# Patient Record
Sex: FEMALE | Race: WHITE | NOT HISPANIC OR LATINO | Employment: FULL TIME | URBAN - METROPOLITAN AREA
[De-identification: names, ages, dates, MRNs, and addresses within clinical notes are randomized per-mention and may not be internally consistent; named-entity substitution may affect disease eponyms.]

---

## 2017-09-11 ENCOUNTER — OFFICE VISIT (OUTPATIENT)
Dept: URGENT CARE | Facility: CLINIC | Age: 27
End: 2017-09-11
Payer: COMMERCIAL

## 2017-09-11 VITALS
HEIGHT: 66 IN | RESPIRATION RATE: 16 BRPM | DIASTOLIC BLOOD PRESSURE: 78 MMHG | HEART RATE: 83 BPM | TEMPERATURE: 97 F | BODY MASS INDEX: 45.8 KG/M2 | SYSTOLIC BLOOD PRESSURE: 112 MMHG | OXYGEN SATURATION: 98 % | WEIGHT: 285 LBS

## 2017-09-11 DIAGNOSIS — R11.2 NON-INTRACTABLE VOMITING WITH NAUSEA, UNSPECIFIED VOMITING TYPE: Primary | ICD-10-CM

## 2017-09-11 PROCEDURE — S0119 ONDANSETRON 4 MG: HCPCS | Mod: S$GLB,,, | Performed by: EMERGENCY MEDICINE

## 2017-09-11 PROCEDURE — 99203 OFFICE O/P NEW LOW 30 MIN: CPT | Mod: 25,S$GLB,, | Performed by: EMERGENCY MEDICINE

## 2017-09-11 PROCEDURE — 3008F BODY MASS INDEX DOCD: CPT | Mod: S$GLB,,, | Performed by: EMERGENCY MEDICINE

## 2017-09-11 PROCEDURE — 96372 THER/PROPH/DIAG INJ SC/IM: CPT | Mod: S$GLB,,, | Performed by: EMERGENCY MEDICINE

## 2017-09-11 RX ORDER — ONDANSETRON 2 MG/ML
4 INJECTION INTRAMUSCULAR; INTRAVENOUS
Status: COMPLETED | OUTPATIENT
Start: 2017-09-11 | End: 2017-09-11

## 2017-09-11 RX ORDER — ONDANSETRON HYDROCHLORIDE 8 MG/1
8 TABLET, FILM COATED ORAL EVERY 8 HOURS PRN
Qty: 12 TABLET | Refills: 0 | Status: SHIPPED | OUTPATIENT
Start: 2017-09-11 | End: 2018-08-22

## 2017-09-11 RX ORDER — ONDANSETRON 4 MG/1
4 TABLET, ORALLY DISINTEGRATING ORAL
Status: COMPLETED | OUTPATIENT
Start: 2017-09-11 | End: 2017-09-11

## 2017-09-11 RX ADMIN — ONDANSETRON 4 MG: 4 TABLET, ORALLY DISINTEGRATING ORAL at 03:09

## 2017-09-11 RX ADMIN — ONDANSETRON 4 MG: 2 INJECTION INTRAMUSCULAR; INTRAVENOUS at 03:09

## 2017-09-11 NOTE — PATIENT INSTRUCTIONS
"  Vomiting (Adult)  Vomiting is a common symptom that may be due to different causes. These include gastroenteritis ("stomach flu"), food poisoning and gastritis. There are other more serious causes of vomiting which may be hard to diagnose early in the illness. Therefore, it is important to watch for the warning signs listed below.  The main danger from repeated vomiting is dehydration. This is due to excess loss of water and minerals from the body. When this occurs, body fluids must be replaced.  Home care  · If symptoms are severe, rest at home for the next 24 hours.  · Because your symptoms may be from an infection, wash your hands frequently and well, and use alcohol-based  to avoid spreading the infection to others.  · Wash your hands for at least 20 seconds. Hum the happy birthday song twice for the correct length of time.  · Wash your hands after using the toilet, before and after preparing food, before eating food, after changing a diaper, cleaning a wound, caring for a sick person, and blowing your nose, coughing, or sneezing. You should also wash your hands after caring for someone who is sick, touching pet food, or treats, and touching an animal, or animal waste.  · You may use acetaminophen or NSAID medicines like ibuprofen or naproxen to control fever, unless another medicine was prescribed. If you have chronic liver or kidney disease or ever had a stomach ulcer or GI bleeding, talk with your doctor before using these medicines. Aspirin should never be used in anyone under 18 years of age who is ill with a fever. It may cause severe liver damage. Don't use NSAID medicines if you are already taking one for another condition (like arthritis) or are on aspirin (such as for heart disease, or after a stroke)  · Avoid tobacco and alcohol use, which may worsen your symptoms.  · If medicines for vomiting were prescribed, take as directed.  · Once vomiting stops, then follow these guidelines:  During " the first 12 to 24 hours follow the diet below:  · Fruit juices. Apple, grape juice, clear fruit drinks, and electrolyte replacement drinks.  · Beverages. Soft drinks without caffeine; mineral water (plain or flavored), decaffeinated tea and coffee.  · Soups. Clear broth, consommé and bouillon  · Desserts. Plain gelatin, popsicles and fruit juice bars. As you feel better, you may add 6-8 ounces of yogurt per day.  During the next 24 hours you may add the following to the above:  · Hot cereal, plain toast, bread, rolls, crackers  · Plain noodles, rice, mashed potatoes, chicken noodle or rice soup  · Unsweetened canned fruit (avoid pineapple), bananas  · Limit caffeine and chocolate. No spices or seasonings except salt.  During the next 24 hours:  Gradually resume a normal diet, as you feel better and your symptoms lessen.  Follow-up care  Follow up with your healthcare provider, or as advised.  When to seek medical advice  Call your healthcare provider right away if any of these occur:  · Constant right-sided lower abdominal pain or increasing general abdominal pain  · Continued vomiting (unable to keep liquids down) for 24 hours  · Frequent diarrhea (more than 5 times a day); blood (red or black color) or mucus in diarrhea  · Reduced urine output or extreme thirst  · Weakness, dizziness or fainting  · Unusually drowsy or confused  · Fever of 100.4°F (38°C) oral or higher, or as directed  · Yellow color of the eyes or skin  Date Last Reviewed: 11/16/2015 © 2000-2017 [employer]. [address] [place] [address], [place] [address]. All rights reserved. This information is not intended as a substitute for professional medical care. Always follow your healthcare professional's instructions.      REST AND HYDRATE WITH PLENTY OF FLUIDS  ZOFRAN RX FOR NAUSEA/VOMITING  SLOWLY ADVANCE DIET AS TOLERATED  SEE VOMITING ADVICE ABOVE  TYLENOL OR MOTRIN 600 MG EVERY 6-8 HOURS

## 2017-09-11 NOTE — PROGRESS NOTES
"Subjective:       Patient ID: Divya De Paz is a 27 y.o. female.    Vitals:  height is 5' 6" (1.676 m) and weight is 129.3 kg (285 lb). Her temperature is 97.4 °F (36.3 °C). Her blood pressure is 112/78 and her pulse is 83. Her respiration is 16 and oxygen saturation is 98%.     Chief Complaint: Nausea (pt said she started vomiting when she woke up today)    Pt vomited several times this morning and is currently vomiting. Pt said she drank and  apple cider vinegar drink with breakfast right before she became sick.REPORTS ACUTE ONSET OF CLAMMINESS, NAUSEA, VOMITING, NO DIARRHEA, NO ABDOMINAL PAIN, NO NEAR SYNCOPE.         Nausea   This is a new problem. The current episode started today. The problem occurs constantly. The problem has been unchanged. Associated symptoms include abdominal pain (CRAMPING), nausea and vomiting. Pertinent negatives include no chest pain, chills, coughing or fever. The symptoms are aggravated by drinking and eating. She has tried nothing for the symptoms. The treatment provided no relief.     Review of Systems   Constitution: Negative for chills and fever.   Eyes: Negative for blurred vision and pain.   Cardiovascular: Negative for chest pain.   Respiratory: Negative for cough and shortness of breath.    Musculoskeletal: Negative for back pain.   Gastrointestinal: Positive for abdominal pain (CRAMPING), nausea and vomiting. Negative for constipation, diarrhea, hematochezia and melena.   Genitourinary: Negative for dysuria and hematuria.       Objective:      Physical Exam   Constitutional: She is oriented to person, place, and time. She appears well-developed and well-nourished.   INITIALLY CLAMMY, UNCOMFORTABLE, AFTER ZOFRAN INJECTION, GENERAL APPEARANCE IS MUCH IMPROVED. NO DISTRESS   HENT:   Head: Normocephalic and atraumatic.   Right Ear: External ear normal.   Left Ear: External ear normal.   Nose: Nose normal.   Mouth/Throat: Mucous membranes are normal.   Eyes: " Conjunctivae and lids are normal.   Neck: Trachea normal and full passive range of motion without pain. Neck supple.   Cardiovascular: Normal rate, regular rhythm and normal heart sounds.    Pulmonary/Chest: Effort normal and breath sounds normal. No respiratory distress.   Abdominal: Soft. Normal appearance. She exhibits no distension, no abdominal bruit and no pulsatile midline mass. There is no tenderness.   MILDYLY HYPERACTIVE Bs, ALLOWS FULL PALPATION AND NO TTP IN ANY QUADRANT SPECIFICALLY THE RLQ AND RUQ   Musculoskeletal: Normal range of motion. She exhibits no edema.   Neurological: She is alert and oriented to person, place, and time. She has normal strength.   Skin: Skin is warm, dry and intact. She is not diaphoretic. No pallor.   Psychiatric: She has a normal mood and affect. Her speech is normal and behavior is normal. Cognition and memory are normal.   Nursing note and vitals reviewed.      REASSESSED MULTIPLE TIMES AND REPEAT ABDOMINAL EXAM SHOWS NO TTP OF THE RLQ/RUQ OR ANY OTHER AREA. ATER ZOFRAN ODT AND Im, PATIENT MUCH IMPROVED SUBJECTIVELY AND OBJECTIVELY. C/W AGE VS FOOD POISONING  Assessment:       1. Non-intractable vomiting with nausea, unspecified vomiting type        Plan:         Non-intractable vomiting with nausea, unspecified vomiting type  -     ondansetron disintegrating tablet 4 mg; Take 1 tablet (4 mg total) by mouth one time.  -     ondansetron injection 4 mg; Inject 4 mg into the vein one time.    Other orders  -     ondansetron (ZOFRAN) 8 MG tablet; Take 1 tablet (8 mg total) by mouth every 8 (eight) hours as needed for Nausea.  Dispense: 12 tablet; Refill: 0          REST AND HYDRATE WITH PLENTY OF FLUIDS  ZOFRAN RX FOR NAUSEA/VOMITING  SLOWLY ADVANCE DIET AS TOLERATED  SEE VOMITING ADVICE ABOVE  TYLENOL OR MOTRIN 600 MG EVERY 6-8 HOURS

## 2017-11-20 DIAGNOSIS — N92.6 IRREGULAR MENSTRUAL CYCLE: ICD-10-CM

## 2017-11-20 DIAGNOSIS — E88.819 INSULIN RESISTANCE: ICD-10-CM

## 2017-11-20 RX ORDER — NORGESTIMATE AND ETHINYL ESTRADIOL 7DAYSX3 28
1 KIT ORAL DAILY
Qty: 84 TABLET | Refills: 2 | OUTPATIENT
Start: 2017-11-20 | End: 2017-12-20

## 2018-07-23 ENCOUNTER — TELEPHONE (OUTPATIENT)
Dept: INTERNAL MEDICINE | Facility: CLINIC | Age: 28
End: 2018-07-23

## 2018-08-22 ENCOUNTER — OFFICE VISIT (OUTPATIENT)
Dept: INTERNAL MEDICINE | Facility: CLINIC | Age: 28
End: 2018-08-22
Attending: INTERNAL MEDICINE
Payer: COMMERCIAL

## 2018-08-22 VITALS
WEIGHT: 293 LBS | DIASTOLIC BLOOD PRESSURE: 74 MMHG | BODY MASS INDEX: 47.09 KG/M2 | OXYGEN SATURATION: 98 % | SYSTOLIC BLOOD PRESSURE: 133 MMHG | HEART RATE: 93 BPM | HEIGHT: 66 IN

## 2018-08-22 DIAGNOSIS — F41.9 ANXIETY AND DEPRESSION: ICD-10-CM

## 2018-08-22 DIAGNOSIS — E28.2 PCOS (POLYCYSTIC OVARIAN SYNDROME): ICD-10-CM

## 2018-08-22 DIAGNOSIS — Z00.00 ANNUAL PHYSICAL EXAM: Primary | ICD-10-CM

## 2018-08-22 DIAGNOSIS — F32.A ANXIETY AND DEPRESSION: ICD-10-CM

## 2018-08-22 DIAGNOSIS — E66.01 MORBID OBESITY WITH BMI OF 45.0-49.9, ADULT: ICD-10-CM

## 2018-08-22 PROCEDURE — 99385 PREV VISIT NEW AGE 18-39: CPT | Mod: S$GLB,,, | Performed by: INTERNAL MEDICINE

## 2018-08-22 PROCEDURE — 99999 PR PBB SHADOW E&M-EST. PATIENT-LVL III: CPT | Mod: PBBFAC,,, | Performed by: INTERNAL MEDICINE

## 2018-08-22 RX ORDER — FLUOXETINE HYDROCHLORIDE 20 MG/1
20 CAPSULE ORAL DAILY
Qty: 90 CAPSULE | Refills: 0 | Status: SHIPPED | OUTPATIENT
Start: 2018-08-22 | End: 2018-11-13 | Stop reason: SDUPTHER

## 2018-08-22 NOTE — PROGRESS NOTES
"Subjective:   Patient ID: Divya De Paz is a 28 y.o. female  Chief complaint:   Chief Complaint   Patient presents with    Annual Exam       HPI     Here for annual exam and to [facility] care     Hx of anxiety and depression  - dwain prozac 20mg and well controlled   - no si/hi/hewitt   - was seeing psychiatrist Dr. Nito Gates but copay increasing and doing well on med - would like to see if can further refills from me     PCOS: on ocps - seen by Endocrine in past   Gyn: [facility] - has appt scheduled in Sept    Walking 2 mi per day after new dog   Exercising in gyn - elliptical - to promote wt loss     Review of Systems    Objective:  Vitals:    08/22/18 1141   BP: 133/74   Pulse: 93   SpO2: 98%   Weight: 134.7 kg (296 lb 15.4 oz)   Height: 5' 6" (1.676 m)     Body mass index is 47.93 kg/m².    Physical Exam   Constitutional: She is oriented to person, place, and time. She appears well-developed and well-nourished.   HENT:   Head: Normocephalic and atraumatic.   Right Ear: External ear normal.   Left Ear: External ear normal.   Nose: Nose normal.   Mouth/Throat: Oropharynx is clear and moist. No oropharyngeal exudate.   No carotid bruits   Eyes: Conjunctivae and EOM are normal.   Neck: Neck supple. No thyromegaly present.   Cardiovascular: Normal rate, regular rhythm, normal heart sounds and intact distal pulses.   Pulmonary/Chest: Effort normal and breath sounds normal.   Abdominal: Soft. Bowel sounds are normal.   Musculoskeletal: She exhibits no edema or tenderness.   Lymphadenopathy:     She has no cervical adenopathy.   Neurological: She is alert and oriented to person, place, and time.   Skin: Skin is warm and dry.   Psychiatric: Her behavior is normal. Thought content normal.   Vitals reviewed.      Assessment:  1. Annual physical exam    2. PCOS (polycystic ovarian syndrome)    3. Morbid obesity with BMI of 45.0-49.9, adult    4. Anxiety and depression        Plan:  Divya was seen today for annual " exam.    Diagnoses and all orders for this visit:    Annual physical exam  -     CBC auto differential; Future  -     Comprehensive metabolic panel; Future  -     TSH; Future  -     Lipid panel; Future  -     Hemoglobin A1c; Future    PCOS (polycystic ovarian syndrome)    Morbid obesity with BMI of 45.0-49.9, adult  Recommend daily sunscreen, cardiovascular exercise min 30 min 5 days per week. Seatbelts routinely.    Other orders  -     Cancel: Tdap Vaccine  -     FLUoxetine (PROZAC) 20 MG capsule; Take 1 capsule (20 mg total) by mouth once daily.    Recommend daily sunscreen, cardiovascular exercise min 30 min 5 days per week. Seatbelts routinely.  Cont current meds   Will refill prozac as doing well and sx stable       Health Maintenance   Topic Date Due    TETANUS VACCINE  08/05/2014    Influenza Vaccine  08/01/2018    Pap Smear  09/15/2018 (Originally 10/22/2017)    Lipid Panel  Completed

## 2018-09-14 ENCOUNTER — OFFICE VISIT (OUTPATIENT)
Dept: OBSTETRICS AND GYNECOLOGY | Facility: CLINIC | Age: 28
End: 2018-09-14
Payer: COMMERCIAL

## 2018-09-14 VITALS
WEIGHT: 291 LBS | SYSTOLIC BLOOD PRESSURE: 130 MMHG | DIASTOLIC BLOOD PRESSURE: 78 MMHG | BODY MASS INDEX: 46.77 KG/M2 | HEIGHT: 66 IN

## 2018-09-14 DIAGNOSIS — F41.9 ANXIETY: ICD-10-CM

## 2018-09-14 DIAGNOSIS — Z30.9 ENCOUNTER FOR CONTRACEPTIVE MANAGEMENT, UNSPECIFIED TYPE: Primary | ICD-10-CM

## 2018-09-14 DIAGNOSIS — E88.819 INSULIN RESISTANCE: ICD-10-CM

## 2018-09-14 DIAGNOSIS — Z01.419 ENCOUNTER FOR ANNUAL ROUTINE GYNECOLOGICAL EXAMINATION: ICD-10-CM

## 2018-09-14 DIAGNOSIS — N92.6 IRREGULAR MENSTRUAL CYCLE: ICD-10-CM

## 2018-09-14 LAB
B-HCG UR QL: NEGATIVE
CTP QC/QA: YES

## 2018-09-14 PROCEDURE — 99395 PREV VISIT EST AGE 18-39: CPT | Mod: S$GLB,,, | Performed by: OBSTETRICS & GYNECOLOGY

## 2018-09-14 PROCEDURE — 88175 CYTOPATH C/V AUTO FLUID REDO: CPT

## 2018-09-14 PROCEDURE — 81025 URINE PREGNANCY TEST: CPT | Mod: S$GLB,,, | Performed by: OBSTETRICS & GYNECOLOGY

## 2018-09-14 PROCEDURE — 99999 PR PBB SHADOW E&M-EST. PATIENT-LVL II: CPT | Mod: PBBFAC,,, | Performed by: OBSTETRICS & GYNECOLOGY

## 2018-09-14 RX ORDER — NORGESTIMATE AND ETHINYL ESTRADIOL 7DAYSX3 28
1 KIT ORAL DAILY
Qty: 28 TABLET | Refills: 3 | Status: CANCELLED | OUTPATIENT
Start: 2018-09-14 | End: 2018-10-14

## 2018-09-14 RX ORDER — AMOXICILLIN 500 MG/1
CAPSULE ORAL
COMMUNITY
Start: 2018-08-30 | End: 2018-11-28

## 2018-09-14 RX ORDER — HYDROCODONE BITARTRATE AND ACETAMINOPHEN 5; 325 MG/1; MG/1
TABLET ORAL
COMMUNITY
Start: 2018-08-30 | End: 2018-11-28

## 2018-09-17 NOTE — PROGRESS NOTES
HISTORY OF PRESENT ILLNESS:    Divya De Paz is a 28 y.o. female, , Patient's last menstrual period was 2018.,  presents for a routine exam and has no complaints.  Cycles occur every 1-2 months.     Last visit:   Menarche at age 14 with cycles every 1-2 months, at age 18 every 3-4 months, now every 4-12 months.  Weight at age  lbs,  lbs. No family history of irregular cycles.   Hirsutism increased over the last 2-3 years, hair removal cream every other day.   Patient seen in 10/2014, insurance lapse & returns for follow up today.   Patient had cycle with Provera challenge.    Past Medical History:   Diagnosis Date    Anxiety     Depression     Hidradenitis suppurativa     PCOS (polycystic ovarian syndrome)        History reviewed. No pertinent surgical history.    MEDICATIONS AND ALLERGIES:      Current Outpatient Medications:     amoxicillin (AMOXIL) 500 MG capsule, , Disp: , Rfl:     FLUoxetine (PROZAC) 20 MG capsule, Take 1 capsule (20 mg total) by mouth once daily., Disp: 90 capsule, Rfl: 0    HYDROcodone-acetaminophen (NORCO) 5-325 mg per tablet, , Disp: , Rfl:     norgestimate-ethinyl estradiol (ORTHO TRI-CYCLEN, 28,) 0.18/0.215/0.25 mg-35 mcg (28) tablet, Take 1 tablet by mouth once daily., Disp: 28 tablet, Rfl: 3    Review of patient's allergies indicates:   Allergen Reactions    Cefzil [cefprozil] Hives       Family History   Problem Relation Age of Onset    Diabetes Mother     Depression Mother     Hypertension Father     Depression Father     Arthritis Father     Melanoma Maternal Aunt     Cancer Maternal Grandmother         lung cancer, nonsmoker    Cancer Paternal Grandmother         breast cancer    Acne Brother     Cancer Paternal Aunt 42        breast cancer       Social History     Socioeconomic History    Marital status: Single     Spouse name: Not on file    Number of children: Not on file    Years of education: Not on file    Highest education  level: Not on file   Social Needs    Financial resource strain: Not on file    Food insecurity - worry: Not on file    Food insecurity - inability: Not on file    Transportation needs - medical: Not on file    Transportation needs - non-medical: Not on file   Occupational History    Occupation: claims  and proc     Employer: [employer]   Tobacco Use    Smoking status: Passive Smoke Exposure - Never Smoker    Smokeless tobacco: Never Used    Tobacco comment: social tobacco 3 cigs every 2 weeks    Substance and Sexual Activity    Alcohol use: Yes     Alcohol/week: 0.0 oz     Comment: occasional    Drug use: No    Sexual activity: Not Currently     Partners: Male     Birth control/protection: OCP   Other Topics Concern    Are you pregnant or think you may be? No    Breast-feeding No   Social History Narrative    From [place]    Moved to [facility] 2008    Exercising        COMPREHENSIVE GYN HISTORY:  PAP History: Denies abnormal Paps.  Infection History: Denies STDs. Denies PID.  Benign History: Denies uterine fibroids. Denies ovarian cysts. Denies endometriosis. Denies other conditions.  Cancer History: Denies cervical cancer. Denies uterine cancer or hyperplasia. Denies ovarian cancer. Denies vulvar cancer or pre-cancer. Denies vaginal cancer or pre-cancer. Denies breast cancer. Denies colon cancer.  Sexual Activity History: Reports currently being sexually active  Menstrual History: Monthly. Mod then light flow.   Dysmenorrhea History: Reports mild dysmenorrhea.       ROS:  GENERAL: No weight changes. No swelling. No fatigue. No fever.  CARDIOVASCULAR: No chest pain. No shortness of breath. No leg cramps.   NEUROLOGICAL: No headaches. No vision changes.  BREASTS: No pain. No lumps. No discharge.  ABDOMEN: No pain. No nausea. No vomiting. No diarrhea. No constipation.  REPRODUCTIVE: No abnormal bleeding.   VULVA: No pain. No lesions. No itching.  VAGINA: No relaxation. No itching. No odor. No  "discharge. No lesions.  URINARY: No incontinence. No nocturia. No frequency. No dysuria.    /78   Ht 5' 6" (1.676 m)   Wt 132 kg (291 lb 0.1 oz)   LMP 08/29/2018   BMI 46.97 kg/m²     PE:  APPEARANCE: Well nourished, well developed, in no acute distress.  AFFECT: WNL, alert and oriented x 3.  SKIN: No acne or hirsutism.  NECK: Neck symmetric, without masses or thyromegaly.  NODES: No inguinal, cervical, axillary or femoral lymph node enlargement.  CHEST: Good respiratory effort.   ABDOMEN: Soft. No tenderness or masses. No hepatosplenomegaly. No hernias.  BREASTS: Symmetrical, no skin changes, visible lesions, palpable masses or nipple discharge bilaterally.  PELVIC: External female genitalia without lesions.  Female hair distribution. Adequate perineal body, Normal urethral meatus. Vagina moist and well rugated without lesions or discharge.  No significant cystocele or rectocele present. Cervix pink without lesions, discharge or tenderness. Uterus is 4-6 week size, regular, mobile and nontender. Adnexa without masses or tenderness.  EXTREMITIES: No edema    DIAGNOSIS:  1. Encounter for contraceptive management, unspecified type    2. Irregular menstrual cycle    3. Insulin resistance    4. Encounter for annual routine gynecological examination    5. Anxiety        PLAN:    Orders Placed This Encounter    POCT urine pregnancy    Liquid-based pap smear, screening       COUNSELING:  The patient was counseled today on:  -A.C.S. Pap and pelvic exam guidelines (pap every 3 years), recomendations for yearly mammogram;  -to follow up with her PCP for other health maintenance.    FOLLOW-UP with me annually.   "

## 2018-09-27 DIAGNOSIS — E88.819 INSULIN RESISTANCE: ICD-10-CM

## 2018-09-27 DIAGNOSIS — N92.6 IRREGULAR MENSTRUAL CYCLE: ICD-10-CM

## 2018-09-28 RX ORDER — NORGESTIMATE AND ETHINYL ESTRADIOL 7DAYSX3 28
1 KIT ORAL DAILY
Qty: 84 TABLET | Refills: 2 | Status: SHIPPED | OUTPATIENT
Start: 2018-09-28 | End: 2019-12-30 | Stop reason: SDUPTHER

## 2018-09-29 ENCOUNTER — OFFICE VISIT (OUTPATIENT)
Dept: URGENT CARE | Facility: CLINIC | Age: 28
End: 2018-09-29
Payer: COMMERCIAL

## 2018-09-29 VITALS
RESPIRATION RATE: 18 BRPM | SYSTOLIC BLOOD PRESSURE: 157 MMHG | TEMPERATURE: 98 F | HEIGHT: 66 IN | DIASTOLIC BLOOD PRESSURE: 87 MMHG | BODY MASS INDEX: 46.77 KG/M2 | HEART RATE: 103 BPM | WEIGHT: 291 LBS | OXYGEN SATURATION: 98 %

## 2018-09-29 DIAGNOSIS — S16.1XXA NECK STRAIN, INITIAL ENCOUNTER: Primary | ICD-10-CM

## 2018-09-29 PROCEDURE — 72040 X-RAY EXAM NECK SPINE 2-3 VW: CPT | Mod: FY,S$GLB,, | Performed by: RADIOLOGY

## 2018-09-29 PROCEDURE — 99214 OFFICE O/P EST MOD 30 MIN: CPT | Mod: S$GLB,,, | Performed by: FAMILY MEDICINE

## 2018-09-29 RX ORDER — CYCLOBENZAPRINE HCL 10 MG
10 TABLET ORAL NIGHTLY
Qty: 30 TABLET | Refills: 0 | Status: SHIPPED | OUTPATIENT
Start: 2018-09-29 | End: 2018-10-29

## 2018-09-29 RX ORDER — IBUPROFEN 800 MG/1
800 TABLET ORAL 3 TIMES DAILY
Qty: 30 TABLET | Refills: 0 | Status: SHIPPED | OUTPATIENT
Start: 2018-09-29 | End: 2018-10-09
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
## 2018-09-29 NOTE — PATIENT INSTRUCTIONS
Neck Sprain or Strain  A sudden force that causes turning or bending of the neck can cause sprain or strain. An example would be the force from a car accident. This can stretch or tear muscles called a strain. It can also stretch or tear ligaments called a sprain. Either of these can cause neck pain. Sometimes neck pain occurs after a simple awkward movement. In either case, muscle spasm is commonly present and contributes to the pain.     Unless you had a forceful physical injury (for example, a car accident or fall), X-rays are usually not ordered for the initial evaluation of neck pain. If pain continues and dose not respond to medical treatment, X-rays and other tests may be performed at a later time.  Home care  · You may feel more soreness and spasm the first few days after the injury. Rest until symptoms begin to improve.  · When lying down, use a comfortable pillow or a rolled towel that supports the head and keeps the spine in a neutral position. The position of the head should not be tilted forward or backward.  · Apply an ice pack over the injured area for 15 to 20 minutes every 3 to 6 hours. You should do this for the first 24 to 48 hours. You can make an ice pack by filling a plastic bag that seals at the top with ice cubes and then wrapping it with a thin towel. After 48 hours, apply heat (warm shower or warm bath) for 15 to 20 minutes several times a day, or alternate ice and heat.  · You may use over-the-counter pain medicine to control pain, unless another pain medicine was prescribed. If you have chronic liver or kidney disease or ever had a stomach ulcer or GI bleeding, talk with your healthcare provider before using these medicines.  · If a soft cervical collar was prescribed, it should be worn only for periods of increased pain. It should not be worn for more than 3 hours a day, or for a period longer than 1 to 2 weeks.  Follow-up care  Follow up with your healthcare provider as directed.  Physical therapy may be needed.  Sometimes fractures dont show up on the first X-ray. Bruises and sprains can sometimes hurt as much as a fracture. These injuries can take time to heal completely. If your symptoms dont improve or they get worse, talk with your healthcare provider. You may need a repeat X-ray or other tests. If X-rays were taken, you will be told of any new findings that may affect your care.  Call 911  Call 911 if you have:  · Neck swelling, difficulty or painful swallowing  · Difficulty breathing  · Chest pain  When to seek medical advice  Call your healthcare provider right away if any of these occur:  · Pain becomes worse or spreads into your arms  · Weakness or numbness in one or both arms  Date Last Reviewed: 11/19/2015  © 3707-9108 [employer]. [address] [place] [address], [place] [address]. All rights reserved. This information is not intended as a substitute for professional medical care. Always follow your healthcare professional's instructions.    Follow up with your doctor in a few days as needed.  Return to the urgent care or go to the ER if symptoms get worse.    Jose Dubon MD

## 2018-10-19 ENCOUNTER — LAB VISIT (OUTPATIENT)
Dept: LAB | Facility: OTHER | Age: 28
End: 2018-10-19
Attending: INTERNAL MEDICINE
Payer: COMMERCIAL

## 2018-10-19 DIAGNOSIS — Z00.00 ANNUAL PHYSICAL EXAM: ICD-10-CM

## 2018-10-19 LAB
ALBUMIN SERPL BCP-MCNC: 4 G/DL
ALP SERPL-CCNC: 60 U/L
ALT SERPL W/O P-5'-P-CCNC: 15 U/L
ANION GAP SERPL CALC-SCNC: 9 MMOL/L
AST SERPL-CCNC: 16 U/L
BILIRUB SERPL-MCNC: 0.9 MG/DL
BUN SERPL-MCNC: 7 MG/DL
CALCIUM SERPL-MCNC: 9.5 MG/DL
CHLORIDE SERPL-SCNC: 103 MMOL/L
CHOLEST SERPL-MCNC: 153 MG/DL
CHOLEST/HDLC SERPL: 3.7 {RATIO}
CO2 SERPL-SCNC: 25 MMOL/L
CREAT SERPL-MCNC: 0.6 MG/DL
EST. GFR  (AFRICAN AMERICAN): >60 ML/MIN/1.73 M^2
EST. GFR  (NON AFRICAN AMERICAN): >60 ML/MIN/1.73 M^2
ESTIMATED AVG GLUCOSE: 91 MG/DL
GLUCOSE SERPL-MCNC: 86 MG/DL
HBA1C MFR BLD HPLC: 4.8 %
HDLC SERPL-MCNC: 41 MG/DL
HDLC SERPL: 26.8 %
LDLC SERPL CALC-MCNC: 75.2 MG/DL
NONHDLC SERPL-MCNC: 112 MG/DL
POTASSIUM SERPL-SCNC: 4 MMOL/L
PROT SERPL-MCNC: 7.5 G/DL
SODIUM SERPL-SCNC: 137 MMOL/L
TRIGL SERPL-MCNC: 184 MG/DL
TSH SERPL DL<=0.005 MIU/L-ACNC: 1.88 UIU/ML

## 2018-10-19 PROCEDURE — 80053 COMPREHEN METABOLIC PANEL: CPT

## 2018-10-19 PROCEDURE — 36415 COLL VENOUS BLD VENIPUNCTURE: CPT

## 2018-10-19 PROCEDURE — 84443 ASSAY THYROID STIM HORMONE: CPT

## 2018-10-19 PROCEDURE — 80061 LIPID PANEL: CPT

## 2018-10-19 PROCEDURE — 83036 HEMOGLOBIN GLYCOSYLATED A1C: CPT

## 2018-10-25 DIAGNOSIS — S16.1XXA NECK STRAIN, INITIAL ENCOUNTER: ICD-10-CM

## 2018-10-26 RX ORDER — CYCLOBENZAPRINE HCL 10 MG
TABLET ORAL
Qty: 30 TABLET | Refills: 0 | OUTPATIENT
Start: 2018-10-26

## 2018-11-09 DIAGNOSIS — S16.1XXA NECK STRAIN, INITIAL ENCOUNTER: ICD-10-CM

## 2018-11-09 RX ORDER — CYCLOBENZAPRINE HCL 10 MG
TABLET ORAL
Qty: 30 TABLET | Refills: 0 | OUTPATIENT
Start: 2018-11-09

## 2018-11-13 RX ORDER — FLUOXETINE HYDROCHLORIDE 20 MG/1
20 CAPSULE ORAL DAILY
Qty: 90 CAPSULE | Refills: 3 | Status: SHIPPED | OUTPATIENT
Start: 2018-11-13 | End: 2019-07-07 | Stop reason: SDUPTHER

## 2018-11-13 NOTE — TELEPHONE ENCOUNTER
----- Message from Juan Alberto Pompa sent at 11/13/2018  3:14 PM CST -----  Contact: Kary ([facility])      Can the clinic reply in [facility]: no      Please refill the medication(s) listed below. Please call the patient when the prescription(s) is ready for  at this phone number          Medication #1 FLUoxetine (PROZAC) 20 MG capsule (90 day requested)    Preferred Pharmacy: [id number] - [address]

## 2018-11-28 ENCOUNTER — OFFICE VISIT (OUTPATIENT)
Dept: URGENT CARE | Facility: CLINIC | Age: 28
End: 2018-11-28
Payer: COMMERCIAL

## 2018-11-28 VITALS
BODY MASS INDEX: 45 KG/M2 | DIASTOLIC BLOOD PRESSURE: 73 MMHG | WEIGHT: 280 LBS | HEART RATE: 97 BPM | SYSTOLIC BLOOD PRESSURE: 143 MMHG | TEMPERATURE: 99 F | HEIGHT: 66 IN | RESPIRATION RATE: 18 BRPM | OXYGEN SATURATION: 95 %

## 2018-11-28 DIAGNOSIS — R11.2 NAUSEA AND VOMITING, INTRACTABILITY OF VOMITING NOT SPECIFIED, UNSPECIFIED VOMITING TYPE: Primary | ICD-10-CM

## 2018-11-28 PROCEDURE — 99214 OFFICE O/P EST MOD 30 MIN: CPT | Mod: S$GLB,,, | Performed by: NURSE PRACTITIONER

## 2018-11-28 PROCEDURE — S0119 ONDANSETRON 4 MG: HCPCS | Mod: S$GLB,,, | Performed by: EMERGENCY MEDICINE

## 2018-11-28 RX ORDER — ONDANSETRON 4 MG/1
4 TABLET, ORALLY DISINTEGRATING ORAL EVERY 8 HOURS PRN
Qty: 12 TABLET | Refills: 0 | Status: SHIPPED | OUTPATIENT
Start: 2018-11-28 | End: 2021-08-24

## 2018-11-28 RX ORDER — ONDANSETRON 4 MG/1
4 TABLET, ORALLY DISINTEGRATING ORAL
Status: COMPLETED | OUTPATIENT
Start: 2018-11-28 | End: 2018-11-28

## 2018-11-28 RX ADMIN — ONDANSETRON 4 MG: 4 TABLET, ORALLY DISINTEGRATING ORAL at 02:11

## 2018-11-28 NOTE — LETTER
November 28, 2018      [facility]  [address] [address]  Phone: [phone]  Fax: [phone]       Patient: Divya De Paz   YOB: 1990  Date of Visit: 11/28/2018    To Whom It May Concern:    Mita De Paz  was at [facility] on 11/28/2018. She may return to work/school on 11/29/2018 with no restrictions. If you have any questions or concerns, or if I can be of further assistance, please do not hesitate to contact me.    Sincerely,        Kary Ventura NP

## 2018-11-28 NOTE — PATIENT INSTRUCTIONS
"  You must understand that you've received an Urgent Care treatment only and that you may be released before all your medical problems are known or treated. You, the patient, will arrange for follow up care as instructed.  If your condition worsens we recommend that you receive another evaluation at the emergency room immediately or contact your primary medical clinics after hours call service to discuss your concerns.  Please return here or go to the Emergency Department for any concerns or worsening of condition.      Vomiting (Adult)  Vomiting is a common symptom that may be due to different causes. These include gastroenteritis ("stomach flu"), food poisoning and gastritis. There are other more serious causes of vomiting which may be hard to diagnose early in the illness. Therefore, it is important to watch for the warning signs listed below.  The main danger from repeated vomiting is dehydration. This is due to excess loss of water and minerals from the body. When this occurs, body fluids must be replaced.  Home care  · If symptoms are severe, rest at home for the next 24 hours.  · Because your symptoms may be from an infection, wash your hands frequently and well, and use alcohol-based  to avoid spreading the infection to others.  · Wash your hands for at least 20 seconds. Hum the happy birthday song twice for the correct length of time.  · Wash your hands after using the toilet, before and after preparing food, before eating food, after changing a diaper, cleaning a wound, caring for a sick person, and blowing your nose, coughing, or sneezing. You should also wash your hands after caring for someone who is sick, touching pet food, or treats, and touching an animal, or animal waste.  · You may use acetaminophen or NSAID medicines like ibuprofen or naproxen to control fever, unless another medicine was prescribed. If you have chronic liver or kidney disease or ever had a stomach ulcer or GI bleeding, " talk with your doctor before using these medicines. Aspirin should never be used in anyone under 18 years of age who is ill with a fever. It may cause severe liver damage. Don't use NSAID medicines if you are already taking one for another condition (like arthritis) or are on aspirin (such as for heart disease, or after a stroke)  · Avoid tobacco and alcohol use, which may worsen your symptoms.  · If medicines for vomiting were prescribed, take as directed.  · Once vomiting stops, then follow these guidelines:  During the first 12 to 24 hours follow the diet below:  · Fruit juices. Apple, grape juice, clear fruit drinks, and electrolyte replacement drinks.  · Beverages. Soft drinks without caffeine; mineral water (plain or flavored), decaffeinated tea and coffee.  · Soups. Clear broth, consommé and bouillon  · Desserts. Plain gelatin, popsicles and fruit juice bars. As you feel better, you may add 6-8 ounces of yogurt per day.  During the next 24 hours you may add the following to the above:  · Hot cereal, plain toast, bread, rolls, crackers  · Plain noodles, rice, mashed potatoes, chicken noodle or rice soup  · Unsweetened canned fruit (avoid pineapple), bananas  · Limit caffeine and chocolate. No spices or seasonings except salt.  During the next 24 hours:  Gradually resume a normal diet, as you feel better and your symptoms lessen.  Follow-up care  Follow up with your healthcare provider, or as advised.  When to seek medical advice  Call your healthcare provider right away if any of these occur:  · Constant right-sided lower abdominal pain or increasing general abdominal pain  · Continued vomiting (unable to keep liquids down) for 24 hours  · Frequent diarrhea (more than 5 times a day); blood (red or black color) or mucus in diarrhea  · Reduced urine output or extreme thirst  · Weakness, dizziness or fainting  · Unusually drowsy or confused  · Fever of 100.4°F (38°C) oral or higher, or as directed  · Yellow color  of the eyes or skin  Date Last Reviewed: 11/16/2015  © 1317-9046 [employer]. [address] [place] [address], [place] [address]. All rights reserved. This information is not intended as a substitute for professional medical care. Always follow your healthcare professional's instructions.

## 2018-11-28 NOTE — PROGRESS NOTES
"Subjective:       Patient ID: Divya De Paz is a 28 y.o. female.    Vitals:  height is 5' 6" (1.676 m) and weight is 127 kg (280 lb). Her oral temperature is 98.5 °F (36.9 °C). Her blood pressure is 143/73 (abnormal) and her pulse is 97. Her respiration is 18 and oxygen saturation is 95%.     Chief Complaint: Emesis    Patient states the vomiting started after she drank a cup of coffee. LMP last week per patient. Denies any constipation or diarrhea at this time. Denies any fevers.       Emesis    This is a new problem. The current episode started today. The problem occurs 5 to 10 times per day. The problem has been unchanged. The emesis has an appearance of bile. There has been no fever. Pertinent negatives include no abdominal pain, arthralgias, chest pain, chills, coughing, diarrhea, dizziness, fever, headaches, myalgias, sweats, URI or weight loss. Risk factors include suspect food intake. Treatments tried: Peptobismol. The treatment provided no relief.       Constitution: Negative for chills, fatigue and fever.   HENT: Negative for congestion and sore throat.    Neck: Negative for painful lymph nodes.   Cardiovascular: Negative for chest pain and leg swelling.   Eyes: Negative for double vision and blurred vision.   Respiratory: Negative for cough and shortness of breath.    Gastrointestinal: Positive for nausea and vomiting. Negative for abdominal pain, constipation and diarrhea.   Genitourinary: Negative for dysuria, frequency, urgency and history of kidney stones.   Musculoskeletal: Negative for joint pain, joint swelling, muscle cramps and muscle ache.   Skin: Negative for color change, pale, rash and bruising.   Allergic/Immunologic: Negative for seasonal allergies.   Neurological: Negative for dizziness, history of vertigo, light-headedness, passing out and headaches.   Hematologic/Lymphatic: Negative for swollen lymph nodes.   Psychiatric/Behavioral: Negative for nervous/anxious, sleep disturbance " and depression. The patient is not nervous/anxious.        Objective:      Physical Exam   Constitutional: She is oriented to person, place, and time. She appears well-developed and well-nourished.   HENT:   Head: Normocephalic and atraumatic.   Right Ear: External ear normal.   Left Ear: External ear normal.   Nose: Nose normal.   Mouth/Throat: Mucous membranes are normal.   Eyes: Conjunctivae and lids are normal.   Neck: Trachea normal and full passive range of motion without pain. Neck supple.   Cardiovascular: Normal rate, regular rhythm and normal heart sounds.   Pulmonary/Chest: Effort normal and breath sounds normal. No respiratory distress.   Abdominal: Soft. Normal appearance and bowel sounds are normal. She exhibits no distension, no abdominal bruit, no pulsatile midline mass and no mass. There is no tenderness.     There is no tenderness or guarding throughout exam. Bowel sounds hyperactive.    Musculoskeletal: Normal range of motion. She exhibits no edema.   Neurological: She is alert and oriented to person, place, and time. She has normal strength.   Skin: Skin is warm, dry and intact. She is not diaphoretic. No pallor.   Psychiatric: She has a normal mood and affect. Her speech is normal and behavior is normal. Judgment and thought content normal. Cognition and memory are normal.   Nursing note and vitals reviewed.        Assessment:       1. Nausea and vomiting, intractability of vomiting not specified, unspecified vomiting type        Plan:         Discussed importance of oral rehydration and when it is appropriate to seek care. Pt verb an understanding.     Nausea and vomiting, intractability of vomiting not specified, unspecified vomiting type  -     ondansetron disintegrating tablet 4 mg  -     ondansetron (ZOFRAN-ODT) 4 MG TbDL; Take 1 tablet (4 mg total) by mouth every 8 (eight) hours as needed (nausea).  Dispense: 12 tablet; Refill: 0            Patient Instructions     You must understand that  "you've received an Urgent Care treatment only and that you may be released before all your medical problems are known or treated. You, the patient, will arrange for follow up care as instructed.  If your condition worsens we recommend that you receive another evaluation at the emergency room immediately or contact your primary medical clinics after hours call service to discuss your concerns.  Please return here or go to the Emergency Department for any concerns or worsening of condition.      Vomiting (Adult)  Vomiting is a common symptom that may be due to different causes. These include gastroenteritis ("stomach flu"), food poisoning and gastritis. There are other more serious causes of vomiting which may be hard to diagnose early in the illness. Therefore, it is important to watch for the warning signs listed below.  The main danger from repeated vomiting is dehydration. This is due to excess loss of water and minerals from the body. When this occurs, body fluids must be replaced.  Home care  · If symptoms are severe, rest at home for the next 24 hours.  · Because your symptoms may be from an infection, wash your hands frequently and well, and use alcohol-based  to avoid spreading the infection to others.  · Wash your hands for at least 20 seconds. Hum the happy birthday song twice for the correct length of time.  · Wash your hands after using the toilet, before and after preparing food, before eating food, after changing a diaper, cleaning a wound, caring for a sick person, and blowing your nose, coughing, or sneezing. You should also wash your hands after caring for someone who is sick, touching pet food, or treats, and touching an animal, or animal waste.  · You may use acetaminophen or NSAID medicines like ibuprofen or naproxen to control fever, unless another medicine was prescribed. If you have chronic liver or kidney disease or ever had a stomach ulcer or GI bleeding, talk with your doctor before " using these medicines. Aspirin should never be used in anyone under 18 years of age who is ill with a fever. It may cause severe liver damage. Don't use NSAID medicines if you are already taking one for another condition (like arthritis) or are on aspirin (such as for heart disease, or after a stroke)  · Avoid tobacco and alcohol use, which may worsen your symptoms.  · If medicines for vomiting were prescribed, take as directed.  · Once vomiting stops, then follow these guidelines:  During the first 12 to 24 hours follow the diet below:  · Fruit juices. Apple, grape juice, clear fruit drinks, and electrolyte replacement drinks.  · Beverages. Soft drinks without caffeine; mineral water (plain or flavored), decaffeinated tea and coffee.  · Soups. Clear broth, consommé and bouillon  · Desserts. Plain gelatin, popsicles and fruit juice bars. As you feel better, you may add 6-8 ounces of yogurt per day.  During the next 24 hours you may add the following to the above:  · Hot cereal, plain toast, bread, rolls, crackers  · Plain noodles, rice, mashed potatoes, chicken noodle or rice soup  · Unsweetened canned fruit (avoid pineapple), bananas  · Limit caffeine and chocolate. No spices or seasonings except salt.  During the next 24 hours:  Gradually resume a normal diet, as you feel better and your symptoms lessen.  Follow-up care  Follow up with your healthcare provider, or as advised.  When to seek medical advice  Call your healthcare provider right away if any of these occur:  · Constant right-sided lower abdominal pain or increasing general abdominal pain  · Continued vomiting (unable to keep liquids down) for 24 hours  · Frequent diarrhea (more than 5 times a day); blood (red or black color) or mucus in diarrhea  · Reduced urine output or extreme thirst  · Weakness, dizziness or fainting  · Unusually drowsy or confused  · Fever of 100.4°F (38°C) oral or higher, or as directed  · Yellow color of the eyes or skin  Date  Last Reviewed: 11/16/2015  © 8435-8386 [employer], [employer]. [address] [place] [address], [place] [address]. All rights reserved. This information is not intended as a substitute for professional medical care. Always follow your healthcare professional's instructions.

## 2018-12-21 ENCOUNTER — TELEPHONE (OUTPATIENT)
Dept: OBSTETRICS AND GYNECOLOGY | Facility: CLINIC | Age: 28
End: 2018-12-21

## 2019-07-08 RX ORDER — FLUOXETINE HYDROCHLORIDE 20 MG/1
20 CAPSULE ORAL DAILY
Qty: 90 CAPSULE | Refills: 3 | Status: SHIPPED | OUTPATIENT
Start: 2019-07-08 | End: 2020-10-06 | Stop reason: SDUPTHER

## 2019-07-08 RX ORDER — FLUOXETINE HYDROCHLORIDE 20 MG/1
20 CAPSULE ORAL DAILY
Qty: 90 CAPSULE | Refills: 0 | Status: SHIPPED | OUTPATIENT
Start: 2019-07-08 | End: 2019-12-26 | Stop reason: SDUPTHER

## 2019-07-08 NOTE — TELEPHONE ENCOUNTER
Ms De Paz your prescription has been sent to the pharmacy. You have bood work ordered for 2 months. I need a day and time in September to schedule

## 2019-12-25 DIAGNOSIS — E88.819 INSULIN RESISTANCE: ICD-10-CM

## 2019-12-25 DIAGNOSIS — N92.6 IRREGULAR MENSTRUAL CYCLE: ICD-10-CM

## 2019-12-26 RX ORDER — NORGESTIMATE AND ETHINYL ESTRADIOL 7DAYSX3 28
1 KIT ORAL DAILY
Qty: 84 TABLET | Refills: 2 | OUTPATIENT
Start: 2019-12-26 | End: 2020-01-25

## 2019-12-26 RX ORDER — FLUOXETINE HYDROCHLORIDE 20 MG/1
20 CAPSULE ORAL DAILY
Qty: 30 CAPSULE | Refills: 1 | Status: SHIPPED | OUTPATIENT
Start: 2019-12-26 | End: 2021-08-24

## 2019-12-27 ENCOUNTER — OFFICE VISIT (OUTPATIENT)
Dept: DERMATOLOGY | Facility: CLINIC | Age: 29
End: 2019-12-27
Payer: COMMERCIAL

## 2019-12-27 VITALS — BODY MASS INDEX: 45.19 KG/M2 | WEIGHT: 280 LBS

## 2019-12-27 DIAGNOSIS — L81.4 LENTIGINES: ICD-10-CM

## 2019-12-27 DIAGNOSIS — D18.01 CHERRY ANGIOMA: ICD-10-CM

## 2019-12-27 DIAGNOSIS — D22.9 NEVUS OF MULTIPLE SITES: Primary | ICD-10-CM

## 2019-12-27 PROCEDURE — 99202 OFFICE O/P NEW SF 15 MIN: CPT | Mod: S$GLB,,, | Performed by: DERMATOLOGY

## 2019-12-27 PROCEDURE — 3008F BODY MASS INDEX DOCD: CPT | Mod: CPTII,S$GLB,, | Performed by: DERMATOLOGY

## 2019-12-27 PROCEDURE — 99202 PR OFFICE/OUTPT VISIT, NEW, LEVL II, 15-29 MIN: ICD-10-PCS | Mod: S$GLB,,, | Performed by: DERMATOLOGY

## 2019-12-27 PROCEDURE — 99999 PR PBB SHADOW E&M-EST. PATIENT-LVL II: ICD-10-PCS | Mod: PBBFAC,,, | Performed by: DERMATOLOGY

## 2019-12-27 PROCEDURE — 99999 PR PBB SHADOW E&M-EST. PATIENT-LVL II: CPT | Mod: PBBFAC,,, | Performed by: DERMATOLOGY

## 2019-12-27 PROCEDURE — 3008F PR BODY MASS INDEX (BMI) DOCUMENTED: ICD-10-PCS | Mod: CPTII,S$GLB,, | Performed by: DERMATOLOGY

## 2019-12-27 NOTE — PROGRESS NOTES
Subjective:       Patient ID:  Divya De Paz is a 29 y.o. female who presents for   Chief Complaint   Patient presents with    Mole     face, nose, several years,      New spot on right cheek not painful no tx.,  Also mole on left nose which has grown some.     Mole  - Initial  Affected locations: face  Signs / symptoms: asymptomatic  Aggravated by: nothing  Relieving factors/Treatments tried: nothing        Review of Systems   Constitutional: Negative for fever, chills, weight loss, weight gain, fatigue, night sweats and malaise.   Skin: Negative for daily sunscreen use, activity-related sunscreen use and wears hat.   Hematologic/Lymphatic: Does not bruise/bleed easily.        Objective:    Physical Exam   Constitutional: She appears well-developed and well-nourished. No distress.   Neurological: She is alert and oriented to person, place, and time. She is not disoriented.   Psychiatric: She has a normal mood and affect.   Skin:   Areas Examined (abnormalities noted in diagram):   Scalp / Hair Palpated and Inspected  Head / Face Inspection Performed  Neck Inspection Performed  Back Inspection Performed  RUE Inspected  LUE Inspection Performed                   Diagram Legend     Erythematous scaling macule/papule c/w actinic keratosis       Vascular papule c/w angioma      Pigmented verrucoid papule/plaque c/w seborrheic keratosis      Yellow umbilicated papule c/w sebaceous hyperplasia      Irregularly shaped tan macule c/w lentigo     1-2 mm smooth white papules consistent with Milia      Movable subcutaneous cyst with punctum c/w epidermal inclusion cyst      Subcutaneous movable cyst c/w pilar cyst      Firm pink to brown papule c/w dermatofibroma      Pedunculated fleshy papule(s) c/w skin tag(s)      Evenly pigmented macule c/w junctional nevus     Mildly variegated pigmented, slightly irregular-bordered macule c/w mildly atypical nevus      Flesh colored to evenly pigmented papule c/w intradermal  "nevus       Pink pearly papule/plaque c/w basal cell carcinoma      Erythematous hyperkeratotic cursted plaque c/w SCC      Surgical scar with no sign of skin cancer recurrence      Open and closed comedones      Inflammatory papules and pustules      Verrucoid papule consistent consistent with wart     Erythematous eczematous patches and plaques     Dystrophic onycholytic nail with subungual debris c/w onychomycosis     Umbilicated papule    Erythematous-base heme-crusted tan verrucoid plaque consistent with inflamed seborrheic keratosis     Erythematous Silvery Scaling Plaque c/w Psoriasis     See annotation      Assessment / Plan:        Nevus of multiple sites  The "ABCD" rules to observe pigmented lesions were reviewed.  Brochure provided    Lentigines  The "ABCD" rules to observe pigmented lesions were reviewed.  sunscreen    Cherry angioma  reassurance               Follow up in about 1 year (around 12/27/2020).  "

## 2019-12-30 DIAGNOSIS — N92.6 IRREGULAR MENSTRUAL CYCLE: ICD-10-CM

## 2019-12-30 DIAGNOSIS — E88.819 INSULIN RESISTANCE: ICD-10-CM

## 2019-12-30 RX ORDER — NORGESTIMATE AND ETHINYL ESTRADIOL 7DAYSX3 28
1 KIT ORAL DAILY
Qty: 28 TABLET | Refills: 0 | Status: SHIPPED | OUTPATIENT
Start: 2019-12-30 | End: 2023-02-03 | Stop reason: SDUPTHER

## 2020-04-21 DIAGNOSIS — Z01.84 ANTIBODY RESPONSE EXAMINATION: ICD-10-CM

## 2020-05-21 DIAGNOSIS — Z01.84 ANTIBODY RESPONSE EXAMINATION: ICD-10-CM

## 2020-06-20 DIAGNOSIS — Z01.84 ANTIBODY RESPONSE EXAMINATION: ICD-10-CM

## 2020-07-20 DIAGNOSIS — Z01.84 ANTIBODY RESPONSE EXAMINATION: ICD-10-CM

## 2020-08-19 DIAGNOSIS — Z01.84 ANTIBODY RESPONSE EXAMINATION: ICD-10-CM

## 2020-09-18 DIAGNOSIS — Z01.84 ANTIBODY RESPONSE EXAMINATION: ICD-10-CM

## 2020-10-04 DIAGNOSIS — N92.6 IRREGULAR MENSTRUAL CYCLE: ICD-10-CM

## 2020-10-04 DIAGNOSIS — E88.819 INSULIN RESISTANCE: ICD-10-CM

## 2020-10-05 ENCOUNTER — PATIENT MESSAGE (OUTPATIENT)
Dept: ADMINISTRATIVE | Facility: HOSPITAL | Age: 30
End: 2020-10-05

## 2020-10-05 RX ORDER — NORGESTIMATE AND ETHINYL ESTRADIOL 7DAYSX3 28
1 KIT ORAL DAILY
Qty: 28 TABLET | Refills: 0 | OUTPATIENT
Start: 2020-10-05 | End: 2020-11-04

## 2020-10-05 RX ORDER — FLUOXETINE HYDROCHLORIDE 20 MG/1
20 CAPSULE ORAL DAILY
Qty: 90 CAPSULE | Refills: 3 | OUTPATIENT
Start: 2020-10-05

## 2020-10-05 NOTE — TELEPHONE ENCOUNTER
Care Due:                  Date            Visit Type   Department     Provider  --------------------------------------------------------------------------------    Last Visit: None Found      None         None Found  Next Visit: None Scheduled  None         None Found                                                            Last  Test          Frequency    Reason                     Performed    Due Date  --------------------------------------------------------------------------------    Office Visit  12 months..  FLUoxetine...............  Not Found    Overdue    Powered by [employer]. Reference number: [id number]. 10/04/2020 10:30:55 PM   CDT

## 2020-10-07 RX ORDER — FLUOXETINE HYDROCHLORIDE 20 MG/1
20 CAPSULE ORAL DAILY
Qty: 30 CAPSULE | Refills: 1 | Status: SHIPPED | OUTPATIENT
Start: 2020-10-07 | End: 2021-08-25 | Stop reason: SDUPTHER

## 2020-10-07 NOTE — TELEPHONE ENCOUNTER
No new care gaps identified.  Powered by [employer]. Reference number: [id number]. 10/06/2020 11:32:27 PM   CDT

## 2020-10-18 DIAGNOSIS — Z01.84 ANTIBODY RESPONSE EXAMINATION: ICD-10-CM

## 2020-10-31 ENCOUNTER — HOSPITAL ENCOUNTER (EMERGENCY)
Facility: HOSPITAL | Age: 30
Discharge: HOME | End: 2020-10-31
Attending: SURGERY
Payer: COMMERCIAL

## 2020-10-31 VITALS
HEIGHT: 66 IN | TEMPERATURE: 98 F | SYSTOLIC BLOOD PRESSURE: 157 MMHG | HEART RATE: 95 BPM | WEIGHT: 290.56 LBS | DIASTOLIC BLOOD PRESSURE: 77 MMHG | BODY MASS INDEX: 46.7 KG/M2 | OXYGEN SATURATION: 100 % | RESPIRATION RATE: 18 BRPM

## 2020-10-31 DIAGNOSIS — S61.219A LACERATION OF FINGER WITHOUT FOREIGN BODY WITHOUT DAMAGE TO NAIL, UNSPECIFIED FINGER, UNSPECIFIED LATERALITY, INITIAL ENCOUNTER: Primary | ICD-10-CM

## 2020-10-31 PROCEDURE — 99284 EMERGENCY DEPT VISIT MOD MDM: CPT | Mod: 25

## 2020-10-31 PROCEDURE — 96372 THER/PROPH/DIAG INJ SC/IM: CPT

## 2020-10-31 PROCEDURE — 63600175 PHARM REV CODE 636 W HCPCS: Performed by: SURGERY

## 2020-10-31 PROCEDURE — 12001 RPR S/N/AX/GEN/TRNK 2.5CM/<: CPT

## 2020-10-31 PROCEDURE — 17250 CHEM CAUT OF GRANLTJ TISSUE: CPT

## 2020-10-31 PROCEDURE — 90471 IMMUNIZATION ADMIN: CPT | Performed by: SURGERY

## 2020-10-31 PROCEDURE — 25000003 PHARM REV CODE 250: Performed by: SURGERY

## 2020-10-31 PROCEDURE — 90715 TDAP VACCINE 7 YRS/> IM: CPT | Performed by: SURGERY

## 2020-10-31 RX ORDER — LIDOCAINE HYDROCHLORIDE 10 MG/ML
10 INJECTION, SOLUTION EPIDURAL; INFILTRATION; INTRACAUDAL; PERINEURAL
Status: COMPLETED | OUTPATIENT
Start: 2020-10-31 | End: 2020-10-31

## 2020-10-31 RX ORDER — CLINDAMYCIN HYDROCHLORIDE 300 MG/1
300 CAPSULE ORAL 4 TIMES DAILY
Qty: 28 CAPSULE | Refills: 0 | Status: SHIPPED | OUTPATIENT
Start: 2020-10-31 | End: 2020-11-07

## 2020-10-31 RX ORDER — MUPIROCIN 20 MG/G
1 OINTMENT TOPICAL
Status: COMPLETED | OUTPATIENT
Start: 2020-10-31 | End: 2020-10-31

## 2020-10-31 RX ORDER — MUPIROCIN 20 MG/G
OINTMENT TOPICAL 3 TIMES DAILY
Qty: 15 G | Refills: 0 | Status: SHIPPED | OUTPATIENT
Start: 2020-10-31 | End: 2020-11-10

## 2020-10-31 RX ORDER — HYDROCODONE BITARTRATE AND ACETAMINOPHEN 5; 325 MG/1; MG/1
1 TABLET ORAL EVERY 4 HOURS PRN
Qty: 20 TABLET | Refills: 0 | Status: SHIPPED | OUTPATIENT
Start: 2020-10-31 | End: 2020-11-03

## 2020-10-31 RX ORDER — CLINDAMYCIN PHOSPHATE 150 MG/ML
600 INJECTION, SOLUTION INTRAVENOUS
Status: COMPLETED | OUTPATIENT
Start: 2020-10-31 | End: 2020-10-31

## 2020-10-31 RX ADMIN — MUPIROCIN 22 G: 20 OINTMENT TOPICAL at 10:10

## 2020-10-31 RX ADMIN — LIDOCAINE HYDROCHLORIDE 100 MG: 10 INJECTION, SOLUTION EPIDURAL; INFILTRATION; INTRACAUDAL; PERINEURAL at 10:10

## 2020-10-31 RX ADMIN — CLOSTRIDIUM TETANI TOXOID ANTIGEN (FORMALDEHYDE INACTIVATED), CORYNEBACTERIUM DIPHTHERIAE TOXOID ANTIGEN (FORMALDEHYDE INACTIVATED), BORDETELLA PERTUSSIS TOXOID ANTIGEN (GLUTARALDEHYDE INACTIVATED), BORDETELLA PERTUSSIS FILAMENTOUS HEMAGGLUTININ ANTIGEN (FORMALDEHYDE INACTIVATED), BORDETELLA PERTUSSIS PERTACTIN ANTIGEN, AND BORDETELLA PERTUSSIS FIMBRIAE 2/3 ANTIGEN 0.5 ML: 5; 2; 2.5; 5; 3; 5 INJECTION, SUSPENSION INTRAMUSCULAR at 10:10

## 2020-10-31 RX ADMIN — CLINDAMYCIN PHOSPHATE 600 MG: 150 INJECTION, SOLUTION INTRAMUSCULAR; INTRAVENOUS at 11:10

## 2020-11-01 NOTE — ED NOTES
Discharge instruction and Rx # 3 given.   Patient verbalized understanding.  Discharge home in stable condition.  Ambulated out of ER with steady gait.  No acute distress.

## 2020-11-01 NOTE — ED PROVIDER NOTES
[facility]                                                 Chief Complaint  30 y.o. female with Laceration (Laceration right hand 3rd digit. )      History of Present Illness  Divya De Paz presents to the emergency room with right hand injury  Patient partially cut the tip of her right 3rd finger off tonight with a knife PTA  Patient tangentially cut the tip of her right 3rd finger off, fingernail involved  Additionally, there was no exposed bone, excessive bleeding afterwards  Patient on exam has no exposed bone, but does have bleeding on ER triage  There will be no opportunity for sutures, tissue loss prevents suture closure  Tetanus status will be updated, care was discussed with patient at length  Patient will need Bovie cauterization of the bleeding with hand MD follow-up    The history is provided by the patient   device was not used during this ER visit  Medical history significant for anxiety, depression, hidradenitis and PCOS  No surgical history  Patient is allergic to Cefzil  No significant family history    I have reviewed all of this patient's past medical, surgical, family, and social   histories as well as active allergies and medications documented in the  electronic medical record    Review of Systems and Physical Exam      Review of Systems  -- Constitution - no fever, denies fatigue, no weakness, no chills  -- Eyes - no tearing or redness, no visual disturbance  -- Ear, Nose - no tinnitus or earache, no nasal congestion or discharge  -- Mouth,Throat - no sore throat, no toothache, normal voice, normal swallowing  -- Respiratory - denies cough and congestion, no shortness of breath, no SHETTY  -- Cardiovascular - denies chest pain, no palpitations, denies claudication  -- Gastrointestinal - denies abdominal pain, nausea, vomiting, or diarrhea  -- Genitourinary - no dysuria, denies flank pain, no hematuria, no STD risk  -- Musculoskeletal - right [facility]  finger tip injury  -- Neurological - no headache, denies weakness or seizure; no LOC  -- Skin - denies pallor, rash, or changes in skin. no hives or welts noted    Vital Signs  Her skin temperature is 97.5 °F (36.4 °C).   Her blood pressure is 157/77 and her pulse is 95.   Her respiration is 18 and oxygen saturation is 100%.     Physical Exam  -- Nursing note and vitals reviewed  -- Constitutional: Appears well-developed and well-nourished  -- Head: Atraumatic. Normocephalic. No obvious abnormality  -- Eyes: Pupils are equal and reactive to light. Normal conjunctiva and lids  -- Cardiac: Normal rate, regular rhythm and normal heart sounds  -- Pulmonary: Normal respiratory effort, breath sounds clear to auscultation  -- Abdominal: Soft, no tenderness. Normal bowel sounds. Normal liver edge  -- Musculoskeletal: Normal range of motion, no effusions. Joints stable   -- Neurological: No focal deficits. Showed good interaction with staff  -- Vascular: Posterior tibial, dorsalis pedis and radial pulses 2+ bilaterally    -- Lymphatics: No cervical or peripheral lymphadenopathy. No edema noted  -- Skin: Partial amputation of right 3rd finger tip, nail involvement    Emergency Room Course      Cauterization the right 3rd finger tip  -- Performed by: MONA DSOUZA  -- Consent Done: Emergent Situation  -- Body area: Right 3rd finger tip  -- small avulsion right 3rd finger tip  -- Tendon involvement: none  -- Nerve involvement: none  -- Vascular damage: Small amount of bleeding  -- Anesthesia: Digital block  -- Local anesthetic: lidocaine 1%   -- Anesthetic total: 5 ml  -- Irrigation solution: copious irrigation with saline and Hibiclens  -- Bovie cauterization of bleeding open finger tip, no bone exposed  -- bactroban applied in the ER today   -- The area was bandaged prior to discharge     -- Patient gave verbal consent before the start of the procedure  -- No complications were noted from the procedure  -- The patient  tolerated the procedure well     Medications Given  lidocaine (PF) 10 mg/ml (1%) injection 100 mg (100 mg Infiltration Given 10/31/20 2245)   Tdap vaccine injection 0.5 mL (0.5 mLs Intramuscular Given 10/31/20 2252)   mupirocin 2 % ointment 22 g (22 g Topical (Top) Given 10/31/20 2245)   clindamycin injection 600 mg (600 mg Intramuscular Given 10/31/20 2301)     ED Physician Management  -- Diagnosis management comments: 30 y.o. female with finger tip amputation  -- amputation of right 3rd finger tip obliquely, no bone exposed on exam today  -- wound was immediately irrigated and cleaned with sterile water/Hibiclens  -- there will be no opportunity to suture, patient counseled at length on the plan  -- bleeding amputation site cauterized with Bovie, cleaned, bandaged tonight  -- patient will follow-up with her own primary care doctor and hand specialist  -- return to the ER with any concerning symptoms after discharge today    Diagnosis  [S61.219A] Laceration of finger     Disposition and Plan  -- Disposition: home  -- Condition: stable  -- Follow-up: Patient to follow up with Eva Munson MD in 1-2 days.  -- I advised the patient that we have found no life threatening condition today  -- At this time, I believe the patient is clinically stable for discharge.   -- The patient acknowledges that close follow up with a MD is required   -- Patient agrees to comply with all instruction and direction given in the ER    This note is dictated on M*Modal word recognition program.  There are word recognition mistakes that are occasionally missed on review.             Korey Goodson MD  11/01/20 8905

## 2020-11-17 DIAGNOSIS — Z01.84 ANTIBODY RESPONSE EXAMINATION: ICD-10-CM

## 2021-01-02 ENCOUNTER — IMMUNIZATION (OUTPATIENT)
Dept: INTERNAL MEDICINE | Facility: CLINIC | Age: 31
End: 2021-01-02
Payer: COMMERCIAL

## 2021-01-02 DIAGNOSIS — Z23 NEED FOR VACCINATION: ICD-10-CM

## 2021-01-02 PROCEDURE — 91300 COVID-19, MRNA, LNP-S, PF, 30 MCG/0.3 ML DOSE VACCINE: CPT | Mod: PBBFAC | Performed by: INTERNAL MEDICINE

## 2021-01-23 ENCOUNTER — IMMUNIZATION (OUTPATIENT)
Dept: INTERNAL MEDICINE | Facility: CLINIC | Age: 31
End: 2021-01-23
Payer: COMMERCIAL

## 2021-01-23 DIAGNOSIS — Z23 NEED FOR VACCINATION: Primary | ICD-10-CM

## 2021-01-23 PROCEDURE — 91300 COVID-19, MRNA, LNP-S, PF, 30 MCG/0.3 ML DOSE VACCINE: ICD-10-PCS | Mod: ,,, | Performed by: INTERNAL MEDICINE

## 2021-01-23 PROCEDURE — 0002A COVID-19, MRNA, LNP-S, PF, 30 MCG/0.3 ML DOSE VACCINE: CPT | Mod: CV19,,, | Performed by: INTERNAL MEDICINE

## 2021-01-23 PROCEDURE — 91300 COVID-19, MRNA, LNP-S, PF, 30 MCG/0.3 ML DOSE VACCINE: CPT | Mod: ,,, | Performed by: INTERNAL MEDICINE

## 2021-01-23 PROCEDURE — 0002A COVID-19, MRNA, LNP-S, PF, 30 MCG/0.3 ML DOSE VACCINE: ICD-10-PCS | Mod: CV19,,, | Performed by: INTERNAL MEDICINE

## 2021-03-18 ENCOUNTER — PATIENT MESSAGE (OUTPATIENT)
Dept: RESEARCH | Facility: HOSPITAL | Age: 31
End: 2021-03-18

## 2021-03-26 ENCOUNTER — PATIENT MESSAGE (OUTPATIENT)
Dept: RESEARCH | Facility: HOSPITAL | Age: 31
End: 2021-03-26

## 2021-03-30 ENCOUNTER — CLINICAL SUPPORT (OUTPATIENT)
Dept: OTHER | Facility: CLINIC | Age: 31
End: 2021-03-30
Payer: COMMERCIAL

## 2021-03-30 DIAGNOSIS — Z00.8 ENCOUNTER FOR OTHER GENERAL EXAMINATION: ICD-10-CM

## 2021-03-31 VITALS — HEIGHT: 66 IN | BODY MASS INDEX: 46.9 KG/M2

## 2021-03-31 LAB
GLUCOSE SERPL-MCNC: 120 MG/DL (ref 60–140)
HDLC SERPL-MCNC: 34 MG/DL
POC CHOLESTEROL, LDL (DOCK): 90 MG/DL
POC CHOLESTEROL, TOTAL: 157 MG/DL
TRIGL SERPL-MCNC: 163 MG/DL

## 2021-05-10 ENCOUNTER — TELEPHONE (OUTPATIENT)
Dept: INTERNAL MEDICINE | Facility: CLINIC | Age: 31
End: 2021-05-10

## 2021-08-25 ENCOUNTER — OFFICE VISIT (OUTPATIENT)
Dept: INTERNAL MEDICINE | Facility: CLINIC | Age: 31
End: 2021-08-25
Payer: COMMERCIAL

## 2021-08-25 DIAGNOSIS — F41.1 GAD (GENERALIZED ANXIETY DISORDER): Primary | ICD-10-CM

## 2021-08-25 PROCEDURE — 99214 PR OFFICE/OUTPT VISIT, EST, LEVL IV, 30-39 MIN: ICD-10-PCS | Mod: 95,,, | Performed by: NURSE PRACTITIONER

## 2021-08-25 PROCEDURE — 1160F PR REVIEW ALL MEDS BY PRESCRIBER/CLIN PHARMACIST DOCUMENTED: ICD-10-PCS | Mod: CPTII,,, | Performed by: NURSE PRACTITIONER

## 2021-08-25 PROCEDURE — 1159F MED LIST DOCD IN RCRD: CPT | Mod: CPTII,,, | Performed by: NURSE PRACTITIONER

## 2021-08-25 PROCEDURE — 1160F RVW MEDS BY RX/DR IN RCRD: CPT | Mod: CPTII,,, | Performed by: NURSE PRACTITIONER

## 2021-08-25 PROCEDURE — 1159F PR MEDICATION LIST DOCUMENTED IN MEDICAL RECORD: ICD-10-PCS | Mod: CPTII,,, | Performed by: NURSE PRACTITIONER

## 2021-08-25 PROCEDURE — 99214 OFFICE O/P EST MOD 30 MIN: CPT | Mod: 95,,, | Performed by: NURSE PRACTITIONER

## 2021-08-25 RX ORDER — FLUOXETINE HYDROCHLORIDE 20 MG/1
20 CAPSULE ORAL DAILY
Qty: 30 CAPSULE | Refills: 0 | Status: SHIPPED | OUTPATIENT
Start: 2021-08-25 | End: 2021-10-04 | Stop reason: SDUPTHER

## 2021-08-26 ENCOUNTER — TELEPHONE (OUTPATIENT)
Dept: INTERNAL MEDICINE | Facility: CLINIC | Age: 31
End: 2021-08-26

## 2021-09-22 ENCOUNTER — LAB VISIT (OUTPATIENT)
Dept: INTERNAL MEDICINE | Facility: CLINIC | Age: 31
End: 2021-09-22

## 2021-09-22 DIAGNOSIS — R52 BODY ACHES: Primary | ICD-10-CM

## 2021-09-22 DIAGNOSIS — R52 BODY ACHES: ICD-10-CM

## 2021-09-22 LAB — SARS-COV-2 RDRP RESP QL NAA+PROBE: NEGATIVE

## 2021-09-22 PROCEDURE — U0002 COVID-19 LAB TEST NON-CDC: HCPCS | Performed by: PREVENTIVE MEDICINE

## 2021-10-02 ENCOUNTER — PATIENT MESSAGE (OUTPATIENT)
Dept: INTERNAL MEDICINE | Facility: CLINIC | Age: 31
End: 2021-10-02

## 2021-10-02 DIAGNOSIS — F41.1 GAD (GENERALIZED ANXIETY DISORDER): ICD-10-CM

## 2021-10-04 RX ORDER — FLUOXETINE HYDROCHLORIDE 20 MG/1
20 CAPSULE ORAL DAILY
Qty: 30 CAPSULE | Refills: 11 | Status: SHIPPED | OUTPATIENT
Start: 2021-10-04 | End: 2022-11-10 | Stop reason: SDUPTHER

## 2021-12-23 ENCOUNTER — IMMUNIZATION (OUTPATIENT)
Dept: INTERNAL MEDICINE | Facility: CLINIC | Age: 31
End: 2021-12-23
Payer: COMMERCIAL

## 2021-12-23 DIAGNOSIS — Z23 NEED FOR VACCINATION: Primary | ICD-10-CM

## 2021-12-23 PROCEDURE — 0004A COVID-19, MRNA, LNP-S, PF, 30 MCG/0.3 ML DOSE VACCINE: CPT | Mod: CV19,PBBFAC | Performed by: INTERNAL MEDICINE

## 2022-11-10 DIAGNOSIS — F41.1 GAD (GENERALIZED ANXIETY DISORDER): ICD-10-CM

## 2022-11-10 RX ORDER — FLUOXETINE HYDROCHLORIDE 20 MG/1
20 CAPSULE ORAL DAILY
Qty: 30 CAPSULE | Refills: 0 | Status: SHIPPED | OUTPATIENT
Start: 2022-11-10 | End: 2024-04-03 | Stop reason: SDUPTHER

## 2023-01-16 DIAGNOSIS — F41.1 GAD (GENERALIZED ANXIETY DISORDER): ICD-10-CM

## 2023-01-17 RX ORDER — FLUOXETINE HYDROCHLORIDE 20 MG/1
20 CAPSULE ORAL DAILY
Qty: 30 CAPSULE | Refills: 0 | OUTPATIENT
Start: 2023-01-17

## 2023-01-17 NOTE — TELEPHONE ENCOUNTER
Refill Routing Note   Medication(s) are not appropriate for processing by [facility] for the following reason(s):      - Patient has not been seen in over 15 months by PCP  - Patient has been seen in the ED/Hospital since the last PCP visit    [facility] action(s):  Defer          Medication reconciliation completed: No     Appointments  past 12m or future 3m with PCP    Date Provider   Last Visit   8/22/2018 Eva Munson MD   Next Visit   Visit date not found Eva Munson MD   ED visits in past 90 days: 0        Note composed:9:10 AM 01/17/2023

## 2023-02-03 ENCOUNTER — OFFICE VISIT (OUTPATIENT)
Dept: OBSTETRICS AND GYNECOLOGY | Facility: CLINIC | Age: 33
End: 2023-02-03
Payer: COMMERCIAL

## 2023-02-03 VITALS
BODY MASS INDEX: 47.09 KG/M2 | WEIGHT: 293 LBS | SYSTOLIC BLOOD PRESSURE: 138 MMHG | HEIGHT: 66 IN | DIASTOLIC BLOOD PRESSURE: 86 MMHG

## 2023-02-03 DIAGNOSIS — Z11.51 SCREENING FOR HPV (HUMAN PAPILLOMAVIRUS): ICD-10-CM

## 2023-02-03 DIAGNOSIS — N92.6 IRREGULAR MENSTRUAL CYCLE: ICD-10-CM

## 2023-02-03 DIAGNOSIS — Z80.3 FAMILY HISTORY OF BREAST CANCER: ICD-10-CM

## 2023-02-03 DIAGNOSIS — E88.819 INSULIN RESISTANCE: ICD-10-CM

## 2023-02-03 DIAGNOSIS — Z12.4 ENCOUNTER FOR PAPANICOLAOU SMEAR FOR CERVICAL CANCER SCREENING: ICD-10-CM

## 2023-02-03 DIAGNOSIS — Z01.419 WOMEN'S ANNUAL ROUTINE GYNECOLOGICAL EXAMINATION: Primary | ICD-10-CM

## 2023-02-03 PROCEDURE — 3079F DIAST BP 80-89 MM HG: CPT | Mod: CPTII,S$GLB,, | Performed by: REGISTERED NURSE

## 2023-02-03 PROCEDURE — 3079F PR MOST RECENT DIASTOLIC BLOOD PRESSURE 80-89 MM HG: ICD-10-PCS | Mod: CPTII,S$GLB,, | Performed by: REGISTERED NURSE

## 2023-02-03 PROCEDURE — 3075F PR MOST RECENT SYSTOLIC BLOOD PRESS GE 130-139MM HG: ICD-10-PCS | Mod: CPTII,S$GLB,, | Performed by: REGISTERED NURSE

## 2023-02-03 PROCEDURE — 88175 CYTOPATH C/V AUTO FLUID REDO: CPT | Performed by: REGISTERED NURSE

## 2023-02-03 PROCEDURE — 1159F MED LIST DOCD IN RCRD: CPT | Mod: CPTII,S$GLB,, | Performed by: REGISTERED NURSE

## 2023-02-03 PROCEDURE — 99999 PR PBB SHADOW E&M-EST. PATIENT-LVL III: ICD-10-PCS | Mod: PBBFAC,,, | Performed by: REGISTERED NURSE

## 2023-02-03 PROCEDURE — 87624 HPV HI-RISK TYP POOLED RSLT: CPT | Performed by: REGISTERED NURSE

## 2023-02-03 PROCEDURE — 1160F PR REVIEW ALL MEDS BY PRESCRIBER/CLIN PHARMACIST DOCUMENTED: ICD-10-PCS | Mod: CPTII,S$GLB,, | Performed by: REGISTERED NURSE

## 2023-02-03 PROCEDURE — 1159F PR MEDICATION LIST DOCUMENTED IN MEDICAL RECORD: ICD-10-PCS | Mod: CPTII,S$GLB,, | Performed by: REGISTERED NURSE

## 2023-02-03 PROCEDURE — 1160F RVW MEDS BY RX/DR IN RCRD: CPT | Mod: CPTII,S$GLB,, | Performed by: REGISTERED NURSE

## 2023-02-03 PROCEDURE — 3008F PR BODY MASS INDEX (BMI) DOCUMENTED: ICD-10-PCS | Mod: CPTII,S$GLB,, | Performed by: REGISTERED NURSE

## 2023-02-03 PROCEDURE — 99999 PR PBB SHADOW E&M-EST. PATIENT-LVL III: CPT | Mod: PBBFAC,,, | Performed by: REGISTERED NURSE

## 2023-02-03 PROCEDURE — 3008F BODY MASS INDEX DOCD: CPT | Mod: CPTII,S$GLB,, | Performed by: REGISTERED NURSE

## 2023-02-03 PROCEDURE — 3075F SYST BP GE 130 - 139MM HG: CPT | Mod: CPTII,S$GLB,, | Performed by: REGISTERED NURSE

## 2023-02-03 PROCEDURE — 99385 PREV VISIT NEW AGE 18-39: CPT | Mod: S$GLB,,, | Performed by: REGISTERED NURSE

## 2023-02-03 PROCEDURE — 99385 PR PREVENTIVE VISIT,NEW,18-39: ICD-10-PCS | Mod: S$GLB,,, | Performed by: REGISTERED NURSE

## 2023-02-03 RX ORDER — NORGESTIMATE AND ETHINYL ESTRADIOL 7DAYSX3 28
1 KIT ORAL DAILY
Qty: 90 TABLET | Refills: 3 | Status: SHIPPED | OUTPATIENT
Start: 2023-02-03 | End: 2024-04-03

## 2023-02-03 RX ORDER — MEDROXYPROGESTERONE ACETATE 10 MG/1
10 TABLET ORAL DAILY
Qty: 10 TABLET | Refills: 0 | Status: CANCELLED | OUTPATIENT
Start: 2023-02-03 | End: 2024-02-03

## 2023-02-03 NOTE — PROGRESS NOTES
CC: Annual    HPI: Pt is a 32 y.o.  female who presents for routine annual exam. She is not sexually active. She reports amenorrhea secondary to PCOS. She has used OCPs in the past to regulate her cycle and help with PCOS symptoms and was happy with the pills she was on. She denies hypertension, smoking, hx of DVT/clotting disorders, migraines with aura or personal hx of breast cancer and liver disease. She does not want STD screening.  The patient participates in regular exercise: yes.  The patient does not smoke.  The patient wears seatbelts.   Pt denies any domestic violence. 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      FH:  Breast cancer: paternal grandmother (diagnosed late 50's); paternal aunt (diagnosed early 40's)  Colon cancer: none  Ovarian cancer: none  Endometrial cancer: none    ROS:  GENERAL: Feeling well overall. Denies fever or chills.   SKIN: Denies rash or lesions.   HEAD: Denies head injury or headache.   NODES: Denies enlarged lymph nodes.   CHEST: Denies chest pain or shortness of breath.   CARDIOVASCULAR: Denies palpitations or left sided chest pain.   ABDOMEN: No abdominal pain, constipation, diarrhea, nausea, vomiting or rectal bleeding.   URINARY: No dysuria, hematuria, or burning on urination.  REPRODUCTIVE: See HPI.   BREASTS: Denies pain, lumps, or nipple discharge.   HEMATOLOGIC: No easy bruisability or excessive bleeding.   MUSCULOSKELETAL: Denies joint pain or swelling.   NEUROLOGIC: Denies syncope or weakness.     PE:   APPEARANCE: Well nourished, well developed, White female in no acute distress.  NODES: no cervical, supraclavicular, or inguinal lymphadenopathy  BREASTS: Symmetrical, no skin changes or visible lesions. No palpable masses, nipple discharge or adenopathy bilaterally.  ABDOMEN: Soft. No tenderness or masses. No distention. No hernias palpated. No CVA tenderness.  VULVA: No lesions. Normal external  female genitalia.  URETHRAL MEATUS: Normal size and location, no lesions, no prolapse.  URETHRA: No masses, tenderness, or prolapse.  VAGINA: Moist. No lesions or lacerations noted. No abnormal discharge present. No odor present.   CERVIX: No lesions or discharge. No cervical motion tenderness.   UTERUS: Exam limited by body habitus.   ADNEXA: Exam limited by body habitus.   ANUS PERINEUM: Normal.      Diagnosis:  1. Women's annual routine gynecological examination    2. Irregular menstrual cycle    3. Insulin resistance    4. Encounter for Papanicolaou smear for cervical cancer screening    5. Screening for HPV (human papillomavirus)        Plan:   Pap/HPV co-testing  OCPs rx  Discussed referral to Cancer genetics due to 2 relatives with breast cancer- she is interested. Referral placed.    Patient was counseled today on the new ACS guidelines for cervical cytology screening as well as the current recommendations for breast cancer screening. She was counseled to follow up with her PCP for other routine health maintenance. Counseling session lasted approximately 10 minutes, and all her questions were answered.    Follow-up with me in 1 year for routine exam.        SANDRA London       Answers submitted by the patient for this visit:  Gynecologic Exam Questionnaire  (Submitted on 2/2/2023)  Chief Complaint: Gynecologic exam  genital itching: No  genital lesions: No  genital odor: No  genital rash: No  missed menses: Yes  pelvic pain: No  vaginal bleeding: No  vaginal discharge: No  Chronicity: chronic  Onset: more than 1 year ago  Frequency: constantly  Progression since onset: unchanged  Pain severity: no pain  Pregnant now?: No  abdominal pain: No  anorexia: No  back pain: No  chills: No  constipation: No  diarrhea: No  discolored urine: No  dysuria: No  fever: No  flank pain: No  frequency: No  headaches: No  hematuria: No  nausea: No  painful intercourse: No  rash: No  urgency: No  vomiting: No  Vaginal  bleeding: no bleeding  Passing clots?: No  Passing tissue?: No  treatments tried: nothing  Sexual activity: not sexually active  Partner with STD symptoms: no  Birth control: nothing  Menstrual history: irregular  STD: No  abdominal surgery: No   section: No  Ectopic pregnancy: No  Endometriosis: No  herpes simplex: No  gynecological surgery: No  menorrhagia: No  metrorrhagia: No  miscarriage: No  ovarian cysts: No  perineal abscess: No  PID: No  terminated pregnancy: No  vaginosis: No

## 2023-02-06 ENCOUNTER — PATIENT MESSAGE (OUTPATIENT)
Dept: HEMATOLOGY/ONCOLOGY | Facility: CLINIC | Age: 33
End: 2023-02-06
Payer: COMMERCIAL

## 2023-02-09 LAB
HPV HR 12 DNA SPEC QL NAA+PROBE: NEGATIVE
HPV16 AG SPEC QL: NEGATIVE
HPV18 DNA SPEC QL NAA+PROBE: NEGATIVE

## 2023-02-10 LAB
FINAL PATHOLOGIC DIAGNOSIS: NORMAL
Lab: NORMAL

## 2024-03-28 ENCOUNTER — HOSPITAL ENCOUNTER (EMERGENCY)
Facility: HOSPITAL | Age: 34
Discharge: HOME | End: 2024-03-28
Attending: STUDENT IN AN ORGANIZED HEALTH CARE EDUCATION/TRAINING PROGRAM
Payer: COMMERCIAL

## 2024-03-28 VITALS
OXYGEN SATURATION: 99 % | RESPIRATION RATE: 16 BRPM | DIASTOLIC BLOOD PRESSURE: 90 MMHG | BODY MASS INDEX: 45.99 KG/M2 | SYSTOLIC BLOOD PRESSURE: 144 MMHG | TEMPERATURE: 98 F | HEART RATE: 81 BPM | HEIGHT: 67 IN | WEIGHT: 293 LBS

## 2024-03-28 DIAGNOSIS — F41.9 ANXIETY: Primary | ICD-10-CM

## 2024-03-28 DIAGNOSIS — R07.9 CHEST PAIN: ICD-10-CM

## 2024-03-28 LAB
ALBUMIN SERPL BCP-MCNC: 4.4 G/DL (ref 3.5–5.2)
ALP SERPL-CCNC: 88 U/L (ref 55–135)
ALT SERPL W/O P-5'-P-CCNC: 23 U/L (ref 10–44)
ANION GAP SERPL CALC-SCNC: 10 MMOL/L (ref 8–16)
AST SERPL-CCNC: 19 U/L (ref 10–40)
BASOPHILS # BLD AUTO: 0.05 K/UL (ref 0–0.2)
BASOPHILS NFR BLD: 0.4 % (ref 0–1.9)
BILIRUB SERPL-MCNC: 0.6 MG/DL (ref 0.1–1)
BUN SERPL-MCNC: 11 MG/DL (ref 6–20)
CALCIUM SERPL-MCNC: 10.1 MG/DL (ref 8.7–10.5)
CHLORIDE SERPL-SCNC: 106 MMOL/L (ref 95–110)
CO2 SERPL-SCNC: 21 MMOL/L (ref 23–29)
CREAT SERPL-MCNC: 0.7 MG/DL (ref 0.5–1.4)
DIFFERENTIAL METHOD BLD: NORMAL
EOSINOPHIL # BLD AUTO: 0.1 K/UL (ref 0–0.5)
EOSINOPHIL NFR BLD: 0.4 % (ref 0–8)
ERYTHROCYTE [DISTWIDTH] IN BLOOD BY AUTOMATED COUNT: 12 % (ref 11.5–14.5)
EST. GFR  (NO RACE VARIABLE): >60 ML/MIN/1.73 M^2
GLUCOSE SERPL-MCNC: 102 MG/DL (ref 70–110)
HCT VFR BLD AUTO: 43.3 % (ref 37–48.5)
HGB BLD-MCNC: 15.3 G/DL (ref 12–16)
IMM GRANULOCYTES # BLD AUTO: 0.04 K/UL (ref 0–0.04)
IMM GRANULOCYTES NFR BLD AUTO: 0.4 % (ref 0–0.5)
LYMPHOCYTES # BLD AUTO: 3.3 K/UL (ref 1–4.8)
LYMPHOCYTES NFR BLD: 29.7 % (ref 18–48)
MCH RBC QN AUTO: 29.6 PG (ref 27–31)
MCHC RBC AUTO-ENTMCNC: 35.3 G/DL (ref 32–36)
MCV RBC AUTO: 84 FL (ref 82–98)
MONOCYTES # BLD AUTO: 0.7 K/UL (ref 0.3–1)
MONOCYTES NFR BLD: 6.5 % (ref 4–15)
NEUTROPHILS # BLD AUTO: 7 K/UL (ref 1.8–7.7)
NEUTROPHILS NFR BLD: 62.6 % (ref 38–73)
NRBC BLD-RTO: 0 /100 WBC
PLATELET # BLD AUTO: 346 K/UL (ref 150–450)
PMV BLD AUTO: 9.7 FL (ref 9.2–12.9)
POTASSIUM SERPL-SCNC: 4.2 MMOL/L (ref 3.5–5.1)
PROT SERPL-MCNC: 8 G/DL (ref 6–8.4)
RBC # BLD AUTO: 5.17 M/UL (ref 4–5.4)
SODIUM SERPL-SCNC: 137 MMOL/L (ref 136–145)
TROPONIN I SERPL DL<=0.01 NG/ML-MCNC: <0.006 NG/ML (ref 0–0.03)
WBC # BLD AUTO: 11.21 K/UL (ref 3.9–12.7)

## 2024-03-28 PROCEDURE — 94761 N-INVAS EAR/PLS OXIMETRY MLT: CPT

## 2024-03-28 PROCEDURE — 84484 ASSAY OF TROPONIN QUANT: CPT | Performed by: PHYSICIAN ASSISTANT

## 2024-03-28 PROCEDURE — 80053 COMPREHEN METABOLIC PANEL: CPT | Performed by: PHYSICIAN ASSISTANT

## 2024-03-28 PROCEDURE — 86803 HEPATITIS C AB TEST: CPT | Performed by: PHYSICIAN ASSISTANT

## 2024-03-28 PROCEDURE — 25000003 PHARM REV CODE 250: Performed by: PHYSICIAN ASSISTANT

## 2024-03-28 PROCEDURE — 87389 HIV-1 AG W/HIV-1&-2 AB AG IA: CPT | Performed by: PHYSICIAN ASSISTANT

## 2024-03-28 PROCEDURE — 93005 ELECTROCARDIOGRAM TRACING: CPT

## 2024-03-28 PROCEDURE — 93010 ELECTROCARDIOGRAM REPORT: CPT | Mod: ,,, | Performed by: INTERNAL MEDICINE

## 2024-03-28 PROCEDURE — 99285 EMERGENCY DEPT VISIT HI MDM: CPT | Mod: 25

## 2024-03-28 PROCEDURE — 85025 COMPLETE CBC W/AUTO DIFF WBC: CPT | Performed by: PHYSICIAN ASSISTANT

## 2024-03-28 RX ORDER — LORAZEPAM 1 MG/1
1 TABLET ORAL
Status: COMPLETED | OUTPATIENT
Start: 2024-03-28 | End: 2024-03-28

## 2024-03-28 RX ADMIN — LORAZEPAM 1 MG: 1 TABLET ORAL at 10:03

## 2024-03-29 ENCOUNTER — TELEPHONE (OUTPATIENT)
Dept: ADMINISTRATIVE | Facility: CLINIC | Age: 34
End: 2024-03-29
Payer: COMMERCIAL

## 2024-03-29 LAB
HCV AB SERPL QL IA: NORMAL
HIV 1+2 AB+HIV1 P24 AG SERPL QL IA: NORMAL
OHS QRS DURATION: 82 MS
OHS QTC CALCULATION: 449 MS

## 2024-03-29 NOTE — DISCHARGE INSTRUCTIONS

## 2024-03-29 NOTE — ED PROVIDER NOTES
Encounter Date: 3/28/2024       History     Chief Complaint   Patient presents with    Chest Pain     33-year-old female presents with chest discomfort.  Discomfort on and off throughout the day.  She also endorses a sensation of feeling warm throughout her body and anxious.  She reports a history of anxiety but not on any medications currently.  She does take birth control daily for PCOS.  She appears well and nontoxic.  She is slightly tachycardic with mildly elevated blood pressure.  She does appear to be anxious.  She does not have any other significant medical history and takes no prescription medications daily.  She does not have any other drug allergies other than what is listed, Cefzil.      Review of patient's allergies indicates:   Allergen Reactions    Cefzil [cefprozil] Hives     Past Medical History:   Diagnosis Date    Anxiety     Depression     Hidradenitis suppurativa     PCOS (polycystic ovarian syndrome)      History reviewed. No pertinent surgical history.  Family History   Problem Relation Age of Onset    Diabetes Mother     Depression Mother     Hypertension Father     Depression Father     Arthritis Father     Melanoma Maternal Aunt     Cancer Maternal Grandmother         lung cancer, nonsmoker    Cancer Paternal Grandmother         breast cancer    Acne Brother     Cancer Paternal Aunt 42        breast cancer     Social History     Tobacco Use    Smoking status: Passive Smoke Exposure - Never Smoker    Smokeless tobacco: Never    Tobacco comments:     social tobacco 3 cigs every 2 weeks    Substance Use Topics    Alcohol use: Yes     Alcohol/week: 0.0 standard drinks of alcohol     Comment: occasional    Drug use: No     Review of Systems   Constitutional:  Negative for fever.   HENT:  Negative for sore throat.    Respiratory:  Negative for shortness of breath.    Cardiovascular:  Negative for chest pain.   Gastrointestinal:  Negative for nausea.   Genitourinary:  Negative for dysuria.    Musculoskeletal:  Negative for back pain.   Skin:  Negative for rash.   Neurological:  Negative for weakness.   Hematological:  Does not bruise/bleed easily.   Psychiatric/Behavioral:  The patient is nervous/anxious.        Physical Exam     Initial Vitals [03/28/24 1838]   BP Pulse Resp Temp SpO2   (!) 148/98 (!) 119 16 98.3 °F (36.8 °C) 98 %      MAP       --         Physical Exam    Constitutional: Vital signs are normal. She appears well-developed and well-nourished.   HENT:   Head: Normocephalic and atraumatic.   Right Ear: Hearing normal.   Left Ear: Hearing normal.   Normal exam   Eyes: Conjunctivae are normal.   Cardiovascular:  Normal rate and regular rhythm.           Heart rate improved from triage exam    No lower extremity edema, no JVD    Pulmonary/Chest:   Clear bilaterally   Abdominal: Abdomen is soft. Bowel sounds are normal.   Musculoskeletal:         General: Normal range of motion.     Neurological: She is alert and oriented to person, place, and time.   Nonfocal exam without red flags or deficits speech is clear   Skin: Skin is warm and intact.   Psychiatric: She has a normal mood and affect. Her speech is normal and behavior is normal. Cognition and memory are normal.         ED Course   Procedures  Labs Reviewed   COMPREHENSIVE METABOLIC PANEL - Abnormal; Notable for the following components:       Result Value    CO2 21 (*)     All other components within normal limits   CBC W/ AUTO DIFFERENTIAL   TROPONIN I   HIV 1 / 2 ANTIBODY   HEPATITIS C ANTIBODY   POCT TROPONIN          Imaging Results              X-Ray Chest AP Portable (Final result)  Result time 03/28/24 22:42:55      Final result by Adan Tran MD (03/28/24 22:42:55)                   Impression:      No acute cardiopulmonary finding identified on this single view.      Electronically signed by: Adan Tran MD  Date:    03/28/2024  Time:    22:42               Narrative:    EXAMINATION:  XR CHEST AP  "PORTABLE    CLINICAL HISTORY:  Provided history is "Chest Pain;  ".    TECHNIQUE:  One view of the chest.    COMPARISON:  None.    FINDINGS:  Cardiac silhouette is not enlarged.  No focal consolidation.  No sizable pleural effusion.  No pneumothorax.                                       Medications   LORazepam tablet 1 mg (1 mg Oral Given 3/28/24 2217)     Medical Decision Making  33-year-old female presenting with symptoms that appear to be related to anxiety and stress.  Patient has nonspecific chest discomfort throughout the day with associated symptoms of feeling warm, anxious and tingling throughout her body.  Patient reports a history of anxiety, not on medications currently.  She states this feels very similar to previous episodes of anxiety and panic attacks.  She does not have any significant previous medical history.  She is low risk for heart disease.    Differential:  Anxiety, depression, panic attack, ACS, cardiac arrhythmia    Plan:  Although the patient's symptoms sound very much related to anxiety, I will get a chest x-ray, EKG basic labs and cardiac markers.  Her chest discomfort and symptoms have been present throughout the day, a single troponin will be sufficient and if normal likely does not need further emergent workup.  I will also check other routine labs and I will treat her with p.o. Ativan and reassess.    10:45 p.m. assessment   No acute findings on workup.  Chest x-ray clear, EKG without acute ischemic findings, labs normal.  Troponin negative.  Patient given p.o. Ativan with resolution of symptoms.  Considered but doubt ACS.  I doubt pulmonary embolism.  No signs of pneumonia.  Suspect symptoms likely secondary to anxiety.  Patient discharged home in stable condition, advised to follow-up with primary care, return precautions given.    Amount and/or Complexity of Data Reviewed  Labs: ordered.  Radiology: ordered and independent interpretation performed.     Details: No " Pneumonia  ECG/medicine tests: ordered and independent interpretation performed.     Details: Sinus rhythm, no acute ischemia    Risk  Prescription drug management.                                      Clinical Impression:  Final diagnoses:  [R07.9] Chest pain                 Gurinder Sheppard PA-C  03/28/24 [id number]

## 2024-03-29 NOTE — ED TRIAGE NOTES
Divya De Paz, lisa 33 y.o. female presents to the ED with complaints of chest pain, rapid heart rate, chest tinglings after waking up from a nap earlier today. Pt denies SOB, Chest pain at this time.      Chief Complaint   Patient presents with    Chest Pain     Review of patient's allergies indicates:   Allergen Reactions    Cefzil [cefprozil] Hives     Past Medical History:   Diagnosis Date    Anxiety     Depression     Hidradenitis suppurativa     PCOS (polycystic ovarian syndrome)      Adult Physical Assessment  LOC: Divya De Paz, 33 y.o. female verified via two identifiers.  The patient is awake, alert, oriented and speaking appropriately at this time.  APPEARANCE: Patient resting comfortably and appears to be in no acute distress at this time. Patient is clean and well groomed, patient's clothing is properly fastened.  SKIN:The skin is warm and dry, color consistent with ethnicity, patient has normal skin turgor and moist mucus membranes, skin intact, no breakdown or brusing noted.  MUSCULOSKELETAL: Patient moving all extremities well, no obvious swelling or deformities noted.  RESPIRATORY: Airway is open and patent, respirations are spontaneous, patient has a normal effort and rate, no accessory muscle use noted.  CARDIAC: Patient is tachycardic with a normal rhythm, no periphreal edema noted in any extremity, capillary refill < 3 seconds in all extremities  ABDOMEN: Soft and non tender to palpation, no abdominal distention noted.   NEUROLOGIC: Eyes open spontaneously, behavior appropriate to situation, follows commands, facial expression symmetrical, bilateral hand grasp equal and even, purposeful motor response noted, normal sensation in all extremities when touched with a finger.

## 2024-04-03 ENCOUNTER — TELEPHONE (OUTPATIENT)
Dept: BEHAVIORAL HEALTH | Facility: CLINIC | Age: 34
End: 2024-04-03
Payer: COMMERCIAL

## 2024-04-03 ENCOUNTER — OFFICE VISIT (OUTPATIENT)
Dept: INTERNAL MEDICINE | Facility: CLINIC | Age: 34
End: 2024-04-03
Payer: COMMERCIAL

## 2024-04-03 ENCOUNTER — PATIENT MESSAGE (OUTPATIENT)
Dept: BEHAVIORAL HEALTH | Facility: CLINIC | Age: 34
End: 2024-04-03
Payer: COMMERCIAL

## 2024-04-03 VITALS
HEART RATE: 80 BPM | BODY MASS INDEX: 47.09 KG/M2 | OXYGEN SATURATION: 98 % | SYSTOLIC BLOOD PRESSURE: 148 MMHG | DIASTOLIC BLOOD PRESSURE: 88 MMHG | RESPIRATION RATE: 18 BRPM | HEIGHT: 66 IN | WEIGHT: 293 LBS

## 2024-04-03 DIAGNOSIS — F41.1 GAD (GENERALIZED ANXIETY DISORDER): ICD-10-CM

## 2024-04-03 DIAGNOSIS — F41.9 ANXIETY: Primary | ICD-10-CM

## 2024-04-03 DIAGNOSIS — E66.01 MORBIDLY OBESE: ICD-10-CM

## 2024-04-03 PROBLEM — F32.9 MAJOR DEPRESSIVE DISORDER, SINGLE EPISODE, UNSPECIFIED: Status: ACTIVE | Noted: 2021-09-22

## 2024-04-03 PROCEDURE — 3079F DIAST BP 80-89 MM HG: CPT | Mod: CPTII,S$GLB,, | Performed by: NURSE PRACTITIONER

## 2024-04-03 PROCEDURE — 3008F BODY MASS INDEX DOCD: CPT | Mod: CPTII,S$GLB,, | Performed by: NURSE PRACTITIONER

## 2024-04-03 PROCEDURE — 99214 OFFICE O/P EST MOD 30 MIN: CPT | Mod: S$GLB,,, | Performed by: NURSE PRACTITIONER

## 2024-04-03 PROCEDURE — 3077F SYST BP >= 140 MM HG: CPT | Mod: CPTII,S$GLB,, | Performed by: NURSE PRACTITIONER

## 2024-04-03 PROCEDURE — 99999 PR PBB SHADOW E&M-EST. PATIENT-LVL IV: CPT | Mod: PBBFAC,,, | Performed by: NURSE PRACTITIONER

## 2024-04-03 PROCEDURE — 1159F MED LIST DOCD IN RCRD: CPT | Mod: CPTII,S$GLB,, | Performed by: NURSE PRACTITIONER

## 2024-04-03 RX ORDER — FLUOXETINE HYDROCHLORIDE 20 MG/1
20 CAPSULE ORAL DAILY
Qty: 90 CAPSULE | Refills: 3 | Status: SHIPPED | OUTPATIENT
Start: 2024-04-03 | End: 2024-04-26

## 2024-04-03 NOTE — PROGRESS NOTES
CHW received a call from pt, pt is [employer] employee, completed partial intake and pt will decide if EAP vs [facility]HI and call back.

## 2024-04-03 NOTE — PATIENT INSTRUCTIONS
Restart fluoxetine 20 mg daily    You can call [phone] to schedule your appt with Psychiatry or Behavioral Health per my referral.

## 2024-04-03 NOTE — PROGRESS NOTES
"Subjective     Patient ID: Divya De Paz is a 33 y.o. female.    Chief Complaint: Panic Attack (Pt states she wants to talk options for her recent panic/anxiety attack. )    Pt here for, "I recently went to the ER for a panic attack. I have not taken anxiety meds for a while, and I would like to address my anxiety issue."    I last saw her virtually 8-25-21 for same issue, started her on fluoxetine 20mg daily and placed a behavioral health referral. Told her to touch base with me in 2 weeks to check on things. She messaged me 10-2-21 stating it was working and for a refill. Have not seen her since.    Anxiety has worsened. She tried without the meds, has been off of them a while, tried otc magnesium and supplements without relief. Did well on fluoxetine and would like to get back on. Admits not seeing [facility] has been busy with work but ready to get back on track    I have reviewed the HPI/ROS info pt entered in portal prior to visit today below     Reviewed ER plan of care below:    Medical Decision Making  33-year-old female presenting with symptoms that appear to be related to anxiety and stress.  Patient has nonspecific chest discomfort throughout the day with associated symptoms of feeling warm, anxious and tingling throughout her body.  Patient reports a history of anxiety, not on medications currently.  She states this feels very similar to previous episodes of anxiety and panic attacks.  She does not have any significant previous medical history.  She is low risk for heart disease.     Differential:  Anxiety, depression, panic attack, ACS, cardiac arrhythmia     Plan:  Although the patient's symptoms sound very much related to anxiety, I will get a chest x-ray, EKG basic labs and cardiac markers.  Her chest discomfort and symptoms have been present throughout the day, a single troponin will be sufficient and if normal likely does not need further emergent workup.  I will also check other routine labs and I will " treat her with p.o. Ativan and reassess.     10:45 p.m. assessment   No acute findings on workup.  Chest x-ray clear, EKG without acute ischemic findings, labs normal.  Troponin negative.  Patient given p.o. Ativan with resolution of symptoms.  Considered but doubt ACS.  I doubt pulmonary embolism.  No signs of pneumonia.  Suspect symptoms likely secondary to anxiety.  Patient discharged home in stable condition, advised to follow-up with primary care, return precautions given.     Amount and/or Complexity of Data Reviewed  Labs: ordered.  Radiology: ordered and independent interpretation performed.     Details: No Pneumonia  ECG/medicine tests: ordered and independent interpretation performed.     Details: Sinus rhythm, no acute ischemia     Risk  Prescription drug management.      Clinical Impression:  Final diagnoses:  [R07.9] Chest pain      Gurinder Sheppard PA-C  03/28/24 6895  Review of Systems   Constitutional:  Negative for activity change, chills, fatigue, fever and unexpected weight change.   HENT:  Negative for hearing loss, rhinorrhea and trouble swallowing.    Eyes:  Negative for discharge and visual disturbance.   Respiratory:  Positive for chest tightness. Negative for cough, shortness of breath and wheezing.    Cardiovascular:  Negative for chest pain and palpitations.   Gastrointestinal:  Negative for blood in stool, constipation, diarrhea and vomiting.   Endocrine: Negative for polydipsia and polyuria.   Genitourinary:  Negative for difficulty urinating, dysuria, hematuria and menstrual problem.   Musculoskeletal:  Negative for arthralgias, joint swelling and neck pain.   Allergic/Immunologic: Negative for environmental allergies, food allergies and immunocompromised state.   Neurological:  Negative for dizziness, weakness, light-headedness, numbness, headaches and memory loss.   Hematological:  Negative for adenopathy. Does not bruise/bleed easily.   Psychiatric/Behavioral:  Negative for  confusion, dysphoric mood, sleep disturbance and suicidal ideas. The patient is nervous/anxious.             Review of patient's allergies indicates:   Allergen Reactions    Cefzil [cefprozil] Hives         Current Outpatient Medications:     norgestimate-ethinyl estradioL (ORTHO TRI-CYCLEN,TRI-SPRINTEC) 0.18/0.215/0.25 mg-35 mcg (28) tablet, Take 1 tablet by mouth once daily., Disp: 90 tablet, Rfl: 3    FLUoxetine 20 MG capsule, Take 1 capsule (20 mg total) by mouth once daily., Disp: 90 capsule, Rfl: 3    Patient Active Problem List   Diagnosis    Hidradenitis suppurativa    PCOS (polycystic ovarian syndrome)    Insulin resistance    Secondary oligomenorrhea    Morbid obesity with BMI of 45.0-49.9, adult    Acne    Anxiety    Hirsutism    Anxiety and depression    Major depressive disorder, single episode, unspecified       Past Medical History:   Diagnosis Date    Anxiety     Depression     Hidradenitis suppurativa     PCOS (polycystic ovarian syndrome)        No past surgical history on file.    Social History     Socioeconomic History    Marital status: Single   Occupational History    Occupation: claims  and proc     Employer: [employer]   Tobacco Use    Smoking status: Never     Passive exposure: Yes    Smokeless tobacco: Never    Tobacco comments:     Pt states she dont smoke    Substance and Sexual Activity    Alcohol use: Yes     Alcohol/week: 0.0 standard drinks of alcohol     Comment: occasional    Drug use: No    Sexual activity: Not Currently     Partners: Male     Birth control/protection: OCP   Other Topics Concern    Are you pregnant or think you may be? No    Breast-feeding No   Social History Narrative    From [place]    Moved to [facility] 2008    Exercising      Social Determinants of Health     Financial Resource Strain: Low Risk  (4/2/2024)    Overall Financial Resource Strain (CARDIA)     Difficulty of Paying Living Expenses: Not very hard   Food Insecurity: No Food Insecurity  "(4/2/2024)    Hunger Vital Sign     Worried About Running Out of Food in the Last Year: Never true     Ran Out of Food in the Last Year: Never true   Transportation Needs: No Transportation Needs (4/2/2024)    PRAPARE - Transportation     Lack of Transportation (Medical): No     Lack of Transportation (Non-Medical): No   Physical Activity: Sufficiently Active (4/2/2024)    Exercise Vital Sign     Days of Exercise per Week: 7 days     Minutes of Exercise per Session: 60 min   Stress: Stress Concern Present (4/2/2024)    [employer] - Occupational Stress Questionnaire     Feeling of Stress : Very much   Social Connections: Unknown (4/2/2024)    Social Connection and Isolation Panel [NHANES]     Frequency of Communication with Friends and Family: More than three times a week     Frequency of Social Gatherings with Friends and Family: Once a week     Active Member of Clubs or Organizations: No     Attends Club or Organization Meetings: Never     Marital Status: Never    Housing Stability: Low Risk  (4/2/2024)    Housing Stability Vital Sign     Unable to Pay for Housing in the Last Year: No     Number of Places Lived in the Last Year: 1     Unstable Housing in the Last Year: No       Family History   Problem Relation Age of Onset    Diabetes Mother     Depression Mother     Hypertension Father     Depression Father     Arthritis Father     Melanoma Maternal Aunt     Cancer Maternal Grandmother         lung cancer, nonsmoker    Cancer Paternal Grandmother         breast cancer    Acne Brother     Cancer Paternal Aunt 42        breast cancer       Objective     Vitals:    04/03/24 0924   BP: (!) 148/88   Pulse: 80   Resp: 18   SpO2: 98%   Weight: 133.7 kg (294 lb 10.3 oz)   Height: 5' 6" (1.676 m)   PainSc: 0-No pain       Body mass index is 47.56 kg/m².    Physical Exam  Vitals and nursing note reviewed.   Constitutional:       Appearance: She is obese.   HENT:      Head: " Normocephalic.      Nose: Nose normal.   Eyes:      Conjunctiva/sclera: Conjunctivae normal.   Cardiovascular:      Rate and Rhythm: Normal rate.      Heart sounds: Normal heart sounds.   Pulmonary:      Effort: Pulmonary effort is normal.      Breath sounds: Normal breath sounds.   Musculoskeletal:         General: Normal range of motion.      Cervical back: Normal range of motion.   Skin:     General: Skin is warm and dry.   Neurological:      General: No focal deficit present.      Mental Status: She is alert and oriented to person, place, and time.   Psychiatric:         Mood and Affect: Mood normal.         Behavior: Behavior normal.         Thought Content: Thought content normal.         Judgment: Judgment normal.            Assessment and Plan     1. Anxiety  -     FLUoxetine 20 MG capsule; Take 1 capsule (20 mg total) by mouth once daily.  Dispense: 90 capsule; Refill: 3  -     Ambulatory referral/consult to Primary Care Behavioral Health (Non-Opioids); Future; Expected date: 04/03/2024    2. BMI 45.0-49.9, adult  BMI reviewed    3. Morbidly obese  BMI reviewed.    Diet and exercise to lose weight.    4. ANISH (generalized anxiety disorder)  -     FLUoxetine 20 MG capsule; Take 1 capsule (20 mg total) by mouth once daily.  Dispense: 90 capsule; Refill: 3  -     Ambulatory referral/consult to Primary Care Behavioral Health (Non-Opioids); Future; Expected date: 04/03/2024    Restart fluoxetine 20 mg daily    You can call [phone] to schedule your appt with Psychiatry or Behavioral Health per my referral.    Follow up if symptoms worsen or fail to improve.

## 2024-04-10 ENCOUNTER — PATIENT MESSAGE (OUTPATIENT)
Dept: BEHAVIORAL HEALTH | Facility: CLINIC | Age: 34
End: 2024-04-10
Payer: COMMERCIAL

## 2024-04-10 ENCOUNTER — TELEPHONE (OUTPATIENT)
Dept: BEHAVIORAL HEALTH | Facility: CLINIC | Age: 34
End: 2024-04-10
Payer: COMMERCIAL

## 2024-04-10 NOTE — PROGRESS NOTES
Patient left message saying she is going with [facility]. The referral will be canceled for now unless patient decide to see [facility] later than it can be reinstated.

## 2024-04-23 ENCOUNTER — OFFICE VISIT (OUTPATIENT)
Dept: PSYCHIATRY | Facility: CLINIC | Age: 34
End: 2024-04-23
Payer: COMMERCIAL

## 2024-04-23 DIAGNOSIS — R69 DIAGNOSIS UNKNOWN: Primary | ICD-10-CM

## 2024-04-23 PROCEDURE — 90791 PSYCH DIAGNOSTIC EVALUATION: CPT | Mod: S$GLB,,, | Performed by: SOCIAL WORKER

## 2024-04-23 PROCEDURE — 99999 PR PBB SHADOW E&M-EST. PATIENT-LVL I: CPT | Mod: PBBFAC,,, | Performed by: SOCIAL WORKER

## 2024-04-26 ENCOUNTER — OFFICE VISIT (OUTPATIENT)
Dept: PRIMARY CARE CLINIC | Facility: CLINIC | Age: 34
End: 2024-04-26
Payer: COMMERCIAL

## 2024-04-26 VITALS
HEIGHT: 66 IN | OXYGEN SATURATION: 98 % | BODY MASS INDEX: 46.7 KG/M2 | HEART RATE: 114 BPM | RESPIRATION RATE: 18 BRPM | TEMPERATURE: 98 F | DIASTOLIC BLOOD PRESSURE: 82 MMHG | WEIGHT: 290.56 LBS | SYSTOLIC BLOOD PRESSURE: 138 MMHG

## 2024-04-26 DIAGNOSIS — F32.A ANXIETY AND DEPRESSION: ICD-10-CM

## 2024-04-26 DIAGNOSIS — E88.819 INSULIN RESISTANCE: ICD-10-CM

## 2024-04-26 DIAGNOSIS — F41.9 ANXIETY AND DEPRESSION: ICD-10-CM

## 2024-04-26 DIAGNOSIS — E28.2 PCOS (POLYCYSTIC OVARIAN SYNDROME): ICD-10-CM

## 2024-04-26 DIAGNOSIS — Z76.89 ENCOUNTER TO ESTABLISH CARE: Primary | ICD-10-CM

## 2024-04-26 DIAGNOSIS — L73.2 HIDRADENITIS SUPPURATIVA: ICD-10-CM

## 2024-04-26 PROBLEM — F32.9 MAJOR DEPRESSIVE DISORDER, SINGLE EPISODE, UNSPECIFIED: Status: RESOLVED | Noted: 2021-09-22 | Resolved: 2024-04-26

## 2024-04-26 PROCEDURE — 81003 URINALYSIS AUTO W/O SCOPE: CPT | Performed by: INTERNAL MEDICINE

## 2024-04-26 PROCEDURE — 99499 UNLISTED E&M SERVICE: CPT | Mod: S$GLB,,, | Performed by: INTERNAL MEDICINE

## 2024-04-26 NOTE — PROGRESS NOTES
33-year-old woman with a history of anxiety depression, PCOS (hidradenitis suppurativa, insulin resistance is a manifestation of PCOS, elevated BMI, oligomenorrhea), who is here to establish.    Pertinent review of systems:  Encounter to establish:  The patient has not seen a physician for several years.  She has had no recent labs.  She does have chronic medical issues which she knows she needs to address.  She would like baseline labs today as well as to discuss potential medications which may be of benefit.  She would like to develop a better routine which can lead to a healthier life and improved longevity.      Anxiety and depression:  The patient noted symptoms in her early 20s.  The patient has initial symptoms was feeling of being overwhelmed and she actually developed hives.  The patient notes during this time she also developed negative thought patterns and worries.  She had some obsessive thoughts what she notes also that when she had the she found ways to change that pattern.  The patient first saw a therapist in 2014 for anxiety and therapist referred her to a psychiatrist who started her on Prozac.  The patient took Prozac for 4 years and then weaned herself off.  She has been off it until recently when she had a panic attack which brought her to the emergency room.  At that time she felt a choking sensation and chest pressure and thought she may be having a heart attack.  She sought help from a nurse practitioner who placed her back on Prozac.  She notes currently that she works long hours because she is fearful of losing her job and she does not want the feeling of being overwhelmed by work.  The patient also notes she has been overwhelmed by family issues the past month.  The patient notes she does live by her self.  The patient has no problems going and getting to work.  She notes she has problems falling asleep and worries about falling asleep.  The patient snores but is uncertain if there periods  of apnea that wake her (see below).  The patient does not have daytime sleepiness and does not in voluntarily fall asleep during the day or while driving.  The patient is not suicidal, has no self loathing, in his not consider herself depressed.  She does note that she is anxious.    PCOS:  The patient was diagnosed several years ago with PCOS.  The patient had no ultrasound or any form of imaging.  The diagnosis was established on clinical grounds which included oligomenorrhea, hirsutism, elevated BMI, insulin resistance.  The associated syndromes included hidradenitis  suppurativa.  She has no symptoms of thyroid disease including swelling of neck, changes in hair, eyebrows, bowels, voice, or temperature intolerance.  The patient does not note any changes in mentation, or pressurized speech.  The patient has no symptoms of diabetes including polyuria polydipsia paresthesias problems healing or rapid changes in her vision.  She has no history of chronic vaginal discharge or yeast infections.  She is currently on no spironolactone nor is she on metformin.  Her highest adult weight of 290.  Lowest adult weight was 215 lb.  The patient has been on multiple diets.  The patient lost at most 20 lb on a specific diet but then would give up and regained the weight and more.    Review of laboratory:  When the patient was in the ER at the end of March the patient had a CMP, CBC, hep C, HIV, all of which were normal.  [facility] laboratories have included a 17 hydroxy progesterone which was normal, an abnormal DHEA sulfate but normal testosterone level.  Prolactin level was normal.    Past medical history:  Medical:  The patient has no medical hospitalizations.  As noted at the end of March she was in the ER for what turned out to be a panic attack.  Surgical:  The patient has had no surgical procedures.  Trauma:  The patient has no fractures or dislocations   Psych:  The patient saw psychiatrist as noted several years ago.  It was  for purpose of medication only.  She has seen a therapist in the past in his currently seeing a short-term therapist that [facility] provides.  OBGYN:  Menses began at age 14.  Periods were regular until age 20 when her weight was initially over 200.  The patient has never been pregnant.  The patient had a Pap smear 1 year ago which was normal.  Prevention:  The patient has been vaccinated for flu, COVID, HPV, hepatitis-B, and was vaccinated for the meningococcal vaccine.  The patient does need an updated tetanus shot or Tdap.    Family history:  Diabetes mellitus:  Mother   Hypertension: Mother and father  Hashimoto's thyroiditis:  Father  Lung cancer nonsmoker:  Maternal grandmother  Breast cancer:  Paternal grandmother, paternal aunt  Siblings: The patient has 1 younger brother    Social history:  Tobacco:  The patient does not smoke.    Alcohol:  The patient will have 1-2 drinks a proximally twice a month.    Recreational drugs:  The patient uses no recreational drugs.  Bowel:  Patient moves her bowels daily.    Diet:  Patient eats 3 meals per day.  The patient currently is only drinking 1 cup coffee per day  Sleep:  The patient has difficulty falling asleep and often staying asleep.  She believes she gets approximally 6-7 hours  Exercise:  The patient is walking [phone] steps a day.    Social circumstances:  The patient has never been .  She lives with her dog.  The patient is a  for experience transformation which has to do with the user experienced while in [facility] facilities.  The patient enjoys knitting.  She watches classical movies, musicals.  She loves to travel.  She enjoys walking.  She enjoys cooking when she has time.    Allergies:  The patient develops hives from Cefzil.      Medication list:  Medication list was edited reviewed.    Review of systems:  A 12 system review was inquired.  The patient currently has had no treatment for hidradenitis suppurativa.  The patient has the  usual areas under her axilla, breast, and groin areas which have been involved.  The patient uses a medicated soap which controls the problem.  The patient has not been on long-term antibiotics for a long time.  The remaining review of systems is negative.    Physical Exam  Vitals and nursing note reviewed.   Constitutional:       General: She is not in acute distress.     Appearance: She is not ill-appearing, toxic-appearing or diaphoretic.   HENT:      Head: Atraumatic.      Right Ear: Tympanic membrane, ear canal and external ear normal.      Left Ear: Tympanic membrane, ear canal and external ear normal.      Nose: Nose normal. No congestion or rhinorrhea.      Mouth/Throat:      Mouth: Mucous membranes are moist.      Pharynx: Oropharynx is clear. No oropharyngeal exudate or posterior oropharyngeal erythema.   Eyes:      General: No scleral icterus.     Extraocular Movements: Extraocular movements intact.      Conjunctiva/sclera: Conjunctivae normal.      Pupils: Pupils are equal, round, and reactive to light.      Comments: Fundi are without exudate hemorrhage or scar.  Retinal vessels are normal.  There is no evidence of diabetic retinopathy   Neck:      Comments: Thyroid is without enlargement, nodule, or bruit.  Cardiovascular:      Rate and Rhythm: Normal rate and regular rhythm.      Pulses: Normal pulses.      Heart sounds: Normal heart sounds. No murmur heard.     No gallop.   Pulmonary:      Effort: Pulmonary effort is normal. No respiratory distress.      Breath sounds: Normal breath sounds. No wheezing, rhonchi or rales.   Abdominal:      General: Bowel sounds are normal.      Palpations: Abdomen is soft.      Tenderness: There is no abdominal tenderness.      Comments: No hepatosplenomegaly   Musculoskeletal:         General: No swelling or tenderness. Normal range of motion.      Cervical back: Neck supple.      Right lower leg: No edema.      Left lower leg: No edema.   Skin:     General: Skin is  warm.      Coloration: Skin is not jaundiced or pale.      Findings: No lesion or rash.   Neurological:      General: No focal deficit present.      Mental Status: She is alert and oriented to person, place, and time.      Cranial Nerves: No cranial nerve deficit.      Gait: Gait normal.      Deep Tendon Reflexes: Reflexes normal.   Psychiatric:         Mood and Affect: Mood normal.         Behavior: Behavior normal.     Assessment/plan:  Encounter to establish:  The patient's goals of establishing with a new physician, obtaining baseline labs,  and beginning a process of treating her chronic conditions.  Weight loss and a healthy lifestyle was also discussed.  Plan: Reviewed above with patient  Routine lab  I will contact the patient with her laboratory when all is available and discuss appropriate medications and interventions as necessary.    Anxiety and depression:  The patient primarily has anxiety but she has a obsessive thoughts that depress her mood.  She does have panic attacks and she does worry about falling behind in her work and being overwhelmed.  Discussions regarding the various medications which can be used might include SSRIs but she may be better off with either a tricyclic antidepressant or Wellbutrin which has less sexual side effects as well as less weight gain associated with it.  An antianxiety meds such as buspirone could also be considered however her symptoms appear to be daily.  Her symptoms also interfere with her sleep.  Plan:  Reviewed above with patient.  I have suggested a different bedtime routine.  Please see AVS  Amitriptyline 25 mg at bedtime for anxiety and panic as well as sleep as a single agent.  I will consider the use of buspirone on a p.r.n. basis    PCOS:  This has associated with hidradenitis suppurativa, elevated BMI, oligomenorrhea, and insulin resistance.  It was interesting that the patient has not been on spironolactone or metformin.  The actual combination should  serve her well treating her insulin resistance as well as hidradenitis suppurativa.  It was likely if the patient was successful in losing weight her oligomenorrhea could resolve.  Plan:  Reviewed above with patient   Routine laboratory to be certain the medications above are safe.  Weight loss suggestions were made including a 2 lb weight loss by her next appointment, a goal of walking greater than 10,000 steps daily, as well as the patient paying more attention to the foods that she buys when she shops and brings home.  I have asked the patient to review on [employer] the TV show the blue zones to better understand that weight loss is only part of the solution to longevity.

## 2024-04-26 NOTE — PATIENT INSTRUCTIONS
Thank you for visiting today.  Please get your laboratory after May 6th.  You do not need to be fasting.  It will be at that time I will order medications for you with the exception of what I ordered today.  I sent to the pharmacy a prescription for amitriptyline 25 mg.  There are 2 options on starting it cut it in half when you feel your tolerating it well you can increase it to the full tablet or you can start with a full tablets since this has weekend and if after 2 days you feel groggy your having some side effects cut it in half for a week then increase to full tablet.  Take your Prozac every other day for about a week and then stop  Once I have all your lab results I will call you please remember my phone number comes up as no caller ID.  I want to see you in 1 month.  I have the following tasks you to do during that time.  I want you to lose 2 lb  The 1 suggestion I make is that you take pictures of what you eat every day and the following day review it and see what opportunities you missed in terms of either improving your diet or taking things away that you know you should have had.  I want you to watch the program the blue zones there are 4 episodes.  This has not movie about diet it is about longevity in the contributions of routine, diet, exercise, and lifestyle.  I want you to go to [employer] [facility] afternoon and I want you to read and pick a book that is speaks you about cognitive behavioral therapy.  Next month will talk about referral to a therapist or not.    Finally please remember the alternative bedside routine I have suggested:  Watch TV prior to getting ready to go to bed or look at all your screen such as your e-mail tablet etc..  Go and get ready for bed and then go to bed.  Take the amitriptyline when you are getting ready for bed so that will have time to work during your bedtime preparation.

## 2024-04-29 ENCOUNTER — TELEPHONE (OUTPATIENT)
Dept: PRIMARY CARE CLINIC | Facility: CLINIC | Age: 34
End: 2024-04-29
Payer: COMMERCIAL

## 2024-04-29 RX ORDER — AMITRIPTYLINE HYDROCHLORIDE 25 MG/1
25 TABLET, FILM COATED ORAL NIGHTLY
Qty: 90 TABLET | Refills: 1 | Status: SHIPPED | OUTPATIENT
Start: 2024-04-29 | End: 2025-04-29

## 2024-04-30 NOTE — PROGRESS NOTES
Called patient to apologized.  Directions for use of amitriptyline were placed in the medical record as well as in the AVS.  I was uncertain as to what pharmacy was was [facility].  Rx was sent.

## 2024-05-08 ENCOUNTER — LAB VISIT (OUTPATIENT)
Dept: LAB | Facility: HOSPITAL | Age: 34
End: 2024-05-08
Payer: COMMERCIAL

## 2024-05-08 DIAGNOSIS — E28.2 PCOS (POLYCYSTIC OVARIAN SYNDROME): ICD-10-CM

## 2024-05-08 DIAGNOSIS — F32.A ANXIETY AND DEPRESSION: ICD-10-CM

## 2024-05-08 DIAGNOSIS — Z76.89 ENCOUNTER TO ESTABLISH CARE: ICD-10-CM

## 2024-05-08 DIAGNOSIS — F41.9 ANXIETY AND DEPRESSION: ICD-10-CM

## 2024-05-08 DIAGNOSIS — E88.819 INSULIN RESISTANCE: ICD-10-CM

## 2024-05-08 LAB
ALBUMIN SERPL BCP-MCNC: 3.7 G/DL (ref 3.5–5.2)
ALP SERPL-CCNC: 69 U/L (ref 55–135)
ALT SERPL W/O P-5'-P-CCNC: 24 U/L (ref 10–44)
ANION GAP SERPL CALC-SCNC: 7 MMOL/L (ref 8–16)
AST SERPL-CCNC: 15 U/L (ref 10–40)
BASOPHILS # BLD AUTO: 0.02 K/UL (ref 0–0.2)
BASOPHILS NFR BLD: 0.3 % (ref 0–1.9)
BILIRUB SERPL-MCNC: 0.7 MG/DL (ref 0.1–1)
BILIRUB UR QL STRIP: NEGATIVE
BUN SERPL-MCNC: 8 MG/DL (ref 6–20)
C PEPTIDE SERPL-MCNC: 5.43 NG/ML (ref 0.78–5.19)
CALCIUM SERPL-MCNC: 9.5 MG/DL (ref 8.7–10.5)
CHLORIDE SERPL-SCNC: 106 MMOL/L (ref 95–110)
CLARITY UR REFRACT.AUTO: CLEAR
CO2 SERPL-SCNC: 24 MMOL/L (ref 23–29)
COLOR UR AUTO: YELLOW
CREAT SERPL-MCNC: 0.6 MG/DL (ref 0.5–1.4)
DIFFERENTIAL METHOD BLD: NORMAL
EOSINOPHIL # BLD AUTO: 0 K/UL (ref 0–0.5)
EOSINOPHIL NFR BLD: 0.5 % (ref 0–8)
ERYTHROCYTE [DISTWIDTH] IN BLOOD BY AUTOMATED COUNT: 12.5 % (ref 11.5–14.5)
EST. GFR  (NO RACE VARIABLE): >60 ML/MIN/1.73 M^2
ESTIMATED AVG GLUCOSE: 94 MG/DL (ref 68–131)
FERRITIN SERPL-MCNC: 126 NG/ML (ref 20–300)
GLUCOSE SERPL-MCNC: 99 MG/DL (ref 70–110)
GLUCOSE UR QL STRIP: NEGATIVE
HBA1C MFR BLD: 4.9 % (ref 4–5.6)
HBV SURFACE AB SER-ACNC: 19.47 MIU/ML
HBV SURFACE AB SER-ACNC: REACTIVE M[IU]/ML
HCT VFR BLD AUTO: 41.2 % (ref 37–48.5)
HGB BLD-MCNC: 14.2 G/DL (ref 12–16)
HGB UR QL STRIP: NEGATIVE
IMM GRANULOCYTES # BLD AUTO: 0.02 K/UL (ref 0–0.04)
IMM GRANULOCYTES NFR BLD AUTO: 0.3 % (ref 0–0.5)
INSULIN COLLECTION INTERVAL: ABNORMAL
INSULIN SERPL-ACNC: 65.5 UU/ML
IRON SERPL-MCNC: 62 UG/DL (ref 30–160)
KETONES UR QL STRIP: NEGATIVE
LEUKOCYTE ESTERASE UR QL STRIP: NEGATIVE
LYMPHOCYTES # BLD AUTO: 1.6 K/UL (ref 1–4.8)
LYMPHOCYTES NFR BLD: 25.5 % (ref 18–48)
MCH RBC QN AUTO: 29.9 PG (ref 27–31)
MCHC RBC AUTO-ENTMCNC: 34.5 G/DL (ref 32–36)
MCV RBC AUTO: 87 FL (ref 82–98)
MONOCYTES # BLD AUTO: 0.3 K/UL (ref 0.3–1)
MONOCYTES NFR BLD: 4.4 % (ref 4–15)
NEUTROPHILS # BLD AUTO: 4.2 K/UL (ref 1.8–7.7)
NEUTROPHILS NFR BLD: 69 % (ref 38–73)
NITRITE UR QL STRIP: NEGATIVE
NRBC BLD-RTO: 0 /100 WBC
PH UR STRIP: 6 [PH] (ref 5–8)
PLATELET # BLD AUTO: 278 K/UL (ref 150–450)
PMV BLD AUTO: 9.5 FL (ref 9.2–12.9)
POTASSIUM SERPL-SCNC: 4 MMOL/L (ref 3.5–5.1)
PROT SERPL-MCNC: 7.1 G/DL (ref 6–8.4)
PROT UR QL STRIP: NEGATIVE
RBC # BLD AUTO: 4.75 M/UL (ref 4–5.4)
SATURATED IRON: 16 % (ref 20–50)
SODIUM SERPL-SCNC: 137 MMOL/L (ref 136–145)
SP GR UR STRIP: 1.02 (ref 1–1.03)
T4 FREE SERPL-MCNC: 0.77 NG/DL (ref 0.71–1.51)
TOTAL IRON BINDING CAPACITY: 388 UG/DL (ref 250–450)
TRANSFERRIN SERPL-MCNC: 262 MG/DL (ref 200–375)
TSH SERPL DL<=0.005 MIU/L-ACNC: 1.74 UIU/ML (ref 0.4–4)
URN SPEC COLLECT METH UR: NORMAL
WBC # BLD AUTO: 6.07 K/UL (ref 3.9–12.7)

## 2024-05-08 PROCEDURE — 83540 ASSAY OF IRON: CPT | Performed by: INTERNAL MEDICINE

## 2024-05-08 PROCEDURE — 85025 COMPLETE CBC W/AUTO DIFF WBC: CPT | Performed by: INTERNAL MEDICINE

## 2024-05-08 PROCEDURE — 83525 ASSAY OF INSULIN: CPT | Performed by: INTERNAL MEDICINE

## 2024-05-08 PROCEDURE — 80053 COMPREHEN METABOLIC PANEL: CPT | Performed by: INTERNAL MEDICINE

## 2024-05-08 PROCEDURE — 84681 ASSAY OF C-PEPTIDE: CPT | Performed by: INTERNAL MEDICINE

## 2024-05-08 PROCEDURE — 83036 HEMOGLOBIN GLYCOSYLATED A1C: CPT | Performed by: INTERNAL MEDICINE

## 2024-05-08 PROCEDURE — 84443 ASSAY THYROID STIM HORMONE: CPT | Performed by: INTERNAL MEDICINE

## 2024-05-08 PROCEDURE — 84439 ASSAY OF FREE THYROXINE: CPT | Performed by: INTERNAL MEDICINE

## 2024-05-08 PROCEDURE — 86706 HEP B SURFACE ANTIBODY: CPT | Mod: 91 | Performed by: INTERNAL MEDICINE

## 2024-05-08 PROCEDURE — 82728 ASSAY OF FERRITIN: CPT | Performed by: INTERNAL MEDICINE

## 2024-05-08 PROCEDURE — 36415 COLL VENOUS BLD VENIPUNCTURE: CPT | Performed by: INTERNAL MEDICINE

## 2024-05-08 RX ORDER — SPIRONOLACTONE 25 MG/1
25 TABLET ORAL DAILY
Qty: 30 TABLET | Refills: 11 | Status: SHIPPED | OUTPATIENT
Start: 2024-05-08 | End: 2024-05-27 | Stop reason: SDUPTHER

## 2024-05-08 RX ORDER — METFORMIN HYDROCHLORIDE 500 MG/1
500 TABLET ORAL 2 TIMES DAILY WITH MEALS
Qty: 180 TABLET | Refills: 3 | Status: SHIPPED | OUTPATIENT
Start: 2024-05-08 | End: 2025-05-08

## 2024-05-08 NOTE — PROGRESS NOTES
Called patient to review labs.  Lab is consistent with the patient's PCOS with elevated insulin levels and C-peptide and also her insulin resistance.  The patient was started on metformin twice daily with breakfast and dinner and spironolactone once daily 25 mg.  Is likely that if the patient tolerates the regimen she can increase her spironolactone to 50 mg most likely at the time I see her at the end of the month.  The other lab was clinically unremarkable as was her urinalysis.  Unfortunately a lipid panel was not done.  Also noteworthy was normal hemoglobin A1c.  Since follow-up labs will be done at the next visit the lipid profile can be added at that time.

## 2024-05-27 ENCOUNTER — OFFICE VISIT (OUTPATIENT)
Dept: PRIMARY CARE CLINIC | Facility: CLINIC | Age: 34
End: 2024-05-27
Payer: COMMERCIAL

## 2024-05-27 ENCOUNTER — CLINICAL SUPPORT (OUTPATIENT)
Dept: PRIMARY CARE CLINIC | Facility: CLINIC | Age: 34
End: 2024-05-27
Payer: COMMERCIAL

## 2024-05-27 VITALS
OXYGEN SATURATION: 97 % | BODY MASS INDEX: 46.68 KG/M2 | RESPIRATION RATE: 18 BRPM | DIASTOLIC BLOOD PRESSURE: 96 MMHG | HEIGHT: 66 IN | HEART RATE: 82 BPM | WEIGHT: 290.44 LBS | SYSTOLIC BLOOD PRESSURE: 142 MMHG | TEMPERATURE: 97 F

## 2024-05-27 DIAGNOSIS — F41.9 ANXIETY AND DEPRESSION: ICD-10-CM

## 2024-05-27 DIAGNOSIS — F41.9 ANXIETY AND DEPRESSION: Primary | ICD-10-CM

## 2024-05-27 DIAGNOSIS — F32.A ANXIETY AND DEPRESSION: Primary | ICD-10-CM

## 2024-05-27 DIAGNOSIS — E28.2 PCOS (POLYCYSTIC OVARIAN SYNDROME): ICD-10-CM

## 2024-05-27 DIAGNOSIS — L73.2 HIDRADENITIS SUPPURATIVA: ICD-10-CM

## 2024-05-27 DIAGNOSIS — F32.A ANXIETY AND DEPRESSION: ICD-10-CM

## 2024-05-27 PROCEDURE — 99499 UNLISTED E&M SERVICE: CPT | Mod: S$GLB,,, | Performed by: INTERNAL MEDICINE

## 2024-05-27 RX ORDER — SPIRONOLACTONE 50 MG/1
50 TABLET, FILM COATED ORAL DAILY
Qty: 90 TABLET | Refills: 3 | Status: SHIPPED | OUTPATIENT
Start: 2024-05-27 | End: 2025-05-27

## 2024-05-27 RX ORDER — MONTELUKAST SODIUM 10 MG/1
10 TABLET ORAL NIGHTLY
Qty: 90 TABLET | Refills: 3 | Status: SHIPPED | OUTPATIENT
Start: 2024-05-27 | End: 2025-05-27

## 2024-05-27 NOTE — PROGRESS NOTES
33-year-old woman with a history of anxiety and depression, PCOS (hidradenitis suppurativa, insulin resistance as a manifestation PCOS, elevated BMI, oligomenorrhea), here for one-month follow-up.     Pertinent review of systems:   Follow-up:   the patient is C-peptide and random insulin levels were both high.  Remaining lab which included thyroid values and hemoglobin A1c was essentially normal.  The patient did have a normal CBC however her saturated iron was low with a normal ferritin.      Elevated BMI:  The problem of the patient's weight clearly can be related to the patient's excess production of insulin in order to maintain euglycemia.  Some people will often referred to this as someone being prediabetic though her hemoglobin A1c is perfectly normal.  The patient has no polyphagia, polyuria, polydipsia paresthesias or problems healing.  She has no chronic vaginal rash. She has no changes in her hair, eyebrows, bowels, voice or temperature intolerance.  The patient's BMI continues to be elevated 46.88  Kg per meter squared.  The patient however has lost 4 lb or a proximally 2 lb per month.   The patient is now tolerating metformin.  She is also on low-dose spironolactone.  The patient watched the blue zones and found it very impactful.   She now feels motivated to see a counselor.  The patient has had no issues with her hidradenitis suppurativa.  She is tolerating for this condition and her PCOS the metformin is noted as well as spironolactone.The patient is not tracking her steps.  She is walking a mile per day.  The patient is going to [facility] on September in his beginning to increase her exercise in order to enjoy her trip so she will be able to do her walking and site seen.    Anxiety/depression:  Patient weaned herself off the Prozac in his now only on amitriptyline 25 mg at night.  She now feels well rested in has no symptoms of panic attack and generally is not anxious.  She is not depressed or  suicidal.  She notes she is more hopeful about her future in her future health.     Problem list, medication list, and allergies reviewed.      Review of systems is otherwise negative.    Physical Exam  Vitals and nursing note reviewed.   Constitutional:       General: She is not in acute distress.     Appearance: She is not ill-appearing, toxic-appearing or diaphoretic.   HENT:      Head: Atraumatic.      Nose: Nose normal.      Mouth/Throat:      Mouth: Mucous membranes are moist.      Pharynx: Oropharynx is clear.   Eyes:      General: No scleral icterus.     Conjunctiva/sclera: Conjunctivae normal.   Neck:      Vascular: No carotid bruit.   Cardiovascular:      Rate and Rhythm: Normal rate and regular rhythm.      Pulses: Normal pulses.      Heart sounds: Normal heart sounds. No murmur heard.     No gallop.   Pulmonary:      Effort: Pulmonary effort is normal. No respiratory distress.      Breath sounds: Normal breath sounds. No wheezing, rhonchi or rales.   Musculoskeletal:      Cervical back: Neck supple. No tenderness.   Lymphadenopathy:      Cervical: No cervical adenopathy.   Neurological:      Mental Status: She is alert.   Psychiatric:         Mood and Affect: Mood normal.         Behavior: Behavior normal.        Assessment/plan:   PCOS:  This can account for the patient's hidradenitis, insulin resistance, all of which results in her elevated BMI.  She clearly is motivated and today wish to the except the help of a therapist to better help with her decisions that she makes that effects her weight in the above conditions.  Plan:  Continue current management   Will consider raising the spironolactone next time from 50 mg once a day to 50 mg twice a day  Will consider increasing metformin to 500 mg t.I.d.  Laboratory to assess her lipid panel which somehow was omitted during her last lab    Anxiety and depression:  At this moment it was solved and the patient is feeling hopeful and optimistic.  She has had  no panic  issues and now that she is sleeping she is well rested on a very low dose of amitriptyline.  Plan:  She will continue her current management of amitriptyline.    The patient will also be seeing the therapist which will allow her to work on all her issues.

## 2024-05-27 NOTE — PROGRESS NOTES
[facility] BEHAVIORAL HEALTH INTAKE    DATE:  5/27/2024  REFERRAL SOURCE:  Carlos Villalobos MD  TYPE OF VISIT:    LENGTH OF SESSION:   .  HISTORY OF PRESENTING ILLNESS:  Divya De Paz, a 33 y.o. female with history of .    CHIEF COMPLAINT/REASON FOR ENCOUNTER: Pt presented for initial assessment. Met with . Pt's chief complaint includes the following: .    Patient  currently have a psychiatrist.    Patient  currently have a therapist.      They  interested in medication changes.    Current symptoms:  Depression: .  Anxiety: .  Insomnia: .  Nathaly:  .  Psychosis: .      Session Content/Presenting Problem Hx:    Current social stressors:       Risk assessment:      PSYCHIATRIC HISTORY:  History of Nathaly or diagnosis of Bipolar Disorder in the past:    History of Psychosis or diagnosis of Schizophrenia in the past:    Previous Psychiatric Hospitalizations:    Previous SI/HI:     Previous Suicide Attempts:    Previous Medication Trials:    Previous Psychiatric Outpatient Treatment:    History of Trauma:    History of Violence:    Access to a Gun:      SUBSTANCE ABUSE HISTORY:  Tobacco:     Alcohol:   Illicit Substances:   Misuse of Prescription Medications:      MEDICAL HISTORY:  Past Medical History:   Diagnosis Date    Anxiety     Depression     Hidradenitis suppurativa     PCOS (polycystic ovarian syndrome)        NEUROLOGIC HISTORY:  Seizures:    Head trauma:    Memory loss:      SOCIAL HISTORY (MARRIAGE, EMPLOYMENT, etc.):  Living Situation:   Family Life Cycle:   Family:   Nuclear/Marriage:   Extended Family:   Supports:  Education/Vocation:   Yazdanism/Spirituality:   Hobbies and Interests:     PSYCHIATRIC FAMILY HISTORY: none      MENTAL HEALTH STATUS EXAM  General Appearance:  unremarkable, age appropriate   Speech: normal tone, normal rate, normal pitch, normal volume      Level of Cooperation: cooperative      Thought Processes: normal and logical   Mood: steady      Thought Content: normal, no suicidality, no  homicidality, delusions, or paranoia   Affect: congruent and appropriate   Orientation: Oriented x3   Memory: recent >  intact, remote >  intact   Attention Span & Concentration: intact   Fund of General Knowledge: intact and appropriate to age and level of education   Abstract Reasoning: interpretation of similarities was abstract   Judgment & Insight: good     Language  intact       IMPRESSION:   My diagnostic impression is , as evidenced by .     PROVISIONAL DIAGNOSES:  1. Anxiety and depression         STRENGTHS AND LIABILITIES:     TREATMENT GOALS:     PLAN: In this session a psych evaluation was conducted to get history and process pt's life.  will be utilized in future   therapy sessions to increase .     RETURN TO CLINIC: No follow-ups on file.

## 2024-05-27 NOTE — PATIENT INSTRUCTIONS
Thank you for visiting today.  Congratulations on losing 4 lb since I 1st saw you in April.  I am glad you are tolerating the medication changes that I made.    I did reviewed my visit with you slightly because therapist did have an appointment available for you to see him right away and I thought at this point it would be more valuable as did you.  I do note that your blood pressure continues to run a little high and for whatever reason when your lab was done a lipid panel was omitted.  When I see you in 3 months I will be checking some  limited labs including the lipid profile and a liver and kidney panel.    We have several opportunities in terms of medication changes and I have left the metformin at twice daily but 1 consideration might be to give you metformin before lunch as well.  I have added a drug based on some studies that I saw recently about metformin and montelukast the combination causing weight loss.  Montelukast is a drug that is often used an asthma.  I therefore added this drug to your regimen.    In summary in terms of your medication:   Keep metformin the same for now.  Take the new drug montelukast right after dinner   I have increased your spironolactone from 25 mg to 50 mg.  This may have the added benefit of helping your hirsutism.  This drug has been  used in doses as high as 200 mg and some individuals.  I would be comfortable increasing it at some point to 100 mg but this can be discussed at our future appointment.    I wish to see you in 3 months.  We will discuss other changes at that time.  I would continue with the accomplish boil weight loss of 2 lb per month  I would hope you would increase your steps to achieve a goal of approximately 10,000 steps daily

## 2024-06-03 ENCOUNTER — CLINICAL SUPPORT (OUTPATIENT)
Dept: PRIMARY CARE CLINIC | Facility: CLINIC | Age: 34
End: 2024-06-03
Payer: COMMERCIAL

## 2024-06-03 DIAGNOSIS — F41.9 ANXIETY: Primary | ICD-10-CM

## 2024-06-03 PROCEDURE — 90837 PSYTX W PT 60 MINUTES: CPT | Mod: S$GLB,,, | Performed by: SOCIAL WORKER

## 2024-06-03 NOTE — PROGRESS NOTES
Individual Psychotherapy (LCSW/PhD)  Divya De Paz,  6/3/2024    Site:  [facility]         Therapeutic Intervention: Met with patient for individual psychotherapy.    Chief complaint/reason for encounter: behavior and interpersonal     Interval history and content of current session: Pt provided background on work stress and stress from personal life and supporting family. LCSW and pt discussed family Hx and pt's role in supporting family and boundaries. Pt states they have been very busy at work which has made them feel overwhelmed at times.    Pt and LCSW discussed pt's personality and patterns in relationship. 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 LCSW used CBT principles to help pt identify unhelpful vs. Helpful thoughts. Pt reports catastrophic thinking and all or nothing thinking. LCSW and pt discussed pt's anxiety and prior therapy.  LCSW and pt discussed self-care and role of therapy in helping anxiety.     Treatment plan:  Target symptoms: work stress, behavior  Why chosen therapy is appropriate versus another modality: relevant to diagnosis, evidence based practice  Outcome monitoring methods: self-report, checklist/rating scale  Therapeutic intervention type: behavior modifying psychotherapy, supportive psychotherapy    Risk parameters:  Patient reports no suicidal ideation  Patient reports no homicidal ideation  Patient reports no self-injurious behavior  Patient reports no violent behavior    Verbal deficits: None    Patient's response to intervention:  The patient's response to intervention is accepting.    Progress toward goals and other mental status changes:  The patient's progress toward goals is limited.    Diagnosis:   No diagnosis found.    Plan: Pt plans to continue individual psychotherapy    Return to clinic: as scheduled    Length of Service (minutes): 60

## 2024-06-24 ENCOUNTER — CLINICAL SUPPORT (OUTPATIENT)
Dept: PRIMARY CARE CLINIC | Facility: CLINIC | Age: 34
End: 2024-06-24
Payer: COMMERCIAL

## 2024-06-24 DIAGNOSIS — F41.9 ANXIETY: Primary | ICD-10-CM

## 2024-06-24 PROCEDURE — 90837 PSYTX W PT 60 MINUTES: CPT | Mod: S$GLB,,, | Performed by: SOCIAL WORKER

## 2024-06-24 NOTE — PROGRESS NOTES
"Individual Psychotherapy (LCSW/PhD)  Divya De Paz,  6/24/2024    Site:  [facility]         Therapeutic Intervention: Met with patient for individual psychotherapy.    Chief complaint/reason for encounter: anxiety     Interval history and content of current session: Pt and LCSW reviewed patient's self-care plan. LCSW provided feedback on self-care and importance of setting attainable goals. Pt reviewed each category of self-care and discussed goals. LCSW and pt discussed relationship between self-care anxiety.    Pt provided updated of work stress and difficulties setting boundaries with others at work. Pt expressed feelings of anxiety and "people pleasing" tendencies. LCSW used CBT concepts to help pt in identifying automatic thoughts and their relationship  to core beliefs. Pt identified fear of failure and worthlessness as recurring themes.    Goals:    1) Pt to research internal vs. External locus of control  2) Pt to enact self-care plan and provide updates            Treatment plan:  Target symptoms: anxiety   Why chosen therapy is appropriate versus another modality: relevant to diagnosis, evidence based practice  Outcome monitoring methods: self-report, checklist/rating scale  Therapeutic intervention type: insight oriented psychotherapy, behavior modifying psychotherapy, supportive psychotherapy    Risk parameters:  Patient reports no suicidal ideation  Patient reports no homicidal ideation  Patient reports no self-injurious behavior  Patient reports no violent behavior    Verbal deficits: None    Patient's response to intervention:  The patient's response to intervention is accepting.    Progress toward goals and other mental status changes:  The patient's progress toward goals is fair .    Diagnosis:   No diagnosis found.    Plan: Pt plans to continue individual psychotherapy    Return to clinic: as scheduled    Length of Service (minutes): 60      "

## 2024-07-23 ENCOUNTER — LAB VISIT (OUTPATIENT)
Dept: LAB | Facility: OTHER | Age: 34
End: 2024-07-23
Attending: NURSE PRACTITIONER
Payer: COMMERCIAL

## 2024-07-23 ENCOUNTER — OFFICE VISIT (OUTPATIENT)
Dept: OBSTETRICS AND GYNECOLOGY | Facility: CLINIC | Age: 34
End: 2024-07-23
Payer: COMMERCIAL

## 2024-07-23 VITALS
BODY MASS INDEX: 46.76 KG/M2 | SYSTOLIC BLOOD PRESSURE: 125 MMHG | DIASTOLIC BLOOD PRESSURE: 87 MMHG | WEIGHT: 289.69 LBS

## 2024-07-23 DIAGNOSIS — N91.1 SECONDARY AMENORRHEA: Primary | ICD-10-CM

## 2024-07-23 DIAGNOSIS — N63.20 MASS OF LEFT BREAST, UNSPECIFIED QUADRANT: ICD-10-CM

## 2024-07-23 DIAGNOSIS — N91.1 SECONDARY AMENORRHEA: ICD-10-CM

## 2024-07-23 LAB
FSH SERPL-ACNC: 5.22 MIU/ML
HCG INTACT+B SERPL-ACNC: <1.2 MIU/ML
PROLACTIN SERPL IA-MCNC: 10.2 NG/ML (ref 5.2–26.5)

## 2024-07-23 PROCEDURE — 83001 ASSAY OF GONADOTROPIN (FSH): CPT | Performed by: NURSE PRACTITIONER

## 2024-07-23 PROCEDURE — 84702 CHORIONIC GONADOTROPIN TEST: CPT | Performed by: NURSE PRACTITIONER

## 2024-07-23 PROCEDURE — 3074F SYST BP LT 130 MM HG: CPT | Mod: CPTII,S$GLB,, | Performed by: NURSE PRACTITIONER

## 2024-07-23 PROCEDURE — 3044F HG A1C LEVEL LT 7.0%: CPT | Mod: CPTII,S$GLB,, | Performed by: NURSE PRACTITIONER

## 2024-07-23 PROCEDURE — 1160F RVW MEDS BY RX/DR IN RCRD: CPT | Mod: CPTII,S$GLB,, | Performed by: NURSE PRACTITIONER

## 2024-07-23 PROCEDURE — 99213 OFFICE O/P EST LOW 20 MIN: CPT | Mod: S$GLB,,, | Performed by: NURSE PRACTITIONER

## 2024-07-23 PROCEDURE — 84146 ASSAY OF PROLACTIN: CPT | Performed by: NURSE PRACTITIONER

## 2024-07-23 PROCEDURE — 1159F MED LIST DOCD IN RCRD: CPT | Mod: CPTII,S$GLB,, | Performed by: NURSE PRACTITIONER

## 2024-07-23 PROCEDURE — 3079F DIAST BP 80-89 MM HG: CPT | Mod: CPTII,S$GLB,, | Performed by: NURSE PRACTITIONER

## 2024-07-23 PROCEDURE — 3008F BODY MASS INDEX DOCD: CPT | Mod: CPTII,S$GLB,, | Performed by: NURSE PRACTITIONER

## 2024-07-23 PROCEDURE — 99999 PR PBB SHADOW E&M-EST. PATIENT-LVL III: CPT | Mod: PBBFAC,,, | Performed by: NURSE PRACTITIONER

## 2024-07-23 PROCEDURE — 36415 COLL VENOUS BLD VENIPUNCTURE: CPT | Performed by: NURSE PRACTITIONER

## 2024-07-23 RX ORDER — MEDROXYPROGESTERONE ACETATE 10 MG/1
10 TABLET ORAL DAILY
Qty: 10 TABLET | Refills: 6 | Status: SHIPPED | OUTPATIENT
Start: 2024-07-23 | End: 2025-07-23

## 2024-07-23 NOTE — PROGRESS NOTES
Divya De Paz is a 34 y.o. female  presents with complaint of a left breast lump. New pt to me. Does self breast exams at home and palpated a new lump about 2-3 days ago. Feels firm. Not painful. No lumps on the right breast. No new skin changes or nipple discharge. Not associated with menses- last period about 4 months ago. Not currently SA, history of PCOS. Used to be on cocs but discontinued them- does not want to restart them.    Past Medical History:   Diagnosis Date    Anxiety     Depression     Hidradenitis suppurativa     PCOS (polycystic ovarian syndrome)      History reviewed. No pertinent surgical history.  Social History     Tobacco Use    Smoking status: Never     Passive exposure: Yes    Smokeless tobacco: Never    Tobacco comments:     Pt states she dont smoke    Substance Use Topics    Alcohol use: Yes     Alcohol/week: 0.0 standard drinks of alcohol     Comment: occasional    Drug use: No     Family History   Problem Relation Name Age of Onset    Diabetes Mother      Depression Mother      Hypertension Father      Depression Father      Arthritis Father      Melanoma Maternal Aunt      Cancer Maternal Grandmother          lung cancer, nonsmoker    Cancer Paternal Grandmother          breast cancer    Acne Brother      Cancer Paternal Aunt  42        breast cancer     OB History    Para Term  AB Living   0 0 0 0 0 0   SAB IAB Ectopic Multiple Live Births   0 0 0 0         ROS:  GENERAL: No fever, chills, fatigability or weight loss.  VULVAR: No pain, no lesions and no itching.  VAGINAL: No relaxation, no itching, no discharge, no abnormal bleeding and no lesions.  ABDOMEN: No abdominal pain. Denies nausea. Denies vomiting. No diarrhea. No constipation  BREAST: Denies pain. Pos left breast lump. No discharge.  URINARY: No incontinence, no nocturia, no frequency and no dysuria.  CARDIOVASCULAR: No chest pain. No shortness of breath. No leg cramps.  NEUROLOGICAL: No headaches. No  vision changes.    PHYSICAL EXAM:  No spec exam  BREAST: symmetrical. No skin changes or nipple discharge. Pinpoint mass palpated on border of areola @ 12 o'clock position. Firm, mobile, about 5 mm in size.    ASSESSMENT and PLAN:    ICD-10-CM ICD-9-CM    1. Secondary amenorrhea  N91.1 626.0 Prolactin      Follicle Stimulating Hormone      HCG, Quantitative      medroxyPROGESTERone (PROVERA) 10 MG tablet      2. Mass of left breast, unspecified quadrant  N63.20 611.72 US Breast Left Limited      Mammo Digital Diagnostic Left with Aden            Breast ultrasound and dx mammo ordered.  at [facility] messaged to contact pt  Labs today, hx of PCOS. Rx Provera with refills to induce period    FOLLOW UP: PRN lack of improvement.    As of April 1, 2021, the Cures Act has been passed nationally. This new law requires that all doctors progress notes, lab results, pathology reports and radiology reports be released IMMEDIATELY to the patient in the patient portal. That means that the results are released to you at the EXACT same time they are released to me. Therefore, with all of the patients that I have I am not able to reply to each patient exactly when the results come in. So there will be a delay from when you see the results to when I see them and have time to come up with a response to send you. Also I only see these results when I am on the computer at work. So if the results come in over the weekend or after 5 pm of a work day, I will not see them until the next business day. As you can tell, this is a challenge as a provider to give every patient the quick response they hope for and deserve. So please be patient!   Thanks for your understanding and patience.

## 2024-07-25 ENCOUNTER — HOSPITAL ENCOUNTER (OUTPATIENT)
Dept: RADIOLOGY | Facility: OTHER | Age: 34
Discharge: HOME | End: 2024-07-25
Attending: NURSE PRACTITIONER
Payer: COMMERCIAL

## 2024-07-25 DIAGNOSIS — N63.20 MASS OF LEFT BREAST, UNSPECIFIED QUADRANT: ICD-10-CM

## 2024-07-25 PROCEDURE — 77066 DX MAMMO INCL CAD BI: CPT | Mod: 26,,, | Performed by: RADIOLOGY

## 2024-07-25 PROCEDURE — 77062 BREAST TOMOSYNTHESIS BI: CPT | Mod: 26,,, | Performed by: RADIOLOGY

## 2024-07-25 PROCEDURE — 76642 ULTRASOUND BREAST LIMITED: CPT | Mod: 26,LT,, | Performed by: RADIOLOGY

## 2024-07-25 PROCEDURE — 76642 ULTRASOUND BREAST LIMITED: CPT | Mod: TC,LT

## 2024-07-25 PROCEDURE — 77066 DX MAMMO INCL CAD BI: CPT | Mod: TC

## 2024-07-25 PROCEDURE — 77062 BREAST TOMOSYNTHESIS BI: CPT | Mod: TC

## 2024-07-26 ENCOUNTER — PATIENT MESSAGE (OUTPATIENT)
Dept: OBSTETRICS AND GYNECOLOGY | Facility: CLINIC | Age: 34
End: 2024-07-26
Payer: COMMERCIAL

## 2024-07-26 ENCOUNTER — TELEPHONE (OUTPATIENT)
Dept: OBSTETRICS AND GYNECOLOGY | Facility: CLINIC | Age: 34
End: 2024-07-26
Payer: COMMERCIAL

## 2024-07-26 NOTE — TELEPHONE ENCOUNTER
Spoke with pt. Doing okay. Biopsy already scheduled. Works for [employer] and notified her boss that she may need to some time off. Discussed next steps if biopsy confirms malignancy.

## 2024-08-08 ENCOUNTER — HOSPITAL ENCOUNTER (OUTPATIENT)
Dept: RADIOLOGY | Facility: OTHER | Age: 34
Discharge: HOME | End: 2024-08-08
Attending: NURSE PRACTITIONER
Payer: COMMERCIAL

## 2024-08-08 DIAGNOSIS — R92.8 ABNORMAL MAMMOGRAM: ICD-10-CM

## 2024-08-08 DIAGNOSIS — N63.0 BREAST MASS IN FEMALE: Primary | ICD-10-CM

## 2024-08-08 PROCEDURE — 77065 DX MAMMO INCL CAD UNI: CPT | Mod: 26,LT,, | Performed by: RADIOLOGY

## 2024-08-08 PROCEDURE — 19083 BX BREAST 1ST LESION US IMAG: CPT | Mod: LT

## 2024-08-08 PROCEDURE — 27201068 US BREAST BIOPSY WITH IMAGING 1ST SITE LEFT

## 2024-08-08 PROCEDURE — 77065 DX MAMMO INCL CAD UNI: CPT | Mod: TC,LT

## 2024-08-08 PROCEDURE — 19083 BX BREAST 1ST LESION US IMAG: CPT | Mod: LT,,, | Performed by: RADIOLOGY

## 2024-08-08 PROCEDURE — 25000003 PHARM REV CODE 250: Performed by: NURSE PRACTITIONER

## 2024-08-08 RX ORDER — LIDOCAINE HYDROCHLORIDE AND EPINEPHRINE 20; 10 MG/ML; UG/ML
10 INJECTION, SOLUTION INFILTRATION; PERINEURAL ONCE
Status: COMPLETED | OUTPATIENT
Start: 2024-08-08 | End: 2024-08-08

## 2024-08-08 RX ORDER — LIDOCAINE HYDROCHLORIDE 10 MG/ML
5 INJECTION, SOLUTION INFILTRATION; PERINEURAL ONCE
Status: COMPLETED | OUTPATIENT
Start: 2024-08-08 | End: 2024-08-08

## 2024-08-08 RX ADMIN — LIDOCAINE HYDROCHLORIDE 5 ML: 10 INJECTION, SOLUTION EPIDURAL; INFILTRATION; INTRACAUDAL at 10:08

## 2024-08-08 RX ADMIN — LIDOCAINE HYDROCHLORIDE,EPINEPHRINE BITARTRATE 10 ML: 20; .01 INJECTION, SOLUTION INFILTRATION; PERINEURAL at 10:08

## 2024-08-12 ENCOUNTER — TELEPHONE (OUTPATIENT)
Dept: RADIOLOGY | Facility: OTHER | Age: 34
End: 2024-08-12
Payer: COMMERCIAL

## 2024-08-12 ENCOUNTER — PATIENT MESSAGE (OUTPATIENT)
Dept: SURGERY | Facility: CLINIC | Age: 34
End: 2024-08-12
Payer: COMMERCIAL

## 2024-08-12 ENCOUNTER — TELEPHONE (OUTPATIENT)
Dept: OBSTETRICS AND GYNECOLOGY | Facility: CLINIC | Age: 34
End: 2024-08-12
Payer: COMMERCIAL

## 2024-08-12 ENCOUNTER — DOCUMENTATION ONLY (OUTPATIENT)
Dept: SURGERY | Facility: CLINIC | Age: 34
End: 2024-08-12
Payer: COMMERCIAL

## 2024-08-12 DIAGNOSIS — C50.912 INFILTRATING DUCTAL CARCINOMA OF LEFT BREAST: Primary | ICD-10-CM

## 2024-08-12 NOTE — TELEPHONE ENCOUNTER
Patient notified of left breast biopsy results per pathology reported on 8/12/24. Diagnosis: INVASIVE DUCTAL CARCINOMA. Explained to patient results are positive for breast cancer. Instructed on need for consultation with breast surgeon. Questions answered. Appointment with Dr. Rowell requested per pts preference. Office will call patient directly to confirm appointment. Patient verbalized understanding. Biopsy site WNL. Encouraged to call [phone] for further questions or concerns.

## 2024-08-12 NOTE — PROGRESS NOTES
Subjective:       Patient ID: Divya De Paz is a 34 y.o. female.    Chief Complaint:   Breast Cancer    HPI Met with patient for explained my results.   Breast cancer- discussion.   Breast anatomy discussed.   Different provider roles discussed including surgical, medical and radiation oncologist.   Path results explained in detail. General education discussed as well- see below.   Infiltrating ductal carcinomas are divided into three grades based upon a combination of architectural and cytologic features, usually assessed utilizing a scoring system based on three parameters:  ?Well-differentiated (grade 1) - Well-differentiated tumors have cells that infiltrate the stroma as solid nests of glands. The nuclei are relatively uniform with little or no evidence of mitotic activity   ?Moderately differentiated (grade 2) - Moderately differentiated tumors have cells that infiltrate as solid nests with some glandular differentiation. There is some nuclear pleomorphism and a moderate mitotic rate   ?Poorly differentiated (grade 3) - Poorly differentiated tumors are composed of solid nests of neoplastic cells without evidence of gland formation. There is marked nuclear atypia and considerable mitotic activity       Estrogen and Progesterone receptors- indicated the hormone status of breast cancer. The cells of this type of breast cancer have receptors that allow them to use hormones to grow.      HER2-positive breast cancer is a breast cancer that tests positive for a protein called human epidermal growth factor receptor 2 (HER2). This protein promotes the growth of cancer cells.   Amplification or overexpression of the human epidermal growth factor receptor 2 (HER2) oncogene is present in approximately 15 percent of primary invasive breast cancer .   Treatments that specifically target HER2 are very effective.     Ki-67 is a way to measure how fast cancer cells in a tumor are dividing. Ki-67 is a protein that is found  only in cells that are dividing. A high Ki-67 proliferation index means many cells are dividing quickly and that the cancer is likely to grow and spread. A Ki-67 proliferation index over 30% is typically considered high.     Patient is a triple positive breast cancer measuring ~1 cm. Self palpated in late 2024. US showed: Limited left breast ultrasound:  In the left breast 01:00 o'clock axis 1 cm from the nipple, there is a superficial irregular not parallel hypoechoic mass with indistinct and angular margins measuring 1.0 x 0.8 x 1.0 cm.  This corresponds to the area of palpable concern and left breast mammographic finding.  No significant posterior acoustic effect.  Associated rim hypervascularity. 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  Discussed anti estrogen medication. Discussed cool cap.   Discussed fertility. Declined fertility preservation at this time.     Review of Systems   Constitutional:  Negative for activity change, appetite change, fatigue and fever.   Psychiatric/Behavioral:  The patient is nervous/anxious (minimal).        Objective:      Physical Exam  Constitutional:       Appearance: She is obese.   HENT:      Head: Normocephalic and atraumatic.      Nose: Nose normal.   Pulmonary:      Effort: Pulmonary effort is normal.   Neurological:      Mental Status: She is alert  and oriented to person, place, and time.   Psychiatric:         Mood and Affect: Mood normal.         Behavior: Behavior normal.         Thought Content: Thought content normal.         Judgment: Judgment normal.       Limited due to virtual visit  Assessment:       1. Invasive ductal carcinoma of breast, female, left        Plan:       MRI on Thursday  Dr. Rowell on 8/20/24  Genetics scheduled for 8/16  Declined fertility      Return to clinic in 1 week with MD appointment and imaging.     Patient is in agreement with the proposed treatment plan. All questions were answered to the patient's satisfaction. Patient knows to call clinic for any new or worsening symptoms and if anything is needed before the next clinic visit.          Loraine Tarango, DOMINICP-C  Hematology & Medical Oncology   [facility] [place] [address]  ph. [phone]  Fax. [phone]    Collaborating physician, Dr. Sepulveda.    Approximately 23 minutes were spent face-to-face with the patient.  Approximately 45 minutes in total were spent on this encounter, which includes face-to-face time and non-face-to-face time preparing to see the patient (e.g., review of tests), obtaining and/or reviewing separately obtained history, documenting clinical information in the electronic or other health record, independently interpreting results (not separately reported) and communicating results to the patient/family/caregiver, or care coordination (not separately reported).     Medication List with Changes/Refills   Current Medications    AMITRIPTYLINE (ELAVIL) 25 MG TABLET    Take 1 tablet (25 mg total) by mouth every evening.    MEDROXYPROGESTERONE (PROVERA) 10 MG TABLET    Take 1 tablet (10 mg total) by mouth once daily. Expect menses with one week after taking last pill    METFORMIN (GLUCOPHAGE) 500 MG TABLET    Take 1 tablet (500 mg total) by mouth 2 (two) times daily with meals.    MONTELUKAST (SINGULAIR) 10 MG TABLET    Take 1 tablet (10 mg  total) by mouth every evening.    SPIRONOLACTONE (ALDACTONE) 50 MG TABLET    Take 1 tablet (50 mg total) by mouth once daily.       The patient location is: [facility]  The chief complaint leading to consultation is: breast cancer    Visit type: audiovisual    Face to Face time with patient: 23  45 minutes of total time spent on the encounter, which includes face to face time and non-face to face time preparing to see the patient (eg, review of tests), Obtaining and/or reviewing separately obtained history, Documenting clinical information in the electronic or other health record, Independently interpreting results (not separately reported) and communicating results to the patient/family/caregiver, or Care coordination (not separately reported).         Each patient to whom he or she provides medical services by telemedicine is:  (1) informed of the relationship between the physician and patient and the respective role of any other health care provider with respect to management of the patient; and (2) notified that he or she may decline to receive medical services by telemedicine and may withdraw from such care at any time.    Notes: see above

## 2024-08-12 NOTE — TELEPHONE ENCOUNTER
Spoke with pt regarding biopsy results. Referral placed for breast surgery- pt requesting Dr. Rowell. All pt questions answered.

## 2024-08-12 NOTE — PROGRESS NOTES
Called Patient with results of breast biopsy from 8/8/24.  She reports Sapphire Singer RN explained that the biopsy showed invasive ductal carcinoma . Discussed what this means and that the next step is to meet with an KERVIN for a virtual visit to review results in more detail, complete a breast MRI, and meet with a  breast surgeon. An appt was made for 8/20/24 with Dr. Rowell. Pt to have VV with Loraine Tarango NP 8/13/24 at 8 am. MRI breast to be completed 8/15/24.  Reviewed location of Imaging Center and [facility]. Patient verbalized understanding. Direct contact information to be sent in portal message.    Oncology Navigation   Intake  Date of Diagnosis: 08/08/24 (IDC)  Cancer Type: Breast  Type of Referral: Internal  Date of Referral: 08/12/24  Initial Nurse Navigator Contact: 08/12/24  Referral to Initial Contact Timeline (days): 0  First Appointment Available: 08/14/24  Appointment Date: 08/20/24 (Pt needs MRI Breast)  First Available Date vs. Scheduled Date (days): 6     Treatment  Current Status: Staging work-up    Surgical Oncologist: Dr. Rowell  Consult Date: 08/20/24          Procedures: Biopsy; MRI; Diagnostic Mammogram  Biopsy Schedule Date: 08/08/24  Diagnostic Mammo Schedule Date: 07/25/24  MRI Schedule Date: 08/15/24       ER: Positive  MO: Positive  Her2: Postive       Support Systems: Family members     Acuity      Follow Up  Follow up in 3 days (on 8/15/2024) for Breast MRI auth.

## 2024-08-13 ENCOUNTER — OFFICE VISIT (OUTPATIENT)
Dept: HEMATOLOGY/ONCOLOGY | Facility: CLINIC | Age: 34
End: 2024-08-13
Payer: COMMERCIAL

## 2024-08-13 ENCOUNTER — PATIENT MESSAGE (OUTPATIENT)
Dept: HEMATOLOGY/ONCOLOGY | Facility: CLINIC | Age: 34
End: 2024-08-13
Payer: COMMERCIAL

## 2024-08-13 ENCOUNTER — TELEPHONE (OUTPATIENT)
Dept: HEMATOLOGY/ONCOLOGY | Facility: CLINIC | Age: 34
End: 2024-08-13
Payer: COMMERCIAL

## 2024-08-13 DIAGNOSIS — C50.912 INVASIVE DUCTAL CARCINOMA OF BREAST, FEMALE, LEFT: Primary | ICD-10-CM

## 2024-08-13 PROCEDURE — 99205 OFFICE O/P NEW HI 60 MIN: CPT | Mod: 95,,, | Performed by: NURSE PRACTITIONER

## 2024-08-13 PROCEDURE — 3044F HG A1C LEVEL LT 7.0%: CPT | Mod: CPTII,95,, | Performed by: NURSE PRACTITIONER

## 2024-08-13 PROCEDURE — G2211 COMPLEX E/M VISIT ADD ON: HCPCS | Mod: 95,,, | Performed by: NURSE PRACTITIONER

## 2024-08-13 PROCEDURE — 1159F MED LIST DOCD IN RCRD: CPT | Mod: CPTII,95,, | Performed by: NURSE PRACTITIONER

## 2024-08-13 PROCEDURE — 1160F RVW MEDS BY RX/DR IN RCRD: CPT | Mod: CPTII,95,, | Performed by: NURSE PRACTITIONER

## 2024-08-13 NOTE — TELEPHONE ENCOUNTER
Called patient to set up a genetic appointment left message to call back and sent a portal message.

## 2024-08-15 ENCOUNTER — HOSPITAL ENCOUNTER (OUTPATIENT)
Dept: RADIOLOGY | Facility: HOSPITAL | Age: 34
Discharge: HOME | End: 2024-08-15
Attending: NURSE PRACTITIONER
Payer: COMMERCIAL

## 2024-08-15 DIAGNOSIS — C50.912 INFILTRATING DUCTAL CARCINOMA OF LEFT BREAST: ICD-10-CM

## 2024-08-15 PROCEDURE — 25500020 PHARM REV CODE 255: Performed by: NURSE PRACTITIONER

## 2024-08-15 PROCEDURE — 77049 MRI BREAST C-+ W/CAD BI: CPT | Mod: TC

## 2024-08-15 PROCEDURE — A9577 INJ MULTIHANCE: HCPCS | Performed by: NURSE PRACTITIONER

## 2024-08-15 PROCEDURE — 77049 MRI BREAST C-+ W/CAD BI: CPT | Mod: 26,,, | Performed by: RADIOLOGY

## 2024-08-15 RX ADMIN — GADOBENATE DIMEGLUMINE 20 ML: 529 INJECTION, SOLUTION INTRAVENOUS at 06:08

## 2024-08-16 ENCOUNTER — OFFICE VISIT (OUTPATIENT)
Dept: HEMATOLOGY/ONCOLOGY | Facility: CLINIC | Age: 34
End: 2024-08-16
Payer: COMMERCIAL

## 2024-08-16 DIAGNOSIS — Z71.83 ENCOUNTER FOR NONPROCREATIVE GENETIC COUNSELING: ICD-10-CM

## 2024-08-16 DIAGNOSIS — C50.912 INVASIVE DUCTAL CARCINOMA OF BREAST, FEMALE, LEFT: Primary | ICD-10-CM

## 2024-08-16 DIAGNOSIS — Z80.51 FAMILY HISTORY OF KIDNEY CANCER: ICD-10-CM

## 2024-08-16 DIAGNOSIS — Z80.3 FAMILY HISTORY OF BREAST CANCER: ICD-10-CM

## 2024-08-16 NOTE — PROGRESS NOTES
"Cancer Genetics  Hereditary and High-Risk Clinic  Department of Hematology and Oncology  [facility]    [facility]    Date of Service:  24  Visit Provider:  Loraine Durán MS, [facility]  Collaborating Physician:  Tomasa Joy MD    Patient ID  Name: Divya De Paz    : 1990    MRN: [id number]      Referring Provider:   Loraine Tarango, NP  [address] [place] [address]    Televisit Information  The patient location is:  [facility] .    The chief complaint leading to consultation is:  As below.    Visit type: audiovisual.      Face-to-face time with patient:  Approximately 32 minutes.    Approximately 78 minutes in total were spent on this encounter, which includes face-to-face time and non-face-to-face time preparing to see the patient (e.g., review of tests), obtaining and/or reviewing separately obtained history, documenting clinical information in the electronic or other health record, independently interpreting results (not separately reported) and communicating results to the patient/family/caregiver, or coordinating care (not separately reported).    Each patient provided medical services by telemedicine is:  (1) informed of the relationship between the physician and patient and the respective role of any other health care provider with respect to management of the patient; and (2) notified that he or she may decline to receive medical services by telemedicine and may withdraw from such care at any time.    SUBJECTIVE      Chief Complaint: No chief complaint on file.    History of Present Illness (HPI):  Divya De Paz ("Divya"), 34 y.o., assigned female sex at birth, is established with the [facility] Department of Hematology and Oncology but new to me.  She was referred by Loraine Tarango ([facility] HEMATOLOGY ONCOLOGY 3RD FLOOR ) for hereditary cancer risk assessment given her recent diagnosis of breast cancer.    Age:  34 y.o.   Race and ethnicity:  White, Not " " or /a  Weight:  289 lb  Height:  5'6"  Previous germline cancer genetic testing:  No    Cancer History: Breast cancer at 34.  Patient has a triple positive breast cancer measuring ~1 cm. Self palpated in late July 2024. US showed: Limited left breast ultrasound:  In the left breast 01:00 o'clock axis 1 cm from the nipple, there is a superficial irregular not parallel hypoechoic mass with indistinct and angular margins measuring 1.0 x 0.8 x 1.0 cm.  This corresponds to the area of palpable concern and left breast mammographic finding.  No significant posterior acoustic effect.  Associated rim hypervascularity. Biopsy showed: Breast, left, 1 o'clock, 1 cm from nipple, ultrasound-guided core needle biopsies of mass:   - Invasive ductal carcinoma,,Histologic grade 3 of 3, Estrogen Receptor (ER):  Positive (90-95%), Progesterone Receptor (FL):  Positive (10-20%), HER2:  Positive (3+), Ki-67 proliferation index: 80-90%, Lymphovascular invasion not identified     Gynecologic History  Age at menarche:  14y  Age at first live childbirth:  nulliparous  Menopausal status:  premenopausal  Reproductive organs:  uterus intact, ovaries intact, and fallopian tubes intact    Hormone Use  Estrogen/Progesterone: Provera  OCPs: off and on for 5 years    GI History  Upper GI screening: None  Most recent colonoscopy: None  Pancreatitis:  No    Dermatologic History  No issues reported  Skin cancer screening: None    Focused Social History  Alcohol use: 3 drinks/month  Tobacco use: Former smoker  Total number of years smoked:  8 years  Average number of packs smoked:  1-2 cigarettes/day  = <1 pack-years    ONCOLOGY PEDIGREE  Ancestry: paternal - [place], maternal - [place]    Ashkenazi Latter-day:  No  Consanguinity:  No  Hereditary cancer genetic testing in blood relatives:  Unaffected paternal aunt reportedly had negative genetic testing.     Family Cancer Pedigree:  Cancer Pedigree     Divya reported her  family history of " cancer as follows:   Paternal aunt with breast cancer at 38, living.  Paternal grandmother with breast cancer in her 50s, .  Paternal grandmother's mother with breast cancer in her 30s, .  Maternal grandmother with lung cancer at 62, . She was not a smoker.   Maternal grandfather with kidney cancer at 74, living.     COUNSELING      Pre-test cancer genetic counseling was conducted.        Causes of Cancer:  Cancer occurs when cells grow out of control. Some genes help protect against cancer by controlling the growth of cells. However, mutations (problems) in these genes can prevent them from working properly, increasing the risk of developing cancer.  Sporadic Cancer: Most people who get cancer have sporadic cancer. Sporadic cancer is caused by mutations that occur during the lifetime. These mutations may be caused by aging, environmental exposures (ex. UV radiation, carcinogens), lifestyle (ex. smoking, drinking alcohol, sunbathing, poor diet), other medical conditions (ex. hepatitis, HPV, ulcerative colitis), or other factors.   Hereditary Cancer: A small percentage (5-10%) of people who develop cancer were born with a mutation already in one of the cancer protection genes.  Familial Cancer: Cancer can also cluster in families that do not have an identifiable mutation. This may be due to shared environmental or lifestyle factors or genetic risk factors that have not been identified or cannot be detected using current technology or panels.     The likelihood of having a hereditary cancer risk depends on many factors including who in the family had cancer, what type of cancer they had, their age at diagnosis, cancer specifics (such as MMR status of an endometrial or colon cancer or type of breast cancer), and previous genetic testing in the family. Typically, the chance is higher for families that have multiple people with the same or related cancers, an individual with multiple cancers,  younger ages of cancer, and certain types of cancer (such as pancreatic or triple negative breast cancer).      Possible Results:    Positive (pathogenic or likely pathogenic variant): A genetic variant was found that is suspected or known to impact the function of the gene. The impact of a positive result on an individual's risk of cancer varies based on the gene, specific variant, individual's sex assigned at birth, personal cancer history, other health history (such as surgical history), and family history. A positive result can impact screening and risk management recommendations. However, there are not always available guidelines for management based on a specific gene variant. Family history and personal risk factors should always be considered. Sometimes, a positive result can also have treatment or reproductive implications.   Negative: No clinically significant variants were reported in the tested genes. A negative result does not indicate that an individual cannot develop cancer or even that the individual is at average risk. An individual may still be at an increased risk for cancer based on personal risk factors or family history. Additionally, there could be a hereditary cancer predisposition that was not included in a chosen panel or is not detected with current technology.   Variant of Uncertain Significance (VUS): A variant was found. However, the lab does not have enough information to determine if the variant is benign (harmless) or pathogenic (impacts the function of the gene). The laboratory may update (reclassify) the variant over time as more information becomes available. When reclassified, most variants of uncertain significance are reclassified to benign/likely benign. Typically, it is not recommended to  based on the presence of a VUS. The chance of finding a VUS varies based on the test performed. Generally, the chance of finding a VUS increases with the number of genes  tested and decreases with the amount of testing of that gene (genes that are tested more frequently or for a longer period of time have a lower VUS rate).     Genetic Mutation Inheritance:  When an individual has a gene mutation, their first-degree relatives (parents, children, and siblings) each have a 50% chance of carrying the same mutation. Other, more distant blood relatives can also be at risk of carrying the same mutation. At-risk relatives of an individual with a mutation should consider genetic testing to help determine their risk for cancer.     Genetic Discrimination: The Genetic Information Nondiscrimination Act of 2008 (GABO) is a [place] federal law that provides some protections against the use of an individual's genetic information by their health insurer and by their employer. Title I of GABO prohibits most health insurers (except for insurance obtained through a job with the  or the Federal Employees Health Benefits Plan) from utilizing an individual's genetic information to make decisions regarding insurance eligibility or premium charges. Title II of GABO prohibits covered entities from requesting or requiring the genetic information of employees and applicants and from using said information to make employment decisions. This does not apply to employers with fewer than 15 employees or to the .  GABO also does not protect individuals from genetic discrimination by any other type of policy or entity, including but not limited to life insurance, disability insurance, long-term care insurance,  benefits, and  Health Services benefits.    Genetic Testing Options:   Various genetic testing panel options were discussed along with associated benefits, limitations, and risks.   There are several issues to consider regarding multi-gene testing:  Insurance coverage can vary depending on the genetic test panel(s) ordered.  There are limited data and a lack of clear guidelines  regarding degree of cancer risk associated with some of the genes assessed and how to communicate and manage risk for carriers of these genes; this issue is compounded by the low incidence rates of hereditary disease; multi-gene tests include moderate-penetrance genes, and they often also include low-penetrance genes for which there are little available data regarding degree of cancer risk and guidelines for risk management.  Increased likelihood of detecting a genetic variant of unknown significance (VUS).  Increased chance of finding genotypically distinct cell lines (i.e., genetic mosaicism) with next-generation sequencing; clones of non-cancerous cell containing certain genetic mutations have been found in healthy adults undergoing multi-gene testing; this phenomenon can often be attributed to clonal hematopoiesis, a condition in which a hematopoietic stem cell begins making blood cells with the same acquired mutation.    The option for DNA only vs DNA+RNA was discussed. Adding RNA has a small chance of helping to clarify a VUS or detecting genetic variants that DNA only cannot (deep intronic variants). However, it must be conducted on a blood sample (not saliva).     Genetic Testing Guidelines: Divya's reported personal and family history meets the genetic testing criteria established by the National Comprehensive Cancer Network Genetic/Familial High-Risk Assessment: Breast, Ovarian, and Pancreatic version 3.2024.  Therefore, Divya was offered germline hereditary cancer genetic testing. Divya decided to proceed with CancerNext-Expanded + RNAinsight after a discussion regarding various genetic testing panels that could be performed as well as associated risks. Divya has provided informed consent.     ASSESSMENT / PLAN      Genetic Testing Logistics:  An outside laboratory performs this genetic testing. Genetic testing typically takes 2-3 weeks to after the sample has been received.  There is a potential  for the patient to have out-of-pocket costs related to genetic testing.  Post-test genetic counseling can be conducted once the genetic testing results are available.    Genetic Test Information:  Testing lab: [facility]   Test panel: CancerNext-Expanded + RNAinsight    ICD-10 code(s): C50.912, Z80.3, Z80.51   Verbal informed consent: Obtained   Specimen type: Blood  (Patient denies blood disorders that would necessitate a skin fibroblast specimen)   Specimen collection by: [facility]   Specimen collection date: To be scheduled   Results expected by: Approximately 2-3 weeks after the genetic testing lab receives the specimen   Results disclosure plan: Post-test visit if positive or complex result; otherwise, results will be communicated by phone call      Follow-up: Post-test genetic counseling will be conducted once the genetic testing results are available.    Divya appeared to have a good understanding of the information. Questions were encouraged and answered to the patient's satisfaction, and she verbalized understanding of the information and agreement with the plan.   Divya is welcome to contact me if she has any questions, concerns, or updates to her family history.     This assessment is based on the history and reports provided, as well as the current scientific knowledge regarding cancer genetics.     Loraine Durán, MS, Merged with [facility]  Senior Genetic Counselor  Department of Hematology and Oncology  [facility]    CC:  Loraine Tarango

## 2024-08-19 NOTE — PROGRESS NOTES
Breast Surgery  [facility]  Department of Surgery      REFERRING PROVIDER: Ivy Encarnacion, NP  [address] [address]    Chief Complaint: Left breast cancer    Subjective:     Patient ID: Divya De Paz is a 34 y.o. female with a hx of PCOS and ANISH/MDD who presents with invasive ductal carcinoma of the left breast after self-palpating a mass in mid-July.    Follow-up mammogram (7/25/2024) showed an irregular high density mass in the left breast (upper outer quadrant, anterior depth) corresponding with the area of palpable concern. Ultrasound (7/25/2024) showed a superficial irregular not parallel hypoechoic mass with indistinct and angular margins measuring 1.0 x 0.8 x 1.0 cm (1 o'clock axis, 1 CFN). A ultrasound guided biopsy was performed on 8/8/2024 with pathology revealing grade 3, triple positive invasive ductal carcinoma of the breast.    MRI 8/15/2024 revealed a 1.5 cm x 1.3 cm x 1.0 cm irregularly shaped mass with irregular margins and heterogeneous internal enhancement seen in the left breast at 1 o'clock in the anterior depth, 4.1 cm from the nipple and 0.3 cm from the skin. No internal mammary or axillary adenopathy identified.    Patient does routinely do self breast exams.  Patient has noted a change on breast exam.  Patient denies nipple discharge. Patient denies previous breast biopsy. Patient denies a personal history of breast cancer.    Findings at that time were the following:  Tumor size: 1.5 cm  Tumor ndgndrndanddndend:nd nd2nd Marker: Heather  radar reflector, in expected position  Estrogen Receptor: +  Progesterone Receptor: +  Her-2 beverley: +  Ki-67: 80-90%  Lymph node status: no palpable nodes  Lymphatic invasion: not identified    GYN History:  Age of menarche was 14. Age of menopause was n/a.  Last menstrual period was the week of 8/8/2024, pt reports inconsistent periods due to PCOS. Patient reports hormonal therapy use (Provera) to induce menstruation, stopped  2024 per OB/GYN reccs. Patient is . Age of first live birth was n/a.    Family History:  Paternal Grandmother Breast Cancer - multiple times;  of lung cancer (heavy smoker)  Paternal Aunt Breast Cancer - diagnosed at 45, BRCA negative  Paternal Aunt did not have cancer, also BRCA negative  Maternal grandmother - lung cancer, no smoking history  Maternal grandfather - renal cancer    HbA1c - 4.9 2024 on metformin  Smoking - teens and early 20s, socially. None since then.  Genetic testing ordered by [facility]  Wants bilateral mastectomy w/o reconstruction (prefers flat chest)    Past Medical History:   Diagnosis Date    Anxiety     Depression     Hidradenitis suppurativa     PCOS (polycystic ovarian syndrome)      No past surgical history on file.  Current Outpatient Medications on File Prior to Visit   Medication Sig Dispense Refill    amitriptyline (ELAVIL) 25 MG tablet Take 1 tablet (25 mg total) by mouth every evening. 90 tablet 1    metFORMIN (GLUCOPHAGE) 500 MG tablet Take 1 tablet (500 mg total) by mouth 2 (two) times daily with meals. 180 tablet 3    montelukast (SINGULAIR) 10 mg tablet Take 1 tablet (10 mg total) by mouth every evening. 90 tablet 3    spironolactone (ALDACTONE) 50 MG tablet Take 1 tablet (50 mg total) by mouth once daily. 90 tablet 3    medroxyPROGESTERone (PROVERA) 10 MG tablet Take 1 tablet (10 mg total) by mouth once daily. Expect menses with one week after taking last pill 10 tablet 6     No current facility-administered medications on file prior to visit.     Social History     Socioeconomic History    Marital status: Single   Occupational History    Occupation: claims  and proc     Employer: [employer]   Tobacco Use    Smoking status: Never     Passive exposure: Yes    Smokeless tobacco: Never    Tobacco comments:     Pt states she dont smoke    Substance and Sexual Activity    Alcohol use: Yes     Alcohol/week: 0.0 standard drinks of alcohol     Comment:  occasional    Drug use: No    Sexual activity: Not Currently     Partners: Male     Birth control/protection: OCP   Other Topics Concern    Are you pregnant or think you may be? No    Breast-feeding No   Social History Narrative    From [place]    Moved to [facility] 2008    Exercising      Social Determinants of Health     Financial Resource Strain: Low Risk  (4/2/2024)    Overall Financial Resource Strain (CARDIA)     Difficulty of Paying Living Expenses: Not very hard   Food Insecurity: No Food Insecurity (4/2/2024)    Hunger Vital Sign     Worried About Running Out of Food in the Last Year: Never true     Ran Out of Food in the Last Year: Never true   Transportation Needs: No Transportation Needs (4/2/2024)    PRAPARE - Transportation     Lack of Transportation (Medical): No     Lack of Transportation (Non-Medical): No   Physical Activity: Sufficiently Active (4/2/2024)    Exercise Vital Sign     Days of Exercise per Week: 7 days     Minutes of Exercise per Session: 60 min   Stress: Stress Concern Present (4/2/2024)    [employer] - Occupational Stress Questionnaire     Feeling of Stress : Very much   Housing Stability: Low Risk  (4/2/2024)    Housing Stability Vital Sign     Unable to Pay for Housing in the Last Year: No     Number of Places Lived in the Last Year: 1     Unstable Housing in the Last Year: No     Family History   Problem Relation Name Age of Onset    Diabetes Mother      Depression Mother      Hypertension Father      Depression Father      Arthritis Father      Breast cancer Paternal Aunt  38    Lung cancer Maternal Grandmother  62        nonsmoker    Breast cancer Paternal Grandmother  50 - 59    Acne Brother      Kidney cancer Maternal Grandfather  74    Breast cancer Other  30 - 39       Review of Systems   Constitutional:  Negative for chills, fever and unexpected weight change.   Eyes:  Negative for visual disturbance.   Respiratory:  Negative for cough, shortness  "of breath and wheezing.    Cardiovascular:  Negative for chest pain and palpitations.   Musculoskeletal:  Negative for back pain.   Skin:  Negative for color change, pallor, rash and wound.   Neurological:  Negative for dizziness and headaches.     Objective:   BP (!) 163/103 (BP Location: Left forearm, Patient Position: Sitting, BP Method: Medium (Automatic))   Pulse 92   Ht 5' 6" (1.676 m)   Wt 129.7 kg (286 lb)   BMI 46.16 kg/m²     Physical Exam   Constitutional: She does not appear ill.   HENT:   Head: Normocephalic.   Mouth/Throat: Mucous membranes are moist.   Eyes: Conjunctivae are normal.   Cardiovascular:  Normal rate and regular rhythm.      Exam reveals no gallop and no friction rub.       No murmur heard.  Pulmonary/Chest: Effort normal and breath sounds normal. No respiratory distress. Right breast exhibits no inverted nipple, no mass, no nipple discharge, no skin change and no tenderness. Left breast exhibits mass. Left breast exhibits no inverted nipple, no nipple discharge, no skin change and no tenderness.       Neurological: She is alert.   Skin: Skin is warm and dry.     Psychiatric: Her behavior is normal. Mood normal.       Radiology review: Images personally reviewed by me in the clinic.     Mammogram 7/25/2024:  Findings:  This procedure was performed using tomosynthesis. Computer-aided detection was utilized in the interpretation of this examination.     There are scattered areas of fibroglandular density.      Mammo Digital Diagnostic Bilat with Aden  No suspicious mammographic finding in the right breast.       In the left breast upper outer quadrant anterior depth, there is an irregular high density mass.  This finding is superficial in location and it corresponds to the area of palpable concern.        No left axillary adenopathy.     Impression:  Left breast 01:00 o'clock axis mass corresponds to the area of palpable concern.  BI-RADS 5: Highly suspicious for malignancy.  Recommend " ultrasound-guided biopsy.     BI-RADS Category:   Overall: 5 - Highly Suggestive of Malignancy     Recommendation:  Ultrasound-guided biopsy for the left breast.          Ultrasound 7/25/2024:  Limited left breast ultrasound:  In the left breast 01:00 o'clock axis 1 cm from the nipple, there is a superficial irregular not parallel hypoechoic mass with indistinct and angular margins measuring 1.0 x 0.8 x 1.0 cm.  This corresponds to the area of palpable concern and left breast mammographic finding.  No significant posterior acoustic effect.  Associated rim hypervascularity.          MRI 8/8/2024:  Findings:  The breasts have scattered fibroglandular tissue. The background parenchymal enhancement is mild and symmetric.      Left  There is a 15 mm x 13 mm x 10 mm irregularly shaped mass with irregular margins and heterogeneous internal enhancement seen in the left breast at 1 o'clock in the anterior depth, 4.1 cm from the nipple and 0.3 cm from the skin. The mass correlates with the prior mammogram finding and ultrasound finding. Delayed phase is washout.      Right  There is no evidence of suspicious masses, abnormal enhancement, or other abnormal findings in the right breast.     There is no internal mammary or axillary adenopathy.     Impression:  Left  Mass: Left breast 15 mm x 13 mm x 10 mm mass at the anterior depth and 1 o'clock. Assessment: 6 - Known biopsy, proven malignancy. Surgical Consult is recommended.      Right  There is no MR evidence of malignancy in the right breast.     BI-RADS Category:   Overall: 6 - Known Biopsy-Proven Malignancy     Recommendation:  Further management will be dictated by clinical considerations.  The patient is established with breast surgery and oncology services.     Assessment:       1. Infiltrating ductal carcinoma of left breast        Plan:     Options for management were discussed with the patient and her family. We reviewed the existing data noting the equivalency of  breast conserving surgery with radiation therapy and mastectomy. We also reviewed the guidelines of the National Comprehensive Cancer Network for Stage I breast carcinoma. We discussed the need for lumpectomy margins to be negative for carcinoma, the necessity for postoperative radiation therapy after breast conservation in most cases, the possibility of a failed or false negative sentinel lymph node biopsy and the potential need for complete lymphadenectomy for a failed or positive sentinel lymph node biopsy were fully discussed. In the setting of mastectomy, delayed or immediate reconstruction options are available and were discussed.     In the setting of lumpectomy, radiation therapy would be recommended majority of the time.  The duration and treatment side effects were discussed with the patient.  This will coordinated with the radiation oncologist pending final pathology.    We also discussed the role of systemic therapy in the treatment of early stage breast cancer.  We discussed that this is based on tumor biology and yaneli status and will be determined based on final pathology.  We discussed that if the cancer is hormone positive, endocrine therapy would be recommended in most cases and its use can reduce the risk of recurrence as well as improve survival. Side effects of treatment were briefly discussed. We also discussed the potential role for chemotherapy based on a number of factors such as tumor phenotype (ER+ vs. triple negative vs. [id number]+) and this would be determined in coordination with the medical oncologist.    Genetics completed   Scheduled to see Med Onc - Sai Meyer on Friday 8/23/2024  Discussed MRI results   The patient, in consultation with her family, has elected to proceed with USG left partial mastectomy and sentinel lymph node biopsy with oncoplastic reduction. The operative risks of bleeding, infection, recurrence, scarring, and anesthetic complications and the possibility of  requiring further surgery were all noted. Referral to plastic surgery.       Patient was educated on breast cancer, receptors, wire localization lumpectomy, mastectomy, sentinel lymph node mapping and biopsy, axillary lymph node dissection, reconstruction, breast prosthesis with post-mastectomy bra and radiation therapy. Patient was given patient information binder including [facility] breast cancer treatment brochure.  All her questions were answered.    Total time spent with the patient: 65 minutes.  45 minutes of face to face consultation and 20 minutes of chart review and coordination of care.

## 2024-08-20 ENCOUNTER — OFFICE VISIT (OUTPATIENT)
Dept: SURGERY | Facility: CLINIC | Age: 34
End: 2024-08-20
Payer: COMMERCIAL

## 2024-08-20 ENCOUNTER — TELEPHONE (OUTPATIENT)
Dept: PSYCHIATRY | Facility: CLINIC | Age: 34
End: 2024-08-20
Payer: COMMERCIAL

## 2024-08-20 ENCOUNTER — LAB VISIT (OUTPATIENT)
Dept: LAB | Facility: HOSPITAL | Age: 34
End: 2024-08-20
Payer: COMMERCIAL

## 2024-08-20 VITALS
DIASTOLIC BLOOD PRESSURE: 103 MMHG | SYSTOLIC BLOOD PRESSURE: 163 MMHG | BODY MASS INDEX: 45.96 KG/M2 | HEIGHT: 66 IN | HEART RATE: 92 BPM | WEIGHT: 286 LBS

## 2024-08-20 DIAGNOSIS — C50.912 INFILTRATING DUCTAL CARCINOMA OF LEFT BREAST: Primary | ICD-10-CM

## 2024-08-20 DIAGNOSIS — Z80.3 FAMILY HISTORY OF BREAST CANCER: ICD-10-CM

## 2024-08-20 DIAGNOSIS — Z01.818 PREOP TESTING: ICD-10-CM

## 2024-08-20 DIAGNOSIS — C50.912 INVASIVE DUCTAL CARCINOMA OF BREAST, FEMALE, LEFT: ICD-10-CM

## 2024-08-20 LAB — MISCELLANEOUS GENETIC TEST NAME: NORMAL

## 2024-08-20 PROCEDURE — 3077F SYST BP >= 140 MM HG: CPT | Mod: CPTII,S$GLB,, | Performed by: SURGERY

## 2024-08-20 PROCEDURE — 3080F DIAST BP >= 90 MM HG: CPT | Mod: CPTII,S$GLB,, | Performed by: SURGERY

## 2024-08-20 PROCEDURE — 36415 COLL VENOUS BLD VENIPUNCTURE: CPT

## 2024-08-20 PROCEDURE — 3008F BODY MASS INDEX DOCD: CPT | Mod: CPTII,S$GLB,, | Performed by: SURGERY

## 2024-08-20 PROCEDURE — 99205 OFFICE O/P NEW HI 60 MIN: CPT | Mod: S$GLB,,, | Performed by: SURGERY

## 2024-08-20 PROCEDURE — 99999 PR PBB SHADOW E&M-EST. PATIENT-LVL IV: CPT | Mod: PBBFAC,,, | Performed by: SURGERY

## 2024-08-20 PROCEDURE — 3044F HG A1C LEVEL LT 7.0%: CPT | Mod: CPTII,S$GLB,, | Performed by: SURGERY

## 2024-08-20 NOTE — Clinical Note
Hi, hope you are doing well!  She has a small hormone positive, Her2 positive cancer.  We are proceeding with a lumpectomy and breast reduction for her today.  She will need chemo followed by radiation after surgery, but hopefully just minimal chemo as long as pathology is favorable.   Thanks for sending her! Candie

## 2024-08-21 ENCOUNTER — TELEPHONE (OUTPATIENT)
Dept: PLASTIC SURGERY | Facility: CLINIC | Age: 34
End: 2024-08-21
Payer: COMMERCIAL

## 2024-08-21 ENCOUNTER — TELEPHONE (OUTPATIENT)
Dept: HEMATOLOGY/ONCOLOGY | Facility: CLINIC | Age: 34
End: 2024-08-21
Payer: COMMERCIAL

## 2024-08-21 ENCOUNTER — DOCUMENTATION ONLY (OUTPATIENT)
Dept: SURGERY | Facility: CLINIC | Age: 34
End: 2024-08-21
Payer: COMMERCIAL

## 2024-08-21 NOTE — TELEPHONE ENCOUNTER
Spoke to pt and scheduled appt for a consult to see Dr [facility] - 1st floor   Provided patient with appointment time and date, address of the location and call back # to RN navigator. All questions and concerns addressed. Pt verbalized understanding.

## 2024-08-21 NOTE — PROGRESS NOTES
Nurse Navigator Note:     Met with patient during her consult with Dr. Rowell.  Patient and I reviewed the information she discussed with Dr. Rowell, including treatment options, diagnosis, and future plans for workup. Patient and I went through the new patient booklet, explained some of the information and why it is provided.     Also offered patient consults with our other specialty clinics: Integrative Oncology, Survivorship and/or Women's Gynecologic needs, our breast physical therapy department for pre-op and post-operative assessments, Oncologic Psychology for psychological support, and Oncologic Nutrition for nutritional counseling. Explained to patient that all of these support services are completely optional. Discussed that physical therapy may call patient to offer pre-op appt, and what that appt would entail.     Patient was given a copy of her appointments, Dr. Rowell's card, and my card. Encouraged her to call me if she has any questions or concerns or would like to schedule any additional appointments. Verbalized understanding of all information.     Appointment scheduled with Dr. Chiu for 8/27/24.    Referrals placed for integrative oncology, PT, and psych.    Oncology Navigation   Intake  Date of Diagnosis: 08/08/24  Cancer Type: Breast  Type of Referral: Internal  Date of Referral: 08/12/24  Initial Nurse Navigator Contact: 08/12/24  Referral to Initial Contact Timeline (days): 0  First Appointment Available: 08/14/24  Appointment Date: 08/20/24 (Pt needs MRI Breast)  First Available Date vs. Scheduled Date (days): 6     Treatment  Current Status: Staging work-up    Surgical Oncologist: Dr. Rowell  Plastic Surgeon: Dr. Murcia  Type of Surgery: Left lumpectomy with SLNB and onc reduction  Consult Date: 08/20/24    Medical Oncologist: Dr. Chiu  Consult Date: 08/27/24       Procedures: Biopsy; MRI; Diagnostic Mammogram  Biopsy Schedule Date: 08/08/24  Diagnostic Mammo Schedule Date: 07/25/24  MRI  Schedule Date: 08/15/24    General Referrals: Support Group; Physical Therapy; Integrative Medicine; Psychology  Physical Therapy Referral Date: 08/20/24    ER: Positive  WA: Positive  Her2: Postive       Support Systems: Family members     Acuity      Follow Up  No follow-ups on file.

## 2024-08-21 NOTE — TELEPHONE ENCOUNTER
Spoke to pt. in regards to referral for Integrative Oncology placed by Dr. Candie Rowell. Pt. approved  virtual appt for tomorrow 8/22 @ 4PM with Joanna Dubinsky, PA-C. MA advised pt. that this is an introductory visit whereby the provider will review pt's overall health and discuss the therapies that the dept has to offer. Pt is familiar with utilizing portal for virtual appt.       PENELOPE MADERA Ext [phone]

## 2024-08-22 ENCOUNTER — PATIENT MESSAGE (OUTPATIENT)
Dept: HEMATOLOGY/ONCOLOGY | Facility: CLINIC | Age: 34
End: 2024-08-22
Payer: COMMERCIAL

## 2024-08-22 ENCOUNTER — OFFICE VISIT (OUTPATIENT)
Dept: HEMATOLOGY/ONCOLOGY | Facility: CLINIC | Age: 34
End: 2024-08-22
Payer: COMMERCIAL

## 2024-08-22 DIAGNOSIS — C50.912 INFILTRATING DUCTAL CARCINOMA OF LEFT BREAST: ICD-10-CM

## 2024-08-22 PROCEDURE — 99215 OFFICE O/P EST HI 40 MIN: CPT | Mod: 95,,, | Performed by: PHYSICIAN ASSISTANT

## 2024-08-22 NOTE — PROGRESS NOTES
"The patient location is: [facility]       Visit type: audiovisual    Face to Face time with patient: 25  40 minutes of total time spent on the encounter, which includes face to face time and non-face to face time preparing to see the patient (eg, review of tests), Obtaining and/or reviewing separately obtained history, Documenting clinical information in the electronic or other health record, Independently interpreting results (not separately reported) and communicating results to the patient/family/caregiver, or Care coordination (not separately reported).         Each patient to whom he or she provides medical services by telemedicine is:  (1) informed of the relationship between the physician and patient and the respective role of any other health care provider with respect to management of the patient; and (2) notified that he or she may decline to receive medical services by telemedicine and may withdraw from such care at any time.    Notes:      Integrative Health and Medicine Initial Visit      Chief Complaint:  I want to promote my overall well-being through cancer.    HPI: Divya Upton is a 35 y/o FEMALE recently diagnosed with breast cancer and referred by Dr. Rowell, she is planning for lumpectomy but will then be having chemo, Dr. Chiu is her medical Oncologist.  Patient states she has been struggling with sleep noting she had a "pretty bad panic attack in March."  She was put on amitriptyline which seems to be helping patient.  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  She states she comes home exhausted but she still walks her dog for 30 minutes.      Cancer/Stage/TNM:   Cancer Staging   No matching staging information was found for the patient.       Oncology History    No history exists.         Past Medical History:   Diagnosis Date    Anxiety     Depression     " Hidradenitis suppurativa     PCOS (polycystic ovarian syndrome)         Current Outpatient Medications   Medication Instructions    amitriptyline (ELAVIL) 25 mg, Oral, Nightly    medroxyPROGESTERone (PROVERA) 10 mg, Oral, Daily, Expect menses with one week after taking last pill    metFORMIN (GLUCOPHAGE) 500 mg, Oral, 2 times daily with meals    montelukast (SINGULAIR) 10 mg, Oral, Nightly    spironolactone (ALDACTONE) 50 mg, Oral, Daily        No past surgical history on file.      7 Pillars Assessment      Sleep  How many hours of sleep per night? 6-7 hours    Resilience  Rate your current level of stress-   How do you manage stress? Exercise    Purpose  Do you feel you have a vision or a life purpose? Yes    Environment  Any exposures:no known exposures    Spirituality-       Nutrition   Food allergies or sensitivities: no    Exercise  How would you describe your physical activity level?   Do you work at a sedentary job? no  What do you do for physical activity? Walking       Physical Exam   There were no vitals taken for this visit.   Wt Readings from Last 3 Encounters:   08/20/24 129.7 kg (286 lb)   07/23/24 131.4 kg (289 lb 11 oz)   05/27/24 131.8 kg (290 lb 7.3 oz)     Temp Readings from Last 3 Encounters:   05/27/24 97.1 °F (36.2 °C)   04/26/24 98.1 °F (36.7 °C)   03/28/24 97.9 °F (36.6 °C) (Oral)     BP Readings from Last 3 Encounters:   08/20/24 (!) 163/103   07/23/24 125/87   05/27/24 (!) 142/96     Pulse Readings from Last 3 Encounters:   08/20/24 92   05/27/24 82   04/26/24 (!) 114       Body mass index is There is no height or weight on file to calculate BMI.    Vitals reviewed.   Constitutional:       General: Patient is not in acute distress.     Appearance: Normal appearance.   HENT:      Head: Normocephalic and atraumatic.  Pulmonary:      Effort: Pulmonary effort is normal.       Review of Systems:   Cardiac:           No SOB, chest pain with exertion,edema, orthopnea  Distress:          No  excessive sadness, no hopelessness, no anhedonia, no excessive worry or nervousness  Cognitive:        No trouble with memory, no difficulty paying attention, no brain fog, no trouble functioning with work or home life  Fatigue:           Energy level adequate, performing ADL's, no morning fatigue                           Fatigue  1  / 10  ( Scale 0 - 10)   Hormonal:       No hot flashes, no night sweats  Pain:                Has no pain,  location:                          Pain 0   / 10 (Scale 0 - 10)    Neuropathy:    No numbness, no tingling, no paresthesia        Labs:   Lab Results   Component Value Date    WBC 6.07 05/08/2024    HGB 14.2 05/08/2024    HCT 41.2 05/08/2024    MCV 87 05/08/2024     05/08/2024           Hemoglobin A1C   Date Value Ref Range Status   05/08/2024 4.9 4.0 - 5.6 % Final     Comment:     ADA Screening Guidelines:  5.7-6.4%  Consistent with prediabetes  >or=6.5%  Consistent with diabetes    High levels of fetal hemoglobin interfere with the HbA1C  assay. Heterozygous hemoglobin variants (HbS, HgC, etc)do  not significantly interfere with this assay.   However, presence of multiple variants may affect accuracy.     10/19/2018 4.8 4.0 - 5.6 % Final     Comment:     ADA Screening Guidelines:  5.7-6.4%  Consistent with prediabetes  >or=6.5%  Consistent with diabetes  High levels of fetal hemoglobin interfere with the HbA1C  assay. Heterozygous hemoglobin variants (HbS, HgC, etc)do  not significantly interfere with this assay.   However, presence of multiple variants may affect accuracy.              Assessment:   Patient is a 34 y.o.female who arrived to Integrative Oncology for consult during cancer treatment.       Plan   #Nutrition: Encourage plant-forward anti-inflammatory diet with plenty of protein to maintain muscle mass and bone strength  # Sleep: Recommend 6-8 hours of restful sleep nightly; recommend strong sleep hygiene routine one hour prior to bed.   # Exercise:  Recommend 60 minutes of gentle movement/light activity per day (cleaning, walking, cooking, gardening, stationary bike) at baseline and, if can tolerate, build up to at least 150 minutes (2 ½ hours) of moderate-intensity exercise weekly.  # Acupuncture--sleep, stress, anxiety   # OT/yoga--immunity, stress, sleep  # Offered Psychology for anxiety, depression, situational adjustment disorder  #Discussed potential side effects endocrine therapy; encouraged magnesium 400 nightly with Vitamin D and 1200 calcium (preferable through diet)   #Follow-Up: PRN

## 2024-08-23 ENCOUNTER — TELEPHONE (OUTPATIENT)
Dept: HEMATOLOGY/ONCOLOGY | Facility: CLINIC | Age: 34
End: 2024-08-23
Payer: COMMERCIAL

## 2024-08-23 ENCOUNTER — OFFICE VISIT (OUTPATIENT)
Dept: PSYCHIATRY | Facility: CLINIC | Age: 34
End: 2024-08-23
Payer: COMMERCIAL

## 2024-08-23 ENCOUNTER — PATIENT MESSAGE (OUTPATIENT)
Dept: HEMATOLOGY/ONCOLOGY | Facility: CLINIC | Age: 34
End: 2024-08-23
Payer: COMMERCIAL

## 2024-08-23 DIAGNOSIS — C50.912 INFILTRATING DUCTAL CARCINOMA OF LEFT BREAST: ICD-10-CM

## 2024-08-23 DIAGNOSIS — F41.1 GENERALIZED ANXIETY DISORDER: Primary | ICD-10-CM

## 2024-08-23 PROCEDURE — 99999 PR PBB SHADOW E&M-EST. PATIENT-LVL II: CPT | Mod: PBBFAC,,, | Performed by: PSYCHOLOGIST

## 2024-08-23 PROCEDURE — 90791 PSYCH DIAGNOSTIC EVALUATION: CPT | Mod: S$GLB,,, | Performed by: PSYCHOLOGIST

## 2024-08-23 PROCEDURE — 3044F HG A1C LEVEL LT 7.0%: CPT | Mod: CPTII,S$GLB,, | Performed by: PSYCHOLOGIST

## 2024-08-23 PROCEDURE — 1159F MED LIST DOCD IN RCRD: CPT | Mod: CPTII,S$GLB,, | Performed by: PSYCHOLOGIST

## 2024-08-23 NOTE — NURSING
RN Beny lvm with return contact information for dietitian referral scheduling, LUIS ARMANDO De Leon.

## 2024-08-23 NOTE — PROGRESS NOTES
INFORMED CONSENT/ LIMITS of CONFIDENTIALITY: Prior to beginning the interview, the patient's identification was confirmed using two identifiers. Divya De Paz  was informed of the possible risks and benefits of psychological interventions (e.g., counseling, psychotherapy, testing) and provided information regarding the handling of protected health records and   the limits of confidentiality, including the importance of reporting any suicidal or homicidal ideation to ensure safety of all parties. This provider explained the purpose of today's appointment and the patient was provided with time to ask questions regarding this information.  Acceptance and understanding of these conditions was expressed, and Divya De Paz freely consented to this evaluation.     PSYCHO-ONCOLOGY INTAKE    Diagnostic Interview - CPT 45135    Date: 8/23/2024  Site: [facility]     Evaluation Length (direct face-to-face time):  1 hour     Referral Source: Candie Rowell MD   Oncologist:   PCP: Carlos Villalobos MD    Clinical status of patient: Outpatient    Divya De Paz, a 34 y.o. female, seen for initial evaluation visit.  Met with patient.    Chief complaint/reason for encounter: adjustment to illness, anxiety    Medical/Surgical History:    Patient Active Problem List   Diagnosis    Hidradenitis suppurativa    PCOS (polycystic ovarian syndrome)    Insulin resistance    Secondary oligomenorrhea    Anxiety and depression    Invasive ductal carcinoma of breast, female, left       Health Behaviors:       ETOH Use: No (social and within NIAAA healthy use limits for age and gender)       Tobacco Use: No   Illicit Drug Use:  No     Prescription Misuse:No   Caffeine: moderate 6 or 7pm;    Exercise: Walking daily.   Firearms:  No   Advanced directives:No Provided workbook    Family History:   Psychiatric illness: Yes Great Aunt- unknown mental health issues, great uncle with schizophrenia; father, mother, brother with dep/anx;  mother     Alcohol/Drug Abuse: No     Suicide: No      Past Psychiatric History:   Inpatient treatment: No     Outpatient treatment: Yes  E[facility] for stress in early 2024[facility]   Prior substance abuse treatment: No     Suicide Attempts: No     Psychotropic Medications:  Current: Elavil       Past: Prozac    Current medications as per below, allergies reviewed in chart.    Current Outpatient Medications   Medication    amitriptyline (ELAVIL) 25 MG tablet    medroxyPROGESTERone (PROVERA) 10 MG tablet    metFORMIN (GLUCOPHAGE) 500 MG tablet    montelukast (SINGULAIR) 10 mg tablet    spironolactone (ALDACTONE) 50 MG tablet     No current facility-administered medications for this visit.          Social situation/Stressors: Divya De Paz lives with dog in [facility].  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  She has been in her job for several years.    Divya De Paz has never been  and has 0 children.  Not interested in children.  Not interested in a partner at this time. The patient reports great social support. Divya De Paz is spiritual, but not Sabianism.     Additional stressors:   Work Stress    Strengths:Housing stability, Able to vocalize needs, Values and traditions, Motivation, readiness for change, Setting and pursuing goals, hopes, dreams, aspirations, Resources - social, interpersonal, monetary, and Interpersonal relationships and supports available - family, relatives, friends  Liabilities: Complicated medical illness    Current Evaluation:     Mental Status Exam: Divya De Paz arrived  promptly for the assessment session.   The patient was fully cooperative throughout the interview and was an adequate historian   Appearance: age appropriate, appropriately  dressed, adequately  groomed  Behavior/Cooperation: friendly and cooperative  Speech: normal in rate, volume, and tone and appropriate quality, quantity and organization of sentences  Mood:  euthymic  Affect: mood congruent  Thought Process: goal-directed, logical  Thought Content: normal, no suicidality, no homicidality, delusions, or paranoia;did not appear to be responding to internal stimuli during the interview.   Orientation: grossly intact  Memory: Grossly intact  Attention Span/Concentration: Attends to interview without distraction; reports no difficulty  Fund of Knowledge: average  Estimate of Intelligence: average from verbal skills and history  Cognition: grossly intact  Insight: patient has awareness of illness; good insight into own behavior and behavior of others  Judgment: the patient's behavior is adequate to circumstances    Distress Score    Distress Score: 7        Practical Problems Physical Problems                                                   Family Problems                                         Emotional Problems                                                         Spiritual/Religions Concerns               Other Problems              PHQ ANSWERS    Q1. Little interest or pleasure in doing things: (P) More than half the days (08/20/24 1631)  Q2. Feeling down, depressed, or hopeless: (P) Not at all (08/20/24 1631)  Q3. Trouble falling or staying asleep, or sleeping too much: (P) Nearly every day (08/20/24 1631)  Q4. Feeling tired or having little energy: (P) Several days (08/20/24 1631)  Q5. Poor appetite or overeating: (P) Several days (08/20/24 1631)  Q6. Feeling bad about yourself - or that you are a failure or have let yourself or your family down: (P) Several days (08/20/24 1631)  Q7. Trouble concentrating on things, such as reading the newspaper or watching television: (P) Nearly every day (08/20/24 1631)  Q8. Moving or speaking so slowly that other people could have noticed. Or the opposite - being so fidgety or restless that you have been moving around a lot more than usual: (P) Not at all (08/20/24 1631)  Q9.  0    PHQ8 Score : (P) 11 (08/20/24 1631)  PHQ-9  Total Score: (P) 11 (24 1631)       ANISH-7         2024     4:30 PM   GAD7   1. Feeling nervous, anxious, or on edge? 3   2. Not being able to stop or control worrying? 3   3. Worrying too much about different things? 3   4. Trouble relaxing? 3   5. Being so restless that it is hard to sit still? 0   6. Becoming easily annoyed or irritable? 2   7. Feeling afraid as if something awful might happen? 3   8. If you checked off any problems, how difficult have these problems made it for you to do your work, take care of things at home, or get along with other people? 1   ANISH-7 Score 17        Pain: 0   Pain catastrophizin PCS total score, helplessness, magnification, and rumination    History of present illness:    Patient with new BC. Plans for upfront surgery (lumpectomy). Noted history of anxiety/depression. Saw EAP 2024. Adjusting Annabel well buts some anxieties. Some guilt about good prognosis    Divya De Paz has adjusted to illness with difficulty primarily through active coping strategies and focus on work. She has engaged in appropriate information gathering.  The patient has good family/friend support.  Her support system is coping well with the diagnosis/treatment/prognosis. Illness-related psychosocial stressors include absence from work, difficulty meeting family responsibilities, and absence from home.  The patient has a good partnership with her [facility] onc[facility] treatment team. The patient reports the following barriers to cancer care:none.       Behavioral Health Symptoms:   Mood: Depression: anhedonia, insomnia, fatigue, feelings of worthlessness/guilt, anxiety, and panic attacks prior depression:episodic ; no SI/HI  Nathaly: Denies  Psychosis: Denies   Anxiety: Feeling nervous, anxious, or on edge, Uncontrollable worry (about many things), Excessive worry (interfering with sleep), Difficulty relaxing, Irritability, Fear of unknown, muscle tension, and panic attacks;  lifelong  anxiety  Generalized anxiety: Denies    Panic Disorder: Denies  Social/specific phobia: Denies   OCD: Denies  Trauma: Denies  Sexual Dysfunction: Low desire  Substance abuse: denied  Cognitive functioning:  attention issues since diagnosis  Health behaviors: noncontributory  Sleep: Poor quality sleep last 2 month.   Pain: Ms. De Paz reports no pain.  CAM Therapies: None         Assessment - Diagnosis - Goals:       ICD-10-CM ICD-9-CM   1. Generalized anxiety disorder  F41.1 300.02   2. Infiltrating ductal carcinoma of left breast  C50.912 174.9      Plan:individual psychotherapy and medication management by physician    Summary and Recommendations  Divya De Paz is a 34 y.o. female referred by Candie Rowell MD for psychological evaluation and treatment.  Ms. De Paz appears to be coping marginally with her diagnosis and proposed treatment course.  Patient has good support system and good relationship with parents. Mood protective strategies during cancer treatment were discussed.  She is interested in individual therapy for cancer coping skills and will follow up with me for that purpose..       Racheal Catherine, PhD  Clinical Psychologist  [place] License #[id number]  [place] License #[id number]

## 2024-08-26 ENCOUNTER — TELEPHONE (OUTPATIENT)
Dept: HEMATOLOGY/ONCOLOGY | Facility: CLINIC | Age: 34
End: 2024-08-26
Payer: COMMERCIAL

## 2024-08-26 NOTE — TELEPHONE ENCOUNTER
Spoke w/ pt in regards to scheduling nutrition referral placed by Joanna Dubinsky, PA-C.  Pt approved scheduling with Joycelyn Bills RD for virtual visit on Monday 9/16 @ 8:30AM.       [place], [place] ext [phone]

## 2024-08-27 ENCOUNTER — OFFICE VISIT (OUTPATIENT)
Dept: HEMATOLOGY/ONCOLOGY | Facility: CLINIC | Age: 34
End: 2024-08-27
Payer: COMMERCIAL

## 2024-08-27 VITALS
SYSTOLIC BLOOD PRESSURE: 139 MMHG | TEMPERATURE: 98 F | HEART RATE: 83 BPM | DIASTOLIC BLOOD PRESSURE: 85 MMHG | HEIGHT: 66 IN | OXYGEN SATURATION: 99 % | RESPIRATION RATE: 18 BRPM | BODY MASS INDEX: 46.21 KG/M2 | WEIGHT: 287.5 LBS

## 2024-08-27 DIAGNOSIS — C50.912 INVASIVE DUCTAL CARCINOMA OF BREAST, FEMALE, LEFT: Primary | ICD-10-CM

## 2024-08-27 DIAGNOSIS — Z80.3 FAMILY HISTORY OF BREAST CANCER: ICD-10-CM

## 2024-08-27 DIAGNOSIS — E28.2 PCOS (POLYCYSTIC OVARIAN SYNDROME): ICD-10-CM

## 2024-08-27 PROCEDURE — 99999 PR PBB SHADOW E&M-EST. PATIENT-LVL III: CPT | Mod: PBBFAC,,, | Performed by: INTERNAL MEDICINE

## 2024-08-27 NOTE — PROGRESS NOTES
[facility]   at [facility] Note:      Chief Complaint:   Encounter Diagnoses   Name Primary?    Invasive ductal carcinoma of breast, female, left Yes    Family history of breast cancer     PCOS (polycystic ovarian syndrome)         Cancer Staging   Invasive ductal carcinoma of breast, female, left  Staging form: Breast, AJCC 8th Edition  - Clinical stage from 2024: Stage IA (cT1c, cN0, cM0, G3, ER+, MA+, HER2+) - Signed by Maureen Chiu MD on 2024      HPI:  Divya De Paz is a 34 y.o. female with PCOS and ANISH who presents today for evaluation of newly diagnosed breast cancer.     Oncology History  Patient self palpated a lump during self-exam, got evaluated by Gyn a few days later.   Follow-up mammogram 2024 showed an irregular high density mass in the UOQ of left breast  Ultrasound 2024: superficial irregular not parallel hypoechoic mass with indistinct and angular margins measuring 1.0 x 0.8 x 1.0 cm (1 o'clock axis, 1 CFN).     Biopsy 2024: IDC, grade 3, ER 90-95%/ MA 10-20%/ HER2 3+; Ki67 80-90%     MRI 8/15/2024 revealed a 1.5 cm x 1.3 cm x 1.0 cm irregularly shaped mass with irregular margins and heterogeneous internal enhancement seen in the left breast at 1 o'clock in the anterior depth, 4.1 cm from the nipple and 0.3 cm from the skin. No internal mammary or axillary adenopathy identified.      GYN History:  Age of menarche was 14. Age of menopause was n/a.  Last menstrual period was the week of 2024, pt reports inconsistent periods due to PCOS. Patient reports hormonal therapy use (Provera) to induce menstruation, stopped 2024 per OB/GYN reccs. Patient is . Age of first live birth was n/a.     Family History:  Paternal Grandmother Breast Cancer - multiple times;  of lung cancer (heavy smoker)  Paternal Aunt Breast Cancer - diagnosed at 45, BRCA negative  Paternal Aunt did not have cancer, also  BRCA negative  Maternal grandmother - lung cancer, no smoking history  Maternal grandfather - renal cancer    Social History     Tobacco Use    Smoking status: Never     Passive exposure: Yes    Smokeless tobacco: Never    Tobacco comments:     Pt states she dont smoke    Substance Use Topics    Alcohol use: Yes     Alcohol/week: 0.0 standard drinks of alcohol     Comment: occasional    Drug use: No     Family History   Problem Relation Name Age of Onset    Diabetes Mother      Depression Mother      Hypertension Father      Depression Father      Arthritis Father      Breast cancer Paternal Aunt  38    Lung cancer Maternal Grandmother  62        nonsmoker    Breast cancer Paternal Grandmother  50 - 59    Acne Brother      Kidney cancer Maternal Grandfather  74    Breast cancer Other  30 - 39     Past Medical History:   Diagnosis Date    Anxiety     Depression     Hidradenitis suppurativa     PCOS (polycystic ovarian syndrome)      No past surgical history on file.    Patient Active Problem List   Diagnosis    Hidradenitis suppurativa    PCOS (polycystic ovarian syndrome)    Insulin resistance    Secondary oligomenorrhea    Anxiety and depression    Invasive ductal carcinoma of breast, female, left       Current Outpatient Medications   Medication Instructions    amitriptyline (ELAVIL) 25 mg, Oral, Nightly    medroxyPROGESTERone (PROVERA) 10 mg, Oral, Daily, Expect menses with one week after taking last pill    metFORMIN (GLUCOPHAGE) 500 mg, Oral, 2 times daily with meals    montelukast (SINGULAIR) 10 mg, Oral, Nightly    spironolactone (ALDACTONE) 50 mg, Oral, Daily       Review of Systems:   Review of Systems   Constitutional: Negative.    HENT: Negative.     Eyes: Negative.    Respiratory: Negative.     Cardiovascular: Negative.    Gastrointestinal: Negative.    Genitourinary: Negative.    Musculoskeletal: Negative.    Skin: Negative.    Neurological: Negative.    Endo/Heme/Allergies: Negative.   "  Psychiatric/Behavioral: Negative.         PHYSICAL EXAM:  /85 (BP Location: Left arm, Patient Position: Sitting, BP Method: Large (Automatic))   Pulse 83   Temp 98.1 °F (36.7 °C) (Oral)   Resp 18   Ht 5' 6" (1.676 m)   Wt 130.4 kg (287 lb 7.7 oz)   LMP 08/06/2024 (Within Days)   SpO2 99%   BMI 46.40 kg/m²     General Appearance:    Alert, cooperative, no distress, appears stated age   Head:    Normocephalic, without obvious abnormality, atraumatic   Throat:   Lips, mucosa, and tongue normal; teeth and gums normal   Neck:   Supple, symmetrical, no adenopathy;     thyroid:  no enlargement/tenderness/nodules   Lungs:     Clear to auscultation bilaterally, respirations unlabored    Heart:    Regular rate and rhythm, S1 and S2 normal   Breast Exam:    deferred   Abdomen:     Soft, non-tender, bowel sounds active, no masses, no organomegaly   Extremities:   Extremities normal, atraumatic, no cyanosis or edema   Pulses:   2+ and symmetric all extremities   Skin:   Skin color, texture, turgor normal, no rashes or lesions   Lymph nodes:   Cervical, supraclavicular, and axillary nodes normal   Neurologic:   CNII-XII intact, normal strength, sensation and reflexes     throughout           Pertinent Labs & Imaging:  Pathology Results  (Last 30 days)                 08/08/24 1027  Specimen to Pathology, Radiology Breast, needle biopsy Final result    Narrative:  Left breast mass 1:00   Time in formalin 10:26 AM   Time retrieved from breast [address] [place] [address] [id number]   Release to patient->Immediate               No results found for this or any previous visit (from the past 24 hour(s)).    Assessment & Plan:    1. Invasive ductal carcinoma of breast, female, left    2. Family history of breast cancer    3. PCOS (polycystic ovarian syndrome)  Reviewed patients referring notes, imaging and pathology. Discussed diagnosis, staging, and treatment in detail with patient.   Patient with newly diagnosed clinical stage I " [facility]  We discussed adjuvant Paclitaxel and Trastuzumab for T1N0 HER2-Positive Breast Cancer per study published in NEJ 2015 by Lisa et al.     I have recommended upfront surgery for final pathologic staging. If her disease remains Stage I, I recommend weekly Paclitaxel at 80 mg/meter squared with weekly Trastuzumab. I have recommended a total of 12 weekly cycles of therapy. After completion of the first 12 weekly cycles, I have strongly recommended continuation of Trastuzumab alone for a total of one year of adjuvant Trastuzumab therapy. After completion of Taxol and Trastuzumab, adjuvant Trastuzumab can be given every three weeks.     First Infusion Loading Dose of Trastuzumab will be 4 mg/Kg over 90 minutes followed by the second Infusion of Trastuzumab at 2mg/kg over 30 minutes Qweek for the remainder of Taxol and Trastuzumab.    When switching to r2aprob Trastuzumab, it will be given at 6mg/kg over 60 minutes Q 3 weeks and if tolerated, all subsequent Infusions of Trastuzumab but may be over 30 minutes until completion of one year of therapy.    Overall side effects of chemotherapy were discussed including alopecia, immunosuppression, Neutropenia (low white count), anemia (low hemoglobin), thrombocytopenia (lowered platelets), and neuropathy from Taxol. Other side effects such as nail changes, dry eyes, mouth sores, and bone pain were discussed.  Rare but serious side effects such as increased risk of cardiac toxicity were discussed with Trastuzumab. Monitoring of her cardiac function with MUGAs or an echocardiogram is strongly recommended every 3 months by NCCN guidelines while on adjuvant Trastuzumab.    She has established care with surgery, genetics (labs pending), integrative oncology, and oncology psychology. We recommended establishing care with  for further understand FMLA process.     She has stopped her medroxyprogesterone after breast cancer diagnosis. As she has HR+ involvement, we  strongly recommend against any further hormonal agents.        Route Chart for Scheduling    Med Onc Chart Routing      Follow up with physician 6 weeks.   Follow up with KERVIN    Infusion scheduling note    Injection scheduling note    Labs    Imaging    Pharmacy appointment    Other referrals                Total time of this visit, including time spent face to face with patient and/or via video/audio, and also in preparing for today's visit for MDM and documentation. (Medical Decision Making, including consideration of possible diagnoses, management options, complex medical record review, review of diagnostic tests and information, consideration and discussion of significant complications based on comorbidities, and discussion with providers involved with the care of the patient) 60 minutes. Greater than 50% was spent face to face with the patient counseling and coordinating care.         Sree Aragon MD   Hematology and Medical Oncology Fellow   [facility]

## 2024-08-28 ENCOUNTER — DOCUMENTATION ONLY (OUTPATIENT)
Dept: SURGERY | Facility: CLINIC | Age: 34
End: 2024-08-28
Payer: COMMERCIAL

## 2024-08-28 DIAGNOSIS — Z01.818 PRE-OPERATIVE EXAM: Primary | ICD-10-CM

## 2024-08-28 NOTE — PROGRESS NOTES
Pt called with questions about recovery time. Discussed recovery after lumpectomy with oncoplastic reduction. Pt to consult with Dr. Murcia tomorrow and discuss further.    Oncology Navigation   Intake  Date of Diagnosis: 08/08/24  Cancer Type: Breast  Type of Referral: Internal  Date of Referral: 08/12/24  Initial Nurse Navigator Contact: 08/12/24  Referral to Initial Contact Timeline (days): 0  First Appointment Available: 08/14/24  Appointment Date: 08/20/24 (Pt needs MRI Breast)  First Available Date vs. Scheduled Date (days): 6     Treatment  Current Status: Staging work-up    Surgical Oncologist: Dr. Rowell  Plastic Surgeon: Dr. Murcia  Type of Surgery: Left lumpectomy with SLNB and onc reduction  Consult Date: 08/20/24  Surgery Schedule Date: 09/25/24    Medical Oncologist: Dr. Chiu  Consult Date: 08/27/24       Procedures: Biopsy; MRI; Diagnostic Mammogram  Biopsy Schedule Date: 08/08/24  Diagnostic Mammo Schedule Date: 07/25/24  MRI Schedule Date: 08/15/24    General Referrals: Support Group; Physical Therapy; Integrative Medicine; Psychology  Physical Therapy Referral Date: 08/20/24    ER: Positive  ID: Positive  Her2: Postive       Support Systems: Family members     Acuity      Follow Up  No follow-ups on file.

## 2024-08-29 ENCOUNTER — OFFICE VISIT (OUTPATIENT)
Dept: PLASTIC SURGERY | Facility: CLINIC | Age: 34
End: 2024-08-29
Payer: COMMERCIAL

## 2024-08-29 ENCOUNTER — TELEPHONE (OUTPATIENT)
Dept: HEMATOLOGY/ONCOLOGY | Facility: CLINIC | Age: 34
End: 2024-08-29
Payer: COMMERCIAL

## 2024-08-29 VITALS
WEIGHT: 288.56 LBS | BODY MASS INDEX: 46.38 KG/M2 | HEART RATE: 92 BPM | SYSTOLIC BLOOD PRESSURE: 164 MMHG | DIASTOLIC BLOOD PRESSURE: 92 MMHG | HEIGHT: 66 IN

## 2024-08-29 DIAGNOSIS — Z13.79 GENETIC TESTING: Primary | ICD-10-CM

## 2024-08-29 DIAGNOSIS — C50.912 INVASIVE DUCTAL CARCINOMA OF BREAST, FEMALE, LEFT: Primary | ICD-10-CM

## 2024-08-29 PROCEDURE — 3080F DIAST BP >= 90 MM HG: CPT | Mod: CPTII,S$GLB,, | Performed by: SURGERY

## 2024-08-29 PROCEDURE — 3044F HG A1C LEVEL LT 7.0%: CPT | Mod: CPTII,S$GLB,, | Performed by: SURGERY

## 2024-08-29 PROCEDURE — 1159F MED LIST DOCD IN RCRD: CPT | Mod: CPTII,S$GLB,, | Performed by: SURGERY

## 2024-08-29 PROCEDURE — 3008F BODY MASS INDEX DOCD: CPT | Mod: CPTII,S$GLB,, | Performed by: SURGERY

## 2024-08-29 PROCEDURE — 3077F SYST BP >= 140 MM HG: CPT | Mod: CPTII,S$GLB,, | Performed by: SURGERY

## 2024-08-29 PROCEDURE — 99203 OFFICE O/P NEW LOW 30 MIN: CPT | Mod: S$GLB,,, | Performed by: SURGERY

## 2024-08-29 PROCEDURE — 99999 PR PBB SHADOW E&M-EST. PATIENT-LVL III: CPT | Mod: PBBFAC,,, | Performed by: SURGERY

## 2024-08-29 NOTE — TELEPHONE ENCOUNTER
Called patient to discuss genetic testing results. Divya De Paz had the BRCAplus test through [employer] which was negative. She still has additional genetic testing that is pending. I will call her again with the final results once they are available.   Loraine Durán, MS, [facility]  Senior Genetic Counselor

## 2024-09-03 ENCOUNTER — PATIENT MESSAGE (OUTPATIENT)
Dept: PLASTIC SURGERY | Facility: CLINIC | Age: 34
End: 2024-09-03
Payer: COMMERCIAL

## 2024-09-04 ENCOUNTER — TELEPHONE (OUTPATIENT)
Dept: PLASTIC SURGERY | Facility: CLINIC | Age: 34
End: 2024-09-04
Payer: COMMERCIAL

## 2024-09-04 RX ORDER — CLINDAMYCIN PHOSPHATE 900 MG/50ML
900 INJECTION, SOLUTION INTRAVENOUS
Status: CANCELLED | OUTPATIENT
Start: 2024-09-04

## 2024-09-04 RX ORDER — SODIUM CHLORIDE 9 MG/ML
INJECTION, SOLUTION INTRAVENOUS CONTINUOUS
Status: CANCELLED | OUTPATIENT
Start: 2024-09-04

## 2024-09-04 NOTE — ASSESSMENT & PLAN NOTE
Patient is a good candidate for an oncoplastic breast reduction. Discussed the procedure in detail including using a pedicle of breast tissue to support blood supply to the nipple, the use of Wise pattern skin flaps to reduce size and support the pedicle, risks of skin loss, fat necrosis, partial or complete nipple loss, delayed wound healing, normal scarring, outpatient surgery, general recovery and the use of drains. Octaviano illustration to demonstrate operative details and skin reduction patterns.    Counseled that after surgery the breast that is anticipated to undergo radiation will be kept slightly larger than the noncancer side in anticipation of skin and breast tissue changes from radiation. Also discussed that breasts may be bruised or swollen afterwards and won't have their final appearance for 3-6 months after surgery.

## 2024-09-04 NOTE — PROGRESS NOTES
Plastic Surgery History & Physical    SUBJECTIVE:   Chief complaint: oncoplastic reduction     History of Present Illness:  34 y.o. female with a PMH of hidradenitis suppurativa, PCOS and insulin resistance, recently diagnosed with invasive ductal carcinoma of the left breast. She self palpated a mass in her left breast in mid July and workup revealed a 1.5cm mass, biopsy proven triple positive IDC. In discussion with her breast surgeon, she plans to undergo left lumpectomy followed by XRT. She presents to discuss oncoplastic breast reduction. Her hidradenitis has been well controlled for the last few years using Hibiclens with showers.     She works at [employer] as a  for [employer].  She is a nonsmoker.  No prior surgeries.    Past Medical History:   Diagnosis Date    Anxiety     Depression     Hidradenitis suppurativa     PCOS (polycystic ovarian syndrome)        No past surgical history on file.    Family History   Problem Relation Name Age of Onset    Diabetes Mother      Depression Mother      Hypertension Father      Depression Father      Arthritis Father      Breast cancer Paternal Aunt  38    Lung cancer Maternal Grandmother  62        nonsmoker    Breast cancer Paternal Grandmother  50 - 59    Acne Brother      Kidney cancer Maternal Grandfather  74    Breast cancer Other  30 - 39       Social History     Socioeconomic History    Marital status: Single    Number of children: 0   Occupational History    Occupation: claims  and proc     Employer: [employer]   Tobacco Use    Smoking status: Never     Passive exposure: Yes    Smokeless tobacco: Never    Tobacco comments:     Pt states she dont smoke    Substance and Sexual Activity    Alcohol use: Yes     Alcohol/week: 0.0 standard drinks of alcohol     Comment: occasional    Drug use: No    Sexual activity: Not Currently     Partners: Male     Birth control/protection: OCP   Other Topics Concern    Are you pregnant or think you  may be? No    Breast-feeding No   Social History Narrative    From [place]    Moved to [facility] 2008    Exercising      Social Determinants of Health     Financial Resource Strain: Low Risk  (4/2/2024)    Overall Financial Resource Strain (CARDIA)     Difficulty of Paying Living Expenses: Not very hard   Food Insecurity: No Food Insecurity (4/2/2024)    Hunger Vital Sign     Worried About Running Out of Food in the Last Year: Never true     Ran Out of Food in the Last Year: Never true   Transportation Needs: No Transportation Needs (4/2/2024)    PRAPARE - Transportation     Lack of Transportation (Medical): No     Lack of Transportation (Non-Medical): No   Physical Activity: Sufficiently Active (4/2/2024)    Exercise Vital Sign     Days of Exercise per Week: 7 days     Minutes of Exercise per Session: 60 min   Stress: Stress Concern Present (4/2/2024)    [employer] - Occupational Stress Questionnaire     Feeling of Stress : Very much   Housing Stability: Low Risk  (4/2/2024)    Housing Stability Vital Sign     Unable to Pay for Housing in the Last Year: No     Number of Places Lived in the Last Year: 1     Unstable Housing in the Last Year: No       Current Outpatient Medications   Medication Sig Dispense Refill    amitriptyline (ELAVIL) 25 MG tablet Take 1 tablet (25 mg total) by mouth every evening. 90 tablet 1    metFORMIN (GLUCOPHAGE) 500 MG tablet Take 1 tablet (500 mg total) by mouth 2 (two) times daily with meals. 180 tablet 3    montelukast (SINGULAIR) 10 mg tablet Take 1 tablet (10 mg total) by mouth every evening. 90 tablet 3    spironolactone (ALDACTONE) 50 MG tablet Take 1 tablet (50 mg total) by mouth once daily. 90 tablet 3    medroxyPROGESTERone (PROVERA) 10 MG tablet Take 1 tablet (10 mg total) by mouth once daily. Expect menses with one week after taking last pill 10 tablet 6     No current facility-administered medications for this visit.       Review of patient's  "allergies indicates:   Allergen Reactions    Cefzil [cefprozil] Hives         Review of Systems:  Review of Systems        OBJECTIVE:     BP (!) 164/92   Pulse 92   Ht 5' 6" (1.676 m)   Wt 130.9 kg (288 lb 9.3 oz)   LMP 08/06/2024 (Within Days)   BMI 46.58 kg/m²       Physical Exam:  Gen: NAD, appears stated age  Neuro: normal without focal findings, mental status and speech normal  HEENT: NCAT, neck supple, PEERL  CV: RRR  Pulm: Breathing non-labored, chest wall movement equal bilaterally   Breast: pendulous ptotic breasts   Right Left   SN-N 34 32   N-N 26   N-IMF 9 10      Abdomen: soft, nontender, no guarding  Gu: genitalia not examined  Extremity:normal strength, no cyanosis or edema  Skin: Skin color, texture, turgor normal. No rashes or lesions  Psych: oriented to time, place and person          ASSESSMENT/PLAN:     Invasive ductal carcinoma of breast, female, left  Patient is a good candidate for an oncoplastic breast reduction. Discussed the procedure in detail including using a pedicle of breast tissue to support blood supply to the nipple, the use of Wise pattern skin flaps to reduce size and support the pedicle, risks of skin loss, fat necrosis, partial or complete nipple loss, delayed wound healing, normal scarring, outpatient surgery, general recovery and the use of drains. Octaviano illustration to demonstrate operative details and skin reduction patterns.    Counseled that after surgery the breast that is anticipated to undergo radiation will be kept slightly larger than the noncancer side in anticipation of skin and breast tissue changes from radiation. Also discussed that breasts may be bruised or swollen afterwards and won't have their final appearance for 3-6 months after surgery.        Lori Fierro PA-C  Plastic and Reconstructive Surgery    I spent a total of 35 minutes on the day of the visit.This includes face to face time and non-face to face time preparing to see the patient (eg, " review of tests), obtaining and/or reviewing separately obtained history, documenting clinical information in the electronic or other health record, independently interpreting results and communicating results to the patient/family/caregiver, or care coordinator.

## 2024-09-04 NOTE — TELEPHONE ENCOUNTER
Spoke to pt and confirmed  a surgery date 9/25/24 at the  [facility] location / 1st floor -[facility] - Pt agreeable. Provided patient with details of pre /post op appts, basic prep for sx, arrival time the day before by Sorin's office, and address of the locations.  call back # to RN navigator given should any questions or concerns arise  All questions and concerns addressed. Pt voiced understanding.

## 2024-09-12 NOTE — PROGRESS NOTES
The patient location is: [place]  The chief complaint leading to consultation is: new breast cancer     Visit type: audiovisual    Face to Face time with patient: 57 minutes   65 minutes of total time spent on the encounter, which includes face to face time and non-face to face time preparing to see the patient (eg, review of tests), Obtaining and/or reviewing separately obtained history, Documenting clinical information in the electronic or other health record, Independently interpreting results (not separately reported) and communicating results to the patient/family/caregiver, or Care coordination (not separately reported).     Each patient to whom he or she provides medical services by telemedicine is:  (1) informed of the relationship between the physician and patient and the respective role of any other health care provider with respect to management of the patient; and (2) notified that he or she may decline to receive medical services by telemedicine and may withdraw from such care at any time.    Oncology Nutrition Assessment for Medical Nutrition Therapy  Initial Visit    Divya Baca Baldev   1990    Referring Provider:  Dubinsky, Joanna L., PA*      Reason for Visit: Nutrition counseling and education     PMHx:   Past Medical History:   Diagnosis Date    Anxiety     Depression     Hidradenitis suppurativa     PCOS (polycystic ovarian syndrome)        Nutrition Assessment    Patient is a 34 y.o.female with newly diagnosed breast cancer. Planned for lumpectomy on 9/25/24 followed by chemotherapy (weekly Paclitaxel + Trastuzumab x12) then Trastuzumab x1 year. This will be followed by endocrine therapy. She was referred to nutrition through integrative oncology. PMH includes PCOS, obesity.   She would like to know what to eat for surgery recovery and chemotherapy. She reports being overweight for many years. Has done many fad diets. Her health habits have changed a lot in the past few months. She  "reports she established with a PCP which has been really helpful. Taking metformin and spironolactone for PCOS symptoms and insulin resistance. Per her chart, she has lost about 15lb since March. She recently watched the Blue Zones and is being more conscious of what she is putting in her body. Focused more on wellness, balance, and quality of food. She walks a lot for activity- about 10,000 steps per day on average.     Weight:129.3 kg (285 lb)  Height:5' 6" (1.676 m)  BMI:Body mass index is 46 kg/m².   IBW: Ideal body weight: 59.3 kg (130 lb 11.7 oz)  Adjusted ideal body weight: 87.3 kg (192 lb 7 oz)    Allergies: Cefzil [cefprozil]    Current Medications:    Current Outpatient Medications:     amitriptyline (ELAVIL) 25 MG tablet, Take 1 tablet (25 mg total) by mouth every evening., Disp: 90 tablet, Rfl: 1    medroxyPROGESTERone (PROVERA) 10 MG tablet, Take 1 tablet (10 mg total) by mouth once daily. Expect menses with one week after taking last pill, Disp: 10 tablet, Rfl: 6    metFORMIN (GLUCOPHAGE) 500 MG tablet, Take 1 tablet (500 mg total) by mouth 2 (two) times daily with meals., Disp: 180 tablet, Rfl: 3    montelukast (SINGULAIR) 10 mg tablet, Take 1 tablet (10 mg total) by mouth every evening., Disp: 90 tablet, Rfl: 3    spironolactone (ALDACTONE) 50 MG tablet, Take 1 tablet (50 mg total) by mouth once daily., Disp: 90 tablet, Rfl: 3    Vitamins/Supplements: magnesium, D3    Labs: Reviewed     Glucose   Date Value Ref Range Status   05/08/2024 99 70 - 110 mg/dL Final   03/28/2024 102 70 - 110 mg/dL Final     Cholesterol   Date Value Ref Range Status   10/19/2018 153 120 - 199 mg/dL Final     Comment:     The National Cholesterol Education Program (NCEP) has set the  following guidelines (reference ranges) for Cholesterol:  Optimal.....................<200 mg/dL  Borderline High.............200-239 mg/dL  High........................> or = 240 mg/dL     10/26/2015 162 120 - 199 mg/dL Final     Comment:     " The National Cholesterol Education Program (NCEP) has set the  following guidelines (reference ranges) for Cholesterol:  Optimal.....................<200 mg/dL  Borderline High.............200-239 mg/dL  High........................> or = 240 mg/dL       Hemoglobin A1C   Date Value Ref Range Status   05/08/2024 4.9 4.0 - 5.6 % Final     Comment:     ADA Screening Guidelines:  5.7-6.4%  Consistent with prediabetes  >or=6.5%  Consistent with diabetes    High levels of fetal hemoglobin interfere with the HbA1C  assay. Heterozygous hemoglobin variants (HbS, HgC, etc)do  not significantly interfere with this assay.   However, presence of multiple variants may affect accuracy.     10/19/2018 4.8 4.0 - 5.6 % Final     Comment:     ADA Screening Guidelines:  5.7-6.4%  Consistent with prediabetes  >or=6.5%  Consistent with diabetes  High levels of fetal hemoglobin interfere with the HbA1C  assay. Heterozygous hemoglobin variants (HbS, HgC, etc)do  not significantly interfere with this assay.   However, presence of multiple variants may affect accuracy.       Nutrition Diagnosis    Problem: Nutrition knowledge deficit   Etiology (related to): lack of prior need for nutrition education  Signs/Symptoms (as evidenced by): new cancer diagnosis and self reported diet questions/concerns     Nutrition Intervention    Nutrition Prescription   1950 Kcals (15kcal/kg)  100-130 g protein (0.8-1g/kg)   3200 mL fluid (25mL/kg)    Recommendations:  4 meals per day with 30g protein each   Aim for real food meals   Breakfast  -Greek yogurt, kodiak waffles with nut butter, boiled eggs with nuts and berries, protein smoothies, overnight oats  Lunch and dinner  -lean protein, vegetables, complex carbohydrate   Afternoon snack  -tuna, salmon jerky,Greek yogurt, cottage cheese, nuts/nut butters and fruit, Rx bars, Kind bars   José Manuel BID for two weeks following surgery   Aim for 3L water daily     Materials Provided/Reviewed   Hydration   Food  safety   Meal and recipe ideas     Nutrition Monitoring and Evaluation    Monitor: energy intake, diet tolerance , and diet education needs       Goals: maintain high quality diet, weight loss 1-2lb per week    Follow up Patient will follow up via portal as needed with any questions/concerns   Encouraged follow up at any time during treatment and prior to started endocrine therapy     Communication to referring provider/care team: note available in chart     Counseling time: 45 Minutes    Joycelyn Bills, MPH, RD, , LDN, FAND  Board Certified Specialist in Oncology Nutrition   [phone]

## 2024-09-13 ENCOUNTER — OFFICE VISIT (OUTPATIENT)
Dept: PSYCHIATRY | Facility: CLINIC | Age: 34
End: 2024-09-13
Payer: COMMERCIAL

## 2024-09-13 DIAGNOSIS — C50.912 INFILTRATING DUCTAL CARCINOMA OF LEFT BREAST: ICD-10-CM

## 2024-09-13 DIAGNOSIS — F41.1 GENERALIZED ANXIETY DISORDER: Primary | ICD-10-CM

## 2024-09-13 PROCEDURE — 99999 PR PBB SHADOW E&M-EST. PATIENT-LVL I: CPT | Mod: PBBFAC,,, | Performed by: PSYCHOLOGIST

## 2024-09-13 PROCEDURE — 3044F HG A1C LEVEL LT 7.0%: CPT | Mod: CPTII,S$GLB,, | Performed by: PSYCHOLOGIST

## 2024-09-13 PROCEDURE — 90837 PSYTX W PT 60 MINUTES: CPT | Mod: S$GLB,,, | Performed by: PSYCHOLOGIST

## 2024-09-13 NOTE — PROGRESS NOTES
INFORMED CONSENT: Patient was identified using two patient-identifiers. The patient has been informed of the risks and benefits associated with engaging in psychotherapy, the handling of protected health information, the rights of privacy and the limits of confidentiality. The patient has also been informed of the importance of reporting any suicidal or homicidal ideation to this or any provider to ensure safety of all parties, and the Divya De Paz expressed understanding. The patient was agreeable to these terms and freely participates in individual psychotherapy.    PSYCHO-ONCOLOGY NOTE/ Individual Psychotherapy     Date: 9/13/2024   Site:  [facility]        Therapeutic Intervention: Met with patient.  Outpatient - Insight oriented psychotherapy 60 min - CPT code 29073      Patient was last seen by me on 8/23/2024    Problem list  Patient Active Problem List   Diagnosis    Hidradenitis suppurativa    PCOS (polycystic ovarian syndrome)    Insulin resistance    Secondary oligomenorrhea    Anxiety and depression    Invasive ductal carcinoma of breast, female, left    Negative Hereditary Cancer Genetic testing       Chief complaint/reason for encounter: adjustment   Met with patient to evaluate psychosocial adaptation to diagnosis/treatment/survivorship of BC    Current Medications  Current Outpatient Medications   Medication    amitriptyline (ELAVIL) 25 MG tablet    medroxyPROGESTERone (PROVERA) 10 MG tablet    metFORMIN (GLUCOPHAGE) 500 MG tablet    montelukast (SINGULAIR) 10 mg tablet    spironolactone (ALDACTONE) 50 MG tablet     No current facility-administered medications for this visit.       Objective:  Divya De Paz arrived promptly for the session.    The patient was fully cooperative throughout the session.  Appearance: age appropriate, appropriately  dressed, adequately  groomed  Behavior/Cooperation: friendly and cooperative  Speech: normal in rate, volume, and tone and appropriate quality, quantity  and organization of sentences  Mood: euthymic  Affect: mood congruent  Thought Process: goal-directed, logical  Thought Content: normal,  No delusions or paranoia; did not appear to be responding to internal stimuli during the session  Orientation: grossly intact  Attention Span/Concentration: Attends to session without distraction; reports no difficulty  Insight: patient has awareness of illness; good insight into own behavior and behavior of others  Judgment: the patient's behavior is adequate to circumstances    Interval history and content of current session: Patient with some anxiety leading up until surgery.   Discussed diagnosis, treatment, prognosis, current adaptation to disease and treatment status, and family's adaptation to disease and treatment status. Reports to be coping with moderate difficulty. Evaluated cognitive response, paying particular attention to negative intrusive thoughts of a persistent and detrimental nature. Thoughts of this type are in evidence with moderate distress. Provided cognitive behavioral therapy to address negative cognitions. Identified and evaluated psychosocial and environmental stressors secondary to diagnosis and treatment.  Examined proactive behaviors that may be implemented to minimize or ameliorate psychosocial stressors secondary to diagnosis and treatment.     Risk parameters:   Patient reports no suicidal ideation  Patient reports no homicidal ideation  Patient reports no self-injurious behavior  Patient reports no violent behavior   Safety needs:  None at this time      Verbal deficits: None     Patient's response to intervention:The patient's response to intervention is accepting.     Progress toward goals and other mental status changes:  The patient's progress toward goals is good.      Progress to date:Progress as Expected      Goals from last visit: Met     Patient reported outcomes:    Distress Thermometer:   Distress Score    Distress Score: 6         Practical Problems Physical Problems                                                   Family Problems                                         Emotional Problems                                                         Spiritual/Religions Concerns               Other Problems               PHQ ANSWERS    Q1. Little interest or pleasure in doing things: (P) Several days (09/11/24 1944)  Q2. Feeling down, depressed, or hopeless: (P) Not at all (09/11/24 1944)  Q3. Trouble falling or staying asleep, or sleeping too much: (P) Nearly every day (09/11/24 1944)  Q4. Feeling tired or having little energy: (P) Several days (09/11/24 1944)  Q5. Poor appetite or overeating: (P) Several days (09/11/24 1944)  Q6. Feeling bad about yourself - or that you are a failure or have let yourself or your family down: (P) Several days (09/11/24 1944)  Q7. Trouble concentrating on things, such as reading the newspaper or watching television: (P) More than half the days (09/11/24 1944)  Q8. Moving or speaking so slowly that other people could have noticed. Or the opposite - being so fidgety or restless that you have been moving around a lot more than usual: (P) Not at all (09/11/24 1944)  Q9.  0    PHQ8 Score : (P) 9 (09/11/24 1944)  PHQ-9 Total Score: (P) 9 (09/11/24 1944)     ANISH-7 Answers        9/11/2024     7:44 PM   GAD7   1. Feeling nervous, anxious, or on edge? 1   2. Not being able to stop or control worrying? 1   3. Worrying too much about different things? 1   4. Trouble relaxing? 2   5. Being so restless that it is hard to sit still? 1   6. Becoming easily annoyed or irritable? 1   7. Feeling afraid as if something awful might happen? 1   8. If you checked off any problems, how difficult have these problems made it for you to do your work, take care of things at home, or get along with other people? 1   ANISH-7 Score 8     ANISH-7 Score: (P) 8  Interpretation: (P) Mild Anxiety     Client Strengths: verbal, intelligent, successful,  good social support, good insight, commitment to wellness, strong licha, strong cultural traditions     Diagnosis:     ICD-10-CM ICD-9-CM   1. Generalized anxiety disorder  F41.1 300.02   2. Infiltrating ductal carcinoma of left breast  C50.912 174.9        Treatment Plan:individual psychotherapy and medication management by physician  Target symptoms: anxiety   Why chosen therapy is appropriate versus another modality: relevant to diagnosis, patient responds to this modality, evidence based practice  Outcome monitoring methods: self-report, observation, checklist/rating scale  Therapeutic intervention type: insight oriented psychotherapy, behavior modifying psychotherapy  Prognosis: Good      Behavioral goals:    Exercise:   Stress management:   Social engagement:   Nutrition:   Smoking Cessation:   Therapy:  Increase daily self-care and attention to health management  Pleasant events scheduling and increased social interaction  Monitor stressors in writing and bring to next visit    Return to clinic: as scheduled    Next Session:      Length of Service (minutes direct face-to-face contact): 60    Racheal Catherine, PhD  Clinical Psychologist  [place] License #[id number]  [place] License #[id number]

## 2024-09-16 ENCOUNTER — CLINICAL SUPPORT (OUTPATIENT)
Dept: HEMATOLOGY/ONCOLOGY | Facility: CLINIC | Age: 34
End: 2024-09-16
Payer: COMMERCIAL

## 2024-09-16 VITALS — BODY MASS INDEX: 45.8 KG/M2 | WEIGHT: 285 LBS | HEIGHT: 66 IN

## 2024-09-16 DIAGNOSIS — E28.2 PCOS (POLYCYSTIC OVARIAN SYNDROME): ICD-10-CM

## 2024-09-16 DIAGNOSIS — E66.01 CLASS 3 SEVERE OBESITY DUE TO EXCESS CALORIES WITH BODY MASS INDEX (BMI) OF 45.0 TO 49.9 IN ADULT, UNSPECIFIED WHETHER SERIOUS COMORBIDITY PRESENT: ICD-10-CM

## 2024-09-16 DIAGNOSIS — Z71.3 NUTRITIONAL COUNSELING: Primary | ICD-10-CM

## 2024-09-16 DIAGNOSIS — C50.912 INVASIVE DUCTAL CARCINOMA OF BREAST, FEMALE, LEFT: ICD-10-CM

## 2024-09-16 DIAGNOSIS — C50.912 INFILTRATING DUCTAL CARCINOMA OF LEFT BREAST: ICD-10-CM

## 2024-09-16 PROCEDURE — 97802 MEDICAL NUTRITION INDIV IN: CPT | Mod: 95,,, | Performed by: DIETITIAN, REGISTERED

## 2024-09-18 ENCOUNTER — HOSPITAL ENCOUNTER (OUTPATIENT)
Dept: PREADMISSION TESTING | Facility: OTHER | Age: 34
Discharge: HOME | End: 2024-09-18
Attending: SURGERY
Payer: COMMERCIAL

## 2024-09-18 ENCOUNTER — ANESTHESIA EVENT (OUTPATIENT)
Dept: SURGERY | Facility: OTHER | Age: 34
End: 2024-09-18
Payer: COMMERCIAL

## 2024-09-18 VITALS
DIASTOLIC BLOOD PRESSURE: 70 MMHG | OXYGEN SATURATION: 98 % | RESPIRATION RATE: 17 BRPM | TEMPERATURE: 97 F | HEIGHT: 66 IN | SYSTOLIC BLOOD PRESSURE: 149 MMHG | HEART RATE: 90 BPM | BODY MASS INDEX: 45.96 KG/M2 | WEIGHT: 286 LBS

## 2024-09-18 DIAGNOSIS — Z01.818 PREOP TESTING: ICD-10-CM

## 2024-09-18 LAB
ALBUMIN SERPL BCP-MCNC: 4.2 G/DL (ref 3.5–5.2)
ALP SERPL-CCNC: 80 U/L (ref 55–135)
ALT SERPL W/O P-5'-P-CCNC: 21 U/L (ref 10–44)
ANION GAP SERPL CALC-SCNC: 10 MMOL/L (ref 8–16)
AST SERPL-CCNC: 14 U/L (ref 10–40)
BASOPHILS # BLD AUTO: 0.02 K/UL (ref 0–0.2)
BASOPHILS NFR BLD: 0.3 % (ref 0–1.9)
BILIRUB SERPL-MCNC: 1.3 MG/DL (ref 0.1–1)
BUN SERPL-MCNC: 11 MG/DL (ref 6–20)
CALCIUM SERPL-MCNC: 9.8 MG/DL (ref 8.7–10.5)
CHLORIDE SERPL-SCNC: 105 MMOL/L (ref 95–110)
CO2 SERPL-SCNC: 23 MMOL/L (ref 23–29)
CREAT SERPL-MCNC: 0.7 MG/DL (ref 0.5–1.4)
DIFFERENTIAL METHOD BLD: NORMAL
EOSINOPHIL # BLD AUTO: 0.1 K/UL (ref 0–0.5)
EOSINOPHIL NFR BLD: 1.1 % (ref 0–8)
ERYTHROCYTE [DISTWIDTH] IN BLOOD BY AUTOMATED COUNT: 11.9 % (ref 11.5–14.5)
EST. GFR  (NO RACE VARIABLE): >60 ML/MIN/1.73 M^2
GLUCOSE SERPL-MCNC: 110 MG/DL (ref 70–110)
HCT VFR BLD AUTO: 43 % (ref 37–48.5)
HGB BLD-MCNC: 14.9 G/DL (ref 12–16)
IMM GRANULOCYTES # BLD AUTO: 0.02 K/UL (ref 0–0.04)
IMM GRANULOCYTES NFR BLD AUTO: 0.3 % (ref 0–0.5)
LYMPHOCYTES # BLD AUTO: 2 K/UL (ref 1–4.8)
LYMPHOCYTES NFR BLD: 31.3 % (ref 18–48)
MCH RBC QN AUTO: 29.7 PG (ref 27–31)
MCHC RBC AUTO-ENTMCNC: 34.7 G/DL (ref 32–36)
MCV RBC AUTO: 86 FL (ref 82–98)
MONOCYTES # BLD AUTO: 0.4 K/UL (ref 0.3–1)
MONOCYTES NFR BLD: 6.3 % (ref 4–15)
NEUTROPHILS # BLD AUTO: 3.8 K/UL (ref 1.8–7.7)
NEUTROPHILS NFR BLD: 60.7 % (ref 38–73)
NRBC BLD-RTO: 0 /100 WBC
PLATELET # BLD AUTO: 303 K/UL (ref 150–450)
PMV BLD AUTO: 9.3 FL (ref 9.2–12.9)
POTASSIUM SERPL-SCNC: 4.5 MMOL/L (ref 3.5–5.1)
PROT SERPL-MCNC: 7.4 G/DL (ref 6–8.4)
RBC # BLD AUTO: 5.02 M/UL (ref 4–5.4)
SODIUM SERPL-SCNC: 138 MMOL/L (ref 136–145)
WBC # BLD AUTO: 6.33 K/UL (ref 3.9–12.7)

## 2024-09-18 PROCEDURE — 80053 COMPREHEN METABOLIC PANEL: CPT | Performed by: SURGERY

## 2024-09-18 PROCEDURE — 36415 COLL VENOUS BLD VENIPUNCTURE: CPT | Performed by: SURGERY

## 2024-09-18 PROCEDURE — 85025 COMPLETE CBC W/AUTO DIFF WBC: CPT | Performed by: SURGERY

## 2024-09-18 RX ORDER — LIDOCAINE HYDROCHLORIDE 10 MG/ML
0.5 INJECTION, SOLUTION EPIDURAL; INFILTRATION; INTRACAUDAL; PERINEURAL ONCE
OUTPATIENT
Start: 2024-09-18 | End: 2024-09-18

## 2024-09-18 RX ORDER — ACETAMINOPHEN 500 MG
1000 TABLET ORAL
OUTPATIENT
Start: 2024-09-18 | End: 2024-09-18

## 2024-09-18 RX ORDER — SODIUM CHLORIDE, SODIUM LACTATE, POTASSIUM CHLORIDE, CALCIUM CHLORIDE 600; 310; 30; 20 MG/100ML; MG/100ML; MG/100ML; MG/100ML
INJECTION, SOLUTION INTRAVENOUS CONTINUOUS
OUTPATIENT
Start: 2024-09-18

## 2024-09-18 NOTE — DISCHARGE INSTRUCTIONS
Information to Prepare you for your Surgery    PRE-ADMIT TESTING -  [phone]    [address]  [facility]          Your surgery has been scheduled at [facility]. We are pleased to have the opportunity to serve you. For Further Information please call [phone].    On the day of surgery please report to the Information Desk on the 1st floor.    CONTACT YOUR PHYSICIAN'S OFFICE THE DAY PRIOR TO YOUR SURGERY TO OBTAIN YOUR ARRIVAL TIME.     The evening before surgery do not eat anything after 9 p.m. ( this includes hard candy, chewing gum and mints).  You may only have WATER  from 9 p.m. until you leave your home.   DO NOT DRINK ANY LIQUIDS ON THE WAY TO THE HOSPITAL.        Outpatient Surgery- May allow 2 adult (18 and older) Support Persons (1 being the designated ) for all surgical/procedural patients. A breastfeeding mother will be allowed her infant and 2 adult Support Persons. No one under the age of 18 will be allowed in the building. No swapping out of visitors in the [facility].      SPECIAL MEDICATION INSTRUCTIONS: TAKE medications checked off by the Anesthesiologist on your Medication List.    Angiogram Patients: Take medications as instructed by your physician, including aspirin.     Surgery Patients:    If you take ASPIRIN - Your PHYSICIAN/SURGEON will need to inform you IF/OR when you need to stop taking aspirin prior to your surgery.     The week prior to surgery do not ot take any medications containing IBUPROFEN or NSAIDS ( Advil, Motrin, Goodys, BC, Aleve, Naproxen etc) If you are not sure if you should take a medicine please call your surgeon's office.  Ok to take Tylenol    Do Not Wear any make-up (especially eye make-up) to surgery. Please remove any false eyelashes or eyelash extensions. If you arrive the day of surgery with makeup/eyelashes on you will be required to remove prior to surgery. (There is a risk of corneal  abrasions if eye makeup/eyelash extensions are not removed)      Leave all valuables at home.   Do Not wear any jewelry or watches, including any metal in body piercings. Jewelry must be removed prior to coming to the hospital.  There is a possibility that rings that are unable to be removed may be cut off if they are on the surgical extremity.    Please remove all hair extensions, wigs, clips and any other metal accessories/ ornaments from your hair.  These items may pose a flammable/fire risk in Surgery and must be removed.    Do not shave your surgical area at least 5 days prior to your surgery. The surgical prep will be performed at the hospital according to Infection Control regulations.    Contact Lens must be removed before surgery. Either do not wear the contact lens or bring a case and solution for storage.  Please bring a container for eyeglasses or dentures as required.  Bring any paperwork your physician has provided, such as consent forms,  history and physicals, doctor's orders, etc.   Bring comfortable clothes that are loose fitting to wear upon discharge. Take into consideration the type of surgery being performed.  Maintain your diet as advised per your physician the day prior to surgery.      Adequate rest the night before surgery is advised.   Park in the Parking lot behind the hospital or in the [employer] [facility] across the street from the parking lot. Parking is complimentary.  If you will be discharged the same day as your procedure, please arrange for a responsible adult to drive you home or to accompany you if traveling by taxi.   YOU WILL NOT BE PERMITTED TO DRIVE OR TO LEAVE THE HOSPITAL ALONE AFTER SURGERY.   If you are being discharged the same day, it is strongly recommended that you arrange for someone to remain with you for the first 24 hrs following your surgery.    The Surgeon will speak to your family/visitor after your surgery regarding the outcome of your surgery and post op  care.  The Surgeon may speak to you after your surgery, but there is a possibility you may not remember the details.  Please check with your family members regarding the conversation with the Surgeon.    We strongly recommend whoever is bringing you home be present for discharge instructions.  This will ensure a thorough understanding for your post op home care.    If the patient has fever, cough, or signs/symptoms of Flu or Covid please do not come in for your surgery. Contact your surgeon and your primary care physician for further instructions.           Thank you for your cooperation.  The Staff of [facility].            Bathing Instructions with Hibiclens    Shower the evening before and morning of your procedure with Chlorhexidine (Hibiclens)  do not use Chlorhexidine on your face or genitals. Do not get in your eyes.  Wash your face with water and your regular face wash/soap  Use your regular shampoo  Apply Chlorhexidine (Hibiclens) directly on your skin or on a wet washcloth and wash gently. When showering: Move away from the shower stream when applying Chlorhexidine (Hibiclens) to avoid rinsing off too soon.  Rinse thoroughly with warm water  Do not dilute Chlorhexidine (Hibiclens)   Dry off as usual, do not use any deodorant, powder, body lotions, perfume, after shave or cologne.

## 2024-09-18 NOTE — ANESTHESIA PREPROCEDURE EVALUATION
09/18/2024  Divya De Paz is a 34 y.o., female.      Pre-op Assessment    I have reviewed the Patient Summary Reports.     I have reviewed the Nursing Notes. I have reviewed the NPO Status.   I have reviewed the Medications.     Review of Systems  Anesthesia Hx:  No previous Anesthesia             Denies Family Hx of Anesthesia complications.     Social:  Non-Smoker       Hematology/Oncology:                      Current/Recent Cancer.  Breast    left          Cardiovascular:  Cardiovascular Normal                                            Pulmonary:  Pulmonary Normal                       Renal/:  Renal/ Normal                 Hepatic/GI:  Hepatic/GI Normal                 OB/GYN/PEDS:  PCOS           Neurological:  Neurology Normal                                      Endocrine:  Diabetes (Insulin resistance)         Morbid Obesity / BMI > 40  Psych:  Psychiatric History anxiety depression                Physical Exam  General: Well nourished, Cooperative, Alert and Oriented    Airway:  Mallampati: IV   Mouth Opening: Normal  TM Distance: Normal  Tongue: Normal  Neck ROM: Normal ROM    Dental:  Intact, Caps / Implants        Anesthesia Plan  Type of Anesthesia, risks & benefits discussed:    Anesthesia Type: Gen ETT  Intra-op Monitoring Plan: Standard ASA Monitors  Post Op Pain Control Plan: multimodal analgesia  Induction:  IV  Airway Plan: Video, Post-Induction  Informed Consent: Informed consent signed with the Patient and all parties understand the risks and agree with anesthesia plan.  All questions answered.   ASA Score: 3  Anesthesia Plan Notes: CBC, BMP    Ready For Surgery From Anesthesia Perspective.     .

## 2024-09-24 ENCOUNTER — TELEPHONE (OUTPATIENT)
Dept: SURGERY | Facility: CLINIC | Age: 34
End: 2024-09-24
Payer: COMMERCIAL

## 2024-09-24 ENCOUNTER — OFFICE VISIT (OUTPATIENT)
Dept: PLASTIC SURGERY | Facility: CLINIC | Age: 34
End: 2024-09-24
Payer: COMMERCIAL

## 2024-09-24 VITALS — HEART RATE: 89 BPM | SYSTOLIC BLOOD PRESSURE: 139 MMHG | DIASTOLIC BLOOD PRESSURE: 85 MMHG | RESPIRATION RATE: 19 BRPM

## 2024-09-24 DIAGNOSIS — C50.912 INVASIVE DUCTAL CARCINOMA OF BREAST, FEMALE, LEFT: Primary | ICD-10-CM

## 2024-09-24 PROCEDURE — 99499 UNLISTED E&M SERVICE: CPT | Mod: S$GLB,,, | Performed by: SURGERY

## 2024-09-24 PROCEDURE — 99999 PR PBB SHADOW E&M-EST. PATIENT-LVL III: CPT | Mod: PBBFAC,,, | Performed by: SURGERY

## 2024-09-24 NOTE — PROGRESS NOTES
Plastic Surgery History & Physical    SUBJECTIVE:   Chief complaint: Preoperative Visit for breast reduction    History of Present Illness:  34 y.o. female with a past medical history including HS pre Dm presenting to plastic surgery clinic for a preoperative visit. The patient was last seen 8/29/24 at which time we discussed bilateral oncoplastic breast reduction. Preoperative photos were obtained, she was submitted to insurance and was approved and is scheduled for surgery with Dr Rowell and myself on 9/25/24  Since the last clinic visit there have been no significant changes in the patient's history.     Past Medical History:   Diagnosis Date    Anxiety     Depression     Hidradenitis suppurativa     Invasive ductal carcinoma of breast, female, left     PCOS (polycystic ovarian syndrome)        No past surgical history on file.    Family History   Problem Relation Name Age of Onset    Diabetes Mother      Depression Mother      Hypertension Father      Depression Father      Arthritis Father      Breast cancer Paternal Aunt  38    Lung cancer Maternal Grandmother  62        nonsmoker    Breast cancer Paternal Grandmother  50 - 59    Acne Brother      Kidney cancer Maternal Grandfather  74    Breast cancer Other  30 - 39       Social History     Socioeconomic History    Marital status: Single    Number of children: 0   Occupational History    Occupation: claims  and proc     Employer: [employer]   Tobacco Use    Smoking status: Never     Passive exposure: Yes    Smokeless tobacco: Never    Tobacco comments:     Pt states she dont smoke    Substance and Sexual Activity    Alcohol use: Yes     Alcohol/week: 0.0 standard drinks of alcohol     Comment: occasional    Drug use: No    Sexual activity: Not Currently     Partners: Male     Birth control/protection: OCP   Other Topics Concern    Are you pregnant or think you may be? No    Breast-feeding No   Social History Narrative    From [place]    Moved to  [place] 2008    Exercising      Social Determinants of Health     Financial Resource Strain: Low Risk  (4/2/2024)    Overall Financial Resource Strain (CARDIA)     Difficulty of Paying Living Expenses: Not very hard   Food Insecurity: No Food Insecurity (4/2/2024)    Hunger Vital Sign     Worried About Running Out of Food in the Last Year: Never true     Ran Out of Food in the Last Year: Never true   Transportation Needs: No Transportation Needs (4/2/2024)    PRAPARE - Transportation     Lack of Transportation (Medical): No     Lack of Transportation (Non-Medical): No   Physical Activity: Sufficiently Active (4/2/2024)    Exercise Vital Sign     Days of Exercise per Week: 7 days     Minutes of Exercise per Session: 60 min   Stress: Stress Concern Present (4/2/2024)    [employer] [place] [employer] - Occupational Stress Questionnaire     Feeling of Stress : Very much   Housing Stability: Low Risk  (4/2/2024)    Housing Stability Vital Sign     Unable to Pay for Housing in the Last Year: No     Number of Places Lived in the Last Year: 1     Unstable Housing in the Last Year: No       Current Outpatient Medications   Medication Sig Dispense Refill    amitriptyline (ELAVIL) 25 MG tablet Take 1 tablet (25 mg total) by mouth every evening. 90 tablet 1    metFORMIN (GLUCOPHAGE) 500 MG tablet Take 1 tablet (500 mg total) by mouth 2 (two) times daily with meals. 180 tablet 3    montelukast (SINGULAIR) 10 mg tablet Take 1 tablet (10 mg total) by mouth every evening. 90 tablet 3    spironolactone (ALDACTONE) 50 MG tablet Take 1 tablet (50 mg total) by mouth once daily. 90 tablet 3     No current facility-administered medications for this visit.       Review of patient's allergies indicates:   Allergen Reactions    Cefzil [cefprozil] Hives         Review of Systems:  HENT: Positive for neck pain.    Musculoskeletal: Positive for back pain.         OBJECTIVE:     /85   Pulse 89   Resp 19   LMP 08/06/2024 (Within  Days)       Physical Exam:  Gen: NAD, appears stated age  Neuro: normal without focal findings, mental status and speech normal  HEENT: NCAT, neck supple, PEERL  CV: RRR  Pulm: Breathing non-labored, chest wall movement equal bilaterally   Breast: heavy breasts, reviewed medical photography  Abdomen: soft, nontender, no guarding  Extremity:normal strength, no cyanosis or edema  Skin: Skin color, texture, turgor normal. No rashes or lesions  Psych: oriented to time, place and person, mood and affect are within normal limits    Medical photography reviewed    ASSESSMENT/PLAN:     Divya De Paz with breast cancer and macromastia was seen preoperatively today for bilateral oncoplastic breast reduction.  Worries about asymmetry and being too small    Patient is a good candidate for an oncoplastic breast reduction. Discussed the procedure in detail including using a pedicle of breast tissue to support blood supply to the nipple, the use of Wise pattern skin flaps to reduce size and support the pedicle, risks of skin loss, fat necrosis, partial or complete nipple loss, delayed wound healing, normal scarring, outpatient surgery, general recovery and the use of drains. Octaviano illustration to demonstrate operative details and skin reduction patterns.     Counseled that after surgery the breast that is anticipated to undergo radiation will be kept slightly larger than the noncancer side in anticipation of skin and breast tissue changes from radiation. Also discussed that breasts may be bruised or swollen afterwards and won't have their final appearance for 3-6 months after surgery.      Discussed the procedure in detail including: using a pedicle of breast tissue to support blood supply to the nipple, the use of Wise pattern skin flaps to reduce size and support the pedicle, risks of skin loss, fat necrosis, partial or complete nipple loss, delayed wound healing, normal scarring, outpatient surgery, general recovery and  the use of drains. Also discussed that breasts may be bruised or swollen afterwards and won't have their final appearance for 3-6 months after surgery. Discussed slightly reduced chance of successful breast feeding.  Consents signed     Reviewed multimodal pain control regimen used in the post operative period. Prescriptions will be sent to the outpatient pharmacy the day of surgery.  The patient was given a packet including general instructions for post-operative care, instructions for the day of surgery, drain care and process to complete FMLA/Disability paperwork.  All questions were answered. The patient was given a business card with contact info and advised to contact the clinic with any questions or concerns before or after surgery.      Ridge Murcia  Plastic and Reconstructive Surgery    I spent a total of 30 minutes on the day of the visit.This includes face to face time and non-face to face time preparing to see the patient (eg, review of tests), obtaining and/or reviewing separately obtained history, documenting clinical information in the electronic or other health record, independently interpreting results and communicating results to the patient/family/caregiver, or care coordinator.

## 2024-09-24 NOTE — TELEPHONE ENCOUNTER
Patient was informed of her arrival time for surgery on 09/25/24 at the [facility]. Arrival time is for 7:30 am surgery is scheduled for 9:30 am . Nothing to eat after 9 pm this evening 09/24/24. Clear liquids up until arrival at the hospital . Informed patient to please leave all jewelry home and to wear a button down shirt. Patient appreciated the call .

## 2024-09-24 NOTE — H&P (VIEW-ONLY)
Plastic Surgery History & Physical    SUBJECTIVE:   Chief complaint: Preoperative Visit for breast reduction    History of Present Illness:  34 y.o. female with a past medical history including HS pre Dm presenting to plastic surgery clinic for a preoperative visit. The patient was last seen 8/29/24 at which time we discussed bilateral oncoplastic breast reduction. Preoperative photos were obtained, she was submitted to insurance and was approved and is scheduled for surgery with Dr Rowell and myself on 9/25/24  Since the last clinic visit there have been no significant changes in the patient's history.     Past Medical History:   Diagnosis Date    Anxiety     Depression     Hidradenitis suppurativa     Invasive ductal carcinoma of breast, female, left     PCOS (polycystic ovarian syndrome)        No past surgical history on file.    Family History   Problem Relation Name Age of Onset    Diabetes Mother      Depression Mother      Hypertension Father      Depression Father      Arthritis Father      Breast cancer Paternal Aunt  38    Lung cancer Maternal Grandmother  62        nonsmoker    Breast cancer Paternal Grandmother  50 - 59    Acne Brother      Kidney cancer Maternal Grandfather  74    Breast cancer Other  30 - 39       Social History     Socioeconomic History    Marital status: Single    Number of children: 0   Occupational History    Occupation: claims  and proc     Employer: [employer]   Tobacco Use    Smoking status: Never     Passive exposure: Yes    Smokeless tobacco: Never    Tobacco comments:     Pt states she dont smoke    Substance and Sexual Activity    Alcohol use: Yes     Alcohol/week: 0.0 standard drinks of alcohol     Comment: occasional    Drug use: No    Sexual activity: Not Currently     Partners: Male     Birth control/protection: OCP   Other Topics Concern    Are you pregnant or think you may be? No    Breast-feeding No   Social History Narrative    From [place]    Moved to  [place] 2008    Exercising      Social Determinants of Health     Financial Resource Strain: Low Risk  (4/2/2024)    Overall Financial Resource Strain (CARDIA)     Difficulty of Paying Living Expenses: Not very hard   Food Insecurity: No Food Insecurity (4/2/2024)    Hunger Vital Sign     Worried About Running Out of Food in the Last Year: Never true     Ran Out of Food in the Last Year: Never true   Transportation Needs: No Transportation Needs (4/2/2024)    PRAPARE - Transportation     Lack of Transportation (Medical): No     Lack of Transportation (Non-Medical): No   Physical Activity: Sufficiently Active (4/2/2024)    Exercise Vital Sign     Days of Exercise per Week: 7 days     Minutes of Exercise per Session: 60 min   Stress: Stress Concern Present (4/2/2024)    [employer] [place] [place] [employer] - Occupational Stress Questionnaire     Feeling of Stress : Very much   Housing Stability: Low Risk  (4/2/2024)    Housing Stability Vital Sign     Unable to Pay for Housing in the Last Year: No     Number of Places Lived in the Last Year: 1     Unstable Housing in the Last Year: No       Current Outpatient Medications   Medication Sig Dispense Refill    amitriptyline (ELAVIL) 25 MG tablet Take 1 tablet (25 mg total) by mouth every evening. 90 tablet 1    metFORMIN (GLUCOPHAGE) 500 MG tablet Take 1 tablet (500 mg total) by mouth 2 (two) times daily with meals. 180 tablet 3    montelukast (SINGULAIR) 10 mg tablet Take 1 tablet (10 mg total) by mouth every evening. 90 tablet 3    spironolactone (ALDACTONE) 50 MG tablet Take 1 tablet (50 mg total) by mouth once daily. 90 tablet 3     No current facility-administered medications for this visit.       Review of patient's allergies indicates:   Allergen Reactions    Cefzil [cefprozil] Hives         Review of Systems:  HENT: Positive for neck pain.    Musculoskeletal: Positive for back pain.         OBJECTIVE:     /85   Pulse 89   Resp 19   LMP 08/06/2024 (Within  Days)       Physical Exam:  Gen: NAD, appears stated age  Neuro: normal without focal findings, mental status and speech normal  HEENT: NCAT, neck supple, PEERL  CV: RRR  Pulm: Breathing non-labored, chest wall movement equal bilaterally   Breast: heavy breasts, reviewed medical photography  Abdomen: soft, nontender, no guarding  Extremity:normal strength, no cyanosis or edema  Skin: Skin color, texture, turgor normal. No rashes or lesions  Psych: oriented to time, place and person, mood and affect are within normal limits    Medical photography reviewed    ASSESSMENT/PLAN:     Divya De Paz with breast cancer and macromastia was seen preoperatively today for bilateral oncoplastic breast reduction.  Worries about asymmetry and being too small    Patient is a good candidate for an oncoplastic breast reduction. Discussed the procedure in detail including using a pedicle of breast tissue to support blood supply to the nipple, the use of Wise pattern skin flaps to reduce size and support the pedicle, risks of skin loss, fat necrosis, partial or complete nipple loss, delayed wound healing, normal scarring, outpatient surgery, general recovery and the use of drains. Octaviano illustration to demonstrate operative details and skin reduction patterns.     Counseled that after surgery the breast that is anticipated to undergo radiation will be kept slightly larger than the noncancer side in anticipation of skin and breast tissue changes from radiation. Also discussed that breasts may be bruised or swollen afterwards and won't have their final appearance for 3-6 months after surgery.      Discussed the procedure in detail including: using a pedicle of breast tissue to support blood supply to the nipple, the use of Wise pattern skin flaps to reduce size and support the pedicle, risks of skin loss, fat necrosis, partial or complete nipple loss, delayed wound healing, normal scarring, outpatient surgery, general recovery and  the use of drains. Also discussed that breasts may be bruised or swollen afterwards and won't have their final appearance for 3-6 months after surgery. Discussed slightly reduced chance of successful breast feeding.  Consents signed     Reviewed multimodal pain control regimen used in the post operative period. Prescriptions will be sent to the outpatient pharmacy the day of surgery.  The patient was given a packet including general instructions for post-operative care, instructions for the day of surgery, drain care and process to complete FMLA/Disability paperwork.  All questions were answered. The patient was given a business card with contact info and advised to contact the clinic with any questions or concerns before or after surgery.      Ridge Murcia  Plastic and Reconstructive Surgery    I spent a total of 30 minutes on the day of the visit.This includes face to face time and non-face to face time preparing to see the patient (eg, review of tests), obtaining and/or reviewing separately obtained history, documenting clinical information in the electronic or other health record, independently interpreting results and communicating results to the patient/family/caregiver, or care coordinator.

## 2024-09-25 ENCOUNTER — HOSPITAL ENCOUNTER (OUTPATIENT)
Facility: OTHER | Age: 34
Discharge: HOME | End: 2024-09-25
Attending: SURGERY | Admitting: SURGERY
Payer: COMMERCIAL

## 2024-09-25 ENCOUNTER — HOSPITAL ENCOUNTER (OUTPATIENT)
Dept: RADIOLOGY | Facility: OTHER | Age: 34
Discharge: HOME | End: 2024-09-25
Attending: SURGERY | Admitting: SURGERY
Payer: COMMERCIAL

## 2024-09-25 ENCOUNTER — ANESTHESIA (OUTPATIENT)
Dept: SURGERY | Facility: OTHER | Age: 34
End: 2024-09-25
Payer: COMMERCIAL

## 2024-09-25 VITALS
HEIGHT: 66 IN | HEART RATE: 91 BPM | DIASTOLIC BLOOD PRESSURE: 65 MMHG | TEMPERATURE: 98 F | SYSTOLIC BLOOD PRESSURE: 127 MMHG | RESPIRATION RATE: 16 BRPM | BODY MASS INDEX: 45.96 KG/M2 | OXYGEN SATURATION: 93 % | WEIGHT: 286 LBS

## 2024-09-25 DIAGNOSIS — C50.912 INFILTRATING DUCTAL CARCINOMA OF LEFT BREAST: ICD-10-CM

## 2024-09-25 LAB
B-HCG UR QL: NEGATIVE
CTP QC/QA: YES

## 2024-09-25 PROCEDURE — 81025 URINE PREGNANCY TEST: CPT | Performed by: ANESTHESIOLOGY

## 2024-09-25 PROCEDURE — 88341 IMHCHEM/IMCYTCHM EA ADD ANTB: CPT | Mod: 59 | Performed by: PATHOLOGY

## 2024-09-25 PROCEDURE — 88341 IMHCHEM/IMCYTCHM EA ADD ANTB: CPT | Mod: 26,,, | Performed by: PATHOLOGY

## 2024-09-25 PROCEDURE — 25000003 PHARM REV CODE 250: Performed by: NURSE ANESTHETIST, CERTIFIED REGISTERED

## 2024-09-25 PROCEDURE — 19301 PARTIAL MASTECTOMY: CPT | Mod: LT,,, | Performed by: SURGERY

## 2024-09-25 PROCEDURE — 88342 IMHCHEM/IMCYTCHM 1ST ANTB: CPT | Mod: 26,,, | Performed by: PATHOLOGY

## 2024-09-25 PROCEDURE — 71000033 HC RECOVERY, INTIAL HOUR: Performed by: SURGERY

## 2024-09-25 PROCEDURE — 36000706: Performed by: SURGERY

## 2024-09-25 PROCEDURE — 71000039 HC RECOVERY, EACH ADD'L HOUR: Performed by: SURGERY

## 2024-09-25 PROCEDURE — 71000015 HC POSTOP RECOV 1ST HR: Performed by: SURGERY

## 2024-09-25 PROCEDURE — 88305 TISSUE EXAM BY PATHOLOGIST: CPT | Mod: 59 | Performed by: PATHOLOGY

## 2024-09-25 PROCEDURE — 88307 TISSUE EXAM BY PATHOLOGIST: CPT | Mod: 59 | Performed by: PATHOLOGY

## 2024-09-25 PROCEDURE — 25000003 PHARM REV CODE 250: Performed by: ANESTHESIOLOGY

## 2024-09-25 PROCEDURE — 38900 IO MAP OF SENT LYMPH NODE: CPT | Mod: LT,,, | Performed by: SURGERY

## 2024-09-25 PROCEDURE — 37000009 HC ANESTHESIA EA ADD 15 MINS: Performed by: SURGERY

## 2024-09-25 PROCEDURE — 19318 BREAST REDUCTION: CPT | Mod: 59,RT,, | Performed by: SURGERY

## 2024-09-25 PROCEDURE — 38525 BIOPSY/REMOVAL LYMPH NODES: CPT | Mod: 51,LT,, | Performed by: SURGERY

## 2024-09-25 PROCEDURE — 27201423 OPTIME MED/SURG SUP & DEVICES STERILE SUPPLY: Performed by: SURGERY

## 2024-09-25 PROCEDURE — 63600175 PHARM REV CODE 636 W HCPCS: Performed by: SURGERY

## 2024-09-25 PROCEDURE — 63600175 PHARM REV CODE 636 W HCPCS: Mod: JZ,JG | Performed by: SURGERY

## 2024-09-25 PROCEDURE — 19316 MASTOPEXY: CPT | Mod: LT,,, | Performed by: SURGERY

## 2024-09-25 PROCEDURE — 88305 TISSUE EXAM BY PATHOLOGIST: CPT | Mod: 26,,, | Performed by: PATHOLOGY

## 2024-09-25 PROCEDURE — 88342 IMHCHEM/IMCYTCHM 1ST ANTB: CPT | Performed by: PATHOLOGY

## 2024-09-25 PROCEDURE — A9520 TC99 TILMANOCEPT DIAG 0.5MCI: HCPCS | Performed by: SURGERY

## 2024-09-25 PROCEDURE — 37000008 HC ANESTHESIA 1ST 15 MINUTES: Performed by: SURGERY

## 2024-09-25 PROCEDURE — 36000707: Performed by: SURGERY

## 2024-09-25 PROCEDURE — 88307 TISSUE EXAM BY PATHOLOGIST: CPT | Mod: 26,,, | Performed by: PATHOLOGY

## 2024-09-25 PROCEDURE — 71000016 HC POSTOP RECOV ADDL HR: Performed by: SURGERY

## 2024-09-25 PROCEDURE — 63600175 PHARM REV CODE 636 W HCPCS: Performed by: ANESTHESIOLOGY

## 2024-09-25 PROCEDURE — C9290 INJ, BUPIVACAINE LIPOSOME: HCPCS | Performed by: SURGERY

## 2024-09-25 PROCEDURE — 63600175 PHARM REV CODE 636 W HCPCS: Performed by: NURSE ANESTHETIST, CERTIFIED REGISTERED

## 2024-09-25 RX ORDER — OXYCODONE HYDROCHLORIDE 5 MG/1
5 TABLET ORAL EVERY 4 HOURS PRN
Qty: 10 TABLET | Refills: 0 | Status: SHIPPED | OUTPATIENT
Start: 2024-09-25 | End: 2024-10-11

## 2024-09-25 RX ORDER — ACETAMINOPHEN 500 MG
1000 TABLET ORAL
Status: COMPLETED | OUTPATIENT
Start: 2024-09-25 | End: 2024-09-25

## 2024-09-25 RX ORDER — SODIUM CHLORIDE 0.9 % (FLUSH) 0.9 %
3 SYRINGE (ML) INJECTION
Status: DISCONTINUED | OUTPATIENT
Start: 2024-09-25 | End: 2024-09-25 | Stop reason: HOSPADM

## 2024-09-25 RX ORDER — SODIUM CHLORIDE 9 MG/ML
INJECTION, SOLUTION INTRAVENOUS CONTINUOUS
Status: DISCONTINUED | OUTPATIENT
Start: 2024-09-25 | End: 2024-09-25 | Stop reason: HOSPADM

## 2024-09-25 RX ORDER — ACETAMINOPHEN 500 MG
1000 TABLET ORAL EVERY 8 HOURS
Start: 2024-09-25 | End: 2024-10-02

## 2024-09-25 RX ORDER — HYDROMORPHONE HYDROCHLORIDE 2 MG/ML
0.4 INJECTION, SOLUTION INTRAMUSCULAR; INTRAVENOUS; SUBCUTANEOUS EVERY 5 MIN PRN
Status: DISCONTINUED | OUTPATIENT
Start: 2024-09-25 | End: 2024-09-25 | Stop reason: HOSPADM

## 2024-09-25 RX ORDER — ROCURONIUM BROMIDE 10 MG/ML
INJECTION, SOLUTION INTRAVENOUS
Status: DISCONTINUED | OUTPATIENT
Start: 2024-09-25 | End: 2024-09-25

## 2024-09-25 RX ORDER — MEPERIDINE HYDROCHLORIDE 25 MG/ML
12.5 INJECTION INTRAMUSCULAR; INTRAVENOUS; SUBCUTANEOUS ONCE AS NEEDED
Status: DISCONTINUED | OUTPATIENT
Start: 2024-09-25 | End: 2024-09-25 | Stop reason: HOSPADM

## 2024-09-25 RX ORDER — OXYCODONE HYDROCHLORIDE 5 MG/1
5 TABLET ORAL
Status: DISCONTINUED | OUTPATIENT
Start: 2024-09-25 | End: 2024-09-25 | Stop reason: HOSPADM

## 2024-09-25 RX ORDER — IBUPROFEN 600 MG/1
600 TABLET ORAL EVERY 6 HOURS
Qty: 28 TABLET | Refills: 0 | Status: SHIPPED | OUTPATIENT
Start: 2024-09-25 | End: 2024-10-02

## 2024-09-25 RX ORDER — SUCCINYLCHOLINE CHLORIDE 20 MG/ML
INJECTION INTRAMUSCULAR; INTRAVENOUS
Status: DISCONTINUED | OUTPATIENT
Start: 2024-09-25 | End: 2024-09-25

## 2024-09-25 RX ORDER — CLINDAMYCIN PHOSPHATE 900 MG/50ML
900 INJECTION, SOLUTION INTRAVENOUS
Status: COMPLETED | OUTPATIENT
Start: 2024-09-25 | End: 2024-09-25

## 2024-09-25 RX ORDER — PROPOFOL 10 MG/ML
VIAL (ML) INTRAVENOUS
Status: DISCONTINUED | OUTPATIENT
Start: 2024-09-25 | End: 2024-09-25

## 2024-09-25 RX ORDER — BUPIVACAINE HYDROCHLORIDE 2.5 MG/ML
INJECTION, SOLUTION EPIDURAL; INFILTRATION; INTRACAUDAL
Status: DISCONTINUED | OUTPATIENT
Start: 2024-09-25 | End: 2024-09-25 | Stop reason: HOSPADM

## 2024-09-25 RX ORDER — LIDOCAINE HYDROCHLORIDE 10 MG/ML
0.5 INJECTION, SOLUTION EPIDURAL; INFILTRATION; INTRACAUDAL; PERINEURAL ONCE
Status: DISCONTINUED | OUTPATIENT
Start: 2024-09-25 | End: 2024-09-25 | Stop reason: HOSPADM

## 2024-09-25 RX ORDER — ONDANSETRON HYDROCHLORIDE 2 MG/ML
INJECTION, SOLUTION INTRAVENOUS
Status: DISCONTINUED | OUTPATIENT
Start: 2024-09-25 | End: 2024-09-25

## 2024-09-25 RX ORDER — LIDOCAINE HYDROCHLORIDE 20 MG/ML
INJECTION INTRAVENOUS
Status: DISCONTINUED | OUTPATIENT
Start: 2024-09-25 | End: 2024-09-25

## 2024-09-25 RX ORDER — GABAPENTIN 300 MG/1
300 CAPSULE ORAL 3 TIMES DAILY
Qty: 21 CAPSULE | Refills: 0 | Status: SHIPPED | OUTPATIENT
Start: 2024-09-25 | End: 2024-10-11

## 2024-09-25 RX ORDER — FENTANYL CITRATE 50 UG/ML
INJECTION, SOLUTION INTRAMUSCULAR; INTRAVENOUS
Status: DISCONTINUED | OUTPATIENT
Start: 2024-09-25 | End: 2024-09-25

## 2024-09-25 RX ORDER — SODIUM CHLORIDE, SODIUM LACTATE, POTASSIUM CHLORIDE, CALCIUM CHLORIDE 600; 310; 30; 20 MG/100ML; MG/100ML; MG/100ML; MG/100ML
INJECTION, SOLUTION INTRAVENOUS CONTINUOUS
Status: DISCONTINUED | OUTPATIENT
Start: 2024-09-25 | End: 2024-09-25 | Stop reason: HOSPADM

## 2024-09-25 RX ORDER — PROCHLORPERAZINE EDISYLATE 5 MG/ML
5 INJECTION INTRAMUSCULAR; INTRAVENOUS EVERY 30 MIN PRN
Status: DISCONTINUED | OUTPATIENT
Start: 2024-09-25 | End: 2024-09-25 | Stop reason: HOSPADM

## 2024-09-25 RX ORDER — DEXAMETHASONE SODIUM PHOSPHATE 4 MG/ML
INJECTION, SOLUTION INTRA-ARTICULAR; INTRALESIONAL; INTRAMUSCULAR; INTRAVENOUS; SOFT TISSUE
Status: DISCONTINUED | OUTPATIENT
Start: 2024-09-25 | End: 2024-09-25

## 2024-09-25 RX ORDER — LIDOCAINE HYDROCHLORIDE AND EPINEPHRINE 10; 10 MG/ML; UG/ML
INJECTION, SOLUTION INFILTRATION; PERINEURAL
Status: DISCONTINUED | OUTPATIENT
Start: 2024-09-25 | End: 2024-09-25 | Stop reason: HOSPADM

## 2024-09-25 RX ORDER — GLUCAGON 1 MG
1 KIT INJECTION
Status: DISCONTINUED | OUTPATIENT
Start: 2024-09-25 | End: 2024-09-25 | Stop reason: HOSPADM

## 2024-09-25 RX ORDER — MIDAZOLAM HYDROCHLORIDE 1 MG/ML
INJECTION INTRAMUSCULAR; INTRAVENOUS
Status: DISCONTINUED | OUTPATIENT
Start: 2024-09-25 | End: 2024-09-25

## 2024-09-25 RX ADMIN — ROCURONIUM BROMIDE 20 MG: 10 SOLUTION INTRAVENOUS at 11:09

## 2024-09-25 RX ADMIN — ACETAMINOPHEN 1000 MG: 500 TABLET, FILM COATED ORAL at 08:09

## 2024-09-25 RX ADMIN — ROCURONIUM BROMIDE 30 MG: 10 SOLUTION INTRAVENOUS at 10:09

## 2024-09-25 RX ADMIN — PROPOFOL 180 MG: 10 INJECTION, EMULSION INTRAVENOUS at 09:09

## 2024-09-25 RX ADMIN — OXYCODONE HYDROCHLORIDE 5 MG: 5 TABLET ORAL at 01:09

## 2024-09-25 RX ADMIN — FENTANYL CITRATE 100 MCG: 50 INJECTION, SOLUTION INTRAMUSCULAR; INTRAVENOUS at 09:09

## 2024-09-25 RX ADMIN — SODIUM CHLORIDE, SODIUM LACTATE, POTASSIUM CHLORIDE, AND CALCIUM CHLORIDE: 600; 310; 30; 20 INJECTION, SOLUTION INTRAVENOUS at 09:09

## 2024-09-25 RX ADMIN — FENTANYL CITRATE 50 MCG: 50 INJECTION, SOLUTION INTRAMUSCULAR; INTRAVENOUS at 12:09

## 2024-09-25 RX ADMIN — CARBOXYMETHYLCELLULOSE SODIUM 2 DROP: 2.5 SOLUTION/ DROPS OPHTHALMIC at 09:09

## 2024-09-25 RX ADMIN — TILMANOCEPT 469 MICROCURIE: KIT at 10:09

## 2024-09-25 RX ADMIN — SUGAMMADEX 600 MG: 100 INJECTION, SOLUTION INTRAVENOUS at 12:09

## 2024-09-25 RX ADMIN — MIDAZOLAM HYDROCHLORIDE 2 MG: 1 INJECTION INTRAMUSCULAR; INTRAVENOUS at 09:09

## 2024-09-25 RX ADMIN — DEXAMETHASONE SODIUM PHOSPHATE 4 MG: 4 INJECTION, SOLUTION INTRAMUSCULAR; INTRAVENOUS at 09:09

## 2024-09-25 RX ADMIN — SODIUM CHLORIDE, SODIUM LACTATE, POTASSIUM CHLORIDE, AND CALCIUM CHLORIDE: 600; 310; 30; 20 INJECTION, SOLUTION INTRAVENOUS at 10:09

## 2024-09-25 RX ADMIN — FENTANYL CITRATE 50 MCG: 50 INJECTION, SOLUTION INTRAMUSCULAR; INTRAVENOUS at 10:09

## 2024-09-25 RX ADMIN — LIDOCAINE HYDROCHLORIDE 100 MG: 20 INJECTION, SOLUTION INTRAVENOUS at 09:09

## 2024-09-25 RX ADMIN — ONDANSETRON HYDROCHLORIDE 4 MG: 2 INJECTION INTRAMUSCULAR; INTRAVENOUS at 09:09

## 2024-09-25 RX ADMIN — CLINDAMYCIN PHOSPHATE 900 MG: 18 INJECTION, SOLUTION INTRAVENOUS at 09:09

## 2024-09-25 RX ADMIN — SUCCINYLCHOLINE CHLORIDE 180 MG: 20 INJECTION, SOLUTION INTRAMUSCULAR; INTRAVENOUS at 09:09

## 2024-09-25 RX ADMIN — PROPOFOL 30 MG: 10 INJECTION, EMULSION INTRAVENOUS at 11:09

## 2024-09-25 NOTE — DISCHARGE INSTRUCTIONS
Plastic Surgery Discharge Instructions  Juan Antonio Murcia, DO    Wound Care  1. Shower daily beginning 2 days after surgery  2. Okay to let warm soapy water run over the wound at that time  3. Do not submerge wounds in the bath  4. Leave any skin glue or mesh tape in place    Drain Care  If you have drains, strip tubing and record output when drain bulb becomes half full, or at least twice daily  Record output for each drain and bring to our follow up appointment    Diet  Regular Diet    Activity  Light activity only - no lifting greater than 10 lbs  Avoid strenuous activity that would cause you to get hot or sweaty    Medications  You have been given a prescription for narcotic pain medication, anti-inflammatory pain, and nerve pain  Unless otherwise contraindicated by your doctor, take 2 extra strength Tylenol and 600mg of ibuprofen every 8 hours  Please take medications as prescribed     What to call for:  1. Sustained fever > 101.0  2. Changes in color, sensation, temperature or pain at surgical site  3. Redness and/or drainage from the surgical site  4. Any reaction to prescribed medications  5. Continuous bloody drainage from surgical drains  6. Wound vac malfunction  7. Questions related to the procedure    Follow-up  1. Please call [phone] to schedule or confirm your follow up visit at the [facility], [address]  2. Call with any questions or concerns.  2. After hours call [phone] - ask to speak with the Plastic Surgery fellow on call         Your Surgeon Gave You EXPAREL® (bupivacaine liposome injectable suspension)    To help control your pain after surgery, your surgeon injected EXPAREL into your surgical incision just before the end of the procedure.   EXPAREL is a local analgesic that contains the local anesthetic bupivacaine. Local anesthetics provide pain relief by numbing the tissue  around the surgical site.   EXPAREL is specifically designed to release pain medication over  time and can control pain for up to 72 hours.   In addition to EXPAREL, your surgeon may provide other pain medications to control your pain.   Each patient is different and responds differently to painmedication. Depending on how you respond to EXPAREL, you may require less  additional pain medication during your recovery.    Possible Side Effects    Side effects can occur with any medication and it is important not to ignore anything you may be experiencing. Some patients who  received EXPAREL experienced nausea, vomiting, or constipation. Rarely, patients who receive bupivacaine (the active ingredient in  EXPAREL) have experienced numbness and tingling in their mouth or lips, lightheadedness, or anxiety. Speak with your doctor right  away if you think you may be experiencing any of these sensations, or if you have other questions regarding possible side effects.    Your Recovery    When your pain is under control, your body can better focus on healing. This is not the time to test your pain tolerance, or grin and  bear it.Work with your surgeon and nurse to make your recovery as speedy and pain-free as possible.   Follow the post-op orders your nurse gave you.   Eat a healthy diet and drink plenty of water. Surgery stresses your body; your body responds by needing more energy to heal.      Important Information About EXPAREL  Products that contain bupivacaine, like EXPAREL, may cause a temporary loss of  sensation or the ability to move in the area where bupivacaine was injected.    Date Administered: 7/25/24  Time Administered: 1204    Other Forms of Bupivicaine should not be administered within 96hrs following administration of EXPAREL.

## 2024-09-25 NOTE — TRANSFER OF CARE
"Anesthesia Transfer of Care Note    Patient: Divya De Paz    Procedure(s) Performed: Procedure(s) (LRB):  LUMPECTOMY, BREAST WITH RADIOLOGICAL MARKER (Left)  BIOPSY, LYMPH NODE, SENTINEL (Left)  INJECTION, FOR SENTINEL NODE IDENTIFICATION (Left)  MAMMOPLASTY, REDUCTION, BILATERAL/ONCOLOGIC REDUCTION BILATERAL (Bilateral)    Patient location: PACU    Anesthesia Type: general    Transport from OR: Transported from OR on 6-10 L/min O2 by face mask with adequate spontaneous ventilation    Post pain: adequate analgesia    Post assessment: no apparent anesthetic complications and tolerated procedure well    Post vital signs: stable    Level of consciousness: awake    Nausea/Vomiting: no nausea/vomiting    Complications: none    Transfer of care protocol was followed      Last vitals: Visit Vitals  BP (!) 181/79 (BP Location: Right arm, Patient Position: Lying)   Pulse 93   Temp 36.7 °C (98 °F) (Oral)   Resp 20   Ht 5' 6" (1.676 m)   Wt 129.7 kg (286 lb)   SpO2 100%   Breastfeeding No   BMI 46.16 kg/m²     "

## 2024-09-25 NOTE — OP NOTE
DATE OF PROCEDURE: 9/25/2024    SURGEON: Surgeons and Role:  Panel 1:     * Candie Rowell MD - Primary  Resident: Blas Espinosa    Panel 2:     * Ridge Murcia DO - Primary    PREOPERATIVE DIAGNOSIS: Invasive breast carcinoma of the left breast upper outer quadrant in female, estrogen positive     POSTOPERATIVE DIAGNOSIS: same    ANESTHESIA: local and general    PROCEDURES PERFORMED:   1. left breast ultrasound with reflector localization partial mastectomy (lumpectomy) with excision for clear margins   2. left axillary deep sentinel lymph node biopsy   3. injection of left breast with technetium-labeled radiocolloid for sentinel lymph node identification  4. Identification of sentinel lymph node       PROCEDURE IN DETAIL:   The patient underwent informed consent.  The films were reviewed.    She was then brought to the Operating Room and placed in the supine position. general anesthesia was administered.    The left breast was injected in the subareolar region with the technetium-labeled radiocolloid.   The left breast, anterior chest, arm and axilla were then prepped and draped in a sterile fashion.     Using the gamma probe, activity was noted and localized in the left axilla. We made an incision at the keyhole around the nipple and created a thick skin flap leading to the axilla.  This led to the area of activity. We then dissected down through the clavipectoral fascia using electrocautery, identifying a hot afferent lymphatic channel coming from the upper outer quadrant axillary tail of [place] breast tissue to a level 1 sentinel node, which was excised. It was dissected circumferentially with blunt dissection and the afferent and efferent lymphatics were tied together. The lymph node was labeled as specimen #1 for permanent sectioning, hot and not blue with an ex vivo count of 199. A total of 1 axillary sentinel lymph nodes were removed.  The probe was placed in the cavity and there was no significant  residual background radioactivity or blue dye noted in the axilla indicating adequate and appropriate sentinel lymph node biopsy. The cavity was then palpated and 0 further palpable nodes were noted. Any additional palpable nodes were sent to pathology for permanent section.       We then achieved hemostasis and irrigation was performed.     Next, we turned our attention to the left breast itself. Ultrasound and reflector guidance was used to identify the breast mass at 1 o'clock.  The area if interest was identified with a clip within and then was marked for localization using ultrasound guidance.  An incision was made in the upper outer quadrant of the left breast over the anticipated tract of the lesion in a kevyn-tumoral location with skin completely overlying.  The specimen was dissected circumferentially around the cancer.  We did not dissect all the way down to the underlying pectoralis fascia.  The lumpectomy specimen was inked on the back table using green ink inferiorly, blue ink superiorly, orange ink laterally, yellow ink anteriorly, black ink posteriorly, and the red ink medially.  It was then fixed with acetic acid and submitted for specimen radiograph with the [facility], which confirmed the clip and area of interest within the specimen, and was interpreted in the operating room. Given the appearance and location of the lesion, additional margins were taken including inferior just over the nipple.       Within the lumpectomy cavity, hemostasis was achieved with cautery. The wound was irrigated until clear. There was no evidence of bleeding. It was closed by the plastic surgery service.    Dermabond was applied. Sterile fluff gauze was placed and a post-procedure bra was placed. She tolerated the procedure well without complication and was turned over to Anesthesia for transport to the recovery area in a satisfactory condition. All specimens were sent to Pathology for permanent sectioning.    ESTIMATED  BLOOD LOSS: 5ml    COMPLICATIONS: none    DISPOSITION:  PACU--hemodynamically stable    ATTESTATION:  I was present and scrubbed for the entire procedure.    [place] Node Biopsy for Breast Cancer - Left  Operation performed with curative intent Yes   Tracer(s) used to identify sentinel nodes in the upfront surgery (non-neoadjuvant) setting Radioactive tracer   Tracer(s) used to identify sentinel nodes in the neoadjuvant setting N/A   All nodes (colored or non-colored) present at the end of a dye-filled lymphatic channel were removed N/A   All significantly radioactive nodes were removed Yes   All palpably suspicious nodes were removed N/A   Biopsy-proven positive nodes marked with clips prior to chemotherapy were identified and removed N/A

## 2024-09-25 NOTE — ANESTHESIA POSTPROCEDURE EVALUATION
Anesthesia Post Evaluation    Patient: Divya De Paz    Procedure(s) Performed: Procedure(s) (LRB):  LUMPECTOMY, BREAST WITH RADIOLOGICAL MARKER (Left)  BIOPSY, LYMPH NODE, SENTINEL (Left)  INJECTION, FOR SENTINEL NODE IDENTIFICATION (Left)  MAMMOPLASTY, REDUCTION, BILATERAL/ONCOLOGIC REDUCTION BILATERAL (Bilateral)    Final Anesthesia Type: general      Patient location during evaluation: PACU  Patient participation: Yes- Able to Participate  Level of consciousness: awake and alert  Post-procedure vital signs: reviewed and stable  Pain management: adequate  Airway patency: patent    PONV status at discharge: No PONV  Anesthetic complications: no      Cardiovascular status: blood pressure returned to baseline  Respiratory status: unassisted and spontaneous ventilation  Hydration status: euvolemic  Follow-up not needed.              Vitals Value Taken Time   /74 09/25/24 1247   Temp 36.1 °C (97 °F) 09/25/24 1242   Pulse 99 09/25/24 1251   Resp 17 09/25/24 1246   SpO2 98 % 09/25/24 1251   Vitals shown include unfiled device data.      No case tracking events are documented in the log.      Pain/Penelope Score: Pain Rating Prior to Med Admin: 0 (9/25/2024  8:12 AM)

## 2024-09-25 NOTE — ANESTHESIA POSTPROCEDURE EVALUATION
Anesthesia Post Evaluation    Patient: Divya De Paz    Procedure(s) Performed: Procedure(s) (LRB):  LUMPECTOMY, BREAST WITH RADIOLOGICAL MARKER (Left)  BIOPSY, LYMPH NODE, SENTINEL (Left)  INJECTION, FOR SENTINEL NODE IDENTIFICATION (Left)  MAMMOPLASTY, REDUCTION, BILATERAL/ONCOLOGIC REDUCTION BILATERAL (Bilateral)    Final Anesthesia Type: general      Patient location during evaluation: PACU  Patient participation: Yes- Able to Participate  Level of consciousness: awake and alert  Post-procedure vital signs: reviewed and stable  Pain management: adequate  Airway patency: patent    PONV status at discharge: No PONV  Anesthetic complications: no      Cardiovascular status: blood pressure returned to baseline  Respiratory status: unassisted  Hydration status: euvolemic  Follow-up not needed.              Vitals Value Taken Time   /55 09/25/24 1346   Temp 36.1 °C (97 °F) 09/25/24 1242   Pulse 87 09/25/24 1355   Resp 16 09/25/24 1345   SpO2 97 % 09/25/24 1355   Vitals shown include unfiled device data.      No case tracking events are documented in the log.      Pain/Penelope Score: Pain Rating Prior to Med Admin: 4 (9/25/2024  1:40 PM)  Penelope Score: 8 (9/25/2024  1:15 PM)

## 2024-09-25 NOTE — BRIEF OP NOTE
[facility] Surgery ([place])  Brief Operative Note    Surgery Date: 9/25/2024     Surgeons and Role:  Panel 1:     * Candie Rowell MD - Primary  Panel 2:     * Ridge Murcia DO - Primary    Assisting Surgeon: Blas Espinosa MD    Pre-op Diagnosis:  Infiltrating ductal carcinoma of left breast [C50.912]  INVASINVE DUCTAL CARCINOMA LEFT BREAST    Post-op Diagnosis:  Post-Op Diagnosis Codes:     * Infiltrating ductal carcinoma of left breast [C50.912]    Procedure(s) (LRB):  LUMPECTOMY, BREAST WITH RADIOLOGICAL MARKER (Left)  BIOPSY, LYMPH NODE, SENTINEL (Left)  INJECTION, FOR SENTINEL NODE IDENTIFICATION (Left)  WITH SENTINEL NODE BIOPSY AND AXILLARY LYMPHADENECTOMY / POSSIBLE AXILLARY (Left)  MAMMOPLASTY, REDUCTION, BILATERAL/ONCOLOGIC REDUCTION BILATERAL (Bilateral)    Anesthesia: General    Operative Findings: Radiotracer was injected. Left lumpectomy resected using ultrasound guidance. Specimen inked and imaged with Lot18art to identify clip and margins. [place] lymph node identified with neoprobe, dissected through lumpectomy incision, resected. LN reading of 299. Additional inferior margin removed and marked     Estimated Blood Loss: minimal          Specimens:   Left Lumpectomy - blue superior, green inferior, red medial, orange lateral, yellow anterior, black posterior  [place] lymph node hot 299  New inferior margin - ink marks the new margin  Specimen (24h ago, onward)      None

## 2024-09-25 NOTE — OP NOTE
[facility] Surgery ([place])  Plastic Surgery  Operative Note    SUMMARY     Date of Procedure: 9/25/2024     Location:  [facility]    Procedure(s): Procedure(s) (LRB):  LUMPECTOMY, BREAST WITH RADIOLOGICAL MARKER (Left)  BIOPSY, LYMPH NODE, SENTINEL (Left)  INJECTION, FOR SENTINEL NODE IDENTIFICATION (Left)  MAMMOPLASTY, REDUCTION, BILATERAL/ONCOLOGIC REDUCTION BILATERAL (Bilateral)     Left oncoplastic mastopexy   Right breast reduction for symmetry    Surgeons and Role:  Panel 1:     * Candie Rowell MD - Primary  Panel 2:     * Ridge Murcia DO - Primary    Assistant: JOSE Hunter and Mina Queen MD, Fellow    Pre-Operative Diagnosis: Infiltrating ductal carcinoma of left breast [C50.912]  INVASINVE DUCTAL CARCINOMA LEFT BREAST    Post-Operative Diagnosis: same    Anesthesia: General    Indications for Procedure:  34-year-old woman with invasive ductal carcinoma of the left breast.  It was near the skin just above the top of the areola.  This is a central defect we had been deforming to her breasts.  She also had breast ptosis.  She was a good candidate for oncoplastic breast reduction    Operative Findings (including complications, if any):   Left lumpectomy specimen 98 g  Left total weight removed 126 g   Right total weight removed 66 g    Description of Procedures:  Patient was seen in the preoperative holding area.  Consents were signed the previous day 2 preoperative visit.  All questions were answered.  She had a palpable mass just at the top of the areola on the right side.  She was marked for a bilateral circum vertical mastopexy.  It was discussed with Dr. Rowell preop the need to remove some skin overlying this mass just above the areola    Patient was taken the operating room general anesthesia was induced.  Both breasts were prepped and draped in the sterile fashion.  I began by using a 42 mm cookie cutter to incise the left areola.  It was then cut using a 15 blade.   It did not I then turned the procedure over to Dr. Rowell.  See her operative note for that portion of the procedure     Entered the room after the left lumpectomy and sentinel lymph node biopsy was complete.  It was a defect above the left nipple and areola and a small tunnel to the axilla on the left side temporarily stapled up the lower skin for tension.  I de-epithelialized centrally around the areola of the wedge of lower skin has been marked was removed full-thickness.  Next using several 2-0 Vicryl sutures it was a lateral flap of breast tissue which it was the lateral side of the cavity.  That was anchored centrally in the breast to the medial wall of the lumpectomy cavity using several interrupted 2-0 Vicryl sutures.  Next the vertical pillar was closed using 2-0 Vicryl parenchymal sutures.  The skin was temporarily closed with staples in the vertical column.  Eight interrupted 3-0 Monocryl sutures were used to inset the dermis of the areola.    Next we turned our attention to the right breast.  A 42 mm cookie cutter it was also used and the de-epithelialized centrally just in the area of the areola.  The skin above the areola within the Restoration pattern and all of the skin of the lower pole of the breasts was removed full-thickness using the Bovie.  We released the dermis circumferentially around the skin edges to help with excursion nipple and areola.  Vertical parenchymal sutures were then placed over the lower pole and the lower pole skin and the areola inset were all temporarily stapled.      The patient was then sat up.  We compared the right and left breasts.  It was additional volume of the right breast compared to the left, mainly superiorly and medially.  The patient needed a horizontal excision as well across the base of the breasts.  I measured the distance from the bottom of the areola to the new fold.  It was 7 cm distance was felt to be appropriate.  I tailor tacked horizontal wedge of skin  across both breasts at the new elevated IMF position.    Patient was then laid back flat.  The tailor tacked portions were marked.  On the left side skin only was removed along the IMF.  On the right side some skin and some underlying breast tissue was removed.  Also remove the skin staples on the right areola.  I removed a wedge of tissue from roughly the 9 to 12 o'clock position corresponding with the area of fullness she had.    Bilateral intercostal rib blocks were performed using a mixture of Marcaine and Exparel   No drains were used   Three 3-0 Vicryl sutures were used at the T point on each breast.  Also we inset the dermis around the right areola using buried 3-0 Monocryl suture.  On both sides the vertical and horizontal limbs were closed with buried 3-0 Monocryl and running 3-0 Stratafix suture.  On both areolas were then inset.  Half buried 4-0 Prolene horizontal mattress sutures were used.  This was followed by a running 4-0 Stratafix.      The breasts were cleaned.  Half-inch Steri-Strips were placed around the areola and Prineo tape was placed on the horizontal and vertical limbs.  The patient was padded and a postoperative bra.  She tolerated the procedure well taken to recovery in stable condition    Significant Surgical Tasks Conducted by the Assistant(s), if Applicable: The indicated resident served as first assistant for this procedure.    Estimated Blood Loss (EBL): 50mL           Implants: * No implants in log *    Specimens:   Specimen (24h ago, onward)       Start     Ordered    09/25/24 1219  Specimen to Pathology, Surgery Breast  Once        Comments: Pre-op Diagnosis: Infiltrating ductal carcinoma of left breast [C50.912]INVASINVE DUCTAL CARCINOMA LEFT BREASTProcedure(s):LUMPECTOMY, BREAST WITH RADIOLOGICAL MARKERBIOPSY, LYMPH NODE, SENTINELINJECTION, FOR SENTINEL NODE IDENTIFICATIONWITH SENTINEL NODE BIOPSY AND AXILLARY LYMPHADENECTOMY / POSSIBLE AXILLARYMAMMOPLASTY, REDUCTION,  BILATERAL/ONCOLOGIC REDUCTION BILATERAL Number of specimens: 5Name of specimens: 1) Left lumpectomy (Green-Inferior, Blue-Superior, Orange-Lateral, Yellow-Anterior, Black-Posterior, Red-Medial)2) Left axillary sentinel lymph node #1 - HOT 1993) Left breast - new inferior margin, ink marks the new margin4) Left breast reduction5) Right breast reduction     References:    Click here for ordering Quick Tip   Question Answer Comment   Procedure Type: Breast    Specimen Class: Known or suspected malignancy    Release to patient Immediate        09/25/24 1219                            Condition: Good    Disposition: PACU - hemodynamically stable., DC Home    Attestation: I was present and scrubbed for the entire procedure.

## 2024-09-26 ENCOUNTER — TELEPHONE (OUTPATIENT)
Dept: PSYCHIATRY | Facility: CLINIC | Age: 34
End: 2024-09-26
Payer: COMMERCIAL

## 2024-09-26 NOTE — BRIEF OP NOTE
[facility] - Surgery ([place])  Brief Operative Note     SUMMARY     Surgery Date: 9/25/2024     Surgeons and Role:  Panel 1:     * Candie Rowell MD - Primary  Panel 2:     * Ridge Murcia DO - Primary    Assistant: JOSE Hunter and Mina Queen MD, Fellow    Pre-op Diagnosis:  Infiltrating ductal carcinoma of left breast [C50.912]  INVASINVE DUCTAL CARCINOMA LEFT BREAST    Post-op Diagnosis:  Post-Op Diagnosis Codes:     * Infiltrating ductal carcinoma of left breast [C50.912]    Procedure(s) (LRB):  LUMPECTOMY, BREAST WITH RADIOLOGICAL MARKER (Left)  BIOPSY, LYMPH NODE, SENTINEL (Left)  INJECTION, FOR SENTINEL NODE IDENTIFICATION (Left)  MAMMOPLASTY, REDUCTION, BILATERAL/ONCOLOGIC REDUCTION BILATERAL (Bilateral)    Anesthesia: General    Description of the findings of the procedure: oncoplastic breast reduction    Findings/Key Components: See operative report    Estimated Blood Loss: * No values recorded between 9/25/2024 10:01 AM and 9/25/2024 12:42 PM *         Specimens:   Specimen (24h ago, onward)       Start     Ordered    09/25/24 1219  Specimen to Pathology, Surgery Breast  Once        Comments: Pre-op Diagnosis: Infiltrating ductal carcinoma of left breast [C50.912]INVASINVE DUCTAL CARCINOMA LEFT BREASTProcedure(s):LUMPECTOMY, BREAST WITH RADIOLOGICAL MARKERBIOPSY, LYMPH NODE, SENTINELINJECTION, FOR SENTINEL NODE IDENTIFICATIONWITH SENTINEL NODE BIOPSY AND AXILLARY LYMPHADENECTOMY / POSSIBLE AXILLARYMAMMOPLASTY, REDUCTION, BILATERAL/ONCOLOGIC REDUCTION BILATERAL Number of specimens: 5Name of specimens: 1) Left lumpectomy (Green-Inferior, Blue-Superior, Orange-Lateral, Yellow-Anterior, Black-Posterior, Red-Medial)2) Left axillary sentinel lymph node #1 - HOT 1993) Left breast - new inferior margin, ink marks the new margin4) Left breast reduction5) Right breast reduction     References:    Click here for ordering Quick Tip   Question Answer Comment   Procedure Type: Breast     Specimen Class: Known or suspected malignancy    Release to patient Immediate        09/25/24 1219                    Implants: * No implants in log *    Complications: None    Discharge Note    SUMMARY     Admit Date: 9/25/2024    Discharge Date and Time: 9/25/2024  3:47 PM    Hospital Course: Patient was placed in observation status for the above procedure.  See above.  Postoperatively was discharged home from the PACU once criteria was met.    Final Diagnosis: Post-Op Diagnosis Codes:     * Infiltrating ductal carcinoma of left breast [C50.912]    Disposition: Home or Self Care    Follow Up/Patient Instructions:     Medications:  Reconciled Home Medications:      Medication List        START taking these medications      acetaminophen 500 MG tablet  Commonly known as: TYLENOL  Take 2 tablets (1,000 mg total) by mouth every 8 (eight) hours. for 7 days     gabapentin 300 MG capsule  Commonly known as: NEURONTIN  Take 1 capsule (300 mg total) by mouth 3 (three) times daily. for 7 days     ibuprofen 600 MG tablet  Commonly known as: ADVIL,MOTRIN  Take 1 tablet (600 mg total) by mouth every 6 (six) hours. for 7 days     oxyCODONE 5 MG immediate release tablet  Commonly known as: ROXICODONE  Take 1 tablet (5 mg total) by mouth every 4 (four) hours as needed for Pain.            CONTINUE taking these medications      amitriptyline 25 MG tablet  Commonly known as: ELAVIL  Take 1 tablet (25 mg total) by mouth every evening.     metFORMIN 500 MG tablet  Commonly known as: GLUCOPHAGE  Take 1 tablet (500 mg total) by mouth 2 (two) times daily with meals.     montelukast 10 mg tablet  Commonly known as: SINGULAIR  Take 1 tablet (10 mg total) by mouth every evening.     spironolactone 50 MG tablet  Commonly known as: ALDACTONE  Take 1 tablet (50 mg total) by mouth once daily.            Discharge Procedure Orders   Diet general     Teach JEFERSON drain care and provide sheet to record output     Wear Surgical Bra and/or Girdle at  all times (may remove for short times to shower)   Order Comments: Wear Surgical Bra and/or Girdle at all times (may remove for short times to shower)     Discharge instructions (Specify)

## 2024-09-29 ENCOUNTER — PATIENT MESSAGE (OUTPATIENT)
Dept: PLASTIC SURGERY | Facility: CLINIC | Age: 34
End: 2024-09-29
Payer: COMMERCIAL

## 2024-10-02 LAB
FINAL PATHOLOGIC DIAGNOSIS: NORMAL
GROSS: NORMAL
Lab: NORMAL

## 2024-10-02 NOTE — DISCHARGE SUMMARY
[facility] - Surgery ([place])  Brief Operative Note     SUMMARY      Surgery Date: 9/25/2024      Surgeons and Role:  Panel 1:     * Candie Rowell MD - Primary  Panel 2:     * Ridge Murcia DO - Primary     Assistant: JOSE Hunter and Mina Queen MD, Fellow     Pre-op Diagnosis:  Infiltrating ductal carcinoma of left breast [C50.912]  INVASINVE DUCTAL CARCINOMA LEFT BREAST     Post-op Diagnosis:  Post-Op Diagnosis Codes:     * Infiltrating ductal carcinoma of left breast [C50.912]     Procedure(s) (LRB):  LUMPECTOMY, BREAST WITH RADIOLOGICAL MARKER (Left)  BIOPSY, LYMPH NODE, SENTINEL (Left)  INJECTION, FOR SENTINEL NODE IDENTIFICATION (Left)  MAMMOPLASTY, REDUCTION, BILATERAL/ONCOLOGIC REDUCTION BILATERAL (Bilateral)     Anesthesia: General     Description of the findings of the procedure: oncoplastic breast reduction     Findings/Key Components: See operative report     Estimated Blood Loss: * No values recorded between 9/25/2024 10:01 AM and 9/25/2024 12:42 PM *         Specimens:   Specimen (24h ago, onward)          Start     Ordered     09/25/24 1219   Specimen to Pathology, Surgery Breast  Once        Comments: Pre-op Diagnosis: Infiltrating ductal carcinoma of left breast [C50.912]INVASINVE DUCTAL CARCINOMA LEFT BREASTProcedure(s):LUMPECTOMY, BREAST WITH RADIOLOGICAL MARKERBIOPSY, LYMPH NODE, SENTINELINJECTION, FOR SENTINEL NODE IDENTIFICATIONWITH SENTINEL NODE BIOPSY AND AXILLARY LYMPHADENECTOMY / POSSIBLE AXILLARYMAMMOPLASTY, REDUCTION, BILATERAL/ONCOLOGIC REDUCTION BILATERAL Number of specimens: 5Name of specimens: 1) Left lumpectomy (Green-Inferior, Blue-Superior, Orange-Lateral, Yellow-Anterior, Black-Posterior, Red-Medial)2) Left axillary sentinel lymph node #1 - HOT 1993) Left breast - new inferior margin, ink marks the new margin4) Left breast reduction5) Right breast reduction      References:    Click here for ordering Quick Tip   Question Answer Comment   Procedure Type:  Breast     Specimen Class: Known or suspected malignancy     Release to patient Immediate         09/25/24 1219                          Implants: * No implants in log *     Complications: None     Discharge Note     SUMMARY      Admit Date: 9/25/2024     Discharge Date and Time: 9/25/2024  3:47 PM     Hospital Course: Patient was placed in observation status for the above procedure.  See above.  Postoperatively was discharged home from the PACU once criteria was met.     Final Diagnosis: Post-Op Diagnosis Codes:     * Infiltrating ductal carcinoma of left breast [C50.912]     Disposition: Home or Self Care     Follow Up/Patient Instructions:      Medications:  Reconciled Home Medications:       Medication List          START taking these medications       acetaminophen 500 MG tablet  Commonly known as: TYLENOL  Take 2 tablets (1,000 mg total) by mouth every 8 (eight) hours. for 7 days      gabapentin 300 MG capsule  Commonly known as: NEURONTIN  Take 1 capsule (300 mg total) by mouth 3 (three) times daily. for 7 days      ibuprofen 600 MG tablet  Commonly known as: ADVIL,MOTRIN  Take 1 tablet (600 mg total) by mouth every 6 (six) hours. for 7 days      oxyCODONE 5 MG immediate release tablet  Commonly known as: ROXICODONE  Take 1 tablet (5 mg total) by mouth every 4 (four) hours as needed for Pain.                CONTINUE taking these medications       amitriptyline 25 MG tablet  Commonly known as: ELAVIL  Take 1 tablet (25 mg total) by mouth every evening.      metFORMIN 500 MG tablet  Commonly known as: GLUCOPHAGE  Take 1 tablet (500 mg total) by mouth 2 (two) times daily with meals.      montelukast 10 mg tablet  Commonly known as: SINGULAIR  Take 1 tablet (10 mg total) by mouth every evening.      spironolactone 50 MG tablet  Commonly known as: ALDACTONE  Take 1 tablet (50 mg total) by mouth once daily.                Discharge Procedure Orders   Diet general      Teach JEFERSON drain care and provide sheet to record  output          Wear Surgical Bra and/or Girdle at all times (may remove for short times to shower)   Order Comments: Wear Surgical Bra and/or Girdle at all times (may remove for short times to shower)      Discharge instructions (Specify)

## 2024-10-03 ENCOUNTER — OFFICE VISIT (OUTPATIENT)
Dept: PSYCHIATRY | Facility: CLINIC | Age: 34
End: 2024-10-03
Payer: COMMERCIAL

## 2024-10-03 ENCOUNTER — TELEPHONE (OUTPATIENT)
Dept: SURGERY | Facility: CLINIC | Age: 34
End: 2024-10-03
Payer: COMMERCIAL

## 2024-10-03 ENCOUNTER — OFFICE VISIT (OUTPATIENT)
Dept: PLASTIC SURGERY | Facility: CLINIC | Age: 34
End: 2024-10-03
Payer: COMMERCIAL

## 2024-10-03 VITALS
SYSTOLIC BLOOD PRESSURE: 135 MMHG | OXYGEN SATURATION: 93 % | RESPIRATION RATE: 19 BRPM | DIASTOLIC BLOOD PRESSURE: 71 MMHG

## 2024-10-03 DIAGNOSIS — C50.912 INFILTRATING DUCTAL CARCINOMA OF LEFT BREAST: ICD-10-CM

## 2024-10-03 DIAGNOSIS — F41.1 GENERALIZED ANXIETY DISORDER: Primary | ICD-10-CM

## 2024-10-03 DIAGNOSIS — Z09 SURGERY FOLLOW-UP: Primary | ICD-10-CM

## 2024-10-03 PROCEDURE — 3044F HG A1C LEVEL LT 7.0%: CPT | Mod: CPTII,S$GLB,, | Performed by: SURGERY

## 2024-10-03 PROCEDURE — 3078F DIAST BP <80 MM HG: CPT | Mod: CPTII,S$GLB,, | Performed by: SURGERY

## 2024-10-03 PROCEDURE — 99024 POSTOP FOLLOW-UP VISIT: CPT | Mod: S$GLB,,, | Performed by: SURGERY

## 2024-10-03 PROCEDURE — 3075F SYST BP GE 130 - 139MM HG: CPT | Mod: CPTII,S$GLB,, | Performed by: SURGERY

## 2024-10-03 PROCEDURE — 1159F MED LIST DOCD IN RCRD: CPT | Mod: CPTII,S$GLB,, | Performed by: SURGERY

## 2024-10-03 PROCEDURE — 99999 PR PBB SHADOW E&M-EST. PATIENT-LVL III: CPT | Mod: PBBFAC,,, | Performed by: SURGERY

## 2024-10-03 NOTE — TELEPHONE ENCOUNTER
Called patient with bx results. All questions answered. Scheduled for follow up with Dr. Murcia today. She is scheduled for f/u with Dr. Rowell and Dr. Chiu next week

## 2024-10-03 NOTE — PROGRESS NOTES
INFORMED CONSENT: Patient was identified using two patient-identifiers. The patient has been informed of the risks and benefits associated with engaging in psychotherapy, the handling of protected health information, the rights of privacy and the limits of confidentiality. The patient has also been informed of the importance of reporting any suicidal or homicidal ideation to this or any provider to ensure safety of all parties, and the Divya De Paz expressed understanding. The patient was agreeable to these terms and freely participates in individual psychotherapy.    Telemedicine PSYCHO-ONCOLOGY NOTE/ Individual Psychotherapy     Consultation started: 10/3/2024 at 8:06 AM   The chief complaint leading to consultation is: anxiety  The patient location is: [facility]  Patient home  Virtual visit with synchronous audio and video    Patient alone at the time of consultation      Crisis Disclaimer: Patient was informed that due to the virtual nature of the visit, that if a crisis develops, protocols will be implemented to ensure patient safety, including but not limited to: 1) Initiating a welfare check with local Law Enforcement, 2) Calling 911/National Crisis Hotline, and/or 3) Initiating PEC/CEC procedures.      Each patient provided medical services by telemedicine is:  (1) informed of the relationship between the physician and patient and the respective role of any other health care provider with respect to management of the patient; and (2) notified that he or she may decline to receive medical services by telemedicine and may withdraw from such care at any time.    Date: 10/3/2024   Site of therapist:  [facility]         Therapeutic Intervention: Met with patient.  Outpatient - Supportive psychotherapy 30 min - CPT Code 26143    Referring provider:    Chief complaint/reason for encounter: anxiety   Met with patient to evaluate psychosocial adaptation to survivorship of BC    Patient was last seen  by me on 9/13/2024    Objective:      Divya De Paz arrived promptly for the session.  The patient was fully cooperative throughout the session.  Appearance: age appropriate, appropriately  dressed, adequately  groomed  Behavior/Cooperation: friendly and cooperative  Speech: normal in rate, volume, and tone and appropriate quality, quantity and organization of sentences  Mood: anxious  Affect: mood congruent  Thought Process: goal-directed, logical  Thought Content: normal,  No delusions or paranoia; did not appear to be responding to internal stimuli during the session  Orientation: grossly intact  Memory: Grossly intact  Attention Span/Concentration: Attends to session without distraction; reports no difficulty  Fund of Knowledge: average  Estimate of Intelligence: average from verbal skills and history  Cognition: grossly intact  Insight: patient has awareness of illness; good insight into own behavior and behavior of others  Judgment: the patient's behavior is adequate to circumstances      Interval history and content of current session: Patient completed surgery, adjusting well and setting boundaries with work.   Discussed diagnosis, treatment, prognosis, current adaptation to disease and treatment status, and family's adaptation to disease and treatment status. Reports to be coping in an adequate manner. Evaluated cognitive response, paying particular attention to negative intrusive thoughts of a persistent and detrimental nature. Thoughts of this type are in evidence with moderate distress. Identified and evaluated psychosocial and environmental stressors secondary to diagnosis and treatment.     Focused on providing cognitive behavioral therapy to address negative cognitions. Examined proactive behaviors that may be implemented to minimize or ameliorate psychosocial stressors secondary to diagnosis and treatment.     Risk parameters:   Patient reports no suicidal ideation  Patient reports no homicidal  ideation  Patient reports no self-injurious behavior  Patient reports no violent behavior   Safety needs:  None at this time      Verbal deficits: None     Patient's response to intervention:The patient's response to intervention is accepting.     Progress toward goals and other mental status changes:  The patient's progress toward goals is good.      Progress to date:Progress as Expected      Goals from last visit: Met      Patient reported outcomes:      Distress Thermometer:   Distress Score    Distress Score: 5        Practical Problems Physical Problems                                                   Family Problems                                         Emotional Problems                                                         Spiritual/Religions Concerns               Other Problems             PHQ ANSWERS    Q1. Little interest or pleasure in doing things: (Patient-Rptd) (P) Several days (09/27/24 2015)  Q2. Feeling down, depressed, or hopeless: (Patient-Rptd) (P) Not at all (09/27/24 2015)  Q3. Trouble falling or staying asleep, or sleeping too much: (Patient-Rptd) (P) Nearly every day (09/27/24 2015)  Q4. Feeling tired or having little energy: (Patient-Rptd) (P) Several days (09/27/24 2015)  Q5. Poor appetite or overeating: (Patient-Rptd) (P) Not at all (09/27/24 2015)  Q6. Feeling bad about yourself - or that you are a failure or have let yourself or your family down: (Patient-Rptd) (P) Several days (09/27/24 2015)  Q7. Trouble concentrating on things, such as reading the newspaper or watching television: (Patient-Rptd) (P) Several days (09/27/24 2015)  Q8. Moving or speaking so slowly that other people could have noticed. Or the opposite - being so fidgety or restless that you have been moving around a lot more than usual: (Patient-Rptd) (P) Not at all (09/27/24 2015)  Q9.  0    PHQ8 Score : (P) 7 (09/27/24 2015)  PHQ-9 Total Score: (P) 7 (09/27/24 2015)     ANISH-7 Answers        9/27/2024     8:14 PM    GAD7   1. Feeling nervous, anxious, or on edge? 2    2. Not being able to stop or control worrying? 2    3. Worrying too much about different things? 2    4. Trouble relaxing? 2    5. Being so restless that it is hard to sit still? 2    6. Becoming easily annoyed or irritable? 1    7. Feeling afraid as if something awful might happen? 2    8. If you checked off any problems, how difficult have these problems made it for you to do your work, take care of things at home, or get along with other people? 1    ANISH-7 Score 13       Patient-reported     ANISH-7 Score: (P) 13  Interpretation: (P) Moderate Anxiety       Client Strengths: verbal, intelligent, successful, good social support, good insight, commitment to wellness, strong licha, strong cultural traditions      ICD-10-CM ICD-9-CM   1. Generalized anxiety disorder  F41.1 300.02   2. Infiltrating ductal carcinoma of left breast  C50.912 174.9        Treatment Plan:individual psychotherapy and medication management by physician  Target symptoms: anxiety   Why chosen therapy is appropriate versus another modality: relevant to diagnosis, patient responds to this modality, evidence based practice  Outcome monitoring methods: self-report, observation, checklist/rating scale  Therapeutic intervention type: insight oriented psychotherapy, behavior modifying psychotherapy  Prognosis: Good      Behavioral goals:    Exercise:   Stress management:   Social engagement:   Nutrition:   Smoking Cessation:   Therapy:  Increase daily self-care and attention to health management  Pleasant events scheduling and increased social interaction  Monitor stressors in writing and bring to next visit    Return to clinic: as scheduled     Length of Service (minutes direct face-to-face contact): 30          Racheal Catherine, PhD  Clinical Psychologist  [place] License #[id number]  [place] License #[id number]

## 2024-10-04 NOTE — PROGRESS NOTES
Divya De Paz presents to [facility] for a follow up visit status post oncoplastic breast reduction on 9/25/24.  Pain is well controlled. She is bruised and swollen. States she likes the shape of her breasts now postoperatively than she did preop.      PHYSICAL EXAMINATION  Bilateral breast incision(s) healing well with no issues.   Breasts are swollen and bruised, as expected  Nipples are viable with normal sensation and good position.      ASSESSMENT/PLAN  Divya De Paz is s/p oncoplastic breast reduction  - Discussed breast shape is swollen with a low nipple position, which is intentional to allow settling over time. The final appearance of the breasts will be different in 3-6 months post operatively.   - Areolar sutures and steri strips removed. Advised to apply bacitracin to areolar incisions daily x 1 week.   - Follow up 2 weeks for prineo tape removal      All questions were answered. The patient was advised to contact the clinic with any questions or concerns prior to their next visit.       Lori Fierro PA-C  Plastic and Reconstructive Surgery

## 2024-10-10 ENCOUNTER — OFFICE VISIT (OUTPATIENT)
Dept: SURGERY | Facility: CLINIC | Age: 34
End: 2024-10-10
Payer: COMMERCIAL

## 2024-10-10 VITALS
SYSTOLIC BLOOD PRESSURE: 124 MMHG | DIASTOLIC BLOOD PRESSURE: 82 MMHG | BODY MASS INDEX: 45.96 KG/M2 | HEART RATE: 81 BPM | WEIGHT: 286 LBS | HEIGHT: 66 IN

## 2024-10-10 DIAGNOSIS — Z12.31 SCREENING MAMMOGRAM, ENCOUNTER FOR: ICD-10-CM

## 2024-10-10 DIAGNOSIS — C50.912 INVASIVE DUCTAL CARCINOMA OF BREAST, FEMALE, LEFT: Primary | ICD-10-CM

## 2024-10-10 PROCEDURE — 99024 POSTOP FOLLOW-UP VISIT: CPT | Mod: S$GLB,,, | Performed by: SURGERY

## 2024-10-10 PROCEDURE — 99999 PR PBB SHADOW E&M-EST. PATIENT-LVL III: CPT | Mod: PBBFAC,,, | Performed by: SURGERY

## 2024-10-10 PROCEDURE — 3079F DIAST BP 80-89 MM HG: CPT | Mod: CPTII,S$GLB,, | Performed by: SURGERY

## 2024-10-10 PROCEDURE — 3044F HG A1C LEVEL LT 7.0%: CPT | Mod: CPTII,S$GLB,, | Performed by: SURGERY

## 2024-10-10 PROCEDURE — 3074F SYST BP LT 130 MM HG: CPT | Mod: CPTII,S$GLB,, | Performed by: SURGERY

## 2024-10-10 NOTE — PROGRESS NOTES
[facility]       Post-Op        REFERRING PHYSICIAN:  No referring provider defined for this encounter.       Carlos Villalobos MD    MEDICAL ONCOLOGIST:    Dr. Chiu   RADIATION ONCOLOGIST:   To be established    DIAGNOSIS:    This is a 34 y.o. female with a 1.1cm IDC of the left breast stage IA pT1b snN0 Mx grade 3 ER+, SC+, HER2+.     TREATMENT SUMMARY:  The patient is status post left lumpectomy with sentinel node biopsy and oncoplastic reduction (Plastics- Dr. Murcia) on 9/25/2024.    - Final pathology showed IDC measuring 1.1cm with DCIS, high nuclear grade. Final surgical margins were greater than 1cm in all dimensions from both IDC and DCIS. 1 lymph node was taken and negative for metastatic disease.  Patient underwent genetic testing with [facility] and no clinically significant variants were detected.    INTERVAL HISTORY:   Divya De Paz comes in for a post-op check.  She denies fever, chills, chest pain or shortness of breath.  Her pain is well controlled with tylenol and Advil. She has some serosanguineous fluid leaking around the areola on the right side and erythema of the right breast. There is some serous drainage from the IMF incision on the right. Pain is worst on right breat than left. Has follow up appointment with Dr. Murcia in 1 week.    MEDICATIONS:  Current Outpatient Medications   Medication Sig Dispense Refill    amitriptyline (ELAVIL) 25 MG tablet Take 1 tablet (25 mg total) by mouth every evening. 90 tablet 1    gabapentin (NEURONTIN) 300 MG capsule Take 1 capsule (300 mg total) by mouth 3 (three) times daily. for 7 days 21 capsule 0    metFORMIN (GLUCOPHAGE) 500 MG tablet Take 1 tablet (500 mg total) by mouth 2 (two) times daily with meals. 180 tablet 3    montelukast (SINGULAIR) 10 mg tablet Take 1 tablet (10 mg total) by mouth every evening. 90 tablet 3    oxyCODONE (ROXICODONE) 5 MG immediate release tablet Take 1 tablet (5 mg total) by mouth every 4 (four) hours  as needed for Pain. 10 tablet 0    spironolactone (ALDACTONE) 50 MG tablet Take 1 tablet (50 mg total) by mouth once daily. 90 tablet 3     No current facility-administered medications for this visit.       ALLERGIES:   Review of patient's allergies indicates:   Allergen Reactions    Cefzil [cefprozil] Hives       PHYSICAL EXAMINATION:   General:  This is a well appearing female with appropriate speech, affect and gait.     Breast:  Left areolar incision c/d/I. Left IMF incision slight erythema c/d/I. Right breast with erythema all over. Right areolar incision with some SS drainage. Right IMF incision with serous drainage and erythema.    IMPRESSION:   The patient has had an uneventful postoperative course.    PLAN:   1. Bilateral mammogram in July 2025 and return to clinic after for a follow up office visit and breast exam  2. The patient is advised in continued exam of the breast chest wall and to report to this office sooner should she note any areas of abnormality or concern.   3.  She is set up with Dr. Chiu and there is a tentative plan for adjuvant Paclitaxel and Trastuzumab   4. Establish with Rad Onc for adjuvant radiation therapy

## 2024-10-11 ENCOUNTER — OFFICE VISIT (OUTPATIENT)
Dept: PRIMARY CARE CLINIC | Facility: CLINIC | Age: 34
End: 2024-10-11
Payer: COMMERCIAL

## 2024-10-11 ENCOUNTER — PATIENT MESSAGE (OUTPATIENT)
Dept: SURGERY | Facility: CLINIC | Age: 34
End: 2024-10-11
Payer: COMMERCIAL

## 2024-10-11 ENCOUNTER — OFFICE VISIT (OUTPATIENT)
Dept: HEMATOLOGY/ONCOLOGY | Facility: CLINIC | Age: 34
End: 2024-10-11
Payer: COMMERCIAL

## 2024-10-11 VITALS
HEART RATE: 79 BPM | HEIGHT: 66 IN | DIASTOLIC BLOOD PRESSURE: 87 MMHG | BODY MASS INDEX: 46.16 KG/M2 | OXYGEN SATURATION: 100 % | TEMPERATURE: 98 F | RESPIRATION RATE: 18 BRPM | SYSTOLIC BLOOD PRESSURE: 132 MMHG | WEIGHT: 287.25 LBS

## 2024-10-11 VITALS
BODY MASS INDEX: 46.13 KG/M2 | TEMPERATURE: 98 F | OXYGEN SATURATION: 97 % | HEART RATE: 85 BPM | RESPIRATION RATE: 18 BRPM | SYSTOLIC BLOOD PRESSURE: 132 MMHG | WEIGHT: 287.06 LBS | DIASTOLIC BLOOD PRESSURE: 87 MMHG | HEIGHT: 66 IN

## 2024-10-11 DIAGNOSIS — L73.2 HIDRADENITIS SUPPURATIVA: ICD-10-CM

## 2024-10-11 DIAGNOSIS — F41.9 ANXIETY AND DEPRESSION: Primary | ICD-10-CM

## 2024-10-11 DIAGNOSIS — C50.912 INVASIVE DUCTAL CARCINOMA OF BREAST, FEMALE, LEFT: Primary | ICD-10-CM

## 2024-10-11 DIAGNOSIS — F32.A ANXIETY AND DEPRESSION: Primary | ICD-10-CM

## 2024-10-11 DIAGNOSIS — E88.819 INSULIN RESISTANCE: ICD-10-CM

## 2024-10-11 DIAGNOSIS — C50.912 INVASIVE DUCTAL CARCINOMA OF BREAST, FEMALE, LEFT: ICD-10-CM

## 2024-10-11 DIAGNOSIS — E28.2 PCOS (POLYCYSTIC OVARIAN SYNDROME): ICD-10-CM

## 2024-10-11 PROCEDURE — 99999 PR PBB SHADOW E&M-EST. PATIENT-LVL III: CPT | Mod: PBBFAC,,, | Performed by: INTERNAL MEDICINE

## 2024-10-11 NOTE — PROGRESS NOTES
[facility]/ [facility]   at [facility] Note:      Chief Complaint:   Encounter Diagnosis   Name Primary?    Invasive ductal carcinoma of breast, female, left Yes        Cancer Staging   Invasive ductal carcinoma of breast, female, left  Staging form: Breast, AJCC 8th Edition  - Clinical stage from 2024: Stage IA (cT1c, cN0, cM0, G3, ER+, IN+, HER2+) - Signed by Maureen Chiu MD on 2024      HPI:  Divya De Paz is a 34 y.o. female with PCOS and ANISH who presents today for follow up of newly diagnosed breast cancer.     Oncology History  Patient self palpated a lump during self-exam, got evaluated by Gyn a few days later.   Follow-up mammogram 2024 showed an irregular high density mass in the UOQ of left breast  Ultrasound 2024: superficial irregular not parallel hypoechoic mass with indistinct and angular margins measuring 1.0 x 0.8 x 1.0 cm (1 o'clock axis, 1 CFN).     Biopsy 2024: IDC, grade 3, ER 90-95%/ IN 10-20%/ HER2 3+; Ki67 80-90%     MRI 8/15/2024 revealed a 1.5 cm x 1.3 cm x 1.0 cm irregularly shaped mass with irregular margins and heterogeneous internal enhancement seen in the left breast at 1 o'clock in the anterior depth, 4.1 cm from the nipple and 0.3 cm from the skin. No internal mammary or axillary adenopathy identified.    L breast lumpectomy 24: IDC, grade 3, 1.1cm; 0/1 LN+      GYN History:  Age of menarche was 14. Age of menopause was n/a.  Last menstrual period was the week of 2024, pt reports inconsistent periods due to PCOS. Patient reports hormonal therapy use (Provera) to induce menstruation, stopped 2024 per OB/GYN reccs. Patient is . Age of first live birth was n/a.     Family History:  Paternal Grandmother Breast Cancer - multiple times;  of lung cancer (heavy smoker)  Paternal Aunt Breast Cancer - diagnosed at 45, BRCA negative  Paternal Aunt did not have cancer, also BRCA  "negative  Maternal grandmother - lung cancer, no smoking history  Maternal grandfather - renal cancer        Patient Active Problem List   Diagnosis    Hidradenitis suppurativa    PCOS (polycystic ovarian syndrome)    Insulin resistance    Secondary oligomenorrhea    Anxiety and depression    Invasive ductal carcinoma of breast, female, left    Negative Hereditary Cancer Genetic testing       Current Outpatient Medications   Medication Instructions    amitriptyline (ELAVIL) 25 mg, Oral, Nightly    metFORMIN (GLUCOPHAGE) 500 mg, Oral, 2 times daily with meals    montelukast (SINGULAIR) 10 mg, Oral, Nightly    spironolactone (ALDACTONE) 50 mg, Oral, Daily       Review of Systems:   Review of Systems   Constitutional: Negative.    HENT: Negative.     Eyes: Negative.    Respiratory: Negative.  Negative for shortness of breath.    Cardiovascular: Negative.  Negative for chest pain.   Gastrointestinal: Negative.  Negative for abdominal pain and diarrhea.   Genitourinary: Negative.  Negative for frequency.   Musculoskeletal: Negative.  Negative for back pain.   Skin: Negative.    Neurological: Negative.  Negative for headaches.   Endo/Heme/Allergies: Negative.    Psychiatric/Behavioral: Negative.         PHYSICAL EXAM:  /87   Pulse 79   Temp 98.1 °F (36.7 °C) (Oral)   Resp 18   Ht 5' 6" (1.676 m)   Wt 130.3 kg (287 lb 4.2 oz)   SpO2 100%   BMI 46.36 kg/m²     deferred          Pertinent Labs & Imaging:  Pathology Results  (Last 30 days)                 09/25/24 1219  Specimen to Pathology, Surgery Breast Final result    Narrative:  Pre-op Diagnosis: Infiltrating ductal carcinoma of left breast    [C50.912]   INVASINVE DUCTAL CARCINOMA LEFT BREAST   Procedure(s):   LUMPECTOMY, BREAST WITH RADIOLOGICAL MARKER   BIOPSY, LYMPH NODE, SENTINEL   INJECTION, FOR SENTINEL NODE IDENTIFICATION   WITH SENTINEL NODE BIOPSY AND AXILLARY LYMPHADENECTOMY /   POSSIBLE AXILLARY   MAMMOPLASTY, REDUCTION, BILATERAL/ONCOLOGIC " REDUCTION   BILATERAL   Number of specimens: 5   Name of specimens:   1) Left lumpectomy (Green-Inferior, Blue-Superior,   Orange-Lateral, Yellow-Anterior, Black-Posterior, Red-Medial)   2) Left axillary sentinel lymph node #1 -    3) Left breast - new inferior margin, ink marks the new   margin   4) Left breast reduction   5) Right breast reduction   Release to patient->Immediate   Specimen total (fresh, frozen, permanent):->5               No results found for this or any previous visit (from the past 24 hours).    Assessment & Plan:    1. Invasive ductal carcinoma of breast, female, left  - Ambulatory referral/consult to Interventional RAD; Future  - CBC W/ AUTO DIFFERENTIAL; Standing  - CMP; Standing  - B-HCG; Standing  - Echo; Future    Patient with newly diagnosed stage I TPBC  We discussed adjuvant Paclitaxel and Trastuzumab for T1N0 HER2-Positive Breast Cancer per study published in NEJ 2015 by Lisa et al.   I recommend weekly Paclitaxel at 80 mg/meter squared with weekly Trastuzumab. I have recommended a total of 12 weekly cycles of therapy. After completion of the first 12 weekly cycles, I have strongly recommended continuation of Trastuzumab alone for a total of one year of adjuvant Trastuzumab therapy. After completion of Taxol and Trastuzumab, adjuvant Trastuzumab can be given every three weeks.     First Infusion Loading Dose of Trastuzumab will be 4 mg/Kg over 90 minutes followed by the second Infusion of Trastuzumab at 2mg/kg over 30 minutes Qweek for the remainder of Taxol and Trastuzumab.    When switching to [id number] Trastuzumab, it will be given at 6mg/kg over 60 minutes Q 3 weeks and if tolerated, all subsequent Infusions of Trastuzumab but may be over 30 minutes until completion of one year of therapy.    Overall side effects of chemotherapy were discussed including alopecia, immunosuppression, Neutropenia (low white count), anemia (low hemoglobin), thrombocytopenia (lowered  platelets), and neuropathy from Taxol. Other side effects such as nail changes, dry eyes, mouth sores, and bone pain were discussed.  Rare but serious side effects such as increased risk of cardiac toxicity were discussed with Trastuzumab. Monitoring of her cardiac function with MUGAs or an echocardiogram is strongly recommended every 3 months by NCCN guidelines while on adjuvant Trastuzumab.    Recommend ovarian protection given young age. Discussed Zoladex for the 12 weeks on chemo and then discontinuing  Given small size of her cancer, would recommend adjuvant Tamoxifen with Trastuzumab in the adjuvant setting       Route Chart for Scheduling    Med Onc Chart Routing  Urgent    Follow up with physician . First day of chemo   Follow up with KERVIN . Chemoteaching   Infusion scheduling note New or changed treatment   weekly chemotherapy x 12   Injection scheduling note zoladex q4wk   Labs CBC, B HCG and CMP   Scheduling:  Preferred lab:  Lab interval:     Imaging ECHO      Pharmacy appointment    Other referrals            MDM includes  :    - Acute or chronic illness or injury that poses a threat to life or bodily function  - Review of prior external notes from unique source  - Independent review and explanation of 3+ results from unique tests  - Discussion of management and ordering 3+ unique tests  - Extensive discussion of treatment and management  - Prescription drug management  - Drug therapy requiring intensive monitoring for toxicity        Treatment Plan Information   OP BREAST TRASTUZUMAB PACLITAXEL WEEKLY Maureen Chiu MD   Associated diagnosis: Invasive ductal carcinoma of breast, female, left Stage IA cT1c, cN0, cM0, G3, ER+, MT+, HER2+ noted on 8/12/2024   Line of treatment: Adjuvant  Treatment Goal: Curative     Upcoming Treatment Dates - OP BREAST TRASTUZUMAB PACLITAXEL WEEKLY    10/11/2024       Pre-Medications       diphenhydrAMINE (BENADRYL) 50 mg in NS 50 mL IVPB       dexAMETHasone (DECADRON)  10 mg in sodium chloride 0.9% 50 mL IVPB       famotidine (PF) injection 20 mg       Chemotherapy       trastuzumab-anns (KANJINTI) 521 mg in 0.9% NaCl 250 mL chemo infusion       PACLitaxeL (TAXOL) 80 mg/m2 = 198 mg in 0.9% NaCl chemo infusion  10/18/2024       Pre-Medications       diphenhydrAMINE (BENADRYL) 50 mg in NS 50 mL IVPB       dexAMETHasone (DECADRON) 10 mg in sodium chloride 0.9% 50 mL IVPB       famotidine (PF) injection 20 mg       Chemotherapy       trastuzumab-anns (KANJINTI) 261 mg in 0.9% NaCl 250 mL chemo infusion       PACLitaxeL (TAXOL) 80 mg/m2 = 198 mg in 0.9% NaCl chemo infusion  10/25/2024       Pre-Medications       diphenhydrAMINE (BENADRYL) 50 mg in NS 50 mL IVPB       dexAMETHasone (DECADRON) 10 mg in sodium chloride 0.9% 50 mL IVPB       famotidine (PF) injection 20 mg       Chemotherapy       trastuzumab-anns (KANJINTI) 261 mg in 0.9% NaCl 250 mL chemo infusion       PACLitaxeL (TAXOL) 80 mg/m2 = 198 mg in 0.9% NaCl chemo infusion  11/1/2024       Pre-Medications       diphenhydrAMINE (BENADRYL) 50 mg in NS 50 mL IVPB       dexAMETHasone (DECADRON) 10 mg in sodium chloride 0.9% 50 mL IVPB       famotidine (PF) injection 20 mg       Chemotherapy       trastuzumab-anns (KANJINTI) 261 mg in 0.9% NaCl 250 mL chemo infusion       PACLitaxeL (TAXOL) 80 mg/m2 = 198 mg in 0.9% NaCl chemo infusion    Supportive Plan Information  OP BREAST GOSERELIN Q4W Maureen Chiu MD   Associated Diagnosis: Invasive ductal carcinoma of breast, female, left Stage IA cT1c, cN0, cM0, G3, ER+, MT+, HER2+ noted on 8/12/2024   Line of treatment: Supportive Care   Treatment goal: Supportive     Upcoming Treatment Dates - OP BREAST GOSERELIN Q4W    10/11/2024       Chemotherapy       goserelin (ZOLADEX) injection 3.6 mg  11/8/2024       Chemotherapy       goserelin (ZOLADEX) injection 3.6 mg  12/6/2024       Chemotherapy       goserelin (ZOLADEX) injection 3.6 mg  1/3/2025       Chemotherapy       goserelin  (ZOLADEX) injection 3.6 mg      Maureen Chiu MD  10/11/2024

## 2024-10-11 NOTE — PROGRESS NOTES
34-year-old woman with a recent history of infiltrating ductal carcinoma of her left breast status post lumpectomy and currently in the healing process, anxiety and depression, PCOS (hidradenitis suppurativa, insulin resistance, elevated BMI 46 disease.33 kilograms/meter squared, oligomenorrhea), here for follow-up.    History of present illness and pertinent review of systems:  New diagnosis of breast cancer:  The patient has infiltrating ductal carcinoma of the left breast.  She underwent left lumpectomy and also plastic surgery to alleviate the breast asymmetry caused by the surgery.  The patient is scheduled to meet with an oncologist for chemotherapy and later the radiation oncologist.  The patient has had a difficult time since July when this was found..  She initially brought this to the attention of her gynecologist when she discovered in the shower and had a mammogram.  She then had a workup which included MRI.  It was then referred to surgery and now to Oncology.  She has no swelling in her axilla, unexplained neck or back pain, symptoms of liver disease.  She has had no unexplained weight loss.  The patient is still feels uncertain about her decision for chemotherapy and radiation versus bilateral mastectomy due to her young age.  The patient was screened for genetic testing which was negative.  Her last Pap smear was proximally 1 year ago.  The patient had a paternal aunt and paternal grandmother both of whom had breast cancer, her aunt also got it in her mid 30s.  She has noted no flare-ups if her hydradenitis suppurativa    Anxiety and depression:  The patient is anxieties well-controlled with a small dose of amitriptyline.  She also has not noted time to be depressed however she is having issues with sleep.  Postanesthesia the patient was noted to be snoring but apnea was not noted.  The nurses suggested to the patient she should consider sleep studies.  She is not suicidal, and has had adequate  support from friends and family.    PCOS:  The patient has periods are still absent.  The gynecologist prior to the diagnosis had put her on birth control pills and Provera which have been stopped now that the patient was found to be hormone positive and HER2 positive.  The patient knows she needs to get to a healthier weight.  She would consider the [facility] ones if the oncologist agreed.  She notes her appetite is decreased since metformin.  The patient has lost approximally 5 or 6 lb.  She notes a decreased craving for fast foods.  She has improved her dietary habits.    Problem list, medication list, and allergies have been reviewed.  The patient gets hives from Cefzil a cephalosporin    Review of systems is otherwise negative    Physical Exam  Vitals and nursing note reviewed.   Constitutional:       General: She is not in acute distress.     Appearance: She is not ill-appearing, toxic-appearing or diaphoretic.   HENT:      Head: Atraumatic.      Right Ear: Tympanic membrane, ear canal and external ear normal.      Left Ear: Tympanic membrane, ear canal and external ear normal.      Nose: Nose normal. No rhinorrhea.      Mouth/Throat:      Mouth: Mucous membranes are moist.      Pharynx: Oropharynx is clear. No posterior oropharyngeal erythema.   Eyes:      General: No scleral icterus.     Extraocular Movements: Extraocular movements intact.      Conjunctiva/sclera: Conjunctivae normal.      Pupils: Pupils are equal, round, and reactive to light.      Comments: Fundi are benign without exudate hemorrhage or scar.  Retinal vessels are normal   Neck:      Vascular: No carotid bruit.      Comments: Thyroid is without enlargement, nodule, or bruit  Cardiovascular:      Rate and Rhythm: Normal rate and regular rhythm.      Pulses: Normal pulses.      Heart sounds: Normal heart sounds. No murmur heard.     No gallop.   Pulmonary:      Effort: Pulmonary effort is normal. No respiratory distress.      Breath sounds: Normal  breath sounds. No wheezing, rhonchi or rales.   Abdominal:      General: Bowel sounds are normal.      Palpations: Abdomen is soft.      Tenderness: There is no abdominal tenderness.      Comments: No hepatosplenomegaly   Musculoskeletal:         General: No tenderness. Normal range of motion.      Cervical back: Neck supple. No tenderness.      Right lower leg: No edema.      Left lower leg: No edema.   Lymphadenopathy:      Head:      Right side of head: No submandibular or occipital adenopathy.      Left side of head: No submandibular or occipital adenopathy.      Cervical: No cervical adenopathy.      Right cervical: No superficial, deep or posterior cervical adenopathy.     Left cervical: No superficial or deep cervical adenopathy.      Upper Body:      Right upper body: No supraclavicular or axillary adenopathy.      Left upper body: No supraclavicular or axillary adenopathy.   Skin:     Coloration: Skin is not jaundiced or pale.      Findings: No rash.   Neurological:      General: No focal deficit present.      Mental Status: She is alert and oriented to person, place, and time.      Gait: Gait normal.   Psychiatric:         Mood and Affect: Mood normal.         Behavior: Behavior normal.      Assessment/plan:  Invasive ductal carcinoma of the left breast:  Reviewed with patient therapy which will include chemotherapy and radiation.  The patient was still having difficulty dealing with whether the lumpectomy procedure was more beneficial than having a mastectomy in terms of her emotional long-term outcome.  She was also aware that the possibility of mastectomy may eliminate the need for radiation.  Plan: Reviewed above with patient   The patient will discuss above with the oncologist   The patient will return either on completion of her initial chemotherapy and radiation or if she would like on a regular basis.  She will get back to me regarding this.    PCOS:  The patient is manifestations that include  hidradenitis suppurativa, elevated BMI, hirsutism, amenorrhea, insulin resistance.  She has lost a proximally 7 lb and felt that even prior to her diagnosis she was beginning to lose weight.  Due to the cancer diagnosis she has for the time being stopped her therapy as she will be receiving therapy with the cancer department that provides this service.  She notes that she has had no outbreaks of her hidradenitis.  She remains on metformin and spironolactone for hirsutism.  These medications should be acceptable to the oncologist during her therapy.  She also notes interestingly that the metformin has decreased her appetite and she has made positive changes in terms of a healthy diet.  Her exercise continues to be walking her dog.  She has no symptoms of diabetes.  She also has no symptoms of thyroid disease.  The sleep apnea is likely to be present however weight loss certainly will improve this.  Plan:  Reviewed above with patient   The patient has should still receive regular lab which she will and this is typically sent to me.  I will contact the patient is I review these labs.    Anxiety and depression:  The small dose of amitriptyline has improved her anxiety and she despite her diagnosis is not depressed.  She notes that the sleep issue may be related to sleep apnea but as she continues to lose weight this may improve and she wishes to be observed.  Plan:  Reviewed above with patient.  Reviewed the impact of the cancer diagnosis on this condition as well as other ways in which she may combat some of these feelings   Suggested a book on guided imagery has a positive way to recover from her cancer while she still receiving treatment.

## 2024-10-11 NOTE — PLAN OF CARE
START ON PATHWAY REGIMEN - Breast    [id number]        Trastuzumab-xxxx       Paclitaxel       Trastuzumab-xxxx       Paclitaxel       Trastuzumab-xxxx           Additional Orders: Obtain LVEF assessment prior to initiation of   trastuzumab and as clinically indicated during and after treatment. See PI for   details. Reload trastuzumab if a dose is missed by more than one week.    **Always confirm dose/schedule in your pharmacy ordering system**    Patient Characteristics:  Postoperative without Neoadjuvant Therapy, M0 (Pathologic Staging), Invasive   Disease, Adjuvant Therapy, HER2 Positive, ER Positive, Node Negative, pT1b,   pN0/N1mi, Chemotherapy Indicated  Therapeutic Status: Postoperative without Neoadjuvant Therapy, M0 (Pathologic   Staging)  AJCC Grade: G3  AJCC N Category: pN0  AJCC M Category: cM0  ER Status: Positive (+)  AJCC 8 Stage Grouping: IA  HER2 Status: Positive (+)  Oncotype Dx Recurrence Score: Not Appropriate  AJCC T Category: pT1b  WA Status: Positive (+)  Intervention Indicated: Chemotherapy  Intent of Therapy:  Curative Intent, Discussed with Patient

## 2024-10-14 ENCOUNTER — TELEPHONE (OUTPATIENT)
Dept: INTERVENTIONAL RADIOLOGY/VASCULAR | Facility: CLINIC | Age: 34
End: 2024-10-14
Payer: COMMERCIAL

## 2024-10-14 ENCOUNTER — DOCUMENTATION ONLY (OUTPATIENT)
Dept: SURGERY | Facility: CLINIC | Age: 34
End: 2024-10-14
Payer: COMMERCIAL

## 2024-10-14 DIAGNOSIS — C50.912 INVASIVE DUCTAL CARCINOMA OF BREAST, FEMALE, LEFT: Primary | ICD-10-CM

## 2024-10-14 NOTE — PROGRESS NOTES
Per Dr. Rowell, pt needs consult with radiation oncology. Spoke with pt and scheduled her with Dr. Gomes for 10/24/24.    Oncology Navigation   Intake  Date of Diagnosis: 08/08/24  Cancer Type: Breast  Type of Referral: Internal  Date of Referral: 08/12/24  Initial Nurse Navigator Contact: 08/12/24  Referral to Initial Contact Timeline (days): 0  First Appointment Available: 08/14/24  Appointment Date: 08/20/24 (Pt needs MRI Breast)  First Available Date vs. Scheduled Date (days): 6     Treatment  Current Status: Active    Surgical Oncologist: Dr. Rowell  Plastic Surgeon: Dr. Murcia  Type of Surgery: Left lumpectomy with SLNB and onc reduction  Consult Date: 08/20/24  Surgery Schedule Date: 09/25/24    Medical Oncologist: Dr. Chiu  Consult Date: 08/27/24    Radiation Oncologist: Dr. Gomes (Consult 10/24/24)    Procedures: Biopsy; MRI; Diagnostic Mammogram  Biopsy Schedule Date: 08/08/24  Diagnostic Mammo Schedule Date: 07/25/24  MRI Schedule Date: 08/15/24    General Referrals: Support Group; Physical Therapy; Integrative Medicine; Psychology  Physical Therapy Referral Date: 08/20/24    ER: Positive  NM: Positive  Her2: Postive    Radiation Oncologist: Dr. Gomes (Consult 10/24/24)    Support Systems: Family members     Acuity      Follow Up  No follow-ups on file.

## 2024-10-15 ENCOUNTER — TELEPHONE (OUTPATIENT)
Dept: HEMATOLOGY/ONCOLOGY | Facility: CLINIC | Age: 34
End: 2024-10-15
Payer: COMMERCIAL

## 2024-10-16 ENCOUNTER — PATIENT MESSAGE (OUTPATIENT)
Dept: INTERVENTIONAL RADIOLOGY/VASCULAR | Facility: HOSPITAL | Age: 34
End: 2024-10-16
Payer: COMMERCIAL

## 2024-10-17 ENCOUNTER — OFFICE VISIT (OUTPATIENT)
Dept: PLASTIC SURGERY | Facility: CLINIC | Age: 34
End: 2024-10-17
Payer: COMMERCIAL

## 2024-10-17 ENCOUNTER — TELEPHONE (OUTPATIENT)
Dept: INTERVENTIONAL RADIOLOGY/VASCULAR | Facility: HOSPITAL | Age: 34
End: 2024-10-17
Payer: COMMERCIAL

## 2024-10-17 ENCOUNTER — HOSPITAL ENCOUNTER (OUTPATIENT)
Dept: CARDIOLOGY | Facility: HOSPITAL | Age: 34
Discharge: HOME | End: 2024-10-17
Attending: INTERNAL MEDICINE
Payer: COMMERCIAL

## 2024-10-17 VITALS
HEIGHT: 66 IN | WEIGHT: 287 LBS | HEART RATE: 76 BPM | SYSTOLIC BLOOD PRESSURE: 128 MMHG | DIASTOLIC BLOOD PRESSURE: 80 MMHG | BODY MASS INDEX: 46.12 KG/M2

## 2024-10-17 VITALS — SYSTOLIC BLOOD PRESSURE: 140 MMHG | DIASTOLIC BLOOD PRESSURE: 68 MMHG | HEART RATE: 88 BPM

## 2024-10-17 DIAGNOSIS — Z09 SURGERY FOLLOW-UP: Primary | ICD-10-CM

## 2024-10-17 DIAGNOSIS — C50.912 INVASIVE DUCTAL CARCINOMA OF BREAST, FEMALE, LEFT: ICD-10-CM

## 2024-10-17 LAB
ASCENDING AORTA: 2.8 CM
AV AREA BY CONTINUOUS VTI: 2.8 CM2
AV INDEX (PROSTH): 0.85
AV LVOT MEAN GRADIENT: 3 MMHG
AV LVOT PEAK GRADIENT: 4 MMHG
AV MEAN GRADIENT: 3.9 MMHG
AV PEAK GRADIENT: 7.8 MMHG
AV VALVE AREA BY VELOCITY RATIO: 2.2 CM²
AV VALVE AREA: 2.7 CM2
AV VELOCITY RATIO: 0.71
BSA FOR ECHO PROCEDURE: 2.46 M2
CV ECHO LV RWT: 0.36 CM
DOP CALC AO PEAK VEL: 1.4 M/S
DOP CALC AO VTI: 25.4 CM
DOP CALC LVOT AREA: 3.1 CM2
DOP CALC LVOT DIAMETER: 2 CM
DOP CALC LVOT PEAK VEL: 1 M/S
DOP CALC LVOT STROKE VOLUME: 68.1 CM3
DOP CALCLVOT PEAK VEL VTI: 21.7 CM
E WAVE DECELERATION TIME: 193.44 MS
E/A RATIO: 1.54
E/E' RATIO: 8.67 M/S
ECHO EF ESTIMATED: 58 %
ECHO LV POSTERIOR WALL: 0.8 CM (ref 0.6–1.1)
FRACTIONAL SHORTENING: 31.1 % (ref 28–44)
GLOBAL LONGITUIDAL STRAIN: 17.6 %
INTERVENTRICULAR SEPTUM: 0.7 CM (ref 0.6–1.1)
IVRT: 74.22 MS
LA MAJOR: 5.26 CM
LA MINOR: 5.33 CM
LA WIDTH: 3.81 CM
LEFT ATRIUM SIZE: 3.95 CM
LEFT ATRIUM VOLUME INDEX MOD: 28.7 ML/M2
LEFT ATRIUM VOLUME INDEX: 29.1 ML/M2
LEFT ATRIUM VOLUME MOD: 66.86 ML
LEFT ATRIUM VOLUME: 67.73 CM3
LEFT INTERNAL DIMENSION IN SYSTOLE: 3.1 CM (ref 2.1–4)
LEFT VENTRICLE DIASTOLIC VOLUME INDEX: 38.99 ML/M2
LEFT VENTRICLE DIASTOLIC VOLUME: 90.84 ML
LEFT VENTRICLE MASS INDEX: 44.8 G/M2
LEFT VENTRICLE SYSTOLIC VOLUME INDEX: 16.2 ML/M2
LEFT VENTRICLE SYSTOLIC VOLUME: 37.76 ML
LEFT VENTRICULAR INTERNAL DIMENSION IN DIASTOLE: 4.5 CM (ref 3.5–6)
LEFT VENTRICULAR MASS: 104.5 G
LV LATERAL E/E' RATIO: 7.58
LV SEPTAL E/E' RATIO: 10.11
MV A" WAVE DURATION": 110.37 MS
MV PEAK A VEL: 0.59 M/S
MV PEAK E VEL: 0.91 M/S
OHS CV RV/LV RATIO: 0.76 CM
PISA TR MAX VEL: 1.7 M/S
PULM VEIN A" WAVE DURATION": 110.37 MS
PULM VEIN S/D RATIO: 1.14
PULMONIC VEIN PEAK A VELOCITY: 0.2 M/S
PV PEAK D VEL: 0.37 M/S
PV PEAK S VEL: 0.42 M/S
RA MAJOR: 4.08 CM
RA PRESSURE ESTIMATED: 3 MMHG
RA WIDTH: 3.94 CM
RIGHT VENTRICLE DIASTOLIC BASEL DIMENSION: 3.4 CM
RV TB RVSP: 5 MMHG
RV TISSUE DOPPLER FREE WALL SYSTOLIC VELOCITY 1 (APICAL 4 CHAMBER VIEW): 9.66 CM/S
SINUS: 3.32 CM
STJ: 2.67 CM
TDI LATERAL: 0.12 M/S
TDI SEPTAL: 0.09 M/S
TDI: 0.11 M/S
TR MAX PG: 12 MMHG
TRICUSPID ANNULAR PLANE SYSTOLIC EXCURSION: 2.21 CM
TV PEAK GRADIENT: 12 MMHG
TV REST PULMONARY ARTERY PRESSURE: 15 MMHG
Z-SCORE OF LEFT VENTRICULAR DIMENSION IN END DIASTOLE: -7.41
Z-SCORE OF LEFT VENTRICULAR DIMENSION IN END SYSTOLE: -4.79

## 2024-10-17 PROCEDURE — 99999 PR PBB SHADOW E&M-EST. PATIENT-LVL III: CPT | Mod: PBBFAC,,, | Performed by: SURGERY

## 2024-10-17 PROCEDURE — 25500020 PHARM REV CODE 255: Performed by: INTERNAL MEDICINE

## 2024-10-17 PROCEDURE — 3044F HG A1C LEVEL LT 7.0%: CPT | Mod: CPTII,S$GLB,, | Performed by: SURGERY

## 2024-10-17 PROCEDURE — 3078F DIAST BP <80 MM HG: CPT | Mod: CPTII,S$GLB,, | Performed by: SURGERY

## 2024-10-17 PROCEDURE — C8929 TTE W OR WO FOL WCON,DOPPLER: HCPCS

## 2024-10-17 PROCEDURE — 99024 POSTOP FOLLOW-UP VISIT: CPT | Mod: S$GLB,,, | Performed by: SURGERY

## 2024-10-17 PROCEDURE — 93356 MYOCRD STRAIN IMG SPCKL TRCK: CPT | Mod: ,,, | Performed by: STUDENT IN AN ORGANIZED HEALTH CARE EDUCATION/TRAINING PROGRAM

## 2024-10-17 PROCEDURE — 3077F SYST BP >= 140 MM HG: CPT | Mod: CPTII,S$GLB,, | Performed by: SURGERY

## 2024-10-17 PROCEDURE — 93306 TTE W/DOPPLER COMPLETE: CPT

## 2024-10-17 PROCEDURE — 93306 TTE W/DOPPLER COMPLETE: CPT | Mod: 26,,, | Performed by: STUDENT IN AN ORGANIZED HEALTH CARE EDUCATION/TRAINING PROGRAM

## 2024-10-17 PROCEDURE — 93356 MYOCRD STRAIN IMG SPCKL TRCK: CPT

## 2024-10-17 RX ADMIN — HUMAN ALBUMIN MICROSPHERES AND PERFLUTREN 0.66 MG: 10; .22 INJECTION, SOLUTION INTRAVENOUS at 07:10

## 2024-10-17 NOTE — NURSING NOTE
Called into pt's room to start PIV for [facility] admin per RT Carlos. Pt identified by 2 identifiers. Allergies reviewed. 22g PIV placed to lt hand x1. Site intact w/o complications and WNL; + BR noted, flushed w/ 10cc NS and secured w/ tegaderm. [facility] subsequently admin'd per Carlos. Pt tolerated procedure well. IV d/c with catheter intact, pressure dsg applied.

## 2024-10-18 ENCOUNTER — HOSPITAL ENCOUNTER (OUTPATIENT)
Dept: INTERVENTIONAL RADIOLOGY/VASCULAR | Facility: HOSPITAL | Age: 34
Discharge: HOME | End: 2024-10-18
Attending: NURSE PRACTITIONER
Payer: COMMERCIAL

## 2024-10-18 VITALS
SYSTOLIC BLOOD PRESSURE: 155 MMHG | BODY MASS INDEX: 45.96 KG/M2 | TEMPERATURE: 98 F | DIASTOLIC BLOOD PRESSURE: 83 MMHG | HEART RATE: 81 BPM | OXYGEN SATURATION: 98 % | RESPIRATION RATE: 18 BRPM | HEIGHT: 66 IN | WEIGHT: 286 LBS

## 2024-10-18 DIAGNOSIS — C50.912 INVASIVE DUCTAL CARCINOMA OF BREAST, FEMALE, LEFT: ICD-10-CM

## 2024-10-18 DIAGNOSIS — C50.919 BREAST CANCER: ICD-10-CM

## 2024-10-18 LAB
B-HCG UR QL: NEGATIVE
CTP QC/QA: YES

## 2024-10-18 PROCEDURE — 36561 INSERT TUNNELED CV CATH: CPT | Mod: RT | Performed by: RADIOLOGY

## 2024-10-18 PROCEDURE — 99152 MOD SED SAME PHYS/QHP 5/>YRS: CPT | Mod: ,,, | Performed by: RADIOLOGY

## 2024-10-18 PROCEDURE — 76937 US GUIDE VASCULAR ACCESS: CPT | Mod: TC | Performed by: RADIOLOGY

## 2024-10-18 PROCEDURE — 99900035 HC TECH TIME PER 15 MIN (STAT)

## 2024-10-18 PROCEDURE — 76937 US GUIDE VASCULAR ACCESS: CPT | Mod: 26,,, | Performed by: RADIOLOGY

## 2024-10-18 PROCEDURE — 77001 FLUOROGUIDE FOR VEIN DEVICE: CPT | Mod: 26,,, | Performed by: RADIOLOGY

## 2024-10-18 PROCEDURE — 99152 MOD SED SAME PHYS/QHP 5/>YRS: CPT | Performed by: RADIOLOGY

## 2024-10-18 PROCEDURE — C1788 PORT, INDWELLING, IMP: HCPCS

## 2024-10-18 PROCEDURE — 77001 FLUOROGUIDE FOR VEIN DEVICE: CPT | Mod: TC | Performed by: RADIOLOGY

## 2024-10-18 PROCEDURE — 63600175 PHARM REV CODE 636 W HCPCS: Performed by: RADIOLOGY

## 2024-10-18 PROCEDURE — C1894 INTRO/SHEATH, NON-LASER: HCPCS

## 2024-10-18 PROCEDURE — 94760 N-INVAS EAR/PLS OXIMETRY 1: CPT

## 2024-10-18 PROCEDURE — 81025 URINE PREGNANCY TEST: CPT | Performed by: NURSE PRACTITIONER

## 2024-10-18 RX ORDER — LIDOCAINE HYDROCHLORIDE 10 MG/ML
1 INJECTION, SOLUTION EPIDURAL; INFILTRATION; INTRACAUDAL; PERINEURAL ONCE
Status: DISCONTINUED | OUTPATIENT
Start: 2024-10-18 | End: 2024-10-19 | Stop reason: HOSPADM

## 2024-10-18 RX ORDER — LIDOCAINE HYDROCHLORIDE 20 MG/ML
INJECTION, SOLUTION INFILTRATION; PERINEURAL
Status: COMPLETED | OUTPATIENT
Start: 2024-10-18 | End: 2024-10-18

## 2024-10-18 RX ORDER — MIDAZOLAM HYDROCHLORIDE 1 MG/ML
INJECTION, SOLUTION INTRAMUSCULAR; INTRAVENOUS
Status: COMPLETED | OUTPATIENT
Start: 2024-10-18 | End: 2024-10-18

## 2024-10-18 RX ORDER — FENTANYL CITRATE 50 UG/ML
INJECTION, SOLUTION INTRAMUSCULAR; INTRAVENOUS
Status: COMPLETED | OUTPATIENT
Start: 2024-10-18 | End: 2024-10-18

## 2024-10-18 RX ORDER — SODIUM CHLORIDE 9 MG/ML
INJECTION, SOLUTION INTRAVENOUS CONTINUOUS
Status: DISCONTINUED | OUTPATIENT
Start: 2024-10-18 | End: 2024-10-18

## 2024-10-18 RX ADMIN — FENTANYL CITRATE 50 MCG: 50 INJECTION, SOLUTION INTRAMUSCULAR; INTRAVENOUS at 10:10

## 2024-10-18 RX ADMIN — MIDAZOLAM HYDROCHLORIDE 1 MG: 1 INJECTION, SOLUTION INTRAMUSCULAR; INTRAVENOUS at 10:10

## 2024-10-18 RX ADMIN — LIDOCAINE HYDROCHLORIDE 20 ML: 20 INJECTION, SOLUTION INFILTRATION; PERINEURAL at 10:10

## 2024-10-18 NOTE — PLAN OF CARE
Pt arrived to [facility] for port placement. Pt AAO. Pt oriented to unit and staff. Plan of care reviewed with pt and verbalizes understanding. Pt prepped and draped using sterile technique. Pt placed on continuous monitoring. Pt presents comfortable and w/o complaints.Timeout completed with staff present to perform procedure. See charting for all vitals, assessments and medications given.

## 2024-10-18 NOTE — H&P
Consult/H&P Note  Interventional Radiology    Consult Requested By: oncology    Reason for Consult: port placement    SUBJECTIVE:     Chief Complaint: L breast cancer    History of Present Illness: 35 yo F with L breast cancer, needs port for chemo.    Past Medical History:   Diagnosis Date    Anxiety     Depression     Hidradenitis suppurativa     Invasive ductal carcinoma of breast, female, left     PCOS (polycystic ovarian syndrome)      Past Surgical History:   Procedure Laterality Date    INJECTION FOR SENTINEL NODE IDENTIFICATION Left 9/25/2024    Procedure: INJECTION, FOR SENTINEL NODE IDENTIFICATION;  Surgeon: Candie Rowell MD;  Location: [facility];  Service: General;  Laterality: Left;    LUMPECTOMY, BREAST Left 9/25/2024    Procedure: LUMPECTOMY, BREAST WITH RADIOLOGICAL MARKER;  Surgeon: Candie Rowell MD;  Location: [facility];  Service: General;  Laterality: Left;    REDUCTION OF BOTH BREASTS Bilateral 9/25/2024    Procedure: MAMMOPLASTY, REDUCTION;  Surgeon: Ridge Murcia DO;  Location: [facility];  Service: General;  Laterality: Bilateral;  , BILATERAL/ONCOLOGIC REDUCTION BILATERAL    SENTINEL LYMPH NODE BIOPSY Left 9/25/2024    Procedure: BIOPSY, LYMPH NODE, SENTINEL;  Surgeon: Candie Rowell MD;  Location: [facility];  Service: General;  Laterality: Left;     Family History   Problem Relation Name Age of Onset    Diabetes Mother      Depression Mother      Hypertension Father      Depression Father      Arthritis Father      Breast cancer Paternal Aunt  38    Lung cancer Maternal Grandmother  62        nonsmoker    Breast cancer Paternal Grandmother  50 - 59    Acne Brother      Kidney cancer Maternal Grandfather  74    Breast cancer Other  30 - 39     Social History     Tobacco Use    Smoking status: Never     Passive exposure: Yes    Smokeless tobacco: Never    Tobacco comments:     Pt states she dont smoke    Substance Use Topics    Alcohol use: Yes     Alcohol/week: 0.0 standard drinks of  "alcohol     Comment: occasional    Drug use: No       Review of Systems:  Constitutional/General:Negative for fever.  Hematological/Immuno: no known coagulopathies  Respiratory: no shortness of breath  Cardiovascular: no chest pain  Gastrointestinal: no abdominal pain  Genito-Urinary: no dysuria  Musculoskeletal: negative  Skin: Negative for rash, itching, pigmentation changes, nail or hair changes.  Neurological: no TIA or stroke symptoms  Psychiatric: normal mood/affect, good insight/judgement      OBJECTIVE:     Vital Signs Range (Last 24H):  Temp:  [97.7 °F (36.5 °C)]   Pulse:  [88]   Resp:  [20]   BP: (115-140)/(53-68)   SpO2:  [98 %]     Physical Exam:  General- Patient alert and oriented x3 in NAD  ENT- PERRLA,  Neck- No masses  CV- Regular rate and rhythm  Resp-  No increased WOB  GI- Non tender/non-distended  Extrem- No cyanosis, clubbing, edema.   Derm- No rashes, masses, or lesions noted  Neuro-  No focal deficits noted.     Physical Exam  Body mass index is 46.16 kg/m².    Scheduled Meds:    LIDOcaine (PF) 10 mg/ml (1%)  1 mL Other Once     Continuous Infusions:   PRN Meds:    Allergies:   Review of patient's allergies indicates:   Allergen Reactions    Cefzil [cefprozil] Hives       Labs:  No results for input(s): "INR", "PT", "PTT" in the last 168 hours.  No results for input(s): "WBC", "HGB", "HCT", "MCV", "PLT" in the last 168 hours. No results for input(s): "GLU", "NA", "K", "CL", "CO2", "BUN", "CREATININE", "CALCIUM", "MG", "ALT", "AST", "ALBUMIN", "BILITOT", "BILIDIR" in the last 168 hours.    Vitals (Most Recent):  Temp: 97.7 °F (36.5 °C) (10/18/24 0958)  Pulse: 88 (10/18/24 0958)  Resp: 20 (10/18/24 0958)  BP: (!) 115/53 (10/18/24 0958)  SpO2: 98 % (10/18/24 0958)    ASA: 2  Mallampati: 2    Consent obtained    ASSESSMENT/PLAN:     R chest port placement. Moderate sedation.    There are no hospital problems to display for this patient.          Francisco Otto MD   "

## 2024-10-18 NOTE — PROCEDURES
Radiology Post-Procedure Note    Pre Op Diagnosis: breast cancer  Post Op Diagnosis: Same    Procedure: port placement    Procedure performed by: Francisco Otto MD    Written Informed Consent Obtained: Yes  Specimen Removed: NO  Estimated Blood Loss: Minimal    Findings:   R chest port ready for use.    Patient tolerated procedure well.    Francisco Otto MD

## 2024-10-18 NOTE — Clinical Note
Right: Chest.   Scrubbed with ChloroPrep With Tint.    Hair: N/A.  Skin prep dry before draping.  Prepped by: Mandeep Carter RT 10/18/2024 10:40 AM.

## 2024-10-18 NOTE — PLAN OF CARE
Chart reviewed. Preop nursing care completed per orders. Safe surgery checklist complete. Pt denies any open wounds cuts or sores. Pt denies any metal in body. Belongings in PACU locker 20. Waiting for H&P and surgical consent prior to surgery. Pt AAOX4, VSS on room air. Pt toileted, Bed locked in lowest position, Call light within reach. Pt denies any needs at this time.

## 2024-10-18 NOTE — PLAN OF CARE
Pt AAOx3, VSS on room air.Pt tolerated procedure well. Pt denies any pain, Dizziness or N/V. IV removed with catheter tip intact. Assisted up for the first time, steady on feet. Pt Discharge instructions given and explained to patient with verbalization of understanding all instructions. Pt denies any further questions at this time. Pt DC'd home VIA wheelchair with friend.

## 2024-10-18 NOTE — DISCHARGE SUMMARY
Radiology Discharge Summary      Hospital Course: No complications    Admit Date: 10/18/2024  Discharge Date: 10/18/2024     Instructions Given to Patient: Yes  Diet: Resume prior diet  Activity: activity as tolerated and no driving for today    Description of Condition on Discharge: Stable  Vital Signs (Most Recent): Temp: 97.7 °F (36.5 °C) (10/18/24 0958)  Pulse: 75 (10/18/24 1051)  Resp: 18 (10/18/24 1051)  BP: (!) 155/79 (10/18/24 1051)  SpO2: 99 % (10/18/24 1051)    Discharge Disposition: Home    Discharge Diagnosis: L breast cancer     Follow-up: per oncology, port ready for use    Francisco Otto MD

## 2024-10-18 NOTE — PROGRESS NOTES
PATIENT IDENTIFICATION:  Patient Name: Divya De Paz  MRN: [id number]  : 1990    DIAGNOSIS:  Cancer Staging   Invasive ductal carcinoma of breast, female, left  Staging form: Breast, AJCC 8th Edition  - Clinical stage from 2024: Stage IA (cT1c, cN0, cM0, G3, ER+, KS+, HER2+) - Signed by Maureen Chiu MD on 2024      HISTORY OF PRESENT ILLNESS:   The patient is a 34 year old woman with a Stage IA triple positive left breast cancer.  She is status post breast conserving surgery and awaiting adjuvant chemoimmunotherapy.  She has been referred for consideration of adjuvant radiation to the left breast.    The patient noted a subareolar mass in the left breast while showering.    24 Breast, left, 1 o'clock, 1 cm from nipple, ultrasound-guided core needle biopsies of mass:   - Invasive ductal carcinoma       - 6 mm in greatest contiguous dimension       - Nano-Villanueva modified SBR score 3 + 3 + 3 = 9 of 9       - Histologic grade 3 of 3       24 SYNOPTIC REPORT  SPECIMEN, LATERALITY, PROCEDURE: Breast, breast, left, excision  HISTOLOGIC TYPE:  Invasive ductal carcinoma (best seen in blocks 1G and 1N)  HISTOLOGIC GRADE: Grade 3       Tubules 3       Nuclei 3       Mitosis 3  TUMOR FOCALITY:  Single focus  TUMOR SIZE:  11 mm  DUCTAL CARCINOMA IN SITU:  Present in 5 of 20 slides examined, comprising 10% of the total tumor burden (EIC negative)       Nuclear grade:  3 (high nuclear grade)       Necrosis:  Present, central       Architecture:  Solid and comedo types  TUMOR EXTENSION:         Skin:  Uninvolved by tumor       Nipple:  Not present for evaluation       Skeletal muscle:  Not present for evaluation  MARGINS:       Invasive carcinoma:  Invasive carcinoma is greater than 10 mm away from all margins of resection       Ductal carcinoma in situ:  In-situ carcinoma is greater than 10 mm away from all margins of resection  LYMPH NODES:       Total number of nodes examined: 1       Total  number of sentinel nodes examined: 1       Total number of lymph nodes involved:  0     TUMOR MARKERS:  Tumor markers were performed on the previous biopsy specimen (UBS ) and per report the tumor cells are positive for estrogen receptor (2 to 3+, 90-95%) and progesterone receptor (1 to 2+, 10-20%) and are positive for HER2   overexpression.     The patient is planned for adjuvant chemotherapy under the care of Dr. Maureen Chiu.  Oncology History   Invasive ductal carcinoma of breast, female, left   8/8/2024 Cancer Staged    Staging form: Breast, AJCC 8th Edition  - Clinical stage from 8/8/2024: Stage IA (cT1c, cN0, cM0, G3, ER+, [place]+, HER2+)     8/12/2024 Initial Diagnosis    Invasive ductal carcinoma of breast, female, left     10/11/2024 -  Chemotherapy    Treatment Summary   Plan Name: OP BREAST TRASTUZUMAB PACLITAXEL WEEKLY  Treatment Goal: Curative  Status: Active  Start Date: 10/11/2024 (Planned)  End Date: 9/12/2025 (Planned)  Provider: Maureen Chiu MD  Chemotherapy: PACLITAXEL INFUSION, 80 mg/m2, Intravenous, Clinic/HOD 1 time, 0 of 12 cycles  TRASTUZUMAB-ANNS CHEMO INFUSION, 4 mg/kg, Intravenous, Clinic/HOD 1 time, 0 of 25 cycles          REVIEW OF SYSTEMS:   Review of Systems   Constitutional:  Positive for malaise/fatigue. Negative for fever and weight loss.   HENT:  Negative for ear pain, hearing loss, sinus pain and sore throat.    Eyes:  Negative for blurred vision, double vision and pain.   Respiratory:  Negative for cough, hemoptysis, shortness of breath and wheezing.    Cardiovascular:  Positive for palpitations. Negative for chest pain and leg swelling.   Gastrointestinal:  Negative for abdominal pain, blood in stool, constipation, diarrhea, heartburn, nausea and vomiting.   Genitourinary:  Negative for dysuria, frequency, hematuria and urgency.   Musculoskeletal:  Negative for back pain and joint pain.   Skin:  Negative for itching and rash.   Neurological:  Negative for tingling,  focal weakness, seizures and headaches.   Psychiatric/Behavioral:  Negative for depression. The patient is nervous/anxious.        PAST MEDICAL HISTORY:  Past Medical History:   Diagnosis Date    Anxiety     Depression     Hidradenitis suppurativa     Invasive ductal carcinoma of breast, female, left     PCOS (polycystic ovarian syndrome)        PAST SURGICAL HISTORY:  Past Surgical History:   Procedure Laterality Date    INJECTION FOR SENTINEL NODE IDENTIFICATION Left 9/25/2024    Procedure: INJECTION, FOR SENTINEL NODE IDENTIFICATION;  Surgeon: Candie Rowell MD;  Location: [facility];  Service: General;  Laterality: Left;    LUMPECTOMY, BREAST Left 9/25/2024    Procedure: LUMPECTOMY, BREAST WITH RADIOLOGICAL MARKER;  Surgeon: Candie Rowell MD;  Location: [facility];  Service: General;  Laterality: Left;    REDUCTION OF BOTH BREASTS Bilateral 9/25/2024    Procedure: MAMMOPLASTY, REDUCTION;  Surgeon: Ridge Murcia DO;  Location: [facility];  Service: General;  Laterality: Bilateral;  , BILATERAL/ONCOLOGIC REDUCTION BILATERAL    SENTINEL LYMPH NODE BIOPSY Left 9/25/2024    Procedure: BIOPSY, LYMPH NODE, SENTINEL;  Surgeon: Candie Rowell MD;  Location: [facility];  Service: General;  Laterality: Left;       ALLERGIES:   Review of patient's allergies indicates:   Allergen Reactions    Cefzil [cefprozil] Hives       MEDICATIONS:  Current Outpatient Medications   Medication Sig    amitriptyline (ELAVIL) 25 MG tablet Take 1 tablet (25 mg total) by mouth every evening.    metFORMIN (GLUCOPHAGE) 500 MG tablet Take 1 tablet (500 mg total) by mouth 2 (two) times daily with meals.    montelukast (SINGULAIR) 10 mg tablet Take 1 tablet (10 mg total) by mouth every evening.    spironolactone (ALDACTONE) 50 MG tablet Take 1 tablet (50 mg total) by mouth once daily.     No current facility-administered medications for this visit.       SOCIAL HISTORY:  Social History     Socioeconomic History    Marital status: Single    Number  of children: 0   Occupational History    Occupation: claims  and proc     Employer: [employer]   Tobacco Use    Smoking status: Never     Passive exposure: Yes    Smokeless tobacco: Never    Tobacco comments:     Pt states she dont smoke    Substance and Sexual Activity    Alcohol use: Yes     Alcohol/week: 0.0 standard drinks of alcohol     Comment: occasional    Drug use: No    Sexual activity: Not Currently     Partners: Male     Birth control/protection: OCP   Other Topics Concern    Are you pregnant or think you may be? No    Breast-feeding No   Social History Narrative    From [place]    Moved to [facility] 2008    Exercising      Social Drivers of Health     Financial Resource Strain: Low Risk  (4/2/2024)    Overall Financial Resource Strain (CARDIA)     Difficulty of Paying Living Expenses: Not very hard   Food Insecurity: No Food Insecurity (4/2/2024)    Hunger Vital Sign     Worried About Running Out of Food in the Last Year: Never true     Ran Out of Food in the Last Year: Never true   Transportation Needs: No Transportation Needs (4/2/2024)    PRAPARE - Transportation     Lack of Transportation (Medical): No     Lack of Transportation (Non-Medical): No   Physical Activity: Sufficiently Active (4/2/2024)    Exercise Vital Sign     Days of Exercise per Week: 7 days     Minutes of Exercise per Session: 60 min   Stress: Stress Concern Present (4/2/2024)    [employer] - Occupational Stress Questionnaire     Feeling of Stress : Very much   Housing Stability: Low Risk  (4/2/2024)    Housing Stability Vital Sign     Unable to Pay for Housing in the Last Year: No     Number of Places Lived in the Last Year: 1     Unstable Housing in the Last Year: No       FAMILY HISTORY:  Family History   Problem Relation Name Age of Onset    Diabetes Mother      Depression Mother      Hypertension Father      Depression Father      Arthritis Father      Breast cancer Paternal Aunt  38     Lung cancer Maternal Grandmother  62        nonsmoker    Breast cancer Paternal Grandmother  50 - 59    Acne Brother      Kidney cancer Maternal Grandfather  74    Breast cancer Other  30 - 39         PHYSICAL EXAMINATION:  Vitals:    10/24/24 0820   BP: (!) 145/73   Pulse: 93   Resp: 16   Temp: 97.8 °F (36.6 °C)     Body mass index is 46.01 kg/m².    ECO  Physical Exam  Constitutional:       Appearance: Normal appearance.   HENT:      Head: Normocephalic and atraumatic.      Nose: Nose normal.      Mouth/Throat:      Mouth: Mucous membranes are moist.   Cardiovascular:      Rate and Rhythm: Normal rate.   Pulmonary:      Effort: Pulmonary effort is normal.   Chest:       Abdominal:      General: Abdomen is flat.      Palpations: Abdomen is soft.   Musculoskeletal:         General: Normal range of motion.      Cervical back: Normal range of motion.   Skin:     General: Skin is warm and dry.   Neurological:      General: No focal deficit present.      Mental Status: She is alert. Mental status is at baseline.             ASSESSMENT/PLAN:  Diagnoses and all orders for this visit:    Invasive ductal carcinoma of breast, female, left        The patient is recommended adjuvant radiation to the left breast to reduce risk of recurrence.  She will receive 3 weeks of daily EBRT.  She will be treated using DIBH to minimize radiation dose delivered to the heart and lungs.  The patient should return to department after chemotherapy has been completed for follow up and CT simulation.    The risks and benefits of treatment have been discussed with the patient and she expressed full understanding. she understands the treatment plan and willing to proceed accordingly.    I spent approximately 60 minutes reviewing the available records and evaluating the patient, out of which over 50% of the time was spent face to face with the patient in counseling and coordinating this patient's care.

## 2024-10-24 ENCOUNTER — OFFICE VISIT (OUTPATIENT)
Dept: HEMATOLOGY/ONCOLOGY | Facility: CLINIC | Age: 34
End: 2024-10-24
Payer: COMMERCIAL

## 2024-10-24 ENCOUNTER — OFFICE VISIT (OUTPATIENT)
Dept: RADIATION ONCOLOGY | Facility: CLINIC | Age: 34
End: 2024-10-24
Payer: COMMERCIAL

## 2024-10-24 VITALS
HEART RATE: 93 BPM | SYSTOLIC BLOOD PRESSURE: 145 MMHG | DIASTOLIC BLOOD PRESSURE: 73 MMHG | HEIGHT: 68 IN | WEIGHT: 285.69 LBS | RESPIRATION RATE: 17 BRPM | OXYGEN SATURATION: 97 % | TEMPERATURE: 97 F | BODY MASS INDEX: 43.3 KG/M2

## 2024-10-24 VITALS
BODY MASS INDEX: 46.01 KG/M2 | DIASTOLIC BLOOD PRESSURE: 73 MMHG | OXYGEN SATURATION: 97 % | HEART RATE: 93 BPM | SYSTOLIC BLOOD PRESSURE: 145 MMHG | RESPIRATION RATE: 16 BRPM | WEIGHT: 285.06 LBS | TEMPERATURE: 98 F

## 2024-10-24 DIAGNOSIS — T45.1X5A ALOPECIA DUE TO CYTOTOXIC DRUG: ICD-10-CM

## 2024-10-24 DIAGNOSIS — T45.1X5A CHEMOTHERAPY-INDUCED NAUSEA: ICD-10-CM

## 2024-10-24 DIAGNOSIS — C50.912 INVASIVE DUCTAL CARCINOMA OF BREAST, FEMALE, LEFT: Primary | ICD-10-CM

## 2024-10-24 DIAGNOSIS — L65.8 ALOPECIA DUE TO CYTOTOXIC DRUG: ICD-10-CM

## 2024-10-24 DIAGNOSIS — R11.0 CHEMOTHERAPY-INDUCED NAUSEA: ICD-10-CM

## 2024-10-24 PROCEDURE — 3044F HG A1C LEVEL LT 7.0%: CPT | Mod: CPTII,S$GLB,, | Performed by: NURSE PRACTITIONER

## 2024-10-24 PROCEDURE — 3008F BODY MASS INDEX DOCD: CPT | Mod: CPTII,S$GLB,, | Performed by: NURSE PRACTITIONER

## 2024-10-24 PROCEDURE — G2211 COMPLEX E/M VISIT ADD ON: HCPCS | Mod: S$GLB,,, | Performed by: NURSE PRACTITIONER

## 2024-10-24 PROCEDURE — 99999 PR PBB SHADOW E&M-EST. PATIENT-LVL III: CPT | Mod: PBBFAC,,, | Performed by: NURSE PRACTITIONER

## 2024-10-24 PROCEDURE — 1159F MED LIST DOCD IN RCRD: CPT | Mod: CPTII,S$GLB,, | Performed by: NURSE PRACTITIONER

## 2024-10-24 PROCEDURE — 99215 OFFICE O/P EST HI 40 MIN: CPT | Mod: S$GLB,,, | Performed by: NURSE PRACTITIONER

## 2024-10-24 PROCEDURE — 99999 PR PBB SHADOW E&M-EST. PATIENT-LVL III: CPT | Mod: PBBFAC,,, | Performed by: RADIOLOGY

## 2024-10-24 PROCEDURE — 3078F DIAST BP <80 MM HG: CPT | Mod: CPTII,S$GLB,, | Performed by: NURSE PRACTITIONER

## 2024-10-24 PROCEDURE — 3077F SYST BP >= 140 MM HG: CPT | Mod: CPTII,S$GLB,, | Performed by: NURSE PRACTITIONER

## 2024-10-24 RX ORDER — LIDOCAINE AND PRILOCAINE 25; 25 MG/G; MG/G
CREAM TOPICAL
Qty: 30 G | Refills: 1 | Status: SHIPPED | OUTPATIENT
Start: 2024-10-24

## 2024-10-24 RX ORDER — PROCHLORPERAZINE MALEATE 5 MG
5 TABLET ORAL EVERY 6 HOURS PRN
Qty: 30 TABLET | Refills: 1 | Status: SHIPPED | OUTPATIENT
Start: 2024-10-24 | End: 2025-10-24

## 2024-10-24 RX ORDER — ONDANSETRON HYDROCHLORIDE 8 MG/1
8 TABLET, FILM COATED ORAL EVERY 8 HOURS PRN
Qty: 30 TABLET | Refills: 1 | Status: SHIPPED | OUTPATIENT
Start: 2024-10-24 | End: 2025-10-24

## 2024-10-24 NOTE — PROGRESS NOTES
[facility]/ [facility]   at [facility] Note:      Chief Complaint:   Encounter Diagnoses   Name Primary?    Invasive ductal carcinoma of breast, female, left Yes    Alopecia due to cytotoxic drug     Chemotherapy-induced nausea       Cancer Staging   Invasive ductal carcinoma of breast, female, left  Staging form: Breast, AJCC 8th Edition  - Clinical stage from 8/8/2024: Stage IA (cT1c, cN0, cM0, G3, ER+, MA+, HER2+) - Signed by Maureen Chiu MD on 8/27/2024      HPI:  Divya De Paz is a 34 y.o. female with PCOS and ANISH who presents today for follow up of newly diagnosed breast cancer.  She is here for chemo education today.  She is generally feeling well today.  Has had some delayed healing to her right breast incisions, following with plastics.  Has another visit scheduled for 11/1.    Oncology History  Patient self palpated a lump during self-exam, got evaluated by Gyn a few days later.   Follow-up mammogram 7/25/2024 showed an irregular high density mass in the UOQ of left breast  Ultrasound 7/25/2024: superficial irregular not parallel hypoechoic mass with indistinct and angular margins measuring 1.0 x 0.8 x 1.0 cm (1 o'clock axis, 1 CFN).     Biopsy 8/8/2024: IDC, grade 3, ER 90-95%/ MA 10-20%/ HER2 3+; Ki67 80-90%     MRI 8/15/2024 revealed a 1.5 cm x 1.3 cm x 1.0 cm irregularly shaped mass with irregular margins and heterogeneous internal enhancement seen in the left breast at 1 o'clock in the anterior depth, 4.1 cm from the nipple and 0.3 cm from the skin. No internal mammary or axillary adenopathy identified.    L breast lumpectomy 9/25/24: IDC, grade 3, 1.1cm; 0/1 LN+,  also with oncoplastic reduction    GYN History:  Age of menarche was 14. Age of menopause was n/a.  Last menstrual period was the week of 8/8/2024, pt reports inconsistent periods due to PCOS. Patient reports hormonal therapy use (Provera) to induce menstruation, stopped  "2024 per OB/GYN reccs. Patient is . Age of first live birth was n/a.     Family History:  Paternal Grandmother Breast Cancer - multiple times;  of lung cancer (heavy smoker)  Paternal Aunt Breast Cancer - diagnosed at 45, BRCA negative  Paternal Aunt did not have cancer, also BRCA negative  Maternal grandmother - lung cancer, no smoking history  Maternal grandfather - renal cancer      Patient Active Problem List   Diagnosis    Hidradenitis suppurativa    PCOS (polycystic ovarian syndrome)    Insulin resistance    Secondary oligomenorrhea    Anxiety and depression    Invasive ductal carcinoma of breast, female, left    Negative Hereditary Cancer Genetic testing       Current Outpatient Medications   Medication Instructions    amitriptyline (ELAVIL) 25 mg, Oral, Nightly    LIDOcaine-prilocaine (EMLA) cream Apply topically to port one hour prior to chemotherapy infusion    metFORMIN (GLUCOPHAGE) 500 mg, Oral, 2 times daily with meals    ondansetron (ZOFRAN) 8 mg, Oral, Every 8 hours PRN    prochlorperazine (COMPAZINE) 5 mg, Oral, Every 6 hours PRN, Take if zofran is not working    spironolactone (ALDACTONE) 50 mg, Oral, Daily       Review of Systems:   Review of Systems   Constitutional: Negative.    HENT: Negative.     Eyes: Negative.    Respiratory: Negative.  Negative for shortness of breath.    Cardiovascular: Negative.  Negative for chest pain.   Gastrointestinal: Negative.  Negative for abdominal pain and diarrhea.   Genitourinary: Negative.  Negative for frequency.   Musculoskeletal: Negative.  Negative for back pain.   Skin: Negative.    Neurological: Negative.  Negative for headaches.   Endo/Heme/Allergies: Negative.    Psychiatric/Behavioral: Negative.         PHYSICAL EXAM:  BP (!) 145/73   Pulse 93   Temp 97 °F (36.1 °C) (Oral)   Resp 17   Ht 5' 8" (1.727 m)   Wt 129.6 kg (285 lb 11.5 oz)   LMP 2024   SpO2 97%   BMI 43.44 kg/m²     Physical Exam  Constitutional:       General: " She is not in acute distress.     Appearance: Normal appearance. She is not ill-appearing.   HENT:      Head: Normocephalic and atraumatic.   Pulmonary:      Effort: Pulmonary effort is normal. No respiratory distress.   Chest:      Comments: Incisions noted to right breast with small openings, delayed healing, dressings intact, left breast deferred  Musculoskeletal:      Cervical back: Normal range of motion.   Skin:     Comments: Steri strips intact to right chest port   Neurological:      Mental Status: She is alert and oriented to person, place, and time.         Pertinent Labs & Imaging:  Pathology Results  (Last 30 days)                 09/25/24 1219  Specimen to Pathology, Surgery Breast Final result    Narrative:  Pre-op Diagnosis: Infiltrating ductal carcinoma of left breast    [C50.912]   INVASINVE DUCTAL CARCINOMA LEFT BREAST   Procedure(s):   LUMPECTOMY, BREAST WITH RADIOLOGICAL MARKER   BIOPSY, LYMPH NODE, SENTINEL   INJECTION, FOR SENTINEL NODE IDENTIFICATION   WITH SENTINEL NODE BIOPSY AND AXILLARY LYMPHADENECTOMY /   POSSIBLE AXILLARY   MAMMOPLASTY, REDUCTION, BILATERAL/ONCOLOGIC REDUCTION   BILATERAL   Number of specimens: 5   Name of specimens:   1) Left lumpectomy (Green-Inferior, Blue-Superior,   Orange-Lateral, Yellow-Anterior, Black-Posterior, Red-Medial)   2) Left axillary sentinel lymph node #1 -    3) Left breast - new inferior margin, ink marks the new   margin   4) Left breast reduction   5) Right breast reduction   Release to patient->Immediate   Specimen total (fresh, frozen, permanent):->5               No results found for this or any previous visit (from the past 24 hours).    Assessment & Plan:    1. Invasive ductal carcinoma of breast, female, left  -- chemo education done  -- port placed  -- echo on 10/17/24: ef 60 to 65%, ls -17.6%  -- has met with nutrition and IO  -- waiting to start chemo for improved wounds to right breast, seeing plastics 11/1, will get chemo scheduled  "for 3 weeks from now and can push back if needed  -- rtc in about 3 weeks for chemo start, would prefer chemo on Thursdays    Per Dr. Chiu's note:   "Patient with newly diagnosed stage I TPBC  We discussed adjuvant Paclitaxel and Trastuzumab for T1N0 HER2-Positive Breast Cancer per study published in [facility] 2015 by Lisa et al.   I recommend weekly Paclitaxel at 80 mg/meter squared with weekly Trastuzumab. I have recommended a total of 12 weekly cycles of therapy. After completion of the first 12 weekly cycles, I have strongly recommended continuation of Trastuzumab alone for a total of one year of adjuvant Trastuzumab therapy. After completion of Taxol and Trastuzumab, adjuvant Trastuzumab can be given every three weeks.     First Infusion Loading Dose of Trastuzumab will be 4 mg/Kg over 90 minutes followed by the second Infusion of Trastuzumab at 2mg/kg over 30 minutes Qweek for the remainder of Taxol and Trastuzumab.    When switching to p5iatxs Trastuzumab, it will be given at 6mg/kg over 60 minutes Q 3 weeks and if tolerated, all subsequent Infusions of Trastuzumab but may be over 30 minutes until completion of one year of therapy.    Overall side effects of chemotherapy were discussed including alopecia, immunosuppression, Neutropenia (low white count), anemia (low hemoglobin), thrombocytopenia (lowered platelets), and neuropathy from Taxol. Other side effects such as nail changes, dry eyes, mouth sores, and bone pain were discussed.  Rare but serious side effects such as increased risk of cardiac toxicity were discussed with Trastuzumab. Monitoring of her cardiac function with MUGAs or an echocardiogram is strongly recommended every 3 months by NCCN guidelines while on adjuvant Trastuzumab.    Recommend ovarian protection given young age. Discussed Zoladex for the 12 weeks on chemo and then discontinuing  Given small size of her cancer, would recommend adjuvant Tamoxifen with Trastuzumab in the adjuvant " "setting     - Care Companion Enrollment Chemotherapy; [facility]  - LIDOcaine-prilocaine (EMLA) cream; Apply topically to port one hour prior to chemotherapy infusion  Dispense: 30 g; Refill: 1  - ondansetron (ZOFRAN) 8 MG tablet; Take 1 tablet (8 mg total) by mouth every 8 (eight) hours as needed for Nausea.  Dispense: 30 tablet; Refill: 1"    2. Alopecia due to cytotoxic drug  - HAIR PROSTHESIS FOR HOME USE    3. Chemotherapy-induced nausea  - ondansetron (ZOFRAN) 8 MG tablet; Take 1 tablet (8 mg total) by mouth every 8 (eight) hours as needed for Nausea.  Dispense: 30 tablet; Refill: 1  - prochlorperazine (COMPAZINE) 5 MG tablet; Take 1 tablet (5 mg total) by mouth every 6 (six) hours as needed for Nausea. Take if zofran is not working  Dispense: 30 tablet; Refill: 1    Divya De Paz was consented for chemotherapy/immunotherapy to treat breast cancer. Divya De Paz was consented to receive trastuzumab and paclitaxel.   The consent was discussed and reviewed with patient.     2. Patient was education on what to expect when receiving chemotherapy including: checking in, receiving an ID band, 1 guest allowed in the infusion suite during infusion, can alternate people as well, pole going with you once you are hooked up, warm blankets are available, you may bring lunch and snacks, minimal snacks are available, take medications as regularly unless told otherwise, warm blankets, will be available. Education about what to expect during their chemo cycle and how often their regimen is given.     3. An extensive discussion was had which included a thorough discussion of the risk and benefits of treatment and alternatives.  Risks, including but not limited to, possible hair loss, bone marrow damage (anemia, thrombocytopenia, immune suppression, neutropenia), damage to body organs (brain, heart, liver, kidney, lungs, nervous system, skin, and others), allergic reactions, sterility, nausea/vomiting, " constipation/diarrhea, sores in the mouth, secondary cancers, local damage at possible injection sites, and rarely death were all discussed. Specific side effects pertaining to their chemotherapy/immunotherapy medications were discussed as well.The patient agrees with the plan, and all questions and their support system's questions have been answered to their satisfaction. Contraindications and potential side effects discussed as listed in micromedx.     4. Patient was given binder which includes: contact information for the [facility], an immunotherapy side effect guide (if applicable to patient), resources including, but not limited to: women's wellness, acupuncture, physical therapy, , urgent care within oncology and financial assistance.     5. Premedications were prescribed and patient was education on appropriate premedications.     6. Patient was tested for pharmacogenomics if receiving fluorouracil or capecitabine. Patient has a UPT ordered if still of childrearing age and still has ovaries and uterus.     7. Genetics testing was done, if appropriate on this patient. Not all patients may qualify for genetic testing.     8. Patient was educated on when to call (and given the numbers to call and knows to message via [facility] if possible) or notify the provider including, but not limited to:   Persistent Nausea and/or Vomiting  Dehydration  Persistent Diarrhea  Fever of 100.4 > 1 hour in duration or any isolated fever > 101   Rash (while on active chemotherapy or immunotherapy)   Severe pain or new onset pain not controlled by current medication regimen  Or any other symptom you feel is related to your current hematology or oncology treatment    9. Patient was provided with additional resources that [facility] including, but not limited to: financial counseling, , psychologist, palliative care, support groups, transportation, dietician, rehab services, women's wellness  and urgent care visits within the oncology department.     10. Patient was offered and signed up for chemo care companion. Educated on daily vital signs and daily questionnaire.     11. Patient has an established PCP    12. Patient was offered a virtual visit or a phone call 1 week post their Cycle 1 Day 1 chemotherapy and agreed     Patient will Proceed with cycle 1 day 1 of TH. Patient will be seen ~1 week for a post chemo visit with CHRISTIE.     Route Chart for Scheduling    Med Onc Chart Routing  Urgent    Follow up with physician . About 3 weeks for chemo start   Follow up with CHRISTIE . 4 weeks (for cycle 2), then pt to see md or christie every other infusion   Infusion scheduling note   infusions weekly x 13, then every 3 weeks (would prefer on thursdays)   Injection scheduling note zoladex injections every 4 weeks   Labs CMP, CBC and B HCG   Scheduling:  Preferred lab:  Lab interval:  weekly x 13, then every 3 weeks, day before chemo   Imaging ECHO   every 3 months   Pharmacy appointment    Other referrals                  Total time of this visit, including time spent face to face with patient and/or via video/audio, and also in preparing for today's visit for MDM and documentation. (Medical Decision Making, including consideration of possible diagnoses, management options, complex medical record review, review of diagnostic tests and information, consideration and discussion of significant complications based on comorbidities, and discussion with providers involved with the care of the patient) is 60 minutes. Greater than 50% was spent face to face with the patient counseling and coordinating care.    Cristal Posadas NP    Treatment Plan Information   OP BREAST TRASTUZUMAB PACLITAXEL WEEKLY Maureen Chiu MD   Associated diagnosis: Invasive ductal carcinoma of breast, female, left Stage IA cT1c, cN0, cM0, G3, ER+, CA+, HER2+ noted on 8/12/2024   Line of treatment: Adjuvant  Treatment Goal: Curative     Upcoming  Treatment Dates - OP BREAST TRASTUZUMAB PACLITAXEL WEEKLY    10/11/2024       Pre-Medications       diphenhydrAMINE (BENADRYL) 50 mg in NS 50 mL IVPB       dexAMETHasone (DECADRON) 10 mg in sodium chloride 0.9% 50 mL IVPB       famotidine (PF) injection 20 mg       Chemotherapy       trastuzumab-anns (KANJINTI) 521 mg in 0.9% NaCl 250 mL chemo infusion       PACLitaxeL (TAXOL) 80 mg/m2 = 198 mg in 0.9% NaCl 250 mL chemo infusion  10/18/2024       Pre-Medications       diphenhydrAMINE (BENADRYL) 50 mg in NS 50 mL IVPB       dexAMETHasone (DECADRON) 10 mg in sodium chloride 0.9% 50 mL IVPB       famotidine (PF) injection 20 mg       Chemotherapy       trastuzumab-anns (KANJINTI) 261 mg in 0.9% NaCl 250 mL chemo infusion       PACLitaxeL (TAXOL) 80 mg/m2 = 198 mg in 0.9% NaCl 250 mL chemo infusion  10/25/2024       Pre-Medications       diphenhydrAMINE (BENADRYL) 50 mg in NS 50 mL IVPB       dexAMETHasone (DECADRON) 10 mg in sodium chloride 0.9% 50 mL IVPB       famotidine (PF) injection 20 mg       Chemotherapy       trastuzumab-anns (KANJINTI) 261 mg in 0.9% NaCl 250 mL chemo infusion       PACLitaxeL (TAXOL) 80 mg/m2 = 198 mg in 0.9% NaCl 250 mL chemo infusion  11/1/2024       Pre-Medications       diphenhydrAMINE (BENADRYL) 50 mg in NS 50 mL IVPB       dexAMETHasone (DECADRON) 10 mg in sodium chloride 0.9% 50 mL IVPB       famotidine (PF) injection 20 mg       Chemotherapy       trastuzumab-anns (KANJINTI) 261 mg in 0.9% NaCl 250 mL chemo infusion       PACLitaxeL (TAXOL) 80 mg/m2 = 198 mg in 0.9% NaCl 250 mL chemo infusion    Supportive Plan Information  OP BREAST GOSERELIN Q4W Maureen Chiu MD   Associated Diagnosis: Invasive ductal carcinoma of breast, female, left Stage IA cT1c, cN0, cM0, G3, ER+, NH+, HER2+ noted on 8/12/2024   Line of treatment: Supportive Care   Treatment goal: Supportive     Upcoming Treatment Dates - OP BREAST GOSERELIN Q4W    10/11/2024       Chemotherapy       goserelin (ZOLADEX)  injection 3.6 mg  11/8/2024       Chemotherapy       goserelin (ZOLADEX) injection 3.6 mg  12/6/2024       Chemotherapy       goserelin (ZOLADEX) injection 3.6 mg  1/3/2025       Chemotherapy       goserelin (ZOLADEX) injection 3.6 mg      Cristal Posadas, NP  10/24/2024

## 2024-10-28 ENCOUNTER — IMMUNIZATION (OUTPATIENT)
Dept: INTERNAL MEDICINE | Facility: CLINIC | Age: 34
End: 2024-10-28
Payer: COMMERCIAL

## 2024-10-28 ENCOUNTER — PATIENT MESSAGE (OUTPATIENT)
Dept: PLASTIC SURGERY | Facility: CLINIC | Age: 34
End: 2024-10-28
Payer: COMMERCIAL

## 2024-10-28 ENCOUNTER — PATIENT MESSAGE (OUTPATIENT)
Dept: ADMINISTRATIVE | Facility: OTHER | Age: 34
End: 2024-10-28
Payer: COMMERCIAL

## 2024-10-28 ENCOUNTER — TELEPHONE (OUTPATIENT)
Dept: PLASTIC SURGERY | Facility: CLINIC | Age: 34
End: 2024-10-28
Payer: COMMERCIAL

## 2024-10-28 DIAGNOSIS — Z23 NEED FOR VACCINATION: Primary | ICD-10-CM

## 2024-10-28 PROCEDURE — 91320 SARSCV2 VAC 30MCG TRS-SUC IM: CPT | Mod: S$GLB,,, | Performed by: INTERNAL MEDICINE

## 2024-10-28 PROCEDURE — 90480 ADMN SARSCOV2 VAC 1/ONLY CMP: CPT | Mod: S$GLB,,, | Performed by: INTERNAL MEDICINE

## 2024-10-29 ENCOUNTER — OFFICE VISIT (OUTPATIENT)
Dept: PLASTIC SURGERY | Facility: CLINIC | Age: 34
End: 2024-10-29
Payer: COMMERCIAL

## 2024-10-29 VITALS — HEART RATE: 93 BPM | DIASTOLIC BLOOD PRESSURE: 81 MMHG | SYSTOLIC BLOOD PRESSURE: 132 MMHG

## 2024-10-29 DIAGNOSIS — C50.912 INVASIVE DUCTAL CARCINOMA OF BREAST, FEMALE, LEFT: Primary | ICD-10-CM

## 2024-10-29 PROCEDURE — 3075F SYST BP GE 130 - 139MM HG: CPT | Mod: CPTII,S$GLB,, | Performed by: SURGERY

## 2024-10-29 PROCEDURE — 99999 PR PBB SHADOW E&M-EST. PATIENT-LVL III: CPT | Mod: PBBFAC,,, | Performed by: SURGERY

## 2024-10-29 PROCEDURE — 3044F HG A1C LEVEL LT 7.0%: CPT | Mod: CPTII,S$GLB,, | Performed by: SURGERY

## 2024-10-29 PROCEDURE — 3079F DIAST BP 80-89 MM HG: CPT | Mod: CPTII,S$GLB,, | Performed by: SURGERY

## 2024-10-29 PROCEDURE — 99024 POSTOP FOLLOW-UP VISIT: CPT | Mod: S$GLB,,, | Performed by: SURGERY

## 2024-10-31 ENCOUNTER — OFFICE VISIT (OUTPATIENT)
Dept: PSYCHIATRY | Facility: CLINIC | Age: 34
End: 2024-10-31
Payer: COMMERCIAL

## 2024-10-31 ENCOUNTER — TELEPHONE (OUTPATIENT)
Dept: HEMATOLOGY/ONCOLOGY | Facility: CLINIC | Age: 34
End: 2024-10-31
Payer: COMMERCIAL

## 2024-10-31 DIAGNOSIS — F41.1 GENERALIZED ANXIETY DISORDER: Primary | ICD-10-CM

## 2024-10-31 DIAGNOSIS — C50.912 INFILTRATING DUCTAL CARCINOMA OF LEFT BREAST: ICD-10-CM

## 2024-10-31 PROCEDURE — 99999 PR PBB SHADOW E&M-EST. PATIENT-LVL II: CPT | Mod: PBBFAC,,, | Performed by: PSYCHOLOGIST

## 2024-10-31 PROCEDURE — 3044F HG A1C LEVEL LT 7.0%: CPT | Mod: CPTII,S$GLB,, | Performed by: PSYCHOLOGIST

## 2024-10-31 PROCEDURE — 90834 PSYTX W PT 45 MINUTES: CPT | Mod: S$GLB,,, | Performed by: PSYCHOLOGIST

## 2024-10-31 PROCEDURE — 1159F MED LIST DOCD IN RCRD: CPT | Mod: CPTII,S$GLB,, | Performed by: PSYCHOLOGIST

## 2024-10-31 NOTE — PROGRESS NOTES
INFORMED CONSENT: Patient was identified using two patient-identifiers. The patient has been informed of the risks and benefits associated with engaging in psychotherapy, the handling of protected health information, the rights of privacy and the limits of confidentiality. The patient has also been informed of the importance of reporting any suicidal or homicidal ideation to this or any provider to ensure safety of all parties, and the Divya De Paz expressed understanding. The patient was agreeable to these terms and freely participates in individual psychotherapy.    PSYCHO-ONCOLOGY NOTE/ Individual Psychotherapy     Date: 10/31/2024   Site:  [facility]        Therapeutic Intervention: Met with patient.  Outpatient - Insight oriented psychotherapy 45 min - CPT code 85329      Patient was last seen by me on 10/3/2024    Problem list  Patient Active Problem List   Diagnosis    Hidradenitis suppurativa    PCOS (polycystic ovarian syndrome)    Insulin resistance    Secondary oligomenorrhea    Anxiety and depression    Invasive ductal carcinoma of breast, female, left    Negative Hereditary Cancer Genetic testing       Chief complaint/reason for encounter: anxiety   Met with patient to evaluate psychosocial adaptation to diagnosis/treatment/survivorship of BC    Current Medications  Current Outpatient Medications   Medication    amitriptyline (ELAVIL) 25 MG tablet    LIDOcaine-prilocaine (EMLA) cream    metFORMIN (GLUCOPHAGE) 500 MG tablet    ondansetron (ZOFRAN) 8 MG tablet    prochlorperazine (COMPAZINE) 5 MG tablet    spironolactone (ALDACTONE) 50 MG tablet     No current facility-administered medications for this visit.       Objective:  Divya De Paz arrived  promptly for the session.    The patient was fully cooperative throughout the session.  Appearance: age appropriate, appropriately  dressed, adequately  groomed  Behavior/Cooperation: friendly and cooperative  Speech: normal in rate, volume,  and tone and appropriate quality, quantity and organization of sentences  Mood: steady  Affect: mood congruent  Thought Process: goal-directed, logical  Thought Content: normal,  No delusions or paranoia; did not appear to be responding to internal stimuli during the session  Orientation: grossly intact  Attention Span/Concentration: Attends to session without distraction; reports no difficulty  Insight: patient has awareness of illness; good insight into own behavior and behavior of others  Judgment: the patient's behavior is adequate to circumstances    Interval history and content of current session: Patient with slow healing wound. Having trouble relinquishing control at work.  Discussed diagnosis, treatment, prognosis, current adaptation to disease and treatment status, and family's adaptation to disease and treatment status. Reports to be coping with great difficulty. Evaluated cognitive response, paying particular attention to negative intrusive thoughts of a persistent and detrimental nature. Thoughts of this type are in evidence with moderate distress. Provided cognitive behavioral therapy to address negative cognitions. Identified and evaluated psychosocial and environmental stressors secondary to diagnosis and treatment.  Examined proactive behaviors that may be implemented to minimize or ameliorate psychosocial stressors secondary to diagnosis and treatment.     Risk parameters:   Patient reports no suicidal ideation  Patient reports no homicidal ideation  Patient reports no self-injurious behavior  Patient reports no violent behavior   Safety needs:  None at this time      Verbal deficits: None     Patient's response to intervention:The patient's response to intervention is accepting.     Progress toward goals and other mental status changes:  The patient's progress toward goals is good.      Progress to date:Progress as Expected      Goals from last visit: Met     Patient reported outcomes:    Distress  Thermometer:   Distress Score    Distress Score: 6        Practical Problems Physical Problems                                                   Family Problems                                         Emotional Problems                                                         Spiritual/Religions Concerns               Other Problems               PHQ ANSWERS    Q1. Little interest or pleasure in doing things: (Patient-Rptd) (P) More than half the days (10/27/24 1809)  Q2. Feeling down, depressed, or hopeless: (Patient-Rptd) (P) Not at all (10/27/24 1809)  Q3. Trouble falling or staying asleep, or sleeping too much: (Patient-Rptd) (P) Nearly every day (10/27/24 1809)  Q4. Feeling tired or having little energy: (Patient-Rptd) (P) Nearly every day (10/27/24 1809)  Q5. Poor appetite or overeating: (Patient-Rptd) (P) Several days (10/27/24 1809)  Q6. Feeling bad about yourself - or that you are a failure or have let yourself or your family down: (Patient-Rptd) (P) Not at all (10/27/24 1809)  Q7. Trouble concentrating on things, such as reading the newspaper or watching television: (Patient-Rptd) (P) More than half the days (10/27/24 1809)  Q8. Moving or speaking so slowly that other people could have noticed. Or the opposite - being so fidgety or restless that you have been moving around a lot more than usual: (Patient-Rptd) (P) Not at all (10/27/24 1809)  Q9.      PHQ8 Score : (Patient-Rptd) (P) 11 (10/27/24 1809)  PHQ-9 Total Score: (Patient-Rptd) (P) 11 (10/27/24 1809)     ANISH-7 Answers        10/27/2024     6:08 PM   GAD7   1. Feeling nervous, anxious, or on edge? 2    2. Not being able to stop or control worrying? 1    3. Worrying too much about different things? 1    4. Trouble relaxing? 3    5. Being so restless that it is hard to sit still? 0    6. Becoming easily annoyed or irritable? 2    7. Feeling afraid as if something awful might happen? 2    8. If you checked off any problems, how difficult have these  problems made it for you to do your work, take care of things at home, or get along with other people? 1    ANISH-7 Score 11        Patient-reported     ANISH-7 Score: (Patient-Rptd) (P) 11  Interpretation: (Patient-Rptd) (P) Moderate Anxiety     Client Strengths: verbal, intelligent, successful, good social support, good insight, commitment to wellness, strong licha, strong cultural traditions     Diagnosis:     ICD-10-CM ICD-9-CM   1. Generalized anxiety disorder  F41.1 300.02   2. Infiltrating ductal carcinoma of left breast  C50.912 174.9        Treatment Plan:individual psychotherapy and medication management by physician  Target symptoms: anxiety   Why chosen therapy is appropriate versus another modality: relevant to diagnosis, patient responds to this modality, evidence based practice  Outcome monitoring methods: self-report, observation, checklist/rating scale  Therapeutic intervention type: insight oriented psychotherapy, behavior modifying psychotherapy  Prognosis: Good      Behavioral goals:    Exercise:   Stress management:   Social engagement:   Nutrition:   Smoking Cessation:   Therapy:  Increase daily self-care and attention to health management  Pleasant events scheduling and increased social interaction  Monitor stressors in writing and bring to next visit    Return to clinic: as scheduled    Next Session:      Length of Service (minutes direct face-to-face contact): 45    Racheal Catherine, PhD  Clinical Psychologist  [place] License #[id number]  [place] License #[id number]

## 2024-11-07 RX ORDER — AMITRIPTYLINE HYDROCHLORIDE 25 MG/1
25 TABLET, FILM COATED ORAL NIGHTLY
Qty: 90 TABLET | Refills: 1 | Status: SHIPPED | OUTPATIENT
Start: 2024-11-07 | End: 2025-11-07

## 2024-11-08 ENCOUNTER — TELEPHONE (OUTPATIENT)
Dept: HEMATOLOGY/ONCOLOGY | Facility: CLINIC | Age: 34
End: 2024-11-08
Payer: COMMERCIAL

## 2024-11-08 NOTE — TELEPHONE ENCOUNTER
----- Message from Screenie sent at 11/8/2024  1:49 PM CST -----  Regarding: Consult/Advisory  Contact: :Divya De Paz     Consult/Advisory     Name Of Caller:Divya De Paz         Contact Preference:[phone] (home)       Nature of call:Patient is calling to speak to doctor about chemo appt and not being cleared from her doctor yet to do chemo. Requesting a call back to discuss.

## 2024-11-08 NOTE — TELEPHONE ENCOUNTER
Called patient. Patient answered. Patient has questions about If she should start chemo with wounds from surgery still healing. I let her know I will message Dr. Chiu and see what she suggest we do. Patient was thankful.

## 2024-11-11 ENCOUNTER — TELEPHONE (OUTPATIENT)
Dept: HEMATOLOGY/ONCOLOGY | Facility: CLINIC | Age: 34
End: 2024-11-11
Payer: COMMERCIAL

## 2024-11-11 NOTE — TELEPHONE ENCOUNTER
----- Message from Med Assistant Avelar sent at 11/7/2024 12:52 PM CST -----  Regarding: FW: appt  Contact: [phone]  Please return patient call regarding appts. Thanks.  ----- Message -----  From: Rosaura Dave  Sent: 11/7/2024  12:39 PM CST  To: [facility] Staff  Subject: appt                                             Pt asking to speak to Nasima. Pls call to discuss.

## 2024-11-19 ENCOUNTER — PATIENT MESSAGE (OUTPATIENT)
Dept: REHABILITATION | Facility: HOSPITAL | Age: 34
End: 2024-11-19
Payer: COMMERCIAL

## 2024-11-19 ENCOUNTER — OFFICE VISIT (OUTPATIENT)
Dept: PLASTIC SURGERY | Facility: CLINIC | Age: 34
End: 2024-11-19
Payer: COMMERCIAL

## 2024-11-19 ENCOUNTER — TELEPHONE (OUTPATIENT)
Dept: HEMATOLOGY/ONCOLOGY | Facility: CLINIC | Age: 34
End: 2024-11-19
Payer: COMMERCIAL

## 2024-11-19 VITALS
DIASTOLIC BLOOD PRESSURE: 82 MMHG | HEART RATE: 83 BPM | BODY MASS INDEX: 43.91 KG/M2 | SYSTOLIC BLOOD PRESSURE: 117 MMHG | WEIGHT: 288.81 LBS

## 2024-11-19 DIAGNOSIS — Z09 SURGERY FOLLOW-UP: Primary | ICD-10-CM

## 2024-11-19 PROCEDURE — 99024 POSTOP FOLLOW-UP VISIT: CPT | Mod: S$GLB,,, | Performed by: SURGERY

## 2024-11-19 PROCEDURE — 99999 PR PBB SHADOW E&M-EST. PATIENT-LVL III: CPT | Mod: PBBFAC,,, | Performed by: SURGERY

## 2024-11-19 PROCEDURE — 3044F HG A1C LEVEL LT 7.0%: CPT | Mod: CPTII,S$GLB,, | Performed by: SURGERY

## 2024-11-19 PROCEDURE — 3079F DIAST BP 80-89 MM HG: CPT | Mod: CPTII,S$GLB,, | Performed by: SURGERY

## 2024-11-19 PROCEDURE — 3074F SYST BP LT 130 MM HG: CPT | Mod: CPTII,S$GLB,, | Performed by: SURGERY

## 2024-11-19 NOTE — TELEPHONE ENCOUNTER
----- Message from LUIS ARMANDO Sage sent at 11/19/2024  1:35 PM CST -----  Regarding: RE: Reschedule Existing Appointment  Contact: Divya De Paz  Pt scheduled to see clive on 11/21 are we making changes ?  ----- Message -----  From: Becki Clark  Sent: 11/19/2024  11:01 AM CST  To: [facility] Staff  Subject: FW: Reschedule Existing Appointment              Patient is scheduled for 11/29 already, maybe patient can keep that and will just need a clinic visit?  ----- Message -----  From: Esme Dimas  Sent: 11/19/2024   9:52 AM CST  To: [facility]  Subject: Reschedule Existing Appointment                  Reschedule Existing Appointment     Current appt date:11/21     Type of appt :Labs , EP and Chemo     Physician:Twin     Reason for rescheduling:Patient is calling stating that she needs to reschedule due to surgery wounds not healed yet. Requesting a call back     Caller:Divya De Paz      Contact Preference:[phone] (home)

## 2024-11-20 NOTE — PROGRESS NOTES
[facility] for a follow up visit status post oncoplastic breast reduction on 9/25/24.  Found to have triple positive breast cancer and is recommended to undergo adjuvant chemotherapy. Start date is pending wound healing. She is using silver alginate dressings to her bilateral IMF incision breakdown. This is suspected to be complicated by her history of hidradenitis suppurativa.         PHYSICAL EXAMINATION  Bilateral IMF wounds. Right wound is about 5cm in length, 1 cm width, the depth has decreased significantly. There is healthy granulation tissue in the wound base. The left IMF wound is 2x1cm and starting to epithelialize.   Nipples are viable with normal sensation and good position.        ASSESSMENT/PLAN  Divya De Paz is s/p oncoplastic breast reduction  - Continue local wound care. Discussed and offered wound care referral but had shared decision making and elected to continue with silver alginate.  - Follow up 4 weeks        All questions were answered. The patient was advised to contact the clinic with any questions or concerns prior to their next visit.         Lori Fierro PA-C  Plastic and Reconstructive Surgery

## 2024-11-22 ENCOUNTER — OFFICE VISIT (OUTPATIENT)
Dept: PSYCHIATRY | Facility: CLINIC | Age: 34
End: 2024-11-22
Payer: COMMERCIAL

## 2024-11-22 DIAGNOSIS — F41.1 GENERALIZED ANXIETY DISORDER: Primary | ICD-10-CM

## 2024-11-22 DIAGNOSIS — C50.912 INFILTRATING DUCTAL CARCINOMA OF LEFT BREAST: ICD-10-CM

## 2024-11-22 PROCEDURE — 99999 PR PBB SHADOW E&M-EST. PATIENT-LVL II: CPT | Mod: PBBFAC,,, | Performed by: PSYCHOLOGIST

## 2024-11-22 PROCEDURE — 1159F MED LIST DOCD IN RCRD: CPT | Mod: CPTII,S$GLB,, | Performed by: PSYCHOLOGIST

## 2024-11-22 PROCEDURE — 90832 PSYTX W PT 30 MINUTES: CPT | Mod: S$GLB,,, | Performed by: PSYCHOLOGIST

## 2024-11-22 PROCEDURE — 3044F HG A1C LEVEL LT 7.0%: CPT | Mod: CPTII,S$GLB,, | Performed by: PSYCHOLOGIST

## 2024-11-22 NOTE — PROGRESS NOTES
INFORMED CONSENT: Patient was identified using two patient-identifiers. The patient has been informed of the risks and benefits associated with engaging in psychotherapy, the handling of protected health information, the rights of privacy and the limits of confidentiality. The patient has also been informed of the importance of reporting any suicidal or homicidal ideation to this or any provider to ensure safety of all parties, and the Divya De Paz expressed understanding. The patient was agreeable to these terms and freely participates in individual psychotherapy.    PSYCHO-ONCOLOGY NOTE/ Individual Psychotherapy     Date: 11/22/2024   Site:  [facility]        Therapeutic Intervention: Met with patient.  Outpatient - Insight oriented psychotherapy 30 min - CPT code 24115      Patient was last seen by me on 10/31/2024    Problem list  Patient Active Problem List   Diagnosis    Hidradenitis suppurativa    PCOS (polycystic ovarian syndrome)    Insulin resistance    Secondary oligomenorrhea    Anxiety and depression    Invasive ductal carcinoma of breast, female, left    Negative Hereditary Cancer Genetic testing       Chief complaint/reason for encounter: anxiety   Met with patient to evaluate psychosocial adaptation to diagnosis/treatment/survivorship of BC    Current Medications  Current Outpatient Medications   Medication    amitriptyline (ELAVIL) 25 MG tablet    LIDOcaine-prilocaine (EMLA) cream    metFORMIN (GLUCOPHAGE) 500 MG tablet    ondansetron (ZOFRAN) 8 MG tablet    prochlorperazine (COMPAZINE) 5 MG tablet    spironolactone (ALDACTONE) 50 MG tablet     No current facility-administered medications for this visit.       Objective:  Divya De Paz arrived promptly for the session.   The patient was fully cooperative throughout the session.  Appearance: age appropriate, appropriately  dressed, adequately  groomed  Behavior/Cooperation: friendly and cooperative  Speech: normal in rate, volume, and  tone and appropriate quality, quantity and organization of sentences  Mood: euthymic  Affect: mood congruent  Thought Process: goal-directed, logical  Thought Content: normal,  No delusions or paranoia; did not appear to be responding to internal stimuli during the session  Orientation: grossly intact  Attention Span/Concentration: Attends to session without distraction; reports no difficulty  Insight: patient has awareness of illness; good insight into own behavior and behavior of others  Judgment: the patient's behavior is adequate to circumstances    Interval history and content of current session: Patient Chemo start delayed due to wound healing issues.   Discussed diagnosis, treatment, prognosis, current adaptation to disease and treatment status, and family's adaptation to disease and treatment status. Reports to be coping with great difficulty. Evaluated cognitive response, paying particular attention to negative intrusive thoughts of a persistent and detrimental nature. Thoughts of this type are in evidence with moderate distress. Provided cognitive behavioral therapy to address negative cognitions. Identified and evaluated psychosocial and environmental stressors secondary to diagnosis and treatment.  Examined proactive behaviors that may be implemented to minimize or ameliorate psychosocial stressors secondary to diagnosis and treatment.     Risk parameters:   Patient reports no suicidal ideation  Patient reports no homicidal ideation  Patient reports no self-injurious behavior  Patient reports no violent behavior   Safety needs:  None at this time      Verbal deficits: None     Patient's response to intervention:The patient's response to intervention is accepting.     Progress toward goals and other mental status changes:  The patient's progress toward goals is good.      Progress to date:Progress as Expected      Goals from last visit: Met     Patient reported outcomes:    Distress Thermometer:    Distress Score    Distress Score: 6        Practical Problems Physical Problems                                                   Family Problems                                         Emotional Problems                                                         Spiritual/Religions Concerns               Other Problems               PHQ ANSWERS    Q1. Little interest or pleasure in doing things: (Patient-Rptd) (P) More than half the days (11/22/24 1003)  Q2. Feeling down, depressed, or hopeless: (Patient-Rptd) (P) Not at all (11/22/24 1003)  Q3. Trouble falling or staying asleep, or sleeping too much: (Patient-Rptd) (P) Nearly every day (11/22/24 1003)  Q4. Feeling tired or having little energy: (Patient-Rptd) (P) Nearly every day (11/22/24 1003)  Q5. Poor appetite or overeating: (Patient-Rptd) (P) Several days (11/22/24 1003)  Q6. Feeling bad about yourself - or that you are a failure or have let yourself or your family down: (Patient-Rptd) (P) Several days (11/22/24 1003)  Q7. Trouble concentrating on things, such as reading the newspaper or watching television: (Patient-Rptd) (P) Several days (11/22/24 1003)  Q8. Moving or speaking so slowly that other people could have noticed. Or the opposite - being so fidgety or restless that you have been moving around a lot more than usual: (Patient-Rptd) (P) Not at all (11/22/24 1003)  Q9.  0    PHQ8 Score : (Patient-Rptd) (P) 11 (11/22/24 1003)  PHQ-9 Total Score: (Patient-Rptd) (P) 11 (11/22/24 1003)     ANISH-7 Answers        11/22/2024    10:02 AM   GAD7   1. Feeling nervous, anxious, or on edge? 1    2. Not being able to stop or control worrying? 2    3. Worrying too much about different things? 2    4. Trouble relaxing? 2    5. Being so restless that it is hard to sit still? 0    6. Becoming easily annoyed or irritable? 2    7. Feeling afraid as if something awful might happen? 2    8. If you checked off any problems, how difficult have these problems made it for you  to do your work, take care of things at home, or get along with other people? 1    ANISH-7 Score 11        Patient-reported     ANISH-7 Score: (Patient-Rptd) (P) 11  Interpretation: (Patient-Rptd) (P) Moderate Anxiety     Client Strengths: verbal, intelligent, successful, good social support, good insight, commitment to wellness, strong licha, strong cultural traditions     Diagnosis:     ICD-10-CM ICD-9-CM   1. Generalized anxiety disorder  F41.1 300.02   2. Infiltrating ductal carcinoma of left breast  C50.912 174.9       Treatment Plan:individual psychotherapy and medication management by physician  Target symptoms: anxiety   Why chosen therapy is appropriate versus another modality: relevant to diagnosis, patient responds to this modality, evidence based practice  Outcome monitoring methods: self-report, observation, checklist/rating scale  Therapeutic intervention type: insight oriented psychotherapy, behavior modifying psychotherapy  Prognosis: Good      Behavioral goals:    Exercise:   Stress management:   Social engagement:   Nutrition:   Smoking Cessation:   Therapy:  Increase daily self-care and attention to health management  Pleasant events scheduling and increased social interaction  Monitor stressors in writing and bring to next visit    Return to clinic: as scheduled    Next Session:      Length of Service (minutes direct face-to-face contact): 30    Racheal Catherine, PhD  Clinical Psychologist  [place] License #[id number]  [place] License #[id number]

## 2024-11-25 ENCOUNTER — TELEPHONE (OUTPATIENT)
Dept: HEMATOLOGY/ONCOLOGY | Facility: CLINIC | Age: 34
End: 2024-11-25
Payer: COMMERCIAL

## 2024-11-25 ENCOUNTER — PATIENT MESSAGE (OUTPATIENT)
Dept: HEMATOLOGY/ONCOLOGY | Facility: CLINIC | Age: 34
End: 2024-11-25
Payer: COMMERCIAL

## 2024-11-25 NOTE — TELEPHONE ENCOUNTER
----- Message from Maureen Chiu MD sent at 11/25/2024  9:55 AM CST -----  Regarding: RE: Reschedule Existing Appointment  Contact: Divya De Paz  Yes idalia would work  ----- Message -----  From: Eduardo Becki  Sent: 11/25/2024   9:16 AM CST  To: Maureen Chiu MD; Teodora Haskins RN  Subject: RE: Reschedule Existing Appointment              Would the week of 12/9 be okay? She already has a chemo scheduled that week. I'll just try to look for an office visit. Please let me know.  ----- Message -----  From: Teodora Haskins RN  Sent: 11/25/2024   8:24 AM CST  To: Becki Elizabetheco  Subject: FW: Reschedule Existing Appointment              Good morning,     Can you please assist in getting her scheduled for chemo? Send message when done and I will notify Dr. Chiu. Thanks so much in advance!    Teodora DURÁN BSN  ----- Message -----  From: Maureen Chiu MD  Sent: 11/22/2024   2:26 PM CST  To: Ridge Murcia DO; Teodora Haskins RN  Subject: RE: Reschedule Existing Appointment              Ok sounds good. We'll plan for 3 weeks     Teodora can we get her scheduled for chemo in 3 weeks?  ----- Message -----  From: Ridge Murcia DO  Sent: 11/22/2024  10:51 AM CST  To: Maureen Chiu MD  Subject: RE: Reschedule Existing Appointment              HBO  maybe, but its a huge time commitment and expense for limited benefit  Would care would not  do much different than we are now  I think in three weeks we will be really close if not already there.  So lets plan on that   -Juan Antonio  ----- Message -----  From: Maureen Chiu MD  Sent: 11/22/2024   8:57 AM CST  To: Ridge Murcia DO  Subject: RE: Reschedule Existing Appointment              I can give you 3 weeks. Do you think wound care or hyperbarics could help move things along?  ----- Message -----  From: Ridge Murcia DO  Sent: 11/20/2024  10:52 AM CST  To: Maureen Chiu MD  Subject: RE: Reschedule Existing Appointment              Honestly  Maureen, she has healed pretty poorly and slow-  She has much more wound breakdown than I normally see and also had some breakdown around the areola, which I never see.  She has hidradenitis under the breasts and I think this has contributed to her healing issue  It probably is another three weeks or so.  If you don't think the taxol would be a big hindrance, we could start it sooner    -Juan Antonio  ----- Message -----  From: Maureen Chiu MD  Sent: 11/20/2024   8:35 AM CST  To: Ridge Murcia,   Subject: RE: Reschedule Existing Appointment              Robert Romano,     How bad is her wound healing? Does she really need another 4 weeks? I have her scheduled  to get only weekly Taxol chemotherapy, which shouldn't drop her blood counts much at all. I really need to get her started if we're going to get any benefit out of treatment and I'm really worried since she's so young. She's already 8 weeks out, I could potentially stretch to 12 weeks (Dec 18) at an absolute max    Thoughts?   Maureen  ----- Message -----  From: Becki Clark  Sent: 11/19/2024   2:22 PM CST  To: Maureen Chiu MD; Chu Alicia, LUIS ARMANDO  Subject: RE: Reschedule Existing Appointment              I just spoke to patient and it seems after seeing Dr. Sabillon today, she had a surgery from from September it seems that her wound is still taking a while to heal and recommended 4 more weeks to heal. I assume maybe Dr. Murcia reached out to Dr. Chiu. I know she would have to push back her first time starting treatment.  ----- Message -----  From: Chu Alicia, LUIS ARMANDO  Sent: 11/19/2024   1:35 PM CST  To: Becki Clark  Subject: RE: Reschedule Existing Appointment              Pt scheduled to see clive on 11/21 are we making changes ?  ----- Message -----  From: Becki Clark  Sent: 11/19/2024  11:01 AM CST  To: [facility] Staff  Subject: FW: Reschedule Existing Appointment              Patient is scheduled for 11/29 already, maybe  patient can keep that and will just need a clinic visit?  ----- Message -----  From: Esme Dimas  Sent: 11/19/2024   9:52 AM CST  To: [facility]  Subject: Reschedule Existing Appointment                  Reschedule Existing Appointment     Current appt date:11/21     Type of appt :Labs , EP and Chemo     Physician:Twin     Reason for rescheduling:Patient is calling stating that she needs to reschedule due to surgery wounds not healed yet. Requesting a call back     Caller:Divya De Paz      Contact Preference:[phone] (home)

## 2024-11-25 NOTE — PROGRESS NOTES
Ms De Paz it was now 4 weeks status post left lumpectomy with oncoplastic breast reduction.  She was unfortunately dealing with some bilateral wound breakdown.      On exam she was a small area of breakdown of the T point on the left side.  On the right side she was a larger area T point breakdown that extends along the right IMF.      Recommended local wound care.  Sometimes the wounds in the IMF for slower to heal.    I recommended a silver alginate dressing was able to show her a picture of to over the counter dressings that she can buy.  I would like her to keep these in the skin folds with a gauze pad.    I will see her back in 2 weeks    Juan Antonio Murcia, DO  Plastic and Reconstructive Surgery

## 2024-11-27 ENCOUNTER — TELEPHONE (OUTPATIENT)
Dept: HEMATOLOGY/ONCOLOGY | Facility: CLINIC | Age: 34
End: 2024-11-27
Payer: COMMERCIAL

## 2024-11-27 DIAGNOSIS — C50.912 INVASIVE DUCTAL CARCINOMA OF BREAST, FEMALE, LEFT: Primary | ICD-10-CM

## 2024-11-29 DIAGNOSIS — C50.912 INVASIVE DUCTAL CARCINOMA OF BREAST, FEMALE, LEFT: Primary | ICD-10-CM

## 2024-12-10 ENCOUNTER — LAB VISIT (OUTPATIENT)
Dept: LAB | Facility: HOSPITAL | Age: 34
End: 2024-12-10
Attending: INTERNAL MEDICINE
Payer: COMMERCIAL

## 2024-12-10 ENCOUNTER — OFFICE VISIT (OUTPATIENT)
Dept: HEMATOLOGY/ONCOLOGY | Facility: CLINIC | Age: 34
End: 2024-12-10
Payer: COMMERCIAL

## 2024-12-10 VITALS — BODY MASS INDEX: 46.28 KG/M2 | HEIGHT: 66 IN | RESPIRATION RATE: 18 BRPM | WEIGHT: 288 LBS

## 2024-12-10 DIAGNOSIS — R45.89 ANXIETY ABOUT HEALTH: ICD-10-CM

## 2024-12-10 DIAGNOSIS — T45.1X5A CHEMOTHERAPY-INDUCED NAUSEA: ICD-10-CM

## 2024-12-10 DIAGNOSIS — R11.0 CHEMOTHERAPY-INDUCED NAUSEA: ICD-10-CM

## 2024-12-10 DIAGNOSIS — C50.912 INVASIVE DUCTAL CARCINOMA OF BREAST, FEMALE, LEFT: ICD-10-CM

## 2024-12-10 DIAGNOSIS — C50.912 INVASIVE DUCTAL CARCINOMA OF BREAST, FEMALE, LEFT: Primary | ICD-10-CM

## 2024-12-10 LAB
ALBUMIN SERPL BCP-MCNC: 3.9 G/DL (ref 3.5–5.2)
ALP SERPL-CCNC: 88 U/L (ref 40–150)
ALT SERPL W/O P-5'-P-CCNC: 11 U/L (ref 10–44)
ANION GAP SERPL CALC-SCNC: 9 MMOL/L (ref 8–16)
AST SERPL-CCNC: 11 U/L (ref 10–40)
BASOPHILS # BLD AUTO: 0.02 K/UL (ref 0–0.2)
BASOPHILS NFR BLD: 0.3 % (ref 0–1.9)
BILIRUB SERPL-MCNC: 0.7 MG/DL (ref 0.1–1)
BUN SERPL-MCNC: 14 MG/DL (ref 6–20)
CALCIUM SERPL-MCNC: 9.1 MG/DL (ref 8.7–10.5)
CHLORIDE SERPL-SCNC: 106 MMOL/L (ref 95–110)
CO2 SERPL-SCNC: 23 MMOL/L (ref 23–29)
CREAT SERPL-MCNC: 0.6 MG/DL (ref 0.5–1.4)
DIFFERENTIAL METHOD BLD: ABNORMAL
EOSINOPHIL # BLD AUTO: 0.1 K/UL (ref 0–0.5)
EOSINOPHIL NFR BLD: 2 % (ref 0–8)
ERYTHROCYTE [DISTWIDTH] IN BLOOD BY AUTOMATED COUNT: 12.2 % (ref 11.5–14.5)
EST. GFR  (NO RACE VARIABLE): >60 ML/MIN/1.73 M^2
GLUCOSE SERPL-MCNC: 109 MG/DL (ref 70–110)
HCG INTACT+B SERPL-ACNC: <2.4 MIU/ML
HCT VFR BLD AUTO: 41.6 % (ref 37–48.5)
HGB BLD-MCNC: 14.4 G/DL (ref 12–16)
IMM GRANULOCYTES # BLD AUTO: 0.01 K/UL (ref 0–0.04)
IMM GRANULOCYTES NFR BLD AUTO: 0.2 % (ref 0–0.5)
LYMPHOCYTES # BLD AUTO: 2.3 K/UL (ref 1–4.8)
LYMPHOCYTES NFR BLD: 39.7 % (ref 18–48)
MCH RBC QN AUTO: 29.4 PG (ref 27–31)
MCHC RBC AUTO-ENTMCNC: 34.6 G/DL (ref 32–36)
MCV RBC AUTO: 85 FL (ref 82–98)
MONOCYTES # BLD AUTO: 0.4 K/UL (ref 0.3–1)
MONOCYTES NFR BLD: 7.5 % (ref 4–15)
NEUTROPHILS # BLD AUTO: 3 K/UL (ref 1.8–7.7)
NEUTROPHILS NFR BLD: 50.3 % (ref 38–73)
NRBC BLD-RTO: 0 /100 WBC
PLATELET # BLD AUTO: 287 K/UL (ref 150–450)
PMV BLD AUTO: 9.1 FL (ref 9.2–12.9)
POTASSIUM SERPL-SCNC: 4.1 MMOL/L (ref 3.5–5.1)
PROT SERPL-MCNC: 7.2 G/DL (ref 6–8.4)
RBC # BLD AUTO: 4.89 M/UL (ref 4–5.4)
SODIUM SERPL-SCNC: 138 MMOL/L (ref 136–145)
WBC # BLD AUTO: 5.9 K/UL (ref 3.9–12.7)

## 2024-12-10 PROCEDURE — 3008F BODY MASS INDEX DOCD: CPT | Mod: CPTII,95,, | Performed by: NURSE PRACTITIONER

## 2024-12-10 PROCEDURE — 80053 COMPREHEN METABOLIC PANEL: CPT | Performed by: INTERNAL MEDICINE

## 2024-12-10 PROCEDURE — 36415 COLL VENOUS BLD VENIPUNCTURE: CPT | Performed by: INTERNAL MEDICINE

## 2024-12-10 PROCEDURE — 99215 OFFICE O/P EST HI 40 MIN: CPT | Mod: 95,,, | Performed by: NURSE PRACTITIONER

## 2024-12-10 PROCEDURE — 85025 COMPLETE CBC W/AUTO DIFF WBC: CPT | Performed by: INTERNAL MEDICINE

## 2024-12-10 PROCEDURE — G2211 COMPLEX E/M VISIT ADD ON: HCPCS | Mod: 95,,, | Performed by: NURSE PRACTITIONER

## 2024-12-10 PROCEDURE — 84702 CHORIONIC GONADOTROPIN TEST: CPT | Performed by: INTERNAL MEDICINE

## 2024-12-10 PROCEDURE — 1159F MED LIST DOCD IN RCRD: CPT | Mod: CPTII,95,, | Performed by: NURSE PRACTITIONER

## 2024-12-10 PROCEDURE — 3044F HG A1C LEVEL LT 7.0%: CPT | Mod: CPTII,95,, | Performed by: NURSE PRACTITIONER

## 2024-12-10 RX ORDER — HEPARIN 100 UNIT/ML
500 SYRINGE INTRAVENOUS
Status: CANCELLED | OUTPATIENT
Start: 2025-02-09

## 2024-12-10 RX ORDER — DIPHENHYDRAMINE HYDROCHLORIDE 50 MG/ML
50 INJECTION INTRAMUSCULAR; INTRAVENOUS ONCE AS NEEDED
Status: CANCELLED | OUTPATIENT
Start: 2025-02-09

## 2024-12-10 RX ORDER — FAMOTIDINE 10 MG/ML
20 INJECTION INTRAVENOUS
Status: CANCELLED | OUTPATIENT
Start: 2025-02-09

## 2024-12-10 RX ORDER — MEPERIDINE HYDROCHLORIDE 50 MG/ML
25 INJECTION INTRAMUSCULAR; INTRAVENOUS; SUBCUTANEOUS ONCE AS NEEDED
Status: CANCELLED
Start: 2025-02-09

## 2024-12-10 RX ORDER — EPINEPHRINE 0.3 MG/.3ML
0.3 INJECTION SUBCUTANEOUS ONCE AS NEEDED
Status: CANCELLED | OUTPATIENT
Start: 2025-02-09

## 2024-12-10 RX ORDER — PROCHLORPERAZINE EDISYLATE 5 MG/ML
10 INJECTION INTRAMUSCULAR; INTRAVENOUS ONCE AS NEEDED
Status: CANCELLED
Start: 2025-02-09

## 2024-12-10 RX ORDER — SODIUM CHLORIDE 0.9 % (FLUSH) 0.9 %
10 SYRINGE (ML) INJECTION
Status: CANCELLED | OUTPATIENT
Start: 2025-02-09

## 2024-12-10 NOTE — Clinical Note
Sorry to send to you but I need her chemo signed for this week. Its adjuvant cycle 1 Kanjinti and Taxol. [facility] patient. Thanks

## 2024-12-10 NOTE — PROGRESS NOTES
[facility]/ [facility]   at [facility] Note:      The patient location is: [place]  The chief complaint leading to consultation is: breast cancer    Visit type: audiovisual      40 minutes of total time spent on the encounter, which includes face to face time and non-face to face time preparing to see the patient (eg, review of tests), Obtaining and/or reviewing separately obtained history, Documenting clinical information in the electronic or other health record, Independently interpreting results (not separately reported) and communicating results to the patient/family/caregiver, or Care coordination (not separately reported).         Each patient to whom he or she provides medical services by telemedicine is:  (1) informed of the relationship between the physician and patient and the respective role of any other health care provider with respect to management of the patient; and (2) notified that he or she may decline to receive medical services by telemedicine and may withdraw from such care at any time.    Notes:     Chief Complaint:   Encounter Diagnoses   Name Primary?    Invasive ductal carcinoma of breast, female, left Yes    Chemotherapy-induced nausea     Anxiety about health         Cancer Staging   Invasive ductal carcinoma of breast, female, left  Staging form: Breast, AJCC 8th Edition  - Clinical stage from 8/8/2024: Stage IA (cT1c, cN0, cM0, G3, ER+, OK+, HER2+) - Signed by Maureen Chiu MD on 8/27/2024      HPI:  Divya De Paz is a 34 y.o. female with PCOS and ANISH who presents today for follow up of newly diagnosed breast cancer.  She is here to be cleared for her C1 adjuvant chemo. Her incisions are healing well. She had very small opening that is now healing on the left breast. No redness or drainage. She has seen surgery for follow up multiple times due to delayed wound healing.   She is generally feeling well today.  Has had some  delayed healing to her right breast incisions, following with plastics.  Not sleeping well. She bought melatonin but hasn't tried it. She does have anxiety and seeing psychology.   She has ice packs for hands and feet.   Dr. Chiu and plastics have been in communication and ok to start      Oncology History  Patient self palpated a lump during self-exam, got evaluated by Gyn a few days later.   Follow-up mammogram 2024 showed an irregular high density mass in the UOQ of left breast  Ultrasound 2024: superficial irregular not parallel hypoechoic mass with indistinct and angular margins measuring 1.0 x 0.8 x 1.0 cm (1 o'clock axis, 1 CFN).     Biopsy 2024: IDC, grade 3, ER 90-95%/ IN 10-20%/ HER2 3+; Ki67 80-90%     MRI 8/15/2024 revealed a 1.5 cm x 1.3 cm x 1.0 cm irregularly shaped mass with irregular margins and heterogeneous internal enhancement seen in the left breast at 1 o'clock in the anterior depth, 4.1 cm from the nipple and 0.3 cm from the skin. No internal mammary or axillary adenopathy identified.    L breast lumpectomy 24: IDC, grade 3, 1.1cm; 0/1 LN+,  also with oncoplastic reduction    GYN History:  Age of menarche was 14. Age of menopause was n/a.  Last menstrual period was the week of 2024, pt reports inconsistent periods due to PCOS. Patient reports hormonal therapy use (Provera) to induce menstruation, stopped 2024 per OB/GYN reccs. Patient is . Age of first live birth was n/a.     Family History:  Paternal Grandmother Breast Cancer - multiple times;  of lung cancer (heavy smoker)  Paternal Aunt Breast Cancer - diagnosed at 45, BRCA negative  Paternal Aunt did not have cancer, also BRCA negative  Maternal grandmother - lung cancer, no smoking history  Maternal grandfather - renal cancer      Patient Active Problem List   Diagnosis    Hidradenitis suppurativa    PCOS (polycystic ovarian syndrome)    Insulin resistance    Secondary oligomenorrhea    Anxiety and  "depression    Invasive ductal carcinoma of breast, female, left    Negative Hereditary Cancer Genetic testing       Current Outpatient Medications   Medication Instructions    amitriptyline (ELAVIL) 25 mg, Oral, Nightly    LIDOcaine-prilocaine (EMLA) cream Apply topically to port one hour prior to chemotherapy infusion    metFORMIN (GLUCOPHAGE) 500 mg, Oral, 2 times daily with meals    ondansetron (ZOFRAN) 8 mg, Oral, Every 8 hours PRN    prochlorperazine (COMPAZINE) 5 mg, Oral, Every 6 hours PRN, Take if zofran is not working    spironolactone (ALDACTONE) 50 mg, Oral, Daily       Review of Systems:   Review of Systems   Constitutional:         See above   All other systems reviewed and are negative.      PHYSICAL EXAM:  Resp 18   Ht 5' 6" (1.676 m)   Wt 130.6 kg (288 lb) Comment: pt reported  BMI 46.48 kg/m²     Physical Exam  Constitutional:       Comments: Limited as virtual.  Appears comfortable.          Pertinent Labs & Imaging:  Pathology Results  (Last 30 days)      None            Recent Results (from the past 24 hours)   CBC W/ AUTO DIFFERENTIAL    Collection Time: 12/10/24  7:43 AM   Result Value Ref Range    WBC 5.90 3.90 - 12.70 K/uL    RBC 4.89 4.00 - 5.40 M/uL    Hemoglobin 14.4 12.0 - 16.0 g/dL    Hematocrit 41.6 37.0 - 48.5 %    MCV 85 82 - 98 fL    MCH 29.4 27.0 - 31.0 pg    MCHC 34.6 32.0 - 36.0 g/dL    RDW 12.2 11.5 - 14.5 %    Platelets 287 150 - 450 K/uL    MPV 9.1 (L) 9.2 - 12.9 fL    Immature Granulocytes 0.2 0.0 - 0.5 %    Gran # (ANC) 3.0 1.8 - 7.7 K/uL    Immature Grans (Abs) 0.01 0.00 - 0.04 K/uL    Lymph # 2.3 1.0 - 4.8 K/uL    Mono # 0.4 0.3 - 1.0 K/uL    Eos # 0.1 0.0 - 0.5 K/uL    Baso # 0.02 0.00 - 0.20 K/uL    nRBC 0 0 /100 WBC    Gran % 50.3 38.0 - 73.0 %    Lymph % 39.7 18.0 - 48.0 %    Mono % 7.5 4.0 - 15.0 %    Eosinophil % 2.0 0.0 - 8.0 %    Basophil % 0.3 0.0 - 1.9 %    Differential Method Automated    CMP    Collection Time: 12/10/24  7:43 AM   Result Value Ref Range    " "Sodium 138 136 - 145 mmol/L    Potassium 4.1 3.5 - 5.1 mmol/L    Chloride 106 95 - 110 mmol/L    CO2 23 23 - 29 mmol/L    Glucose 109 70 - 110 mg/dL    BUN 14 6 - 20 mg/dL    Creatinine 0.6 0.5 - 1.4 mg/dL    Calcium 9.1 8.7 - 10.5 mg/dL    Total Protein 7.2 6.0 - 8.4 g/dL    Albumin 3.9 3.5 - 5.2 g/dL    Total Bilirubin 0.7 0.1 - 1.0 mg/dL    Alkaline Phosphatase 88 40 - 150 U/L    AST 11 10 - 40 U/L    ALT 11 10 - 44 U/L    eGFR >60.0 >60 mL/min/1.73 m^2    Anion Gap 9 8 - 16 mmol/L   B-HCG    Collection Time: 12/10/24  7:43 AM   Result Value Ref Range    HCG Quant <2.4 See Text mIU/mL       Assessment & Plan:    1. Invasive ductal carcinoma of breast, female, left  -- chemo education done  -- port placed  -- echo on 10/17/24: ef 60 to 65%, ls -17.6%  -- has met with nutrition and IO  -- labs reviewed     Per Dr. Chiu's note:   "Patient with newly diagnosed stage I TPBC  We discussed adjuvant Paclitaxel and Trastuzumab for T1N0 HER2-Positive Breast Cancer per study published in NEJM 2015 by Lisa et al.   I recommend weekly Paclitaxel at 80 mg/meter squared with weekly Trastuzumab. I have recommended a total of 12 weekly cycles of therapy. After completion of the first 12 weekly cycles, I have strongly recommended continuation of Trastuzumab alone for a total of one year of adjuvant Trastuzumab therapy. After completion of Taxol and Trastuzumab, adjuvant Trastuzumab can be given every three weeks.     First Infusion Loading Dose of Trastuzumab will be 4 mg/Kg over 90 minutes followed by the second Infusion of Trastuzumab at 2mg/kg over 30 minutes Qweek for the remainder of Taxol and Trastuzumab.    When switching to y1ffics Trastuzumab, it will be given at 6mg/kg over 60 minutes Q 3 weeks and if tolerated, all subsequent Infusions of Trastuzumab but may be over 30 minutes until completion of one year of therapy.    Overall side effects of chemotherapy were discussed including alopecia, immunosuppression, " "Neutropenia (low white count), anemia (low hemoglobin), thrombocytopenia (lowered platelets), and neuropathy from Taxol. Other side effects such as nail changes, dry eyes, mouth sores, and bone pain were discussed.  Rare but serious side effects such as increased risk of cardiac toxicity were discussed with Trastuzumab. Monitoring of her cardiac function with MUGAs or an echocardiogram is strongly recommended every 3 months by NCCN guidelines while on adjuvant Trastuzumab.    Recommend ovarian protection given young age. Discussed Zoladex for the 12 weeks on chemo and then discontinuing  Given small size of her cancer, would recommend adjuvant Tamoxifen with Trastuzumab in the adjuvant setting     - [facility] Enrollment Chemotherapy; [facility]  - LIDOcaine-prilocaine (EMLA) cream; Apply topically to port one hour prior to chemotherapy infusion  Dispense: 30 g; Refill: 1  - ondansetron (ZOFRAN) 8 MG tablet; Take 1 tablet (8 mg total) by mouth every 8 (eight) hours as needed for Nausea.  Dispense: 30 tablet; Refill: 1"      --ok to start C1 this week as scheduled. All questions answered. Knows to call for any issue or complaints  Reassurance given  F/u psychology prn  Ok to take melatonin for sleep    Route Chart for Scheduling    Med Onc Chart Routing      Follow up with physician    Follow up with [facility] . As scheduled.   Infusion scheduling note    Injection scheduling note    Labs    Imaging    Pharmacy appointment    Other referrals                  Treatment Plan Information   OP BREAST TRASTUZUMAB PACLITAXEL WEEKLY Maureen Chiu MD   Associated diagnosis: Invasive ductal carcinoma of breast, female, left Stage IA cT1c, cN0, cM0, G3, ER+, TX+, HER2+ noted on 8/12/2024   Line of treatment: Adjuvant  Treatment Goal: Curative     Upcoming Treatment Dates - OP BREAST TRASTUZUMAB PACLITAXEL WEEKLY    12/10/2024       Pre-Medications       diphenhydrAMINE (BENADRYL) 50 mg in NS 50 mL IVPB       dexAMETHasone " (DECADRON) 10 mg in sodium chloride 0.9% 50 mL IVPB       famotidine (PF) injection 20 mg       Chemotherapy       trastuzumab-anns (KANJINTI) 521 mg in 0.9% NaCl 250 mL chemo infusion       PACLitaxeL (TAXOL) 80 mg/m2 = 198 mg in 0.9% NaCl 250 mL chemo infusion  12/17/2024       Pre-Medications       diphenhydrAMINE (BENADRYL) 50 mg in NS 50 mL IVPB       dexAMETHasone (DECADRON) 10 mg in sodium chloride 0.9% 50 mL IVPB       famotidine (PF) injection 20 mg       Chemotherapy       trastuzumab-anns (KANJINTI) 261 mg in 0.9% NaCl 250 mL chemo infusion       PACLitaxeL (TAXOL) 80 mg/m2 = 198 mg in 0.9% NaCl 250 mL chemo infusion  12/24/2024       Pre-Medications       diphenhydrAMINE (BENADRYL) 50 mg in NS 50 mL IVPB       dexAMETHasone (DECADRON) 10 mg in sodium chloride 0.9% 50 mL IVPB       famotidine (PF) injection 20 mg       Chemotherapy       trastuzumab-anns (KANJINTI) 261 mg in 0.9% NaCl 250 mL chemo infusion       PACLitaxeL (TAXOL) 80 mg/m2 = 198 mg in 0.9% NaCl 250 mL chemo infusion  12/31/2024       Pre-Medications       diphenhydrAMINE (BENADRYL) 50 mg in NS 50 mL IVPB       dexAMETHasone (DECADRON) 10 mg in sodium chloride 0.9% 50 mL IVPB       famotidine (PF) injection 20 mg       Chemotherapy       trastuzumab-anns (KANJINTI) 261 mg in 0.9% NaCl 250 mL chemo infusion       PACLitaxeL (TAXOL) 80 mg/m2 = 198 mg in 0.9% NaCl 250 mL chemo infusion    Supportive Plan Information  OP BREAST GOSERELIN Q4W Maureen Chiu MD   Associated Diagnosis: Invasive ductal carcinoma of breast, female, left Stage IA cT1c, cN0, cM0, G3, ER+, MO+, HER2+ noted on 8/12/2024   Line of treatment: Supportive Care   Treatment goal: Supportive     Upcoming Treatment Dates - OP BREAST GOSERELIN Q4W    10/11/2024       Chemotherapy       goserelin (ZOLADEX) injection 3.6 mg  11/8/2024       Chemotherapy       goserelin (ZOLADEX) injection 3.6 mg  12/6/2024       Chemotherapy       goserelin (ZOLADEX) injection 3.6  mg  1/3/2025       Chemotherapy       goserelin (ZOLADEX) injection 3.6 mg    Patient is in agreement with the proposed treatment plan. All questions were answered to the patient's satisfaction. Pt knows to call clinic for any new or worsening symptoms and if anything is needed before the next clinic visit.    Db Stern, MSN, APRN, FNP-C  Nurse Practitioner to Dr. Tomasa Joy  [facility]  [facility]  Hematology & Medical Oncology  [address] [place] [id number]  ph. [phone] ext [phone]  Fax. [phone]

## 2024-12-12 ENCOUNTER — INFUSION (OUTPATIENT)
Dept: INFUSION THERAPY | Facility: HOSPITAL | Age: 34
End: 2024-12-12
Payer: COMMERCIAL

## 2024-12-12 ENCOUNTER — PATIENT MESSAGE (OUTPATIENT)
Dept: ADMINISTRATIVE | Facility: OTHER | Age: 34
End: 2024-12-12
Payer: COMMERCIAL

## 2024-12-12 VITALS
DIASTOLIC BLOOD PRESSURE: 63 MMHG | TEMPERATURE: 98 F | WEIGHT: 290 LBS | BODY MASS INDEX: 46.61 KG/M2 | SYSTOLIC BLOOD PRESSURE: 135 MMHG | RESPIRATION RATE: 16 BRPM | HEART RATE: 105 BPM | OXYGEN SATURATION: 98 % | HEIGHT: 66 IN

## 2024-12-12 DIAGNOSIS — C50.912 INVASIVE DUCTAL CARCINOMA OF BREAST, FEMALE, LEFT: Primary | ICD-10-CM

## 2024-12-12 PROCEDURE — 96417 CHEMO IV INFUS EACH ADDL SEQ: CPT

## 2024-12-12 PROCEDURE — 63600175 PHARM REV CODE 636 W HCPCS: Mod: JZ,JG | Performed by: INTERNAL MEDICINE

## 2024-12-12 PROCEDURE — A4216 STERILE WATER/SALINE, 10 ML: HCPCS | Performed by: INTERNAL MEDICINE

## 2024-12-12 PROCEDURE — 25000003 PHARM REV CODE 250: Performed by: INTERNAL MEDICINE

## 2024-12-12 PROCEDURE — 96413 CHEMO IV INFUSION 1 HR: CPT

## 2024-12-12 PROCEDURE — 96367 TX/PROPH/DG ADDL SEQ IV INF: CPT

## 2024-12-12 PROCEDURE — 96375 TX/PRO/DX INJ NEW DRUG ADDON: CPT

## 2024-12-12 PROCEDURE — 63600175 PHARM REV CODE 636 W HCPCS: Performed by: INTERNAL MEDICINE

## 2024-12-12 PROCEDURE — 96402 CHEMO HORMON ANTINEOPL SQ/IM: CPT

## 2024-12-12 RX ORDER — MEPERIDINE HYDROCHLORIDE 100 MG/ML
25 INJECTION INTRAMUSCULAR; INTRAVENOUS; SUBCUTANEOUS ONCE AS NEEDED
Status: DISCONTINUED | OUTPATIENT
Start: 2024-12-12 | End: 2024-12-12 | Stop reason: HOSPADM

## 2024-12-12 RX ORDER — HEPARIN 100 UNIT/ML
500 SYRINGE INTRAVENOUS
Status: DISCONTINUED | OUTPATIENT
Start: 2024-12-12 | End: 2024-12-12 | Stop reason: HOSPADM

## 2024-12-12 RX ORDER — FAMOTIDINE 10 MG/ML
20 INJECTION INTRAVENOUS
Status: COMPLETED | OUTPATIENT
Start: 2024-12-12 | End: 2024-12-12

## 2024-12-12 RX ORDER — DIPHENHYDRAMINE HYDROCHLORIDE 50 MG/ML
50 INJECTION INTRAMUSCULAR; INTRAVENOUS ONCE AS NEEDED
Status: COMPLETED | OUTPATIENT
Start: 2024-12-12 | End: 2024-12-12

## 2024-12-12 RX ORDER — SODIUM CHLORIDE 0.9 % (FLUSH) 0.9 %
10 SYRINGE (ML) INJECTION
Status: DISCONTINUED | OUTPATIENT
Start: 2024-12-12 | End: 2024-12-12 | Stop reason: HOSPADM

## 2024-12-12 RX ORDER — PROCHLORPERAZINE EDISYLATE 5 MG/ML
10 INJECTION INTRAMUSCULAR; INTRAVENOUS ONCE AS NEEDED
Status: DISCONTINUED | OUTPATIENT
Start: 2024-12-12 | End: 2024-12-12 | Stop reason: HOSPADM

## 2024-12-12 RX ORDER — EPINEPHRINE 0.3 MG/.3ML
0.3 INJECTION SUBCUTANEOUS ONCE AS NEEDED
Status: DISCONTINUED | OUTPATIENT
Start: 2024-12-12 | End: 2024-12-12 | Stop reason: HOSPADM

## 2024-12-12 RX ADMIN — HEPARIN SODIUM (PORCINE) LOCK FLUSH IV SOLN 100 UNIT/ML 500 UNITS: 100 SOLUTION at 02:12

## 2024-12-12 RX ADMIN — GOSERELIN ACETATE 3.6 MG: 3.6 IMPLANT SUBCUTANEOUS at 10:12

## 2024-12-12 RX ADMIN — DEXAMETHASONE SODIUM PHOSPHATE 10 MG: 4 INJECTION, SOLUTION INTRA-ARTICULAR; INTRALESIONAL; INTRAMUSCULAR; INTRAVENOUS; SOFT TISSUE at 11:12

## 2024-12-12 RX ADMIN — PACLITAXEL 198 MG: 6 INJECTION, SOLUTION INTRAVENOUS at 12:12

## 2024-12-12 RX ADMIN — TRASTUZUMAB-ANNS 521 MG: 420 INJECTION, POWDER, LYOPHILIZED, FOR SOLUTION INTRAVENOUS at 10:12

## 2024-12-12 RX ADMIN — DIPHENHYDRAMINE HYDROCHLORIDE 50 MG: 50 INJECTION, SOLUTION INTRAMUSCULAR; INTRAVENOUS at 12:12

## 2024-12-12 RX ADMIN — FAMOTIDINE 20 MG: 10 INJECTION INTRAVENOUS at 12:12

## 2024-12-12 RX ADMIN — Medication 10 ML: at 02:12

## 2024-12-12 NOTE — PLAN OF CARE
Pt here for C1D1 Kanjinti/Taxol and C1 zoladex. Labs and clinic visit complete. PAC accessed with positive blood return. Education provided. Pt resting comfortably in chair.     2:30- Kanjinti tolerated over 90 minutes, taxol given without reaction, titrated as ordered.  Zoladex given to ABD tissue. RTC in 1 week, appts already scheduled. Pt knows to call clinic with questions or concerns. D/C in stable condition, ambulated independently.

## 2024-12-13 ENCOUNTER — PATIENT MESSAGE (OUTPATIENT)
Dept: HEMATOLOGY/ONCOLOGY | Facility: CLINIC | Age: 34
End: 2024-12-13
Payer: COMMERCIAL

## 2024-12-17 ENCOUNTER — OFFICE VISIT (OUTPATIENT)
Dept: PLASTIC SURGERY | Facility: CLINIC | Age: 34
End: 2024-12-17
Payer: COMMERCIAL

## 2024-12-17 VITALS
WEIGHT: 286.81 LBS | SYSTOLIC BLOOD PRESSURE: 143 MMHG | BODY MASS INDEX: 45.01 KG/M2 | HEART RATE: 90 BPM | HEIGHT: 67 IN | DIASTOLIC BLOOD PRESSURE: 83 MMHG

## 2024-12-17 DIAGNOSIS — C50.912 INVASIVE DUCTAL CARCINOMA OF BREAST, FEMALE, LEFT: Primary | ICD-10-CM

## 2024-12-17 PROCEDURE — 3077F SYST BP >= 140 MM HG: CPT | Mod: CPTII,S$GLB,, | Performed by: SURGERY

## 2024-12-17 PROCEDURE — 99999 PR PBB SHADOW E&M-EST. PATIENT-LVL III: CPT | Mod: PBBFAC,,, | Performed by: SURGERY

## 2024-12-17 PROCEDURE — 3079F DIAST BP 80-89 MM HG: CPT | Mod: CPTII,S$GLB,, | Performed by: SURGERY

## 2024-12-17 PROCEDURE — 3044F HG A1C LEVEL LT 7.0%: CPT | Mod: CPTII,S$GLB,, | Performed by: SURGERY

## 2024-12-17 PROCEDURE — 99024 POSTOP FOLLOW-UP VISIT: CPT | Mod: S$GLB,,, | Performed by: SURGERY

## 2024-12-18 ENCOUNTER — OFFICE VISIT (OUTPATIENT)
Dept: HEMATOLOGY/ONCOLOGY | Facility: CLINIC | Age: 34
End: 2024-12-18
Payer: COMMERCIAL

## 2024-12-18 ENCOUNTER — LAB VISIT (OUTPATIENT)
Dept: LAB | Facility: HOSPITAL | Age: 34
End: 2024-12-18
Attending: INTERNAL MEDICINE
Payer: COMMERCIAL

## 2024-12-18 VITALS
DIASTOLIC BLOOD PRESSURE: 70 MMHG | OXYGEN SATURATION: 98 % | BODY MASS INDEX: 45.5 KG/M2 | SYSTOLIC BLOOD PRESSURE: 118 MMHG | TEMPERATURE: 98 F | RESPIRATION RATE: 20 BRPM | HEART RATE: 89 BPM | WEIGHT: 289.88 LBS | HEIGHT: 67 IN

## 2024-12-18 DIAGNOSIS — R45.89 ANXIETY ABOUT HEALTH: ICD-10-CM

## 2024-12-18 DIAGNOSIS — C50.912 INVASIVE DUCTAL CARCINOMA OF BREAST, FEMALE, LEFT: ICD-10-CM

## 2024-12-18 DIAGNOSIS — T45.1X5A CHEMOTHERAPY-INDUCED NAUSEA: ICD-10-CM

## 2024-12-18 DIAGNOSIS — C50.912 INVASIVE DUCTAL CARCINOMA OF BREAST, FEMALE, LEFT: Primary | ICD-10-CM

## 2024-12-18 DIAGNOSIS — R11.0 CHEMOTHERAPY-INDUCED NAUSEA: ICD-10-CM

## 2024-12-18 LAB
ALBUMIN SERPL BCP-MCNC: 3.8 G/DL (ref 3.5–5.2)
ALP SERPL-CCNC: 79 U/L (ref 40–150)
ALT SERPL W/O P-5'-P-CCNC: 43 U/L (ref 10–44)
ANION GAP SERPL CALC-SCNC: 10 MMOL/L (ref 8–16)
AST SERPL-CCNC: 19 U/L (ref 10–40)
BASOPHILS # BLD AUTO: 0.05 K/UL (ref 0–0.2)
BASOPHILS NFR BLD: 0.8 % (ref 0–1.9)
BILIRUB SERPL-MCNC: 0.9 MG/DL (ref 0.1–1)
BUN SERPL-MCNC: 14 MG/DL (ref 6–20)
CALCIUM SERPL-MCNC: 9.4 MG/DL (ref 8.7–10.5)
CHLORIDE SERPL-SCNC: 105 MMOL/L (ref 95–110)
CO2 SERPL-SCNC: 22 MMOL/L (ref 23–29)
CREAT SERPL-MCNC: 0.7 MG/DL (ref 0.5–1.4)
DIFFERENTIAL METHOD BLD: NORMAL
EOSINOPHIL # BLD AUTO: 0.1 K/UL (ref 0–0.5)
EOSINOPHIL NFR BLD: 1.5 % (ref 0–8)
ERYTHROCYTE [DISTWIDTH] IN BLOOD BY AUTOMATED COUNT: 12 % (ref 11.5–14.5)
EST. GFR  (NO RACE VARIABLE): >60 ML/MIN/1.73 M^2
GLUCOSE SERPL-MCNC: 107 MG/DL (ref 70–110)
HCG INTACT+B SERPL-ACNC: <2.4 MIU/ML
HCT VFR BLD AUTO: 41.1 % (ref 37–48.5)
HGB BLD-MCNC: 14.8 G/DL (ref 12–16)
IMM GRANULOCYTES # BLD AUTO: 0.03 K/UL (ref 0–0.04)
IMM GRANULOCYTES NFR BLD AUTO: 0.5 % (ref 0–0.5)
LYMPHOCYTES # BLD AUTO: 2.1 K/UL (ref 1–4.8)
LYMPHOCYTES NFR BLD: 35.4 % (ref 18–48)
MCH RBC QN AUTO: 30.5 PG (ref 27–31)
MCHC RBC AUTO-ENTMCNC: 36 G/DL (ref 32–36)
MCV RBC AUTO: 85 FL (ref 82–98)
MONOCYTES # BLD AUTO: 0.3 K/UL (ref 0.3–1)
MONOCYTES NFR BLD: 4.2 % (ref 4–15)
NEUTROPHILS # BLD AUTO: 3.4 K/UL (ref 1.8–7.7)
NEUTROPHILS NFR BLD: 57.6 % (ref 38–73)
NRBC BLD-RTO: 0 /100 WBC
PLATELET # BLD AUTO: 305 K/UL (ref 150–450)
PMV BLD AUTO: 9.3 FL (ref 9.2–12.9)
POTASSIUM SERPL-SCNC: 4.2 MMOL/L (ref 3.5–5.1)
PROT SERPL-MCNC: 7.1 G/DL (ref 6–8.4)
RBC # BLD AUTO: 4.85 M/UL (ref 4–5.4)
SODIUM SERPL-SCNC: 137 MMOL/L (ref 136–145)
WBC # BLD AUTO: 5.93 K/UL (ref 3.9–12.7)

## 2024-12-18 PROCEDURE — 36415 COLL VENOUS BLD VENIPUNCTURE: CPT | Performed by: INTERNAL MEDICINE

## 2024-12-18 PROCEDURE — 3074F SYST BP LT 130 MM HG: CPT | Mod: CPTII,S$GLB,, | Performed by: NURSE PRACTITIONER

## 2024-12-18 PROCEDURE — 3078F DIAST BP <80 MM HG: CPT | Mod: CPTII,S$GLB,, | Performed by: NURSE PRACTITIONER

## 2024-12-18 PROCEDURE — 3044F HG A1C LEVEL LT 7.0%: CPT | Mod: CPTII,S$GLB,, | Performed by: NURSE PRACTITIONER

## 2024-12-18 PROCEDURE — 80053 COMPREHEN METABOLIC PANEL: CPT | Performed by: INTERNAL MEDICINE

## 2024-12-18 PROCEDURE — 84702 CHORIONIC GONADOTROPIN TEST: CPT | Performed by: INTERNAL MEDICINE

## 2024-12-18 PROCEDURE — 3008F BODY MASS INDEX DOCD: CPT | Mod: CPTII,S$GLB,, | Performed by: NURSE PRACTITIONER

## 2024-12-18 PROCEDURE — 1159F MED LIST DOCD IN RCRD: CPT | Mod: CPTII,S$GLB,, | Performed by: NURSE PRACTITIONER

## 2024-12-18 PROCEDURE — 85025 COMPLETE CBC W/AUTO DIFF WBC: CPT | Performed by: INTERNAL MEDICINE

## 2024-12-18 PROCEDURE — G2211 COMPLEX E/M VISIT ADD ON: HCPCS | Mod: S$GLB,,, | Performed by: NURSE PRACTITIONER

## 2024-12-18 PROCEDURE — 99999 PR PBB SHADOW E&M-EST. PATIENT-LVL III: CPT | Mod: PBBFAC,,, | Performed by: NURSE PRACTITIONER

## 2024-12-18 PROCEDURE — 99215 OFFICE O/P EST HI 40 MIN: CPT | Mod: S$GLB,,, | Performed by: NURSE PRACTITIONER

## 2024-12-18 RX ORDER — HEPARIN 100 UNIT/ML
500 SYRINGE INTRAVENOUS
Status: CANCELLED | OUTPATIENT
Start: 2025-02-16

## 2024-12-18 RX ORDER — DIPHENHYDRAMINE HYDROCHLORIDE 50 MG/ML
50 INJECTION INTRAMUSCULAR; INTRAVENOUS ONCE AS NEEDED
Status: CANCELLED | OUTPATIENT
Start: 2025-02-16

## 2024-12-18 RX ORDER — PROCHLORPERAZINE EDISYLATE 5 MG/ML
10 INJECTION INTRAMUSCULAR; INTRAVENOUS ONCE AS NEEDED
Status: CANCELLED
Start: 2025-02-16

## 2024-12-18 RX ORDER — FAMOTIDINE 10 MG/ML
20 INJECTION INTRAVENOUS
Status: CANCELLED | OUTPATIENT
Start: 2025-02-16

## 2024-12-18 RX ORDER — SODIUM CHLORIDE 0.9 % (FLUSH) 0.9 %
10 SYRINGE (ML) INJECTION
Status: CANCELLED | OUTPATIENT
Start: 2025-02-16

## 2024-12-18 RX ORDER — MEPERIDINE HYDROCHLORIDE 50 MG/ML
25 INJECTION INTRAMUSCULAR; INTRAVENOUS; SUBCUTANEOUS ONCE AS NEEDED
Status: CANCELLED
Start: 2025-02-16

## 2024-12-18 RX ORDER — EPINEPHRINE 0.3 MG/.3ML
0.3 INJECTION SUBCUTANEOUS ONCE AS NEEDED
Status: CANCELLED | OUTPATIENT
Start: 2025-02-16

## 2024-12-18 NOTE — PROGRESS NOTES
[facility]/ [facility]   at [facility] Note:      Chief Complaint:   Encounter Diagnoses   Name Primary?    Invasive ductal carcinoma of breast, female, left Yes    Chemotherapy-induced nausea     Anxiety about health         Cancer Staging   Invasive ductal carcinoma of breast, female, left  Staging form: Breast, AJCC 8th Edition  - Clinical stage from 8/8/2024: Stage IA (cT1c, cN0, cM0, G3, ER+, MN+, HER2+) - Signed by Maureen Chiu MD on 8/27/2024      HPI:  Divya De Paz is a 34 y.o. female with PCOS and ANISH who presents today for follow up of newly diagnosed breast cancer.  She is here to be cleared for her C2 adjuvant chemo, she is getting taxol/herceptin weekly. She had some delayed healing to her right reduction incision site.  She saw plastics recently who were pleased with how she is now healing.  She is generally feeling well today.  Tolerated C1 generally well    Mild fatigue on days two and three  No n/v, No c/d  Eating and drinking well  No fever  No neuropathy  Melatonin has helped with sleep    Per Dr. Chiu's note:   Oncology History  Patient self palpated a lump during self-exam, got evaluated by Gyn a few days later.   Follow-up mammogram 7/25/2024 showed an irregular high density mass in the UOQ of left breast  Ultrasound 7/25/2024: superficial irregular not parallel hypoechoic mass with indistinct and angular margins measuring 1.0 x 0.8 x 1.0 cm (1 o'clock axis, 1 CFN).     Biopsy 8/8/2024: IDC, grade 3, ER 90-95%/ MN 10-20%/ HER2 3+; Ki67 80-90%     MRI 8/15/2024 revealed a 1.5 cm x 1.3 cm x 1.0 cm irregularly shaped mass with irregular margins and heterogeneous internal enhancement seen in the left breast at 1 o'clock in the anterior depth, 4.1 cm from the nipple and 0.3 cm from the skin. No internal mammary or axillary adenopathy identified.    L breast lumpectomy 9/25/24: IDC, grade 3, 1.1cm; 0/1 LN+,  also with  "oncoplastic reduction    GYN History:  Age of menarche was 14. Age of menopause was n/a.  Last menstrual period was the week of 2024, pt reports inconsistent periods due to PCOS. Patient reports hormonal therapy use (Provera) to induce menstruation, stopped 2024 per OB/GYN reccs. Patient is . Age of first live birth was n/a.     Family History:  Paternal Grandmother Breast Cancer - multiple times;  of lung cancer (heavy smoker)  Paternal Aunt Breast Cancer - diagnosed at 45, BRCA negative  Paternal Aunt did not have cancer, also BRCA negative  Maternal grandmother - lung cancer, no smoking history  Maternal grandfather - renal cancer      Patient Active Problem List   Diagnosis    Hidradenitis suppurativa    PCOS (polycystic ovarian syndrome)    Insulin resistance    Secondary oligomenorrhea    Anxiety and depression    Invasive ductal carcinoma of breast, female, left    Negative Hereditary Cancer Genetic testing       Current Outpatient Medications   Medication Instructions    amitriptyline (ELAVIL) 25 mg, Oral, Nightly    LIDOcaine-prilocaine (EMLA) cream Apply topically to port one hour prior to chemotherapy infusion    metFORMIN (GLUCOPHAGE) 500 mg, Oral, 2 times daily with meals    ondansetron (ZOFRAN) 8 mg, Oral, Every 8 hours PRN    prochlorperazine (COMPAZINE) 5 mg, Oral, Every 6 hours PRN, Take if zofran is not working    spironolactone (ALDACTONE) 50 mg, Oral, Daily       Review of Systems:   Review of Systems   Constitutional:         See above   All other systems reviewed and are negative.      PHYSICAL EXAM:  /70 (BP Location: Left arm, Patient Position: Sitting)   Pulse 89   Temp 98 °F (36.7 °C) (Oral)   Resp 20   Ht 5' 7" (1.702 m)   Wt 131.5 kg (289 lb 14.5 oz)   SpO2 98%   BMI 45.41 kg/m²     Physical Exam  Constitutional:       General: She is not in acute distress.     Appearance: Normal appearance. She is not ill-appearing.      Comments: Limited as " virtual.  Appears comfortable.    HENT:      Head: Normocephalic and atraumatic.   Eyes:      Extraocular Movements: Extraocular movements intact.   Cardiovascular:      Rate and Rhythm: Normal rate and regular rhythm.      Pulses: Normal pulses.      Heart sounds: Normal heart sounds. No murmur heard.  Pulmonary:      Effort: Pulmonary effort is normal. No respiratory distress.      Breath sounds: Normal breath sounds.   Chest:      Comments: Skin now fully covering right breast incision, no major redness. Warmth or drainage.  Left breast exam deferred today  Lymphadenopathy:      Cervical: No cervical adenopathy.   Skin:     General: Skin is warm and dry.   Neurological:      General: No focal deficit present.      Mental Status: She is alert and oriented to person, place, and time.       Pertinent Labs & Imaging:  Pathology Results  (Last 30 days)      None            Recent Results (from the past 24 hours)   CBC W/ AUTO DIFFERENTIAL    Collection Time: 12/18/24  7:28 AM   Result Value Ref Range    WBC 5.93 3.90 - 12.70 K/uL    RBC 4.85 4.00 - 5.40 M/uL    Hemoglobin 14.8 12.0 - 16.0 g/dL    Hematocrit 41.1 37.0 - 48.5 %    MCV 85 82 - 98 fL    MCH 30.5 27.0 - 31.0 pg    MCHC 36.0 32.0 - 36.0 g/dL    RDW 12.0 11.5 - 14.5 %    Platelets 305 150 - 450 K/uL    MPV 9.3 9.2 - 12.9 fL    Immature Granulocytes 0.5 0.0 - 0.5 %    Gran # (ANC) 3.4 1.8 - 7.7 K/uL    Immature Grans (Abs) 0.03 0.00 - 0.04 K/uL    Lymph # 2.1 1.0 - 4.8 K/uL    Mono # 0.3 0.3 - 1.0 K/uL    Eos # 0.1 0.0 - 0.5 K/uL    Baso # 0.05 0.00 - 0.20 K/uL    nRBC 0 0 /100 WBC    Gran % 57.6 38.0 - 73.0 %    Lymph % 35.4 18.0 - 48.0 %    Mono % 4.2 4.0 - 15.0 %    Eosinophil % 1.5 0.0 - 8.0 %    Basophil % 0.8 0.0 - 1.9 %    Differential Method Automated          Assessment & Plan:    1. Invasive ductal carcinoma of breast, female, left  -- chemo education done  -- port placed  -- echo on 10/17/24: ef 60 to 65%, ls -17.6%  -- has met with nutrition and  "IO  -- labs reviewed and acceptable for C2 of TH  -- zoladex every 3 weeks  -- rtc weekly for labs and infusion, every 2 weeks for provider visits    Per Dr. Chiu's note:   "Patient with newly diagnosed stage I TPBC  We discussed adjuvant Paclitaxel and Trastuzumab for T1N0 HER2-Positive Breast Cancer per study published in [facility] 2015 by Lisa et al.   I recommend weekly Paclitaxel at 80 mg/meter squared with weekly Trastuzumab. I have recommended a total of 12 weekly cycles of therapy. After completion of the first 12 weekly cycles, I have strongly recommended continuation of Trastuzumab alone for a total of one year of adjuvant Trastuzumab therapy. After completion of Taxol and Trastuzumab, adjuvant Trastuzumab can be given every three weeks.     First Infusion Loading Dose of Trastuzumab will be 4 mg/Kg over 90 minutes followed by the second Infusion of Trastuzumab at 2mg/kg over 30 minutes Qweek for the remainder of Taxol and Trastuzumab.    When switching to d1iimhs Trastuzumab, it will be given at 6mg/kg over 60 minutes Q 3 weeks and if tolerated, all subsequent Infusions of Trastuzumab but may be over 30 minutes until completion of one year of therapy.    Overall side effects of chemotherapy were discussed including alopecia, immunosuppression, Neutropenia (low white count), anemia (low hemoglobin), thrombocytopenia (lowered platelets), and neuropathy from Taxol. Other side effects such as nail changes, dry eyes, mouth sores, and bone pain were discussed.  Rare but serious side effects such as increased risk of cardiac toxicity were discussed with Trastuzumab. Monitoring of her cardiac function with MUGAs or an echocardiogram is strongly recommended every 3 months by NCCN guidelines while on adjuvant Trastuzumab.    Recommend ovarian protection given young age. Discussed Zoladex for the 12 weeks on chemo and then discontinuing  Given small size of her cancer, would recommend adjuvant Tamoxifen with " "Trastuzumab in the adjuvant setting     - [facility] Enrollment Chem[facility]; [facility]  - LIDOcaine-prilocaine (EMLA) cream; Apply topically to port one hour prior to chemotherapy infusion  Dispense: 30 g; Refill: 1  - ondansetron (ZOFRAN) 8 MG tablet; Take 1 tablet (8 mg total) by mouth every 8 (eight) hours as needed for Nausea.  Dispense: 30 tablet; Refill: 1"    Route Chart for Scheduling    Med Onc Chart Routing      Follow up with physician 2 weeks.   Follow up with KERVIN 4 weeks.   Infusion scheduling note   weekly x 10 more   Injection scheduling note zoladex every 4 weeks   Labs CBC, CMP and B HCG   Scheduling:  Preferred lab:  Lab interval:  weekly x 10 weeks   Imaging ECHO   scheduled   Pharmacy appointment    Other referrals                  Treatment Plan Information   OP BREAST TRASTUZUMAB PACLITAXEL WEEKLY Maureen Chiu MD   Associated diagnosis: Invasive ductal carcinoma of breast, female, left Stage IA cT1c, cN0, cM0, G3, ER+, GA+, HER2+ noted on 8/12/2024   Line of treatment: Adjuvant  Treatment Goal: Curative     Upcoming Treatment Dates - OP BREAST TRASTUZUMAB PACLITAXEL WEEKLY    2/16/2025       Pre-Medications       diphenhydrAMINE (BENADRYL) 50 mg in NS 50 mL IVPB       dexAMETHasone (DECADRON) 10 mg in sodium chloride 0.9% 50 mL IVPB       famotidine (PF) injection 20 mg       Chemotherapy       trastuzumab-anns (KANJINTI) 261 mg in 0.9% NaCl 250 mL chemo infusion       PACLitaxeL (TAXOL) 80 mg/m2 = 198 mg in 0.9% NaCl 250 mL chemo infusion  2/23/2025       Pre-Medications       diphenhydrAMINE (BENADRYL) 50 mg in NS 50 mL IVPB       dexAMETHasone (DECADRON) 10 mg in sodium chloride 0.9% 50 mL IVPB       famotidine (PF) injection 20 mg       Chemotherapy       trastuzumab-anns (KANJINTI) 261 mg in 0.9% NaCl 250 mL chemo infusion       PACLitaxeL (TAXOL) 80 mg/m2 = 198 mg in 0.9% NaCl 250 mL chemo infusion  3/2/2025       Pre-Medications       diphenhydrAMINE (BENADRYL) 50 mg in NS 50 " mL IVPB       dexAMETHasone (DECADRON) 10 mg in sodium chloride 0.9% 50 mL IVPB       famotidine (PF) injection 20 mg       Chemotherapy       trastuzumab-anns (KANJINTI) 261 mg in 0.9% NaCl 250 mL chemo infusion       PACLitaxeL (TAXOL) 80 mg/m2 = 198 mg in 0.9% NaCl 250 mL chemo infusion  3/9/2025       Pre-Medications       diphenhydrAMINE (BENADRYL) 50 mg in NS 50 mL IVPB       dexAMETHasone (DECADRON) 10 mg in sodium chloride 0.9% 50 mL IVPB       famotidine (PF) injection 20 mg       Chemotherapy       trastuzumab-anns (KANJINTI) 261 mg in 0.9% NaCl 250 mL chemo infusion       PACLitaxeL (TAXOL) 80 mg/m2 = 198 mg in 0.9% NaCl 250 mL chemo infusion    Supportive Plan Information  OP BREAST GOSERELIN Q4W Maureen Chiu MD   Associated Diagnosis: Invasive ductal carcinoma of breast, female, left Stage IA cT1c, cN0, cM0, G3, ER+, OK+, HER2+ noted on 8/12/2024   Line of treatment: Supportive Care   Treatment goal: Supportive     Upcoming Treatment Dates - OP BREAST GOSERELIN Q4W    12/13/2024       Chemotherapy       goserelin (ZOLADEX) injection 3.6 mg  1/10/2025       Chemotherapy       goserelin (ZOLADEX) injection 3.6 mg  2/7/2025       Chemotherapy       goserelin (ZOLADEX) injection 3.6 mg  3/7/2025       Chemotherapy       goserelin (ZOLADEX) injection 3.6 mg    Patient is in agreement with the proposed treatment plan. All questions were answered to the patient's satisfaction. Pt knows to call clinic for any new or worsening symptoms and if anything is needed before the next clinic visit.    MDM includes  :    - Acute or chronic illness or injury that poses a threat to life or bodily function  - Independent review and explanation of 3+ results from unique tests  - Discussion of management and ordering 3+ unique tests  - Extensive discussion of treatment and management  - Prescription drug management  - Drug therapy requiring intensive monitoring for toxicity     Cristal Posadas NP

## 2024-12-19 ENCOUNTER — INFUSION (OUTPATIENT)
Dept: INFUSION THERAPY | Facility: HOSPITAL | Age: 34
End: 2024-12-19
Payer: COMMERCIAL

## 2024-12-19 VITALS
RESPIRATION RATE: 18 BRPM | HEIGHT: 67 IN | BODY MASS INDEX: 45.5 KG/M2 | DIASTOLIC BLOOD PRESSURE: 72 MMHG | SYSTOLIC BLOOD PRESSURE: 141 MMHG | OXYGEN SATURATION: 97 % | HEART RATE: 88 BPM | TEMPERATURE: 98 F | WEIGHT: 289.88 LBS

## 2024-12-19 DIAGNOSIS — C50.912 INVASIVE DUCTAL CARCINOMA OF BREAST, FEMALE, LEFT: Primary | ICD-10-CM

## 2024-12-19 PROCEDURE — 96367 TX/PROPH/DG ADDL SEQ IV INF: CPT

## 2024-12-19 PROCEDURE — A4216 STERILE WATER/SALINE, 10 ML: HCPCS | Performed by: NURSE PRACTITIONER

## 2024-12-19 PROCEDURE — 96375 TX/PRO/DX INJ NEW DRUG ADDON: CPT

## 2024-12-19 PROCEDURE — 96417 CHEMO IV INFUS EACH ADDL SEQ: CPT

## 2024-12-19 PROCEDURE — 63600175 PHARM REV CODE 636 W HCPCS: Performed by: NURSE PRACTITIONER

## 2024-12-19 PROCEDURE — 25000003 PHARM REV CODE 250: Performed by: NURSE PRACTITIONER

## 2024-12-19 PROCEDURE — 96413 CHEMO IV INFUSION 1 HR: CPT

## 2024-12-19 RX ORDER — DIPHENHYDRAMINE HYDROCHLORIDE 50 MG/ML
50 INJECTION INTRAMUSCULAR; INTRAVENOUS ONCE AS NEEDED
Status: DISCONTINUED | OUTPATIENT
Start: 2024-12-19 | End: 2024-12-19 | Stop reason: HOSPADM

## 2024-12-19 RX ORDER — FAMOTIDINE 10 MG/ML
20 INJECTION INTRAVENOUS
Status: COMPLETED | OUTPATIENT
Start: 2024-12-19 | End: 2024-12-19

## 2024-12-19 RX ORDER — SODIUM CHLORIDE 0.9 % (FLUSH) 0.9 %
10 SYRINGE (ML) INJECTION
Status: DISCONTINUED | OUTPATIENT
Start: 2024-12-19 | End: 2024-12-19 | Stop reason: HOSPADM

## 2024-12-19 RX ORDER — EPINEPHRINE 0.3 MG/.3ML
0.3 INJECTION SUBCUTANEOUS ONCE AS NEEDED
Status: DISCONTINUED | OUTPATIENT
Start: 2024-12-19 | End: 2024-12-19 | Stop reason: HOSPADM

## 2024-12-19 RX ORDER — MEPERIDINE HYDROCHLORIDE 100 MG/ML
25 INJECTION INTRAMUSCULAR; INTRAVENOUS; SUBCUTANEOUS ONCE AS NEEDED
Status: DISCONTINUED | OUTPATIENT
Start: 2024-12-19 | End: 2024-12-19 | Stop reason: HOSPADM

## 2024-12-19 RX ORDER — HEPARIN 100 UNIT/ML
500 SYRINGE INTRAVENOUS
Status: DISCONTINUED | OUTPATIENT
Start: 2024-12-19 | End: 2024-12-19 | Stop reason: HOSPADM

## 2024-12-19 RX ORDER — PROCHLORPERAZINE EDISYLATE 5 MG/ML
10 INJECTION INTRAMUSCULAR; INTRAVENOUS ONCE AS NEEDED
Status: DISCONTINUED | OUTPATIENT
Start: 2024-12-19 | End: 2024-12-19 | Stop reason: HOSPADM

## 2024-12-19 RX ADMIN — PACLITAXEL 198 MG: 6 INJECTION, SOLUTION INTRAVENOUS at 11:12

## 2024-12-19 RX ADMIN — DEXAMETHASONE SODIUM PHOSPHATE 10 MG: 4 INJECTION, SOLUTION INTRA-ARTICULAR; INTRALESIONAL; INTRAMUSCULAR; INTRAVENOUS; SOFT TISSUE at 10:12

## 2024-12-19 RX ADMIN — SODIUM CHLORIDE: 9 INJECTION, SOLUTION INTRAVENOUS at 08:12

## 2024-12-19 RX ADMIN — TRASTUZUMAB-ANNS 261 MG: 420 INJECTION, POWDER, LYOPHILIZED, FOR SOLUTION INTRAVENOUS at 09:12

## 2024-12-19 RX ADMIN — DIPHENHYDRAMINE HYDROCHLORIDE 50 MG: 50 INJECTION, SOLUTION INTRAMUSCULAR; INTRAVENOUS at 09:12

## 2024-12-19 RX ADMIN — SODIUM CHLORIDE, PRESERVATIVE FREE 10 ML: 5 INJECTION INTRAVENOUS at 12:12

## 2024-12-19 RX ADMIN — HEPARIN SODIUM (PORCINE) LOCK FLUSH IV SOLN 100 UNIT/ML 500 UNITS: 100 SOLUTION at 12:12

## 2024-12-19 RX ADMIN — FAMOTIDINE 20 MG: 10 INJECTION INTRAVENOUS at 10:12

## 2024-12-19 NOTE — PLAN OF CARE
Patient tolerated C2D1 Kanjinti/Taxol without incident. Seen by Cristal Freeman yesterday. Labs reviewed. Vitals stable. Port accessed & flushed with blood return present, heparin locked & needle removed  at d/c. Premedicated per orders. Taxol titrated per protocol. Aware of next appointment 12/26. Stable & ambulatory at d/c.

## 2024-12-20 ENCOUNTER — OFFICE VISIT (OUTPATIENT)
Dept: PSYCHIATRY | Facility: CLINIC | Age: 34
End: 2024-12-20
Payer: COMMERCIAL

## 2024-12-20 DIAGNOSIS — C50.912 INFILTRATING DUCTAL CARCINOMA OF LEFT BREAST: ICD-10-CM

## 2024-12-20 DIAGNOSIS — F41.1 GENERALIZED ANXIETY DISORDER: Primary | ICD-10-CM

## 2024-12-20 PROCEDURE — 99999 PR PBB SHADOW E&M-EST. PATIENT-LVL II: CPT | Mod: PBBFAC,,, | Performed by: PSYCHOLOGIST

## 2024-12-20 NOTE — PROGRESS NOTES
INFORMED CONSENT: Patient was identified using two patient-identifiers. The patient has been informed of the risks and benefits associated with engaging in psychotherapy, the handling of protected health information, the rights of privacy and the limits of confidentiality. The patient has also been informed of the importance of reporting any suicidal or homicidal ideation to this or any provider to ensure safety of all parties, and the Divya De Paz expressed understanding. The patient was agreeable to these terms and freely participates in individual psychotherapy.    PSYCHO-ONCOLOGY NOTE/ Individual Psychotherapy     Date: 12/20/2024   Site:  [facility]        Therapeutic Intervention: Met with patient.  Outpatient - Insight oriented psychotherapy 60 min - CPT code 30090      Patient was last seen by me on 11/22/2024    Problem list  Patient Active Problem List   Diagnosis    Hidradenitis suppurativa    PCOS (polycystic ovarian syndrome)    Insulin resistance    Secondary oligomenorrhea    Anxiety and depression    Invasive ductal carcinoma of breast, female, left    Negative Hereditary Cancer Genetic testing       Chief complaint/reason for encounter: anxiety   Met with patient to evaluate psychosocial adaptation to diagnosis/treatment/survivorship of BC    Current Medications  Current Outpatient Medications   Medication    amitriptyline (ELAVIL) 25 MG tablet    LIDOcaine-prilocaine (EMLA) cream    metFORMIN (GLUCOPHAGE) 500 MG tablet    ondansetron (ZOFRAN) 8 MG tablet    prochlorperazine (COMPAZINE) 5 MG tablet    spironolactone (ALDACTONE) 50 MG tablet     No current facility-administered medications for this visit.       Objective:  Divya De Paz arrived promptly for the session.   The patient was fully cooperative throughout the session.  Appearance: age appropriate, appropriately  dressed, adequately  groomed  Behavior/Cooperation: friendly and cooperative  Speech: normal in rate, volume, and  tone and appropriate quality, quantity and organization of sentences  Mood: steady  Affect: mood congruent  Thought Process: goal-directed, logical  Thought Content: normal,  No delusions or paranoia; did not appear to be responding to internal stimuli during the session  Orientation: grossly intact  Attention Span/Concentration: Attends to session without distraction; reports no difficulty  Insight: patient has awareness of illness; good insight into own behavior and behavior of others  Judgment: the patient's behavior is adequate to circumstances    Interval history and content of current session: Patient doing well with adjustment after beginning chemo.   Discussed diagnosis, treatment, prognosis, current adaptation to disease and treatment status, and family's adaptation to disease and treatment status. Reports to be coping in an adequate manner. Evaluated cognitive response, paying particular attention to negative intrusive thoughts of a persistent and detrimental nature. Thoughts of this type are in evidence with mild distress. Provided cognitive behavioral therapy to address negative cognitions. Identified and evaluated psychosocial and environmental stressors secondary to diagnosis and treatment.  Examined proactive behaviors that may be implemented to minimize or ameliorate psychosocial stressors secondary to diagnosis and treatment.     Risk parameters:   Patient reports no suicidal ideation  Patient reports no homicidal ideation  Patient reports no self-injurious behavior  Patient reports no violent behavior   Safety needs:  None at this time      Verbal deficits: None     Patient's response to intervention:The patient's response to intervention is accepting.     Progress toward goals and other mental status changes:  The patient's progress toward goals is good.      Progress to date:Progress as Expected      Goals from last visit: Met     Patient reported outcomes:    Distress Thermometer:   Distress  Score    Distress Score: 5        Practical Problems Physical Problems                                                   Family Problems                                         Emotional Problems                                                         Spiritual/Religions Concerns               Other Problems               PHQ ANSWERS    Q1. Little interest or pleasure in doing things: (Patient-Rptd) (P) Several days (12/20/24 0818)  Q2. Feeling down, depressed, or hopeless: (Patient-Rptd) (P) Not at all (12/20/24 0818)  Q3. Trouble falling or staying asleep, or sleeping too much: (Patient-Rptd) (P) Several days (12/20/24 0818)  Q4. Feeling tired or having little energy: (Patient-Rptd) (P) Several days (12/20/24 0818)  Q5. Poor appetite or overeating: (Patient-Rptd) (P) Several days (12/20/24 0818)  Q6. Feeling bad about yourself - or that you are a failure or have let yourself or your family down: (Patient-Rptd) (P) Several days (12/20/24 0818)  Q7. Trouble concentrating on things, such as reading the newspaper or watching television: (Patient-Rptd) (P) Several days (12/20/24 0818)  Q8. Moving or speaking so slowly that other people could have noticed. Or the opposite - being so fidgety or restless that you have been moving around a lot more than usual: (Patient-Rptd) (P) Not at all (12/20/24 0818)  Q9.  0    PHQ8 Score : (Patient-Rptd) (P) 6 (12/20/24 0818)  PHQ-9 Total Score: (Patient-Rptd) (P) 6 (12/20/24 0818)     ANISH-7 Answers        12/20/2024     8:17 AM   GAD7   1. Feeling nervous, anxious, or on edge? 1    2. Not being able to stop or control worrying? 1    3. Worrying too much about different things? 1    4. Trouble relaxing? 1    5. Being so restless that it is hard to sit still? 1    6. Becoming easily annoyed or irritable? 1    7. Feeling afraid as if something awful might happen? 1    8. If you checked off any problems, how difficult have these problems made it for you to do your work, take care of  things at home, or get along with other people? 1    ANISH-7 Score 7        Patient-reported     ANISH-7 Score: (Patient-Rptd) (P) 7  Interpretation: (Patient-Rptd) (P) Mild Anxiety     Client Strengths: verbal, intelligent, successful, good social support, good insight, commitment to wellness, strong licha, strong cultural traditions     Diagnosis:     ICD-10-CM ICD-9-CM   1. Generalized anxiety disorder  F41.1 300.02   2. Infiltrating ductal carcinoma of left breast  C50.912 174.9       Treatment Plan:individual psychotherapy and medication management by physician  Target symptoms: anxiety   Why chosen therapy is appropriate versus another modality: relevant to diagnosis, patient responds to this modality, evidence based practice  Outcome monitoring methods: self-report, observation, checklist/rating scale  Therapeutic intervention type: insight oriented psychotherapy, behavior modifying psychotherapy  Prognosis: Good      Behavioral goals:    Exercise:   Stress management:   Social engagement:   Nutrition:   Smoking Cessation:   Therapy:  Increase daily self-care and attention to health management  Pleasant events scheduling and increased social interaction  Monitor stressors in writing and bring to next visit    Return to clinic: as needed    Next Session:      Length of Service (minutes direct face-to-face contact): 60    Racheal Catherine, PhD  Clinical Psychologist  [place] License #[id number]  [place] License #[id number]

## 2024-12-23 NOTE — PROGRESS NOTES
Ms De Paz is following up after she had some delayed wound healing of both breasts after breast reduction.  She is treating them with the Aquacel dressings in both skin folds.  She fell like her wounds are nearly healed.      She says she started adjuvant chemotherapy last week.    On exam both wounds along her IMF and T point are completely epithelialized.  Small areas of breakdown around her areola are also totally healed.      Her wounds are now healed.  She has already started adjuvant chemotherapy and she is okay for radiation therapy.    I will see her back in 6 months    Juan Antonio Murcia, DO  Plastic and Reconstructive Surgery

## 2024-12-26 ENCOUNTER — LAB VISIT (OUTPATIENT)
Dept: LAB | Facility: HOSPITAL | Age: 34
End: 2024-12-26
Attending: INTERNAL MEDICINE
Payer: COMMERCIAL

## 2024-12-26 ENCOUNTER — INFUSION (OUTPATIENT)
Dept: INFUSION THERAPY | Facility: HOSPITAL | Age: 34
End: 2024-12-26
Payer: COMMERCIAL

## 2024-12-26 VITALS
RESPIRATION RATE: 18 BRPM | OXYGEN SATURATION: 99 % | DIASTOLIC BLOOD PRESSURE: 82 MMHG | TEMPERATURE: 99 F | BODY MASS INDEX: 45.99 KG/M2 | SYSTOLIC BLOOD PRESSURE: 148 MMHG | HEART RATE: 72 BPM | WEIGHT: 293 LBS | HEIGHT: 67 IN

## 2024-12-26 DIAGNOSIS — C50.912 INVASIVE DUCTAL CARCINOMA OF BREAST, FEMALE, LEFT: Primary | ICD-10-CM

## 2024-12-26 DIAGNOSIS — C50.912 INVASIVE DUCTAL CARCINOMA OF BREAST, FEMALE, LEFT: ICD-10-CM

## 2024-12-26 LAB
ALBUMIN SERPL BCP-MCNC: 3.9 G/DL (ref 3.5–5.2)
ALP SERPL-CCNC: 85 U/L (ref 40–150)
ALT SERPL W/O P-5'-P-CCNC: 43 U/L (ref 10–44)
ANION GAP SERPL CALC-SCNC: 11 MMOL/L (ref 8–16)
AST SERPL-CCNC: 19 U/L (ref 10–40)
BASOPHILS # BLD AUTO: 0.04 K/UL (ref 0–0.2)
BASOPHILS NFR BLD: 0.7 % (ref 0–1.9)
BILIRUB SERPL-MCNC: 0.7 MG/DL (ref 0.1–1)
BUN SERPL-MCNC: 15 MG/DL (ref 6–20)
CALCIUM SERPL-MCNC: 9.3 MG/DL (ref 8.7–10.5)
CHLORIDE SERPL-SCNC: 105 MMOL/L (ref 95–110)
CO2 SERPL-SCNC: 20 MMOL/L (ref 23–29)
CREAT SERPL-MCNC: 0.7 MG/DL (ref 0.5–1.4)
DIFFERENTIAL METHOD BLD: ABNORMAL
EOSINOPHIL # BLD AUTO: 0.1 K/UL (ref 0–0.5)
EOSINOPHIL NFR BLD: 2 % (ref 0–8)
ERYTHROCYTE [DISTWIDTH] IN BLOOD BY AUTOMATED COUNT: 12.2 % (ref 11.5–14.5)
EST. GFR  (NO RACE VARIABLE): >60 ML/MIN/1.73 M^2
GLUCOSE SERPL-MCNC: 107 MG/DL (ref 70–110)
HCG INTACT+B SERPL-ACNC: <2.4 MIU/ML
HCT VFR BLD AUTO: 41.7 % (ref 37–48.5)
HGB BLD-MCNC: 14.6 G/DL (ref 12–16)
IMM GRANULOCYTES # BLD AUTO: 0.04 K/UL (ref 0–0.04)
IMM GRANULOCYTES NFR BLD AUTO: 0.7 % (ref 0–0.5)
LYMPHOCYTES # BLD AUTO: 2.6 K/UL (ref 1–4.8)
LYMPHOCYTES NFR BLD: 44.2 % (ref 18–48)
MCH RBC QN AUTO: 29.7 PG (ref 27–31)
MCHC RBC AUTO-ENTMCNC: 35 G/DL (ref 32–36)
MCV RBC AUTO: 85 FL (ref 82–98)
MONOCYTES # BLD AUTO: 0.3 K/UL (ref 0.3–1)
MONOCYTES NFR BLD: 5.7 % (ref 4–15)
NEUTROPHILS # BLD AUTO: 2.8 K/UL (ref 1.8–7.7)
NEUTROPHILS NFR BLD: 46.7 % (ref 38–73)
NRBC BLD-RTO: 0 /100 WBC
PLATELET # BLD AUTO: 323 K/UL (ref 150–450)
PMV BLD AUTO: 9.1 FL (ref 9.2–12.9)
POTASSIUM SERPL-SCNC: 4.5 MMOL/L (ref 3.5–5.1)
PROT SERPL-MCNC: 7.5 G/DL (ref 6–8.4)
RBC # BLD AUTO: 4.92 M/UL (ref 4–5.4)
SODIUM SERPL-SCNC: 136 MMOL/L (ref 136–145)
WBC # BLD AUTO: 5.97 K/UL (ref 3.9–12.7)

## 2024-12-26 PROCEDURE — 63600175 PHARM REV CODE 636 W HCPCS: Performed by: NURSE PRACTITIONER

## 2024-12-26 PROCEDURE — 96367 TX/PROPH/DG ADDL SEQ IV INF: CPT

## 2024-12-26 PROCEDURE — 84702 CHORIONIC GONADOTROPIN TEST: CPT | Performed by: INTERNAL MEDICINE

## 2024-12-26 PROCEDURE — 36415 COLL VENOUS BLD VENIPUNCTURE: CPT | Performed by: INTERNAL MEDICINE

## 2024-12-26 PROCEDURE — 96375 TX/PRO/DX INJ NEW DRUG ADDON: CPT

## 2024-12-26 PROCEDURE — 96413 CHEMO IV INFUSION 1 HR: CPT

## 2024-12-26 PROCEDURE — 96417 CHEMO IV INFUS EACH ADDL SEQ: CPT

## 2024-12-26 PROCEDURE — 85025 COMPLETE CBC W/AUTO DIFF WBC: CPT | Performed by: INTERNAL MEDICINE

## 2024-12-26 PROCEDURE — 25000003 PHARM REV CODE 250: Performed by: NURSE PRACTITIONER

## 2024-12-26 PROCEDURE — 80053 COMPREHEN METABOLIC PANEL: CPT | Performed by: INTERNAL MEDICINE

## 2024-12-26 RX ORDER — DIPHENHYDRAMINE HYDROCHLORIDE 50 MG/ML
50 INJECTION INTRAMUSCULAR; INTRAVENOUS ONCE AS NEEDED
Status: CANCELLED | OUTPATIENT
Start: 2025-02-23

## 2024-12-26 RX ORDER — SODIUM CHLORIDE 0.9 % (FLUSH) 0.9 %
10 SYRINGE (ML) INJECTION
Status: CANCELLED | OUTPATIENT
Start: 2025-02-23

## 2024-12-26 RX ORDER — EPINEPHRINE 0.3 MG/.3ML
0.3 INJECTION SUBCUTANEOUS ONCE AS NEEDED
Status: DISCONTINUED | OUTPATIENT
Start: 2024-12-26 | End: 2024-12-26 | Stop reason: HOSPADM

## 2024-12-26 RX ORDER — SODIUM CHLORIDE 0.9 % (FLUSH) 0.9 %
10 SYRINGE (ML) INJECTION
Status: DISCONTINUED | OUTPATIENT
Start: 2024-12-26 | End: 2024-12-26 | Stop reason: HOSPADM

## 2024-12-26 RX ORDER — HEPARIN 100 UNIT/ML
500 SYRINGE INTRAVENOUS
Status: CANCELLED | OUTPATIENT
Start: 2025-02-23

## 2024-12-26 RX ORDER — PROCHLORPERAZINE EDISYLATE 5 MG/ML
10 INJECTION INTRAMUSCULAR; INTRAVENOUS ONCE AS NEEDED
Status: DISCONTINUED | OUTPATIENT
Start: 2024-12-26 | End: 2024-12-26 | Stop reason: HOSPADM

## 2024-12-26 RX ORDER — FAMOTIDINE 10 MG/ML
20 INJECTION INTRAVENOUS
Status: CANCELLED | OUTPATIENT
Start: 2025-02-23

## 2024-12-26 RX ORDER — HEPARIN 100 UNIT/ML
500 SYRINGE INTRAVENOUS
Status: DISCONTINUED | OUTPATIENT
Start: 2024-12-26 | End: 2024-12-26 | Stop reason: HOSPADM

## 2024-12-26 RX ORDER — PROCHLORPERAZINE EDISYLATE 5 MG/ML
10 INJECTION INTRAMUSCULAR; INTRAVENOUS ONCE AS NEEDED
Status: CANCELLED
Start: 2025-02-23

## 2024-12-26 RX ORDER — EPINEPHRINE 0.3 MG/.3ML
0.3 INJECTION SUBCUTANEOUS ONCE AS NEEDED
Status: CANCELLED | OUTPATIENT
Start: 2025-02-23

## 2024-12-26 RX ORDER — MEPERIDINE HYDROCHLORIDE 50 MG/ML
25 INJECTION INTRAMUSCULAR; INTRAVENOUS; SUBCUTANEOUS ONCE AS NEEDED
Status: CANCELLED
Start: 2025-02-23

## 2024-12-26 RX ORDER — FAMOTIDINE 10 MG/ML
20 INJECTION INTRAVENOUS
Status: COMPLETED | OUTPATIENT
Start: 2024-12-26 | End: 2024-12-26

## 2024-12-26 RX ORDER — MEPERIDINE HYDROCHLORIDE 100 MG/ML
25 INJECTION INTRAMUSCULAR; INTRAVENOUS; SUBCUTANEOUS ONCE AS NEEDED
Status: DISCONTINUED | OUTPATIENT
Start: 2024-12-26 | End: 2024-12-26 | Stop reason: SDUPTHER

## 2024-12-26 RX ORDER — MEPERIDINE HYDROCHLORIDE 50 MG/ML
25 INJECTION INTRAMUSCULAR; INTRAVENOUS; SUBCUTANEOUS
Status: DISCONTINUED | OUTPATIENT
Start: 2024-12-26 | End: 2024-12-26 | Stop reason: HOSPADM

## 2024-12-26 RX ORDER — DIPHENHYDRAMINE HYDROCHLORIDE 50 MG/ML
50 INJECTION INTRAMUSCULAR; INTRAVENOUS ONCE AS NEEDED
Status: DISCONTINUED | OUTPATIENT
Start: 2024-12-26 | End: 2024-12-26 | Stop reason: HOSPADM

## 2024-12-26 RX ADMIN — PACLITAXEL 198 MG: 6 INJECTION, SOLUTION INTRAVENOUS at 10:12

## 2024-12-26 RX ADMIN — DEXAMETHASONE SODIUM PHOSPHATE 10 MG: 4 INJECTION, SOLUTION INTRA-ARTICULAR; INTRALESIONAL; INTRAMUSCULAR; INTRAVENOUS; SOFT TISSUE at 10:12

## 2024-12-26 RX ADMIN — DIPHENHYDRAMINE HYDROCHLORIDE 50 MG: 50 INJECTION, SOLUTION INTRAMUSCULAR; INTRAVENOUS at 10:12

## 2024-12-26 RX ADMIN — TRASTUZUMAB-ANNS 261 MG: 420 INJECTION, POWDER, LYOPHILIZED, FOR SOLUTION INTRAVENOUS at 09:12

## 2024-12-26 RX ADMIN — HEPARIN SODIUM (PORCINE) LOCK FLUSH IV SOLN 100 UNIT/ML 500 UNITS: 100 SOLUTION at 11:12

## 2024-12-26 RX ADMIN — FAMOTIDINE 20 MG: 10 INJECTION INTRAVENOUS at 10:12

## 2024-12-26 NOTE — PLAN OF CARE
Pt admitted for C3 Kanjinti/Taxol. Labs reviewed and assessment performed This chito patient tolerated treatment well.Port was de accessed and Pt discharged form unit with her charming mom!!

## 2024-12-31 DIAGNOSIS — C50.912 INVASIVE DUCTAL CARCINOMA OF BREAST, FEMALE, LEFT: Primary | ICD-10-CM

## 2024-12-31 RX ORDER — SULFAMETHOXAZOLE AND TRIMETHOPRIM 400; 80 MG/1; MG/1
1 TABLET ORAL 2 TIMES DAILY
Qty: 20 TABLET | Refills: 0 | Status: SHIPPED | OUTPATIENT
Start: 2024-12-31 | End: 2025-01-10

## 2025-01-02 ENCOUNTER — OFFICE VISIT (OUTPATIENT)
Dept: HEMATOLOGY/ONCOLOGY | Facility: CLINIC | Age: 35
End: 2025-01-02
Payer: COMMERCIAL

## 2025-01-02 ENCOUNTER — LAB VISIT (OUTPATIENT)
Dept: LAB | Facility: HOSPITAL | Age: 35
End: 2025-01-02
Attending: INTERNAL MEDICINE
Payer: COMMERCIAL

## 2025-01-02 VITALS
SYSTOLIC BLOOD PRESSURE: 136 MMHG | RESPIRATION RATE: 18 BRPM | TEMPERATURE: 99 F | WEIGHT: 287.06 LBS | DIASTOLIC BLOOD PRESSURE: 72 MMHG | OXYGEN SATURATION: 97 % | BODY MASS INDEX: 45.06 KG/M2 | HEIGHT: 67 IN | HEART RATE: 97 BPM

## 2025-01-02 DIAGNOSIS — C50.912 INVASIVE DUCTAL CARCINOMA OF BREAST, FEMALE, LEFT: Primary | ICD-10-CM

## 2025-01-02 DIAGNOSIS — C50.912 INVASIVE DUCTAL CARCINOMA OF BREAST, FEMALE, LEFT: ICD-10-CM

## 2025-01-02 DIAGNOSIS — D84.821 IMMUNODEFICIENCY DUE TO DRUGS: ICD-10-CM

## 2025-01-02 DIAGNOSIS — R11.0 CHEMOTHERAPY-INDUCED NAUSEA: ICD-10-CM

## 2025-01-02 DIAGNOSIS — Z79.899 IMMUNODEFICIENCY DUE TO DRUGS: ICD-10-CM

## 2025-01-02 DIAGNOSIS — T45.1X5A CHEMOTHERAPY-INDUCED NAUSEA: ICD-10-CM

## 2025-01-02 DIAGNOSIS — L03.311 CELLULITIS OF ABDOMINAL WALL: ICD-10-CM

## 2025-01-02 LAB
ALBUMIN SERPL BCP-MCNC: 3.6 G/DL (ref 3.5–5.2)
ALP SERPL-CCNC: 91 U/L (ref 40–150)
ALT SERPL W/O P-5'-P-CCNC: 32 U/L (ref 10–44)
ANION GAP SERPL CALC-SCNC: 10 MMOL/L (ref 8–16)
AST SERPL-CCNC: 19 U/L (ref 10–40)
BASOPHILS # BLD AUTO: 0.06 K/UL (ref 0–0.2)
BASOPHILS NFR BLD: 0.8 % (ref 0–1.9)
BILIRUB SERPL-MCNC: 0.7 MG/DL (ref 0.1–1)
BUN SERPL-MCNC: 13 MG/DL (ref 6–20)
CALCIUM SERPL-MCNC: 9.7 MG/DL (ref 8.7–10.5)
CHLORIDE SERPL-SCNC: 107 MMOL/L (ref 95–110)
CO2 SERPL-SCNC: 21 MMOL/L (ref 23–29)
CREAT SERPL-MCNC: 0.7 MG/DL (ref 0.5–1.4)
DIFFERENTIAL METHOD BLD: ABNORMAL
EOSINOPHIL # BLD AUTO: 0.1 K/UL (ref 0–0.5)
EOSINOPHIL NFR BLD: 1.3 % (ref 0–8)
ERYTHROCYTE [DISTWIDTH] IN BLOOD BY AUTOMATED COUNT: 12.3 % (ref 11.5–14.5)
EST. GFR  (NO RACE VARIABLE): >60 ML/MIN/1.73 M^2
GLUCOSE SERPL-MCNC: 119 MG/DL (ref 70–110)
HCG INTACT+B SERPL-ACNC: <2.4 MIU/ML
HCT VFR BLD AUTO: 40.4 % (ref 37–48.5)
HGB BLD-MCNC: 13.6 G/DL (ref 12–16)
IMM GRANULOCYTES # BLD AUTO: 0.26 K/UL (ref 0–0.04)
IMM GRANULOCYTES NFR BLD AUTO: 3.4 % (ref 0–0.5)
LYMPHOCYTES # BLD AUTO: 2.1 K/UL (ref 1–4.8)
LYMPHOCYTES NFR BLD: 27.9 % (ref 18–48)
MCH RBC QN AUTO: 28.8 PG (ref 27–31)
MCHC RBC AUTO-ENTMCNC: 33.7 G/DL (ref 32–36)
MCV RBC AUTO: 85 FL (ref 82–98)
MONOCYTES # BLD AUTO: 0.6 K/UL (ref 0.3–1)
MONOCYTES NFR BLD: 7.4 % (ref 4–15)
NEUTROPHILS # BLD AUTO: 4.5 K/UL (ref 1.8–7.7)
NEUTROPHILS NFR BLD: 59.2 % (ref 38–73)
NRBC BLD-RTO: 0 /100 WBC
PLATELET # BLD AUTO: 406 K/UL (ref 150–450)
PMV BLD AUTO: 8.8 FL (ref 9.2–12.9)
POTASSIUM SERPL-SCNC: 4.2 MMOL/L (ref 3.5–5.1)
PROT SERPL-MCNC: 8.2 G/DL (ref 6–8.4)
RBC # BLD AUTO: 4.73 M/UL (ref 4–5.4)
SODIUM SERPL-SCNC: 138 MMOL/L (ref 136–145)
WBC # BLD AUTO: 7.54 K/UL (ref 3.9–12.7)

## 2025-01-02 PROCEDURE — 85025 COMPLETE CBC W/AUTO DIFF WBC: CPT | Performed by: INTERNAL MEDICINE

## 2025-01-02 PROCEDURE — 99215 OFFICE O/P EST HI 40 MIN: CPT | Mod: S$GLB,,, | Performed by: INTERNAL MEDICINE

## 2025-01-02 PROCEDURE — 99999 PR PBB SHADOW E&M-EST. PATIENT-LVL IV: CPT | Mod: PBBFAC,,, | Performed by: INTERNAL MEDICINE

## 2025-01-02 PROCEDURE — 84702 CHORIONIC GONADOTROPIN TEST: CPT | Performed by: INTERNAL MEDICINE

## 2025-01-02 PROCEDURE — G2211 COMPLEX E/M VISIT ADD ON: HCPCS | Mod: S$GLB,,, | Performed by: INTERNAL MEDICINE

## 2025-01-02 PROCEDURE — 3075F SYST BP GE 130 - 139MM HG: CPT | Mod: CPTII,S$GLB,, | Performed by: INTERNAL MEDICINE

## 2025-01-02 PROCEDURE — 3008F BODY MASS INDEX DOCD: CPT | Mod: CPTII,S$GLB,, | Performed by: INTERNAL MEDICINE

## 2025-01-02 PROCEDURE — 3078F DIAST BP <80 MM HG: CPT | Mod: CPTII,S$GLB,, | Performed by: INTERNAL MEDICINE

## 2025-01-02 PROCEDURE — 80053 COMPREHEN METABOLIC PANEL: CPT | Performed by: INTERNAL MEDICINE

## 2025-01-02 PROCEDURE — 36415 COLL VENOUS BLD VENIPUNCTURE: CPT | Performed by: INTERNAL MEDICINE

## 2025-01-02 PROCEDURE — 1159F MED LIST DOCD IN RCRD: CPT | Mod: CPTII,S$GLB,, | Performed by: INTERNAL MEDICINE

## 2025-01-02 RX ORDER — HEPARIN 100 UNIT/ML
500 SYRINGE INTRAVENOUS
Status: CANCELLED | OUTPATIENT
Start: 2025-01-02

## 2025-01-02 RX ORDER — SODIUM CHLORIDE 0.9 % (FLUSH) 0.9 %
10 SYRINGE (ML) INJECTION
Status: CANCELLED | OUTPATIENT
Start: 2025-01-02

## 2025-01-02 RX ORDER — MEPERIDINE HYDROCHLORIDE 50 MG/ML
25 INJECTION INTRAMUSCULAR; INTRAVENOUS; SUBCUTANEOUS ONCE AS NEEDED
Status: CANCELLED
Start: 2025-01-02

## 2025-01-02 RX ORDER — EPINEPHRINE 0.3 MG/.3ML
0.3 INJECTION SUBCUTANEOUS ONCE AS NEEDED
Status: CANCELLED | OUTPATIENT
Start: 2025-01-02

## 2025-01-02 RX ORDER — FAMOTIDINE 10 MG/ML
20 INJECTION INTRAVENOUS
Status: CANCELLED | OUTPATIENT
Start: 2025-01-02

## 2025-01-02 RX ORDER — DIPHENHYDRAMINE HYDROCHLORIDE 50 MG/ML
50 INJECTION INTRAMUSCULAR; INTRAVENOUS ONCE AS NEEDED
Status: CANCELLED | OUTPATIENT
Start: 2025-01-02

## 2025-01-02 RX ORDER — PROCHLORPERAZINE EDISYLATE 5 MG/ML
10 INJECTION INTRAMUSCULAR; INTRAVENOUS ONCE AS NEEDED
Status: CANCELLED
Start: 2025-01-02

## 2025-01-02 NOTE — PROGRESS NOTES
[facility]/ [facility]   at [facility] Note:      Chief Complaint:   Encounter Diagnoses   Name Primary?    Invasive ductal carcinoma of breast, female, left Yes    Chemotherapy-induced nausea     Immunodeficiency due to drugs         Cancer Staging   Invasive ductal carcinoma of breast, female, left  Staging form: Breast, AJCC 8th Edition  - Clinical stage from 8/8/2024: Stage IA (cT1c, cN0, cM0, G3, ER+, [place]+, HER2+) - Signed by Maureen Chiu MD on 8/27/2024      HPI:  Divya De Paz is a 34 y.o. female with PCOS and ANISH who presents today for C4 of weekly adjuvant taxol/trastuzumab for stage I TPBC    Tolerated treatment well.   She developed what she thinks was an ingrown hair in the RLQ of the abdomen. This developed into a cellulitis with some irritation from bandage  She started on bactrim 36h ago without much improvement in erythema. No fevers or chills       Oncology History  Patient self palpated a lump during self-exam, got evaluated by Gyn a few days later.   Follow-up mammogram 7/25/2024 showed an irregular high density mass in the UOQ of left breast  Ultrasound 7/25/2024: superficial irregular not parallel hypoechoic mass with indistinct and angular margins measuring 1.0 x 0.8 x 1.0 cm (1 o'clock axis, 1 CFN).     Biopsy 8/8/2024: IDC, grade 3, ER 90-95%/ [place] 10-20%/ HER2 3+; Ki67 80-90%     MRI 8/15/2024 revealed a 1.5 cm x 1.3 cm x 1.0 cm irregularly shaped mass with irregular margins and heterogeneous internal enhancement seen in the left breast at 1 o'clock in the anterior depth, 4.1 cm from the nipple and 0.3 cm from the skin. No internal mammary or axillary adenopathy identified.    L breast lumpectomy 9/25/24: IDC, grade 3, 1.1cm; 0/1 LN+,  also with oncoplastic reduction  Adjuvant TH started 12/11/24 (delay due to poor wound healing after surgery)       GYN History:  Age of menarche was 14. Age of menopause was n/a.  Last  "menstrual period was the week of 2024, pt reports inconsistent periods due to PCOS. Patient reports hormonal therapy use (Provera) to induce menstruation, stopped 2024 per OB/GYN reccs. Patient is . Age of first live birth was n/a.     Family History:  Paternal Grandmother Breast Cancer - multiple times;  of lung cancer (heavy smoker)  Paternal Aunt Breast Cancer - diagnosed at 45, BRCA negative  Paternal Aunt did not have cancer, also BRCA negative  Maternal grandmother - lung cancer, no smoking history  Maternal grandfather - renal cancer      Patient Active Problem List   Diagnosis    Hidradenitis suppurativa    PCOS (polycystic ovarian syndrome)    Insulin resistance    Secondary oligomenorrhea    Anxiety and depression    Invasive ductal carcinoma of breast, female, left    Negative Hereditary Cancer Genetic testing    Immunodeficiency due to drugs       Current Outpatient Medications   Medication Instructions    amitriptyline (ELAVIL) 25 mg, Oral, Nightly    LIDOcaine-prilocaine (EMLA) cream Apply topically to port one hour prior to chemotherapy infusion    metFORMIN (GLUCOPHAGE) 500 mg, Oral, 2 times daily with meals    ondansetron (ZOFRAN) 8 mg, Oral, Every 8 hours PRN    prochlorperazine (COMPAZINE) 5 mg, Oral, Every 6 hours PRN, Take if zofran is not working    spironolactone (ALDACTONE) 50 mg, Oral, Daily    sulfamethoxazole-trimethoprim 400-80mg (BACTRIM) 400-80 mg per tablet 1 tablet, Oral, 2 times daily       Review of Systems:   Review of Systems   Constitutional:         See above   All other systems reviewed and are negative.      PHYSICAL EXAM:  /72 (BP Location: Left arm, Patient Position: Sitting)   Pulse 97   Temp 98.8 °F (37.1 °C) (Oral)   Resp 18   Ht 5' 7" (1.702 m)   Wt 130.2 kg (287 lb 0.6 oz)   LMP 2024 (Exact Date)   SpO2 97%   BMI 44.96 kg/m²     General Appearance:    Alert, cooperative, no distress, appears stated age   Head:    Normocephalic, " without obvious abnormality, atraumatic   Lungs:     Clear to auscultation bilaterally, respirations unlabored    Heart:    Regular rate and rhythm, S1 and S2 normal, no murmur, rub    or gallop   Abdomen:     Erythema surrounding 1cm lesion   Extremities:   Extremities normal, atraumatic, no cyanosis or edema   Pulses:   2+ and symmetric all extremities   Skin:   Skin color, texture, turgor normal, no rashes or lesions   Neurologic:   CNII-XII intact, normal gait           Pertinent Labs & Imaging:  Pathology Results  (Last 30 days)      None            Recent Results (from the past 24 hours)   CBC W/ AUTO DIFFERENTIAL    Collection Time: 01/02/25  9:33 AM   Result Value Ref Range    WBC 7.54 3.90 - 12.70 K/uL    RBC 4.73 4.00 - 5.40 M/uL    Hemoglobin 13.6 12.0 - 16.0 g/dL    Hematocrit 40.4 37.0 - 48.5 %    MCV 85 82 - 98 fL    MCH 28.8 27.0 - 31.0 pg    MCHC 33.7 32.0 - 36.0 g/dL    RDW 12.3 11.5 - 14.5 %    Platelets 406 150 - 450 K/uL    MPV 8.8 (L) 9.2 - 12.9 fL    Immature Granulocytes 3.4 (H) 0.0 - 0.5 %    Gran # (ANC) 4.5 1.8 - 7.7 K/uL    Immature Grans (Abs) 0.26 (H) 0.00 - 0.04 K/uL    Lymph # 2.1 1.0 - 4.8 K/uL    Mono # 0.6 0.3 - 1.0 K/uL    Eos # 0.1 0.0 - 0.5 K/uL    Baso # 0.06 0.00 - 0.20 K/uL    nRBC 0 0 /100 WBC    Gran % 59.2 38.0 - 73.0 %    Lymph % 27.9 18.0 - 48.0 %    Mono % 7.4 4.0 - 15.0 %    Eosinophil % 1.3 0.0 - 8.0 %    Basophil % 0.8 0.0 - 1.9 %    Differential Method Automated    CMP    Collection Time: 01/02/25  9:33 AM   Result Value Ref Range    Sodium 138 136 - 145 mmol/L    Potassium 4.2 3.5 - 5.1 mmol/L    Chloride 107 95 - 110 mmol/L    CO2 21 (L) 23 - 29 mmol/L    Glucose 119 (H) 70 - 110 mg/dL    BUN 13 6 - 20 mg/dL    Creatinine 0.7 0.5 - 1.4 mg/dL    Calcium 9.7 8.7 - 10.5 mg/dL    Total Protein 8.2 6.0 - 8.4 g/dL    Albumin 3.6 3.5 - 5.2 g/dL    Total Bilirubin 0.7 0.1 - 1.0 mg/dL    Alkaline Phosphatase 91 40 - 150 U/L    AST 19 10 - 40 U/L    ALT 32 10 - 44 U/L     eGFR >60.0 >60 mL/min/1.73 m^2    Anion Gap 10 8 - 16 mmol/L   B-HCG    Collection Time: 01/02/25  9:33 AM   Result Value Ref Range    HCG Quant <2.4 See Text mIU/mL         Assessment & Plan:    1. Invasive ductal carcinoma of breast, female, left    2. Chemotherapy-induced nausea    3. Immunodeficiency due to drugs    Patient with newly diagnosed stage I TPBC on adjuvant TH weekly   Cycle #4 tomorrow. Reviewed labs. Ok for treatment  echo on 10/17/24: EF 60 to 65%, GLS -17.6%  zoladex every 4 weeks    rtc weekly for labs and infusion, every 2 weeks for provider visits  Strict instructions regarding cellulitis. She will call tomorrow morning. If no improvement, will change treatment to Keflex (discussed cefzil allergy from childhood, tolerates penicillins without difficulty)    Route Chart for Scheduling    Med Onc Chart Routing      Follow up with physician 4 weeks. 8 weeks   Follow up with KERVIN 2 weeks and 6 weeks.   Infusion scheduling note   weekly   Injection scheduling note    Labs CBC, B HCG and CMP   Scheduling:  Preferred lab:  Lab interval: once a week     Imaging None      Pharmacy appointment    Other referrals            Treatment Plan Information   OP BREAST TRASTUZUMAB PACLITAXEL WEEKLY Maureen Chiu MD   Associated diagnosis: Invasive ductal carcinoma of breast, female, left Stage IA cT1c, cN0, cM0, G3, ER+, [place]+, HER2+ noted on 8/12/2024   Line of treatment: Adjuvant  Treatment Goal: Curative     Upcoming Treatment Dates - OP BREAST TRASTUZUMAB PACLITAXEL WEEKLY    3/2/2025       Pre-Medications       diphenhydrAMINE (BENADRYL) 50 mg in NS 50 mL IVPB       dexAMETHasone (DECADRON) 10 mg in sodium chloride 0.9% 50 mL IVPB       famotidine (PF) injection 20 mg       Chemotherapy       trastuzumab-anns (KANJINTI) 261 mg in 0.9% NaCl 250 mL chemo infusion       PACLitaxeL (TAXOL) 80 mg/m2 = 198 mg in 0.9% NaCl 250 mL chemo infusion  3/9/2025       Pre-Medications       diphenhydrAMINE (BENADRYL) 50  mg in NS 50 mL IVPB       dexAMETHasone (DECADRON) 10 mg in sodium chloride 0.9% 50 mL IVPB       famotidine (PF) injection 20 mg       Chemotherapy       trastuzumab-anns (KANJINTI) 261 mg in 0.9% NaCl 250 mL chemo infusion       PACLitaxeL (TAXOL) 80 mg/m2 = 198 mg in 0.9% NaCl 250 mL chemo infusion  3/16/2025       Pre-Medications       diphenhydrAMINE (BENADRYL) 50 mg in NS 50 mL IVPB       dexAMETHasone (DECADRON) 10 mg in sodium chloride 0.9% 50 mL IVPB       famotidine (PF) injection 20 mg       Chemotherapy       trastuzumab-anns (KANJINTI) 261 mg in 0.9% NaCl 250 mL chemo infusion       PACLitaxeL (TAXOL) 80 mg/m2 = 198 mg in 0.9% NaCl 250 mL chemo infusion  3/23/2025       Pre-Medications       diphenhydrAMINE (BENADRYL) 50 mg in NS 50 mL IVPB       dexAMETHasone (DECADRON) 10 mg in sodium chloride 0.9% 50 mL IVPB       famotidine (PF) injection 20 mg       Chemotherapy       trastuzumab-anns (KANJINTI) 261 mg in 0.9% NaCl 250 mL chemo infusion       PACLitaxeL (TAXOL) 80 mg/m2 = 198 mg in 0.9% NaCl 250 mL chemo infusion    Supportive Plan Information  OP BREAST GOSERELIN Q4W Maureen Chiu MD   Associated Diagnosis: Invasive ductal carcinoma of breast, female, left Stage IA cT1c, cN0, cM0, G3, ER+, [place]+, HER2+ noted on 8/12/2024   Line of treatment: Supportive Care   Treatment goal: Supportive     Upcoming Treatment Dates - OP BREAST GOSERELIN Q4W    12/13/2024       Chemotherapy       goserelin (ZOLADEX) injection 3.6 mg  1/10/2025       Chemotherapy       goserelin (ZOLADEX) injection 3.6 mg  2/7/2025       Chemotherapy       goserelin (ZOLADEX) injection 3.6 mg  3/7/2025       Chemotherapy       goserelin (ZOLADEX) injection 3.6 mg    Patient is in agreement with the proposed treatment plan. All questions were answered to the patient's satisfaction. Pt knows to call clinic for any new or worsening symptoms and if anything is needed before the next clinic visit.    MDM includes  :    - Acute or  chronic illness or injury that poses a threat to life or bodily function  - Independent review and explanation of 3+ results from unique tests  - Discussion of management and ordering 3+ unique tests  - Extensive discussion of treatment and management  - Prescription drug management  - Drug therapy requiring intensive monitoring for toxicity       Maureen Chiu MD  01/02/2025

## 2025-01-03 ENCOUNTER — INFUSION (OUTPATIENT)
Dept: INFUSION THERAPY | Facility: HOSPITAL | Age: 35
End: 2025-01-03
Payer: COMMERCIAL

## 2025-01-03 VITALS
HEART RATE: 103 BPM | TEMPERATURE: 98 F | RESPIRATION RATE: 16 BRPM | DIASTOLIC BLOOD PRESSURE: 73 MMHG | OXYGEN SATURATION: 95 % | HEIGHT: 67 IN | WEIGHT: 287.06 LBS | SYSTOLIC BLOOD PRESSURE: 158 MMHG | BODY MASS INDEX: 45.06 KG/M2

## 2025-01-03 DIAGNOSIS — C50.912 INVASIVE DUCTAL CARCINOMA OF BREAST, FEMALE, LEFT: Primary | ICD-10-CM

## 2025-01-03 PROCEDURE — 96413 CHEMO IV INFUSION 1 HR: CPT

## 2025-01-03 PROCEDURE — 96367 TX/PROPH/DG ADDL SEQ IV INF: CPT

## 2025-01-03 PROCEDURE — 25000003 PHARM REV CODE 250: Performed by: INTERNAL MEDICINE

## 2025-01-03 PROCEDURE — 96375 TX/PRO/DX INJ NEW DRUG ADDON: CPT

## 2025-01-03 PROCEDURE — 96417 CHEMO IV INFUS EACH ADDL SEQ: CPT

## 2025-01-03 PROCEDURE — 63600175 PHARM REV CODE 636 W HCPCS: Performed by: INTERNAL MEDICINE

## 2025-01-03 RX ORDER — EPINEPHRINE 0.3 MG/.3ML
0.3 INJECTION SUBCUTANEOUS ONCE AS NEEDED
Status: DISCONTINUED | OUTPATIENT
Start: 2025-01-03 | End: 2025-01-03 | Stop reason: HOSPADM

## 2025-01-03 RX ORDER — DIPHENHYDRAMINE HYDROCHLORIDE 50 MG/ML
50 INJECTION INTRAMUSCULAR; INTRAVENOUS ONCE AS NEEDED
Status: DISCONTINUED | OUTPATIENT
Start: 2025-01-03 | End: 2025-01-03 | Stop reason: HOSPADM

## 2025-01-03 RX ORDER — SODIUM CHLORIDE 0.9 % (FLUSH) 0.9 %
10 SYRINGE (ML) INJECTION
Status: DISCONTINUED | OUTPATIENT
Start: 2025-01-03 | End: 2025-01-03 | Stop reason: HOSPADM

## 2025-01-03 RX ORDER — HEPARIN 100 UNIT/ML
500 SYRINGE INTRAVENOUS
Status: DISCONTINUED | OUTPATIENT
Start: 2025-01-03 | End: 2025-01-03 | Stop reason: HOSPADM

## 2025-01-03 RX ORDER — MEPERIDINE HYDROCHLORIDE 100 MG/ML
25 INJECTION INTRAMUSCULAR; INTRAVENOUS; SUBCUTANEOUS ONCE AS NEEDED
Status: DISCONTINUED | OUTPATIENT
Start: 2025-01-03 | End: 2025-01-03 | Stop reason: HOSPADM

## 2025-01-03 RX ORDER — FAMOTIDINE 10 MG/ML
20 INJECTION INTRAVENOUS
Status: COMPLETED | OUTPATIENT
Start: 2025-01-03 | End: 2025-01-03

## 2025-01-03 RX ORDER — PROCHLORPERAZINE EDISYLATE 5 MG/ML
10 INJECTION INTRAMUSCULAR; INTRAVENOUS ONCE AS NEEDED
Status: DISCONTINUED | OUTPATIENT
Start: 2025-01-03 | End: 2025-01-03 | Stop reason: HOSPADM

## 2025-01-03 RX ADMIN — FAMOTIDINE 20 MG: 10 INJECTION INTRAVENOUS at 10:01

## 2025-01-03 RX ADMIN — TRASTUZUMAB-ANNS 261 MG: 420 INJECTION, POWDER, LYOPHILIZED, FOR SOLUTION INTRAVENOUS at 09:01

## 2025-01-03 RX ADMIN — DIPHENHYDRAMINE HYDROCHLORIDE 50 MG: 50 INJECTION, SOLUTION INTRAMUSCULAR; INTRAVENOUS at 10:01

## 2025-01-03 RX ADMIN — PACLITAXEL 198 MG: 6 INJECTION, SOLUTION INTRAVENOUS at 11:01

## 2025-01-03 RX ADMIN — HEPARIN SODIUM (PORCINE) LOCK FLUSH IV SOLN 100 UNIT/ML 500 UNITS: 100 SOLUTION at 12:01

## 2025-01-03 RX ADMIN — DEXAMETHASONE SODIUM PHOSPHATE 10 MG: 4 INJECTION, SOLUTION INTRA-ARTICULAR; INTRALESIONAL; INTRAMUSCULAR; INTRAVENOUS; SOFT TISSUE at 10:01

## 2025-01-03 NOTE — PLAN OF CARE
pt completed kanjinti/taxol infusion. Pt tolerated well. Ambulated off floor independently, NAD.

## 2025-01-06 RX ORDER — MEPERIDINE HYDROCHLORIDE 50 MG/ML
25 INJECTION INTRAMUSCULAR; INTRAVENOUS; SUBCUTANEOUS ONCE AS NEEDED
Status: CANCELLED
Start: 2025-01-09

## 2025-01-06 RX ORDER — DIPHENHYDRAMINE HYDROCHLORIDE 50 MG/ML
50 INJECTION INTRAMUSCULAR; INTRAVENOUS ONCE AS NEEDED
Status: CANCELLED | OUTPATIENT
Start: 2025-01-09

## 2025-01-06 RX ORDER — EPINEPHRINE 0.3 MG/.3ML
0.3 INJECTION SUBCUTANEOUS ONCE AS NEEDED
Status: CANCELLED | OUTPATIENT
Start: 2025-01-09

## 2025-01-06 RX ORDER — HEPARIN 100 UNIT/ML
500 SYRINGE INTRAVENOUS
Status: CANCELLED | OUTPATIENT
Start: 2025-01-09

## 2025-01-06 RX ORDER — FAMOTIDINE 10 MG/ML
20 INJECTION INTRAVENOUS
Status: CANCELLED | OUTPATIENT
Start: 2025-01-09

## 2025-01-06 RX ORDER — PROCHLORPERAZINE EDISYLATE 5 MG/ML
10 INJECTION INTRAMUSCULAR; INTRAVENOUS ONCE AS NEEDED
Status: CANCELLED
Start: 2025-01-09

## 2025-01-06 RX ORDER — SODIUM CHLORIDE 0.9 % (FLUSH) 0.9 %
10 SYRINGE (ML) INJECTION
Status: CANCELLED | OUTPATIENT
Start: 2025-01-09

## 2025-01-09 ENCOUNTER — LAB VISIT (OUTPATIENT)
Dept: LAB | Facility: HOSPITAL | Age: 35
End: 2025-01-09
Attending: INTERNAL MEDICINE
Payer: COMMERCIAL

## 2025-01-09 ENCOUNTER — INFUSION (OUTPATIENT)
Dept: INFUSION THERAPY | Facility: HOSPITAL | Age: 35
End: 2025-01-09
Payer: COMMERCIAL

## 2025-01-09 VITALS
RESPIRATION RATE: 18 BRPM | TEMPERATURE: 98 F | BODY MASS INDEX: 45.06 KG/M2 | DIASTOLIC BLOOD PRESSURE: 79 MMHG | SYSTOLIC BLOOD PRESSURE: 147 MMHG | HEIGHT: 67 IN | WEIGHT: 287.06 LBS | HEART RATE: 91 BPM

## 2025-01-09 DIAGNOSIS — C50.912 INVASIVE DUCTAL CARCINOMA OF BREAST, FEMALE, LEFT: ICD-10-CM

## 2025-01-09 DIAGNOSIS — C50.912 INVASIVE DUCTAL CARCINOMA OF BREAST, FEMALE, LEFT: Primary | ICD-10-CM

## 2025-01-09 LAB
ALBUMIN SERPL BCP-MCNC: 3.8 G/DL (ref 3.5–5.2)
ALP SERPL-CCNC: 69 U/L (ref 40–150)
ALT SERPL W/O P-5'-P-CCNC: 32 U/L (ref 10–44)
ANION GAP SERPL CALC-SCNC: 7 MMOL/L (ref 8–16)
AST SERPL-CCNC: 16 U/L (ref 10–40)
BASOPHILS # BLD AUTO: 0.03 K/UL (ref 0–0.2)
BASOPHILS NFR BLD: 0.5 % (ref 0–1.9)
BILIRUB SERPL-MCNC: 0.7 MG/DL (ref 0.1–1)
BUN SERPL-MCNC: 15 MG/DL (ref 6–20)
CALCIUM SERPL-MCNC: 9.5 MG/DL (ref 8.7–10.5)
CHLORIDE SERPL-SCNC: 106 MMOL/L (ref 95–110)
CO2 SERPL-SCNC: 24 MMOL/L (ref 23–29)
CREAT SERPL-MCNC: 0.7 MG/DL (ref 0.5–1.4)
DIFFERENTIAL METHOD BLD: ABNORMAL
EOSINOPHIL # BLD AUTO: 0.1 K/UL (ref 0–0.5)
EOSINOPHIL NFR BLD: 0.8 % (ref 0–8)
ERYTHROCYTE [DISTWIDTH] IN BLOOD BY AUTOMATED COUNT: 12.2 % (ref 11.5–14.5)
EST. GFR  (NO RACE VARIABLE): >60 ML/MIN/1.73 M^2
GLUCOSE SERPL-MCNC: 107 MG/DL (ref 70–110)
HCG INTACT+B SERPL-ACNC: <2.4 MIU/ML
HCT VFR BLD AUTO: 39.5 % (ref 37–48.5)
HGB BLD-MCNC: 13.8 G/DL (ref 12–16)
IMM GRANULOCYTES # BLD AUTO: 0.06 K/UL (ref 0–0.04)
IMM GRANULOCYTES NFR BLD AUTO: 1 % (ref 0–0.5)
LYMPHOCYTES # BLD AUTO: 2.4 K/UL (ref 1–4.8)
LYMPHOCYTES NFR BLD: 40.2 % (ref 18–48)
MCH RBC QN AUTO: 29.6 PG (ref 27–31)
MCHC RBC AUTO-ENTMCNC: 34.9 G/DL (ref 32–36)
MCV RBC AUTO: 85 FL (ref 82–98)
MONOCYTES # BLD AUTO: 0.3 K/UL (ref 0.3–1)
MONOCYTES NFR BLD: 4.9 % (ref 4–15)
NEUTROPHILS # BLD AUTO: 3.1 K/UL (ref 1.8–7.7)
NEUTROPHILS NFR BLD: 52.6 % (ref 38–73)
NRBC BLD-RTO: 0 /100 WBC
PLATELET # BLD AUTO: 337 K/UL (ref 150–450)
PMV BLD AUTO: 9 FL (ref 9.2–12.9)
POTASSIUM SERPL-SCNC: 4.4 MMOL/L (ref 3.5–5.1)
PROT SERPL-MCNC: 7.7 G/DL (ref 6–8.4)
RBC # BLD AUTO: 4.66 M/UL (ref 4–5.4)
SODIUM SERPL-SCNC: 137 MMOL/L (ref 136–145)
WBC # BLD AUTO: 5.89 K/UL (ref 3.9–12.7)

## 2025-01-09 PROCEDURE — 80053 COMPREHEN METABOLIC PANEL: CPT | Performed by: INTERNAL MEDICINE

## 2025-01-09 PROCEDURE — 84702 CHORIONIC GONADOTROPIN TEST: CPT | Performed by: INTERNAL MEDICINE

## 2025-01-09 PROCEDURE — 96417 CHEMO IV INFUS EACH ADDL SEQ: CPT

## 2025-01-09 PROCEDURE — 25000003 PHARM REV CODE 250: Performed by: INTERNAL MEDICINE

## 2025-01-09 PROCEDURE — 96401 CHEMO ANTI-NEOPL SQ/IM: CPT

## 2025-01-09 PROCEDURE — 96413 CHEMO IV INFUSION 1 HR: CPT

## 2025-01-09 PROCEDURE — 96375 TX/PRO/DX INJ NEW DRUG ADDON: CPT

## 2025-01-09 PROCEDURE — 96367 TX/PROPH/DG ADDL SEQ IV INF: CPT

## 2025-01-09 PROCEDURE — 36415 COLL VENOUS BLD VENIPUNCTURE: CPT | Performed by: INTERNAL MEDICINE

## 2025-01-09 PROCEDURE — A4216 STERILE WATER/SALINE, 10 ML: HCPCS | Performed by: INTERNAL MEDICINE

## 2025-01-09 PROCEDURE — 85025 COMPLETE CBC W/AUTO DIFF WBC: CPT | Performed by: INTERNAL MEDICINE

## 2025-01-09 PROCEDURE — 63600175 PHARM REV CODE 636 W HCPCS: Performed by: INTERNAL MEDICINE

## 2025-01-09 RX ORDER — SODIUM CHLORIDE 0.9 % (FLUSH) 0.9 %
10 SYRINGE (ML) INJECTION
Status: DISCONTINUED | OUTPATIENT
Start: 2025-01-09 | End: 2025-01-09 | Stop reason: HOSPADM

## 2025-01-09 RX ORDER — DIPHENHYDRAMINE HYDROCHLORIDE 50 MG/ML
50 INJECTION INTRAMUSCULAR; INTRAVENOUS ONCE AS NEEDED
Status: DISCONTINUED | OUTPATIENT
Start: 2025-01-09 | End: 2025-01-09 | Stop reason: HOSPADM

## 2025-01-09 RX ORDER — FAMOTIDINE 10 MG/ML
20 INJECTION INTRAVENOUS
Status: COMPLETED | OUTPATIENT
Start: 2025-01-09 | End: 2025-01-09

## 2025-01-09 RX ORDER — EPINEPHRINE 0.3 MG/.3ML
0.3 INJECTION SUBCUTANEOUS ONCE AS NEEDED
Status: DISCONTINUED | OUTPATIENT
Start: 2025-01-09 | End: 2025-01-09 | Stop reason: HOSPADM

## 2025-01-09 RX ORDER — HEPARIN 100 UNIT/ML
500 SYRINGE INTRAVENOUS
Status: DISCONTINUED | OUTPATIENT
Start: 2025-01-09 | End: 2025-01-09 | Stop reason: HOSPADM

## 2025-01-09 RX ORDER — MEPERIDINE HYDROCHLORIDE 100 MG/ML
25 INJECTION INTRAMUSCULAR; INTRAVENOUS; SUBCUTANEOUS ONCE AS NEEDED
Status: DISCONTINUED | OUTPATIENT
Start: 2025-01-09 | End: 2025-01-09 | Stop reason: HOSPADM

## 2025-01-09 RX ORDER — PROCHLORPERAZINE EDISYLATE 5 MG/ML
10 INJECTION INTRAMUSCULAR; INTRAVENOUS ONCE AS NEEDED
Status: DISCONTINUED | OUTPATIENT
Start: 2025-01-09 | End: 2025-01-09 | Stop reason: HOSPADM

## 2025-01-09 RX ADMIN — HEPARIN SODIUM (PORCINE) LOCK FLUSH IV SOLN 100 UNIT/ML 500 UNITS: 100 SOLUTION at 11:01

## 2025-01-09 RX ADMIN — Medication 10 ML: at 11:01

## 2025-01-09 RX ADMIN — DEXAMETHASONE SODIUM PHOSPHATE 10 MG: 4 INJECTION, SOLUTION INTRA-ARTICULAR; INTRALESIONAL; INTRAMUSCULAR; INTRAVENOUS; SOFT TISSUE at 09:01

## 2025-01-09 RX ADMIN — FAMOTIDINE 20 MG: 10 INJECTION INTRAVENOUS at 09:01

## 2025-01-09 RX ADMIN — TRASTUZUMAB-ANNS 261 MG: 420 INJECTION, POWDER, LYOPHILIZED, FOR SOLUTION INTRAVENOUS at 09:01

## 2025-01-09 RX ADMIN — GOSERELIN ACETATE 3.6 MG: 3.6 IMPLANT SUBCUTANEOUS at 10:01

## 2025-01-09 RX ADMIN — SODIUM CHLORIDE: 9 INJECTION, SOLUTION INTRAVENOUS at 08:01

## 2025-01-09 RX ADMIN — DIPHENHYDRAMINE HYDROCHLORIDE 50 MG: 50 INJECTION, SOLUTION INTRAMUSCULAR; INTRAVENOUS at 09:01

## 2025-01-09 RX ADMIN — PACLITAXEL 198 MG: 6 INJECTION, SOLUTION INTRAVENOUS at 10:01

## 2025-01-13 ENCOUNTER — HOSPITAL ENCOUNTER (OUTPATIENT)
Dept: CARDIOLOGY | Facility: HOSPITAL | Age: 35
Discharge: HOME | End: 2025-01-13
Attending: NURSE PRACTITIONER
Payer: COMMERCIAL

## 2025-01-13 VITALS
WEIGHT: 286.63 LBS | SYSTOLIC BLOOD PRESSURE: 147 MMHG | BODY MASS INDEX: 44.99 KG/M2 | HEIGHT: 67 IN | DIASTOLIC BLOOD PRESSURE: 79 MMHG | HEART RATE: 85 BPM

## 2025-01-13 DIAGNOSIS — C50.912 INVASIVE DUCTAL CARCINOMA OF BREAST, FEMALE, LEFT: ICD-10-CM

## 2025-01-13 LAB
ASCENDING AORTA: 3.07 CM
AV MEAN GRADIENT: 8.3 MMHG
AV PEAK GRADIENT: 13 MMHG
BSA FOR ECHO PROCEDURE: 2.48 M2
CV ECHO LV RWT: 0.67 CM
DOP CALC AO PEAK VEL: 1.8 M/S
DOP CALC AO VTI: 37.5 CM
DOP CALC LVOT AREA: 5.3 CM2
DOP CALC LVOT DIAMETER: 2.6 CM
E WAVE DECELERATION TIME: 172.94 MS
E/A RATIO: 1.27
E/E' RATIO: 11.89 M/S
ECHO EF ESTIMATED: 64 %
ECHO LV POSTERIOR WALL: 1 CM (ref 0.6–1.1)
FRACTIONAL SHORTENING: 33.3 % (ref 28–44)
GLOBAL LONGITUIDAL STRAIN: 17.3 %
INTERVENTRICULAR SEPTUM: 0.9 CM (ref 0.6–1.1)
IVC DIAMETER: 0.97 CM
IVRT: 76.12 MS
LA MAJOR: 5.4 CM
LA MINOR: 5.02 CM
LA WIDTH: 4.26 CM
LEFT ATRIUM SIZE: 2.75 CM
LEFT ATRIUM VOLUME INDEX MOD: 24.4 ML/M2
LEFT ATRIUM VOLUME INDEX: 22 ML/M2
LEFT ATRIUM VOLUME MOD: 57.32 ML
LEFT ATRIUM VOLUME: 51.81 CM3
LEFT INTERNAL DIMENSION IN SYSTOLE: 2 CM (ref 2.1–4)
LEFT VENTRICLE DIASTOLIC VOLUME INDEX: 14.53 ML/M2
LEFT VENTRICLE DIASTOLIC VOLUME: 34.14 ML
LEFT VENTRICLE MASS INDEX: 32.3 G/M2
LEFT VENTRICLE SYSTOLIC VOLUME INDEX: 5.2 ML/M2
LEFT VENTRICLE SYSTOLIC VOLUME: 12.16 ML
LEFT VENTRICULAR INTERNAL DIMENSION IN DIASTOLE: 3 CM (ref 3.5–6)
LEFT VENTRICULAR MASS: 76 G
LV LATERAL E/E' RATIO: 11.89
LV SEPTAL E/E' RATIO: 11.89
MV A" WAVE DURATION": 114.18 MS
MV PEAK A VEL: 0.84 M/S
MV PEAK E VEL: 1.07 M/S
OHS CV RV/LV RATIO: 1 CM
PISA TR MAX VEL: 1.15 M/S
PULM VEIN A" WAVE DURATION": 114.18 MS
PULM VEIN S/D RATIO: 1.46
PULMONIC VEIN PEAK A VELOCITY: 0.2 M/S
PV PEAK D VEL: 0.41 M/S
PV PEAK S VEL: 0.6 M/S
RA MAJOR: 4.02 CM
RA PRESSURE ESTIMATED: 3 MMHG
RA WIDTH: 3.75 CM
RIGHT ATRIAL AREA: 10.8 CM2
RIGHT VENTRICLE DIASTOLIC BASEL DIMENSION: 3 CM
RV TB RVSP: 4 MMHG
RV TISSUE DOPPLER FREE WALL SYSTOLIC VELOCITY 1 (APICAL 4 CHAMBER VIEW): 13.98 CM/S
SINUS: 2.89 CM
STJ: 2.56 CM
TDI LATERAL: 0.09 M/S
TDI SEPTAL: 0.09 M/S
TDI: 0.09 M/S
TR MAX PG: 5 MMHG
TRICUSPID ANNULAR PLANE SYSTOLIC EXCURSION: 2.02 CM
TV PEAK GRADIENT: 5 MMHG
TV REST PULMONARY ARTERY PRESSURE: 8 MMHG
Z-SCORE OF LEFT VENTRICULAR DIMENSION IN END DIASTOLE: -11.87
Z-SCORE OF LEFT VENTRICULAR DIMENSION IN END SYSTOLE: -8.56

## 2025-01-13 PROCEDURE — 93356 MYOCRD STRAIN IMG SPCKL TRCK: CPT

## 2025-01-13 PROCEDURE — 93306 TTE W/DOPPLER COMPLETE: CPT | Mod: 26,,, | Performed by: STUDENT IN AN ORGANIZED HEALTH CARE EDUCATION/TRAINING PROGRAM

## 2025-01-13 PROCEDURE — 93356 MYOCRD STRAIN IMG SPCKL TRCK: CPT | Mod: ,,, | Performed by: STUDENT IN AN ORGANIZED HEALTH CARE EDUCATION/TRAINING PROGRAM

## 2025-01-16 ENCOUNTER — LAB VISIT (OUTPATIENT)
Dept: LAB | Facility: HOSPITAL | Age: 35
End: 2025-01-16
Attending: INTERNAL MEDICINE
Payer: COMMERCIAL

## 2025-01-16 ENCOUNTER — OFFICE VISIT (OUTPATIENT)
Dept: HEMATOLOGY/ONCOLOGY | Facility: CLINIC | Age: 35
End: 2025-01-16
Payer: COMMERCIAL

## 2025-01-16 ENCOUNTER — INFUSION (OUTPATIENT)
Dept: INFUSION THERAPY | Facility: HOSPITAL | Age: 35
End: 2025-01-16
Payer: COMMERCIAL

## 2025-01-16 VITALS
DIASTOLIC BLOOD PRESSURE: 79 MMHG | WEIGHT: 292.31 LBS | SYSTOLIC BLOOD PRESSURE: 153 MMHG | BODY MASS INDEX: 45.88 KG/M2 | HEIGHT: 67 IN | TEMPERATURE: 98 F | OXYGEN SATURATION: 97 % | RESPIRATION RATE: 18 BRPM | HEART RATE: 89 BPM

## 2025-01-16 VITALS
RESPIRATION RATE: 16 BRPM | WEIGHT: 292.31 LBS | TEMPERATURE: 98 F | SYSTOLIC BLOOD PRESSURE: 151 MMHG | HEIGHT: 67 IN | BODY MASS INDEX: 45.88 KG/M2 | HEART RATE: 95 BPM | DIASTOLIC BLOOD PRESSURE: 76 MMHG

## 2025-01-16 DIAGNOSIS — D84.821 IMMUNODEFICIENCY DUE TO DRUGS: ICD-10-CM

## 2025-01-16 DIAGNOSIS — C50.912 INVASIVE DUCTAL CARCINOMA OF BREAST, FEMALE, LEFT: ICD-10-CM

## 2025-01-16 DIAGNOSIS — L03.311 CELLULITIS OF ABDOMINAL WALL: ICD-10-CM

## 2025-01-16 DIAGNOSIS — C50.912 INVASIVE DUCTAL CARCINOMA OF BREAST, FEMALE, LEFT: Primary | ICD-10-CM

## 2025-01-16 DIAGNOSIS — Z79.899 IMMUNODEFICIENCY DUE TO DRUGS: ICD-10-CM

## 2025-01-16 LAB
ALBUMIN SERPL BCP-MCNC: 3.8 G/DL (ref 3.5–5.2)
ALP SERPL-CCNC: 68 U/L (ref 40–150)
ALT SERPL W/O P-5'-P-CCNC: 38 U/L (ref 10–44)
ANION GAP SERPL CALC-SCNC: 12 MMOL/L (ref 8–16)
AST SERPL-CCNC: 18 U/L (ref 10–40)
BASOPHILS # BLD AUTO: 0.03 K/UL (ref 0–0.2)
BASOPHILS NFR BLD: 0.6 % (ref 0–1.9)
BILIRUB SERPL-MCNC: 0.7 MG/DL (ref 0.1–1)
BUN SERPL-MCNC: 13 MG/DL (ref 6–20)
CALCIUM SERPL-MCNC: 9.4 MG/DL (ref 8.7–10.5)
CHLORIDE SERPL-SCNC: 106 MMOL/L (ref 95–110)
CO2 SERPL-SCNC: 23 MMOL/L (ref 23–29)
CREAT SERPL-MCNC: 0.7 MG/DL (ref 0.5–1.4)
DIFFERENTIAL METHOD BLD: ABNORMAL
EOSINOPHIL # BLD AUTO: 0.1 K/UL (ref 0–0.5)
EOSINOPHIL NFR BLD: 1.3 % (ref 0–8)
ERYTHROCYTE [DISTWIDTH] IN BLOOD BY AUTOMATED COUNT: 12.9 % (ref 11.5–14.5)
EST. GFR  (NO RACE VARIABLE): >60 ML/MIN/1.73 M^2
GLUCOSE SERPL-MCNC: 110 MG/DL (ref 70–110)
HCG INTACT+B SERPL-ACNC: <2.4 MIU/ML
HCT VFR BLD AUTO: 37.2 % (ref 37–48.5)
HGB BLD-MCNC: 12.7 G/DL (ref 12–16)
IMM GRANULOCYTES # BLD AUTO: 0.04 K/UL (ref 0–0.04)
IMM GRANULOCYTES NFR BLD AUTO: 0.9 % (ref 0–0.5)
LYMPHOCYTES # BLD AUTO: 1.8 K/UL (ref 1–4.8)
LYMPHOCYTES NFR BLD: 38.5 % (ref 18–48)
MCH RBC QN AUTO: 29 PG (ref 27–31)
MCHC RBC AUTO-ENTMCNC: 34.1 G/DL (ref 32–36)
MCV RBC AUTO: 85 FL (ref 82–98)
MONOCYTES # BLD AUTO: 0.4 K/UL (ref 0.3–1)
MONOCYTES NFR BLD: 8.1 % (ref 4–15)
NEUTROPHILS # BLD AUTO: 2.4 K/UL (ref 1.8–7.7)
NEUTROPHILS NFR BLD: 50.6 % (ref 38–73)
NRBC BLD-RTO: 0 /100 WBC
PLATELET # BLD AUTO: 327 K/UL (ref 150–450)
PMV BLD AUTO: 9.1 FL (ref 9.2–12.9)
POTASSIUM SERPL-SCNC: 4.3 MMOL/L (ref 3.5–5.1)
PROT SERPL-MCNC: 7.4 G/DL (ref 6–8.4)
RBC # BLD AUTO: 4.38 M/UL (ref 4–5.4)
SODIUM SERPL-SCNC: 141 MMOL/L (ref 136–145)
WBC # BLD AUTO: 4.67 K/UL (ref 3.9–12.7)

## 2025-01-16 PROCEDURE — 96375 TX/PRO/DX INJ NEW DRUG ADDON: CPT

## 2025-01-16 PROCEDURE — 63600175 PHARM REV CODE 636 W HCPCS: Performed by: INTERNAL MEDICINE

## 2025-01-16 PROCEDURE — G2211 COMPLEX E/M VISIT ADD ON: HCPCS | Mod: S$GLB,,, | Performed by: INTERNAL MEDICINE

## 2025-01-16 PROCEDURE — 3008F BODY MASS INDEX DOCD: CPT | Mod: CPTII,S$GLB,, | Performed by: INTERNAL MEDICINE

## 2025-01-16 PROCEDURE — 96367 TX/PROPH/DG ADDL SEQ IV INF: CPT

## 2025-01-16 PROCEDURE — 3077F SYST BP >= 140 MM HG: CPT | Mod: CPTII,S$GLB,, | Performed by: INTERNAL MEDICINE

## 2025-01-16 PROCEDURE — 25000003 PHARM REV CODE 250: Performed by: INTERNAL MEDICINE

## 2025-01-16 PROCEDURE — 85025 COMPLETE CBC W/AUTO DIFF WBC: CPT | Performed by: INTERNAL MEDICINE

## 2025-01-16 PROCEDURE — 3078F DIAST BP <80 MM HG: CPT | Mod: CPTII,S$GLB,, | Performed by: INTERNAL MEDICINE

## 2025-01-16 PROCEDURE — A4216 STERILE WATER/SALINE, 10 ML: HCPCS | Performed by: INTERNAL MEDICINE

## 2025-01-16 PROCEDURE — 36415 COLL VENOUS BLD VENIPUNCTURE: CPT | Performed by: INTERNAL MEDICINE

## 2025-01-16 PROCEDURE — 84702 CHORIONIC GONADOTROPIN TEST: CPT | Performed by: INTERNAL MEDICINE

## 2025-01-16 PROCEDURE — 96417 CHEMO IV INFUS EACH ADDL SEQ: CPT

## 2025-01-16 PROCEDURE — 96413 CHEMO IV INFUSION 1 HR: CPT

## 2025-01-16 PROCEDURE — 80053 COMPREHEN METABOLIC PANEL: CPT | Performed by: INTERNAL MEDICINE

## 2025-01-16 PROCEDURE — 99215 OFFICE O/P EST HI 40 MIN: CPT | Mod: S$GLB,,, | Performed by: INTERNAL MEDICINE

## 2025-01-16 PROCEDURE — 99999 PR PBB SHADOW E&M-EST. PATIENT-LVL III: CPT | Mod: PBBFAC,,, | Performed by: INTERNAL MEDICINE

## 2025-01-16 RX ORDER — FAMOTIDINE 10 MG/ML
20 INJECTION INTRAVENOUS
Status: COMPLETED | OUTPATIENT
Start: 2025-01-16 | End: 2025-01-16

## 2025-01-16 RX ORDER — EPINEPHRINE 0.3 MG/.3ML
0.3 INJECTION SUBCUTANEOUS ONCE AS NEEDED
Status: CANCELLED | OUTPATIENT
Start: 2025-01-16

## 2025-01-16 RX ORDER — DIPHENHYDRAMINE HYDROCHLORIDE 50 MG/ML
50 INJECTION INTRAMUSCULAR; INTRAVENOUS ONCE AS NEEDED
Status: DISCONTINUED | OUTPATIENT
Start: 2025-01-16 | End: 2025-01-16 | Stop reason: HOSPADM

## 2025-01-16 RX ORDER — MEPERIDINE HYDROCHLORIDE 100 MG/ML
25 INJECTION INTRAMUSCULAR; INTRAVENOUS; SUBCUTANEOUS ONCE AS NEEDED
Status: DISCONTINUED | OUTPATIENT
Start: 2025-01-16 | End: 2025-01-16

## 2025-01-16 RX ORDER — EPINEPHRINE 0.3 MG/.3ML
0.3 INJECTION SUBCUTANEOUS ONCE AS NEEDED
Status: DISCONTINUED | OUTPATIENT
Start: 2025-01-16 | End: 2025-01-16 | Stop reason: HOSPADM

## 2025-01-16 RX ORDER — HEPARIN 100 UNIT/ML
500 SYRINGE INTRAVENOUS
Status: DISCONTINUED | OUTPATIENT
Start: 2025-01-16 | End: 2025-01-16 | Stop reason: HOSPADM

## 2025-01-16 RX ORDER — HEPARIN 100 UNIT/ML
500 SYRINGE INTRAVENOUS
Status: CANCELLED | OUTPATIENT
Start: 2025-01-16

## 2025-01-16 RX ORDER — DEXAMETHASONE SODIUM PHOSPHATE 10 MG/ML
10 INJECTION INTRAMUSCULAR; INTRAVENOUS
Status: CANCELLED | OUTPATIENT
Start: 2025-01-16

## 2025-01-16 RX ORDER — PROCHLORPERAZINE EDISYLATE 5 MG/ML
10 INJECTION INTRAMUSCULAR; INTRAVENOUS ONCE AS NEEDED
Status: CANCELLED
Start: 2025-01-16

## 2025-01-16 RX ORDER — DIPHENHYDRAMINE HYDROCHLORIDE 50 MG/ML
50 INJECTION INTRAMUSCULAR; INTRAVENOUS ONCE AS NEEDED
Status: CANCELLED | OUTPATIENT
Start: 2025-01-16

## 2025-01-16 RX ORDER — MEPERIDINE HYDROCHLORIDE 50 MG/ML
25 INJECTION INTRAMUSCULAR; INTRAVENOUS; SUBCUTANEOUS ONCE AS NEEDED
Status: CANCELLED
Start: 2025-01-16

## 2025-01-16 RX ORDER — MEPERIDINE HYDROCHLORIDE 50 MG/ML
25 INJECTION INTRAMUSCULAR; INTRAVENOUS; SUBCUTANEOUS
Status: DISCONTINUED | OUTPATIENT
Start: 2025-01-16 | End: 2025-01-16 | Stop reason: HOSPADM

## 2025-01-16 RX ORDER — FAMOTIDINE 10 MG/ML
20 INJECTION INTRAVENOUS
Status: CANCELLED | OUTPATIENT
Start: 2025-01-16

## 2025-01-16 RX ORDER — SODIUM CHLORIDE 0.9 % (FLUSH) 0.9 %
10 SYRINGE (ML) INJECTION
Status: CANCELLED | OUTPATIENT
Start: 2025-01-16

## 2025-01-16 RX ORDER — SODIUM CHLORIDE 0.9 % (FLUSH) 0.9 %
10 SYRINGE (ML) INJECTION
Status: DISCONTINUED | OUTPATIENT
Start: 2025-01-16 | End: 2025-01-16 | Stop reason: HOSPADM

## 2025-01-16 RX ORDER — PROCHLORPERAZINE EDISYLATE 5 MG/ML
10 INJECTION INTRAMUSCULAR; INTRAVENOUS ONCE AS NEEDED
Status: DISCONTINUED | OUTPATIENT
Start: 2025-01-16 | End: 2025-01-16 | Stop reason: HOSPADM

## 2025-01-16 RX ADMIN — DIPHENHYDRAMINE HYDROCHLORIDE 50 MG: 50 INJECTION, SOLUTION INTRAMUSCULAR; INTRAVENOUS at 09:01

## 2025-01-16 RX ADMIN — PACLITAXEL 198 MG: 6 INJECTION, SOLUTION INTRAVENOUS at 10:01

## 2025-01-16 RX ADMIN — TRASTUZUMAB-ANNS 261 MG: 420 INJECTION, POWDER, LYOPHILIZED, FOR SOLUTION INTRAVENOUS at 09:01

## 2025-01-16 RX ADMIN — HEPARIN SODIUM (PORCINE) LOCK FLUSH IV SOLN 100 UNIT/ML 500 UNITS: 100 SOLUTION at 11:01

## 2025-01-16 RX ADMIN — SODIUM CHLORIDE: 9 INJECTION, SOLUTION INTRAVENOUS at 09:01

## 2025-01-16 RX ADMIN — DEXAMETHASONE SODIUM PHOSPHATE 10 MG: 4 INJECTION, SOLUTION INTRA-ARTICULAR; INTRALESIONAL; INTRAMUSCULAR; INTRAVENOUS; SOFT TISSUE at 09:01

## 2025-01-16 RX ADMIN — FAMOTIDINE 20 MG: 10 INJECTION INTRAVENOUS at 09:01

## 2025-01-16 RX ADMIN — Medication 10 ML: at 11:01

## 2025-01-16 NOTE — PROGRESS NOTES
[facility]/ [facility]   at [facility] Note:      Chief Complaint:   Encounter Diagnoses   Name Primary?    Invasive ductal carcinoma of breast, female, left Yes    Immunodeficiency due to drugs     Cellulitis of abdominal wall           Cancer Staging   Invasive ductal carcinoma of breast, female, left  Staging form: Breast, AJCC 8th Edition  - Clinical stage from 8/8/2024: Stage IA (cT1c, cN0, cM0, G3, ER+, [place]+, HER2+) - Signed by Maureen Chiu MD on 8/27/2024      HPI:  Divya De Paz is a 34 y.o. female with PCOS and ANISH who presents today for C6 of weekly adjuvant taxol/trastuzumab for stage I TPBC. Tolerated treatment well. Mild fatigue but mostly able to go about her day normally.  Cellulitis healed without issue. She completed antibiotics         Oncology History  Patient self palpated a lump during self-exam, got evaluated by Gyn a few days later.   Follow-up mammogram 7/25/2024 showed an irregular high density mass in the UOQ of left breast  Ultrasound 7/25/2024: superficial irregular not parallel hypoechoic mass with indistinct and angular margins measuring 1.0 x 0.8 x 1.0 cm (1 o'clock axis, 1 CFN).     Biopsy 8/8/2024: IDC, grade 3, ER 90-95%/ [place] 10-20%/ HER2 3+; Ki67 80-90%     MRI 8/15/2024 revealed a 1.5 cm x 1.3 cm x 1.0 cm irregularly shaped mass with irregular margins and heterogeneous internal enhancement seen in the left breast at 1 o'clock in the anterior depth, 4.1 cm from the nipple and 0.3 cm from the skin. No internal mammary or axillary adenopathy identified.    L breast lumpectomy 9/25/24: IDC, grade 3, 1.1cm; 0/1 LN+,  also with oncoplastic reduction  Adjuvant TH started 12/11/24 (delay due to poor wound healing after surgery)       GYN History:  Age of menarche was 14. Age of menopause was n/a.  Last menstrual period was the week of 8/8/2024, pt reports inconsistent periods due to PCOS. Patient reports hormonal  "therapy use (Provera) to induce menstruation, stopped 2024 per OB/GYN reccs. Patient is . Age of first live birth was n/a.     Family History:  Paternal Grandmother Breast Cancer - multiple times;  of lung cancer (heavy smoker)  Paternal Aunt Breast Cancer - diagnosed at 45, BRCA negative  Paternal Aunt did not have cancer, also BRCA negative  Maternal grandmother - lung cancer, no smoking history  Maternal grandfather - renal cancer      Patient Active Problem List   Diagnosis    Hidradenitis suppurativa    PCOS (polycystic ovarian syndrome)    Insulin resistance    Secondary oligomenorrhea    Severe obesity    Anxiety and depression    Invasive ductal carcinoma of breast, female, left    Negative Hereditary Cancer Genetic testing    Immunodeficiency due to drugs       Current Outpatient Medications   Medication Instructions    amitriptyline (ELAVIL) 25 mg, Oral, Nightly    LIDOcaine-prilocaine (EMLA) cream Apply topically to port one hour prior to chemotherapy infusion    metFORMIN (GLUCOPHAGE) 500 mg, Oral, 2 times daily with meals    ondansetron (ZOFRAN) 8 mg, Oral, Every 8 hours PRN    prochlorperazine (COMPAZINE) 5 mg, Oral, Every 6 hours PRN, Take if zofran is not working    spironolactone (ALDACTONE) 50 mg, Oral, Daily       Review of Systems:   Review of Systems   Constitutional:         See above   All other systems reviewed and are negative.      PHYSICAL EXAM:  BP (!) 153/79 (BP Location: Left arm, Patient Position: Sitting)   Pulse 89   Temp 98.3 °F (36.8 °C) (Oral)   Resp 18   Ht 5' 7" (1.702 m)   Wt 132.6 kg (292 lb 5.3 oz)   LMP 2024 (Exact Date)   SpO2 97%   BMI 45.79 kg/m²     General Appearance:    Alert, cooperative, no distress, appears stated age   Head:    Normocephalic, without obvious abnormality, atraumatic   Lungs:     Clear to auscultation bilaterally, respirations unlabored    Heart:    Regular rate and rhythm, S1 and S2 normal, no murmur, rub    " or gallop   Abdomen:     Erythema surrounding 1cm lesion   Extremities:   Extremities normal, atraumatic, no cyanosis or edema   Pulses:   2+ and symmetric all extremities   Skin:   Skin color, texture, turgor normal, no rashes or lesions   Neurologic:   CNII-XII intact, normal gait           Pertinent Labs & Imaging:  Pathology Results  (Last 30 days)      None            Recent Results (from the past 24 hours)   CBC W/ AUTO DIFFERENTIAL    Collection Time: 01/16/25  7:00 AM   Result Value Ref Range    WBC 4.67 3.90 - 12.70 K/uL    RBC 4.38 4.00 - 5.40 M/uL    Hemoglobin 12.7 12.0 - 16.0 g/dL    Hematocrit 37.2 37.0 - 48.5 %    MCV 85 82 - 98 fL    MCH 29.0 27.0 - 31.0 pg    MCHC 34.1 32.0 - 36.0 g/dL    RDW 12.9 11.5 - 14.5 %    Platelets 327 150 - 450 K/uL    MPV 9.1 (L) 9.2 - 12.9 fL    Immature Granulocytes 0.9 (H) 0.0 - 0.5 %    Gran # (ANC) 2.4 1.8 - 7.7 K/uL    Immature Grans (Abs) 0.04 0.00 - 0.04 K/uL    Lymph # 1.8 1.0 - 4.8 K/uL    Mono # 0.4 0.3 - 1.0 K/uL    Eos # 0.1 0.0 - 0.5 K/uL    Baso # 0.03 0.00 - 0.20 K/uL    nRBC 0 0 /100 WBC    Gran % 50.6 38.0 - 73.0 %    Lymph % 38.5 18.0 - 48.0 %    Mono % 8.1 4.0 - 15.0 %    Eosinophil % 1.3 0.0 - 8.0 %    Basophil % 0.6 0.0 - 1.9 %    Differential Method Automated    CMP    Collection Time: 01/16/25  7:00 AM   Result Value Ref Range    Sodium 141 136 - 145 mmol/L    Potassium 4.3 3.5 - 5.1 mmol/L    Chloride 106 95 - 110 mmol/L    CO2 23 23 - 29 mmol/L    Glucose 110 70 - 110 mg/dL    BUN 13 6 - 20 mg/dL    Creatinine 0.7 0.5 - 1.4 mg/dL    Calcium 9.4 8.7 - 10.5 mg/dL    Total Protein 7.4 6.0 - 8.4 g/dL    Albumin 3.8 3.5 - 5.2 g/dL    Total Bilirubin 0.7 0.1 - 1.0 mg/dL    Alkaline Phosphatase 68 40 - 150 U/L    AST 18 10 - 40 U/L    ALT 38 10 - 44 U/L    eGFR >60.0 >60 mL/min/1.73 m^2    Anion Gap 12 8 - 16 mmol/L   B-HCG    Collection Time: 01/16/25  7:00 AM   Result Value Ref Range    HCG Quant <2.4 See Text mIU/mL         Assessment & Plan:    1.  Invasive ductal carcinoma of breast, female, left    2. Immunodeficiency due to drugs    3. Cellulitis of abdominal wall      Patient with newly diagnosed stage I TPBC on adjuvant TH weekly   Cycle #6 today. Reviewed labs. Ok for treatment  echo on 1/13/24: EF 60 to 65%, GLS -17.3%; unchanged from previous  zoladex every 4 weeks    rtc weekly for labs and infusion, every 2 weeks for provider visits  Cellulitis resolved     Route Chart for Scheduling    Med Onc Chart Routing      Follow up with physician 4 weeks and 2 months.   Follow up with KEVRIN 2 weeks and 6 weeks.   Infusion scheduling note    Injection scheduling note    Labs CBC, CMP and B HCG   Scheduling:  Preferred lab:  Lab interval: once a week     Imaging None      Pharmacy appointment    Other referrals          Treatment Plan Information   OP BREAST TRASTUZUMAB PACLITAXEL WEEKLY Maureen Chiu MD   Associated diagnosis: Invasive ductal carcinoma of breast, female, left Stage IA cT1c, cN0, cM0, G3, ER+, [place]+, HER2+ noted on 8/12/2024   Line of treatment: Adjuvant  Treatment Goal: Curative     Upcoming Treatment Dates - OP BREAST TRASTUZUMAB PACLITAXEL WEEKLY    1/23/2025       Pre-Medications       diphenhydrAMINE (BENADRYL) 50 mg in 0.9% NaCl 50 mL IVPB       dexAMETHasone injection 10 mg       famotidine (PF) injection 20 mg       Chemotherapy       trastuzumab-anns (KANJINTI) 261 mg in 0.9% NaCl 250 mL chemo infusion       PACLitaxeL (TAXOL) 80 mg/m2 = 198 mg in 0.9% NaCl 250 mL chemo infusion  1/30/2025       Pre-Medications       diphenhydrAMINE (BENADRYL) 50 mg in 0.9% NaCl 50 mL IVPB       dexAMETHasone injection 10 mg       famotidine (PF) injection 20 mg       Chemotherapy       trastuzumab-anns (KANJINTI) 261 mg in 0.9% NaCl 250 mL chemo infusion       PACLitaxeL (TAXOL) 80 mg/m2 = 198 mg in 0.9% NaCl 250 mL chemo infusion  2/6/2025       Pre-Medications       diphenhydrAMINE (BENADRYL) 50 mg in 0.9% NaCl 50 mL IVPB       dexAMETHasone  injection 10 mg       famotidine (PF) injection 20 mg       Chemotherapy       trastuzumab-anns (KANJINTI) 261 mg in 0.9% NaCl 250 mL chemo infusion       PACLitaxeL (TAXOL) 80 mg/m2 = 198 mg in 0.9% NaCl 250 mL chemo infusion  2/13/2025       Pre-Medications       diphenhydrAMINE (BENADRYL) 50 mg in 0.9% NaCl 50 mL IVPB       dexAMETHasone injection 10 mg       famotidine (PF) injection 20 mg       Chemotherapy       trastuzumab-anns (KANJINTI) 261 mg in 0.9% NaCl 250 mL chemo infusion       PACLitaxeL (TAXOL) 80 mg/m2 = 198 mg in 0.9% NaCl 250 mL chemo infusion    Supportive Plan Information  OP BREAST GOSERELIN Q4W Maureen Chiu MD   Associated Diagnosis: Invasive ductal carcinoma of breast, female, left Stage IA cT1c, cN0, cM0, G3, ER+, [place]+, HER2+ noted on 8/12/2024   Line of treatment: Supportive Care   Treatment goal: Supportive     Upcoming Treatment Dates - OP BREAST GOSERELIN Q4W    1/10/2025       Chemotherapy       goserelin (ZOLADEX) injection 3.6 mg  2/7/2025       Chemotherapy       goserelin (ZOLADEX) injection 3.6 mg  3/7/2025       Chemotherapy       goserelin (ZOLADEX) injection 3.6 mg  4/4/2025       Chemotherapy       goserelin (ZOLADEX) injection 3.6 mg    Patient is in agreement with the proposed treatment plan. All questions were answered to the patient's satisfaction. Pt knows to call clinic for any new or worsening symptoms and if anything is needed before the next clinic visit.    MDM includes  :    - Acute or chronic illness or injury that poses a threat to life or bodily function  - Independent review and explanation of 3+ results from unique tests  - Discussion of management and ordering 3+ unique tests  - Extensive discussion of treatment and management  - Prescription drug management  - Drug therapy requiring intensive monitoring for toxicity       Maureen Chiu MD  01/16/2025

## 2025-01-16 NOTE — PROGRESS NOTES
[facility]/ [facility]   at [facility] Note:      Chief Complaint:   Encounter Diagnoses   Name Primary?    Invasive ductal carcinoma of breast, female, left Yes    Severe obesity         Cancer Staging   Invasive ductal carcinoma of breast, female, left  Staging form: Breast, AJCC 8th Edition  - Clinical stage from 8/8/2024: Stage IA (cT1c, cN0, cM0, G3, ER+, IA+, HER2+) - Signed by Maureen Chiu MD on 8/27/2024      HPI:  Divya De Paz is a 34 y.o. female with PCOS and ANISH who presents today for C7 of weekly adjuvant taxol/trastuzumab for stage I TPBC    Tolerated treatment well. She is fatigued.  She has had an on and off sore throat for the past week or so. Positive for a mild cough. Denies fevers.   Appetite has been stable.  Bowel movements are normal. Denies peripheral neuropathy.       Per Dr. Chiu's previous note: Oncology History  Patient self palpated a lump during self-exam, got evaluated by Gyn a few days later.   Follow-up mammogram 7/25/2024 showed an irregular high density mass in the UOQ of left breast  Ultrasound 7/25/2024: superficial irregular not parallel hypoechoic mass with indistinct and angular margins measuring 1.0 x 0.8 x 1.0 cm (1 o'clock axis, 1 CFN).     Biopsy 8/8/2024: IDC, grade 3, ER 90-95%/ IA 10-20%/ HER2 3+; Ki67 80-90%     MRI 8/15/2024 revealed a 1.5 cm x 1.3 cm x 1.0 cm irregularly shaped mass with irregular margins and heterogeneous internal enhancement seen in the left breast at 1 o'clock in the anterior depth, 4.1 cm from the nipple and 0.3 cm from the skin. No internal mammary or axillary adenopathy identified.    L breast lumpectomy 9/25/24: IDC, grade 3, 1.1cm; 0/1 LN+,  also with oncoplastic reduction  Adjuvant TH started 12/11/24 (delay due to poor wound healing after surgery)       GYN History:  Age of menarche was 14. Age of menopause was n/a.  Last menstrual period was the week of 8/8/2024, pt  reports inconsistent periods due to PCOS. Patient reports hormonal therapy use (Provera) to induce menstruation, stopped 2024 per OB/GYN reccs. Patient is . Age of first live birth was n/a.     Family History:  Paternal Grandmother Breast Cancer - multiple times;  of lung cancer (heavy smoker)  Paternal Aunt Breast Cancer - diagnosed at 45, BRCA negative  Paternal Aunt did not have cancer, also BRCA negative  Maternal grandmother - lung cancer, no smoking history  Maternal grandfather - renal cancer      Patient Active Problem List   Diagnosis    Hidradenitis suppurativa    PCOS (polycystic ovarian syndrome)    Insulin resistance    Secondary oligomenorrhea    Severe obesity    Anxiety and depression    Invasive ductal carcinoma of breast, female, left    Negative Hereditary Cancer Genetic testing    Immunodeficiency due to drugs       Current Outpatient Medications   Medication Instructions    amitriptyline (ELAVIL) 25 mg, Oral, Nightly    LIDOcaine-prilocaine (EMLA) cream Apply topically to port one hour prior to chemotherapy infusion    metFORMIN (GLUCOPHAGE) 500 mg, Oral, 2 times daily with meals    ondansetron (ZOFRAN) 8 mg, Oral, Every 8 hours PRN    prochlorperazine (COMPAZINE) 5 mg, Oral, Every 6 hours PRN, Take if zofran is not working    spironolactone (ALDACTONE) 50 mg, Oral, Daily       Review of Systems:   Review of Systems   Constitutional:  Positive for malaise/fatigue.        See above   Respiratory:  Positive for cough. Negative for shortness of breath.    Cardiovascular:  Negative for chest pain.   Gastrointestinal:  Negative for abdominal pain and diarrhea.   Genitourinary:  Negative for frequency.   Musculoskeletal:  Negative for back pain.   Skin:  Negative for rash.   Neurological:  Negative for headaches.   Psychiatric/Behavioral:  The patient is nervous/anxious.    All other systems reviewed and are negative.      PHYSICAL EXAM:  LMP 2024 (Exact Date)     General  Appearance:    Alert, cooperative, no distress, appears stated age   Head:    Normocephalic, without obvious abnormality, atraumatic   Lungs:     Clear to auscultation bilaterally, respirations unlabored    Heart:    Regular rate and rhythm, S1 and S2 normal, no murmur, rub    or gallop   Abdomen:     Erythema surrounding 1cm lesion   Extremities:   Extremities normal, atraumatic, no cyanosis or edema   Pulses:      Skin:   Skin color, texture, turgor normal, no rashes or lesions   Neurologic:   normal gait           Pertinent Labs & Imaging:  Pathology Results  (Last 30 days)      None            Recent Results (from the past 24 hours)   CBC W/ AUTO DIFFERENTIAL    Collection Time: 01/16/25  7:00 AM   Result Value Ref Range    WBC 4.67 3.90 - 12.70 K/uL    RBC 4.38 4.00 - 5.40 M/uL    Hemoglobin 12.7 12.0 - 16.0 g/dL    Hematocrit 37.2 37.0 - 48.5 %    MCV 85 82 - 98 fL    MCH 29.0 27.0 - 31.0 pg    MCHC 34.1 32.0 - 36.0 g/dL    RDW 12.9 11.5 - 14.5 %    Platelets 327 150 - 450 K/uL    MPV 9.1 (L) 9.2 - 12.9 fL    Immature Granulocytes 0.9 (H) 0.0 - 0.5 %    Gran # (ANC) 2.4 1.8 - 7.7 K/uL    Immature Grans (Abs) 0.04 0.00 - 0.04 K/uL    Lymph # 1.8 1.0 - 4.8 K/uL    Mono # 0.4 0.3 - 1.0 K/uL    Eos # 0.1 0.0 - 0.5 K/uL    Baso # 0.03 0.00 - 0.20 K/uL    nRBC 0 0 /100 WBC    Gran % 50.6 38.0 - 73.0 %    Lymph % 38.5 18.0 - 48.0 %    Mono % 8.1 4.0 - 15.0 %    Eosinophil % 1.3 0.0 - 8.0 %    Basophil % 0.6 0.0 - 1.9 %    Differential Method Automated    CMP    Collection Time: 01/16/25  7:00 AM   Result Value Ref Range    Sodium 141 136 - 145 mmol/L    Potassium 4.3 3.5 - 5.1 mmol/L    Chloride 106 95 - 110 mmol/L    CO2 23 23 - 29 mmol/L    Glucose 110 70 - 110 mg/dL    BUN 13 6 - 20 mg/dL    Creatinine 0.7 0.5 - 1.4 mg/dL    Calcium 9.4 8.7 - 10.5 mg/dL    Total Protein 7.4 6.0 - 8.4 g/dL    Albumin 3.8 3.5 - 5.2 g/dL    Total Bilirubin 0.7 0.1 - 1.0 mg/dL    Alkaline Phosphatase 68 40 - 150 U/L    AST 18 10 - 40  U/L    ALT 38 10 - 44 U/L    eGFR >60.0 >60 mL/min/1.73 m^2    Anion Gap 12 8 - 16 mmol/L   B-HCG    Collection Time: 01/16/25  7:00 AM   Result Value Ref Range    HCG Quant <2.4 See Text mIU/mL         Assessment & Plan:    1. Invasive ductal carcinoma of breast, female, left    2. Severe obesity    Patient with newly diagnosed stage I TPBC on adjuvant TH weekly   Cycle #7 tomorrow. Reviewed labs. Ok for treatment  echo on 10/17/24: EF 60 to 65%, GLS -17.6%  zoladex every 4 weeks    rtc weekly for labs and infusion, every 2 weeks for provider visits  Strict instructions regarding cellulitis. She will call tomorrow morning. If no improvement, will change treatment to Keflex (discussed cefzil allergy from childhood, tolerates penicillins without difficulty)    Return to clinic in 2 weeks with KERVIN appointment and labs.     Patient is in agreement with the proposed treatment plan. All questions were answered to the patient's satisfaction. Patient knows to call clinic for any new or worsening symptoms and if anything is needed before the next clinic visit.          Loraine Tarango, FNP-C  Hematology & Medical Oncology   [address] [place] [id number]  ph. [phone]  Fax. [phone]    Collaborating physician, Dr. Chiu.    Approximately 10 minutes were spent face-to-face with the patient.  Approximately 15 minutes in total were spent on this encounter, which includes face-to-face time and non-face-to-face time preparing to see the patient (e.g., review of tests), obtaining and/or reviewing separately obtained history, documenting clinical information in the electronic or other health record, independently interpreting results (not separately reported) and communicating results to the patient/family/caregiver, or care coordination (not separately reported).       Route Chart for Scheduling    Med Onc Chart Routing      Follow up with physician    Follow up with KERVIN 2 weeks. already scheduled    Infusion scheduling note    Injection scheduling note    Labs    Imaging    Pharmacy appointment    Other referrals                Treatment Plan Information   OP BREAST TRASTUZUMAB PACLITAXEL WEEKLY Maureen Chiu MD   Associated diagnosis: Invasive ductal carcinoma of breast, female, left Stage IA cT1c, cN0, cM0, G3, ER+, OK+, HER2+ noted on 8/12/2024   Line of treatment: Adjuvant  Treatment Goal: Curative     Upcoming Treatment Dates - OP BREAST TRASTUZUMAB PACLITAXEL WEEKLY    1/16/2025       Pre-Medications       diphenhydrAMINE (BENADRYL) 50 mg in NS 50 mL IVPB       dexAMETHasone (DECADRON) 10 mg in sodium chloride 0.9% 50 mL IVPB       famotidine (PF) injection 20 mg       Chemotherapy       trastuzumab-anns (KANJINTI) 261 mg in 0.9% NaCl 250 mL chemo infusion       PACLitaxeL (TAXOL) 80 mg/m2 = 198 mg in 0.9% NaCl 250 mL chemo infusion  1/23/2025       Pre-Medications       diphenhydrAMINE (BENADRYL) 50 mg in NS 50 mL IVPB       dexAMETHasone (DECADRON) 10 mg in sodium chloride 0.9% 50 mL IVPB       famotidine (PF) injection 20 mg       Chemotherapy       trastuzumab-anns (KANJINTI) 261 mg in 0.9% NaCl 250 mL chemo infusion       PACLitaxeL (TAXOL) 80 mg/m2 = 198 mg in 0.9% NaCl 250 mL chemo infusion  1/30/2025       Pre-Medications       diphenhydrAMINE (BENADRYL) 50 mg in NS 50 mL IVPB       dexAMETHasone (DECADRON) 10 mg in sodium chloride 0.9% 50 mL IVPB       famotidine (PF) injection 20 mg       Chemotherapy       trastuzumab-anns (KANJINTI) 261 mg in 0.9% NaCl 250 mL chemo infusion       PACLitaxeL (TAXOL) 80 mg/m2 = 198 mg in 0.9% NaCl 250 mL chemo infusion  2/6/2025       Pre-Medications       diphenhydrAMINE (BENADRYL) 50 mg in NS 50 mL IVPB       dexAMETHasone (DECADRON) 10 mg in sodium chloride 0.9% 50 mL IVPB       famotidine (PF) injection 20 mg       Chemotherapy       trastuzumab-anns (KANJINTI) 261 mg in 0.9% NaCl 250 mL chemo infusion       PACLitaxeL (TAXOL) 80 mg/m2 = 198 mg in 0.9%  NaCl 250 mL chemo infusion    Supportive Plan Information  OP BREAST GOSERELIN Q4W Maureen Chiu MD   Associated Diagnosis: Invasive ductal carcinoma of breast, female, left Stage IA cT1c, cN0, cM0, G3, ER+, RI+, HER2+ noted on 8/12/2024   Line of treatment: Supportive Care   Treatment goal: Supportive     Upcoming Treatment Dates - OP BREAST GOSERELIN Q4W    1/10/2025       Chemotherapy       goserelin (ZOLADEX) injection 3.6 mg  2/7/2025       Chemotherapy       goserelin (ZOLADEX) injection 3.6 mg  3/7/2025       Chemotherapy       goserelin (ZOLADEX) injection 3.6 mg  4/4/2025       Chemotherapy       goserelin (ZOLADEX) injection 3.6 mg    Patient is in agreement with the proposed treatment plan. All questions were answered to the patient's satisfaction. Pt knows to call clinic for any new or worsening symptoms and if anything is needed before the next clinic visit.    MDM includes  :    - Acute or chronic illness or injury that poses a threat to life or bodily function  - Independent review and explanation of 3+ results from unique tests  - Discussion of management and ordering 3+ unique tests  - Extensive discussion of treatment and management  - Prescription drug management  - Drug therapy requiring intensive monitoring for toxicity       Loraine Tarango NP  01/16/2025

## 2025-01-21 RX ORDER — DEXAMETHASONE SODIUM PHOSPHATE 10 MG/ML
10 INJECTION INTRAMUSCULAR; INTRAVENOUS
Status: CANCELLED | OUTPATIENT
Start: 2025-01-23

## 2025-01-21 RX ORDER — MEPERIDINE HYDROCHLORIDE 50 MG/ML
25 INJECTION INTRAMUSCULAR; INTRAVENOUS; SUBCUTANEOUS ONCE AS NEEDED
Status: CANCELLED
Start: 2025-01-23

## 2025-01-21 RX ORDER — DIPHENHYDRAMINE HYDROCHLORIDE 50 MG/ML
50 INJECTION INTRAMUSCULAR; INTRAVENOUS ONCE AS NEEDED
Status: CANCELLED | OUTPATIENT
Start: 2025-01-23

## 2025-01-21 RX ORDER — HEPARIN 100 UNIT/ML
500 SYRINGE INTRAVENOUS
Status: CANCELLED | OUTPATIENT
Start: 2025-01-23

## 2025-01-21 RX ORDER — EPINEPHRINE 0.3 MG/.3ML
0.3 INJECTION SUBCUTANEOUS ONCE AS NEEDED
Status: CANCELLED | OUTPATIENT
Start: 2025-01-23

## 2025-01-21 RX ORDER — PROCHLORPERAZINE EDISYLATE 5 MG/ML
10 INJECTION INTRAMUSCULAR; INTRAVENOUS ONCE AS NEEDED
Status: CANCELLED
Start: 2025-01-23

## 2025-01-21 RX ORDER — SODIUM CHLORIDE 0.9 % (FLUSH) 0.9 %
10 SYRINGE (ML) INJECTION
Status: CANCELLED | OUTPATIENT
Start: 2025-01-23

## 2025-01-21 RX ORDER — FAMOTIDINE 10 MG/ML
20 INJECTION INTRAVENOUS
Status: CANCELLED | OUTPATIENT
Start: 2025-01-23

## 2025-01-23 ENCOUNTER — TELEPHONE (OUTPATIENT)
Dept: HEMATOLOGY/ONCOLOGY | Facility: CLINIC | Age: 35
End: 2025-01-23
Payer: COMMERCIAL

## 2025-01-23 NOTE — TELEPHONE ENCOUNTER
"----- Message from Varun sent at 1/22/2025 11:59 AM CST -----  Consult/Advisory    Name Of Caller: Self    Contact Preference?: [phone]     Provider Name: eLeanneana m    Does patient feel the need to be seen today? No    What is the nature of the call?: Calling to discuss the status for r/s - 1/23 appts    Additional Notes:  "Thank you for all that you do for our patients"  "

## 2025-01-24 NOTE — TELEPHONE ENCOUNTER
Attempted to return call to patient to, left voicemail for patient letting her know what Dr. Chiu said about making up the treatment.

## 2025-01-30 ENCOUNTER — OFFICE VISIT (OUTPATIENT)
Dept: HEMATOLOGY/ONCOLOGY | Facility: CLINIC | Age: 35
End: 2025-01-30
Payer: COMMERCIAL

## 2025-01-30 ENCOUNTER — LAB VISIT (OUTPATIENT)
Dept: LAB | Facility: HOSPITAL | Age: 35
End: 2025-01-30
Attending: INTERNAL MEDICINE
Payer: COMMERCIAL

## 2025-01-30 ENCOUNTER — INFUSION (OUTPATIENT)
Dept: INFUSION THERAPY | Facility: HOSPITAL | Age: 35
End: 2025-01-30
Payer: COMMERCIAL

## 2025-01-30 VITALS
HEIGHT: 67 IN | DIASTOLIC BLOOD PRESSURE: 84 MMHG | RESPIRATION RATE: 20 BRPM | BODY MASS INDEX: 45.72 KG/M2 | TEMPERATURE: 98 F | OXYGEN SATURATION: 97 % | SYSTOLIC BLOOD PRESSURE: 135 MMHG | WEIGHT: 291.31 LBS | HEART RATE: 96 BPM

## 2025-01-30 VITALS
HEIGHT: 67 IN | SYSTOLIC BLOOD PRESSURE: 175 MMHG | WEIGHT: 291.31 LBS | HEART RATE: 95 BPM | BODY MASS INDEX: 45.72 KG/M2 | DIASTOLIC BLOOD PRESSURE: 89 MMHG | TEMPERATURE: 98 F | OXYGEN SATURATION: 96 % | RESPIRATION RATE: 2 BRPM

## 2025-01-30 DIAGNOSIS — E66.01 SEVERE OBESITY: ICD-10-CM

## 2025-01-30 DIAGNOSIS — C50.912 INVASIVE DUCTAL CARCINOMA OF BREAST, FEMALE, LEFT: Primary | ICD-10-CM

## 2025-01-30 DIAGNOSIS — C50.912 INVASIVE DUCTAL CARCINOMA OF BREAST, FEMALE, LEFT: ICD-10-CM

## 2025-01-30 LAB
ALBUMIN SERPL BCP-MCNC: 3.9 G/DL (ref 3.5–5.2)
ALP SERPL-CCNC: 80 U/L (ref 40–150)
ALT SERPL W/O P-5'-P-CCNC: 24 U/L (ref 10–44)
ANION GAP SERPL CALC-SCNC: 9 MMOL/L (ref 8–16)
AST SERPL-CCNC: 15 U/L (ref 10–40)
BASOPHILS # BLD AUTO: 0.03 K/UL (ref 0–0.2)
BASOPHILS NFR BLD: 0.4 % (ref 0–1.9)
BILIRUB SERPL-MCNC: 1 MG/DL (ref 0.1–1)
BUN SERPL-MCNC: 15 MG/DL (ref 6–20)
CALCIUM SERPL-MCNC: 9.3 MG/DL (ref 8.7–10.5)
CHLORIDE SERPL-SCNC: 104 MMOL/L (ref 95–110)
CO2 SERPL-SCNC: 23 MMOL/L (ref 23–29)
CREAT SERPL-MCNC: 0.7 MG/DL (ref 0.5–1.4)
DIFFERENTIAL METHOD BLD: ABNORMAL
EOSINOPHIL # BLD AUTO: 0.1 K/UL (ref 0–0.5)
EOSINOPHIL NFR BLD: 1.5 % (ref 0–8)
ERYTHROCYTE [DISTWIDTH] IN BLOOD BY AUTOMATED COUNT: 13.2 % (ref 11.5–14.5)
EST. GFR  (NO RACE VARIABLE): >60 ML/MIN/1.73 M^2
GLUCOSE SERPL-MCNC: 112 MG/DL (ref 70–110)
HCG INTACT+B SERPL-ACNC: <2.4 MIU/ML
HCT VFR BLD AUTO: 38.9 % (ref 37–48.5)
HGB BLD-MCNC: 13.5 G/DL (ref 12–16)
IMM GRANULOCYTES # BLD AUTO: 0.06 K/UL (ref 0–0.04)
IMM GRANULOCYTES NFR BLD AUTO: 0.8 % (ref 0–0.5)
LYMPHOCYTES # BLD AUTO: 2.1 K/UL (ref 1–4.8)
LYMPHOCYTES NFR BLD: 29.7 % (ref 18–48)
MCH RBC QN AUTO: 29.2 PG (ref 27–31)
MCHC RBC AUTO-ENTMCNC: 34.7 G/DL (ref 32–36)
MCV RBC AUTO: 84 FL (ref 82–98)
MONOCYTES # BLD AUTO: 0.6 K/UL (ref 0.3–1)
MONOCYTES NFR BLD: 7.6 % (ref 4–15)
NEUTROPHILS # BLD AUTO: 4.3 K/UL (ref 1.8–7.7)
NEUTROPHILS NFR BLD: 60 % (ref 38–73)
NRBC BLD-RTO: 0 /100 WBC
PLATELET # BLD AUTO: 308 K/UL (ref 150–450)
PMV BLD AUTO: 8.7 FL (ref 9.2–12.9)
POTASSIUM SERPL-SCNC: 4.1 MMOL/L (ref 3.5–5.1)
PROT SERPL-MCNC: 7.7 G/DL (ref 6–8.4)
RBC # BLD AUTO: 4.62 M/UL (ref 4–5.4)
SODIUM SERPL-SCNC: 136 MMOL/L (ref 136–145)
WBC # BLD AUTO: 7.21 K/UL (ref 3.9–12.7)

## 2025-01-30 PROCEDURE — 1159F MED LIST DOCD IN RCRD: CPT | Mod: CPTII,S$GLB,, | Performed by: NURSE PRACTITIONER

## 2025-01-30 PROCEDURE — 84702 CHORIONIC GONADOTROPIN TEST: CPT | Performed by: INTERNAL MEDICINE

## 2025-01-30 PROCEDURE — 3079F DIAST BP 80-89 MM HG: CPT | Mod: CPTII,S$GLB,, | Performed by: NURSE PRACTITIONER

## 2025-01-30 PROCEDURE — 3008F BODY MASS INDEX DOCD: CPT | Mod: CPTII,S$GLB,, | Performed by: NURSE PRACTITIONER

## 2025-01-30 PROCEDURE — 99215 OFFICE O/P EST HI 40 MIN: CPT | Mod: S$GLB,,, | Performed by: NURSE PRACTITIONER

## 2025-01-30 PROCEDURE — 99999 PR PBB SHADOW E&M-EST. PATIENT-LVL IV: CPT | Mod: PBBFAC,,, | Performed by: NURSE PRACTITIONER

## 2025-01-30 PROCEDURE — G2211 COMPLEX E/M VISIT ADD ON: HCPCS | Mod: S$GLB,,, | Performed by: NURSE PRACTITIONER

## 2025-01-30 PROCEDURE — 36415 COLL VENOUS BLD VENIPUNCTURE: CPT | Performed by: INTERNAL MEDICINE

## 2025-01-30 PROCEDURE — 1160F RVW MEDS BY RX/DR IN RCRD: CPT | Mod: CPTII,S$GLB,, | Performed by: NURSE PRACTITIONER

## 2025-01-30 PROCEDURE — 3075F SYST BP GE 130 - 139MM HG: CPT | Mod: CPTII,S$GLB,, | Performed by: NURSE PRACTITIONER

## 2025-01-30 PROCEDURE — 85025 COMPLETE CBC W/AUTO DIFF WBC: CPT | Performed by: INTERNAL MEDICINE

## 2025-01-30 PROCEDURE — 80053 COMPREHEN METABOLIC PANEL: CPT | Performed by: INTERNAL MEDICINE

## 2025-01-30 RX ORDER — HEPARIN 100 UNIT/ML
500 SYRINGE INTRAVENOUS
Status: DISCONTINUED | OUTPATIENT
Start: 2025-01-30 | End: 2025-01-30 | Stop reason: HOSPADM

## 2025-01-30 RX ORDER — DIPHENHYDRAMINE HYDROCHLORIDE 50 MG/ML
50 INJECTION INTRAMUSCULAR; INTRAVENOUS ONCE AS NEEDED
Status: DISCONTINUED | OUTPATIENT
Start: 2025-01-30 | End: 2025-01-30 | Stop reason: HOSPADM

## 2025-01-30 RX ORDER — EPINEPHRINE 0.3 MG/.3ML
0.3 INJECTION SUBCUTANEOUS ONCE AS NEEDED
Status: DISCONTINUED | OUTPATIENT
Start: 2025-01-30 | End: 2025-01-30 | Stop reason: HOSPADM

## 2025-01-30 RX ORDER — FAMOTIDINE 10 MG/ML
20 INJECTION INTRAVENOUS
Status: COMPLETED | OUTPATIENT
Start: 2025-01-30 | End: 2025-01-30

## 2025-01-30 RX ORDER — MEPERIDINE HYDROCHLORIDE 50 MG/ML
25 INJECTION INTRAMUSCULAR; INTRAVENOUS; SUBCUTANEOUS ONCE AS NEEDED
Status: DISCONTINUED | OUTPATIENT
Start: 2025-01-30 | End: 2025-01-30 | Stop reason: HOSPADM

## 2025-01-30 RX ORDER — PROCHLORPERAZINE EDISYLATE 5 MG/ML
10 INJECTION INTRAMUSCULAR; INTRAVENOUS ONCE AS NEEDED
Status: DISCONTINUED | OUTPATIENT
Start: 2025-01-30 | End: 2025-01-30 | Stop reason: HOSPADM

## 2025-01-30 RX ORDER — SODIUM CHLORIDE 0.9 % (FLUSH) 0.9 %
10 SYRINGE (ML) INJECTION
Status: DISCONTINUED | OUTPATIENT
Start: 2025-01-30 | End: 2025-01-30 | Stop reason: HOSPADM

## 2025-01-30 RX ORDER — MEPERIDINE HYDROCHLORIDE 100 MG/ML
25 INJECTION INTRAMUSCULAR; INTRAVENOUS; SUBCUTANEOUS ONCE AS NEEDED
Status: DISCONTINUED | OUTPATIENT
Start: 2025-01-30 | End: 2025-01-30

## 2025-01-30 RX ADMIN — FAMOTIDINE 20 MG: 10 INJECTION INTRAVENOUS at 10:01

## 2025-01-30 RX ADMIN — HEPARIN 500 UNITS: 100 SYRINGE at 12:01

## 2025-01-30 RX ADMIN — Medication 10 ML: at 12:01

## 2025-01-30 NOTE — PLAN OF CARE
0900 - Labs, hx, and medications reviewed, Pt meets parameters for treatment today. Assessment completed and plan of care reviewed. Pt verbalized understanding. PAC accessed without complications. Pt voices no new complaints or concerns, will continue to monitor for safety.

## 2025-01-30 NOTE — PLAN OF CARE
1225 - Pt tolerated Kanjinti and Taxol infusions well, no complaints or complications reported. VSS throughout treatment. Positive blood return noted from port; port heparin locked and deaccessed. Pt aware to call provider with any questions or concerns, and aware of upcoming appts. Pt ambulatory from clinic independently with steady gait, no distress noted.

## 2025-02-03 RX ORDER — FAMOTIDINE 10 MG/ML
20 INJECTION INTRAVENOUS
Status: CANCELLED | OUTPATIENT
Start: 2025-02-06

## 2025-02-03 RX ORDER — PROCHLORPERAZINE EDISYLATE 5 MG/ML
10 INJECTION INTRAMUSCULAR; INTRAVENOUS ONCE AS NEEDED
Status: CANCELLED
Start: 2025-02-06

## 2025-02-03 RX ORDER — MEPERIDINE HYDROCHLORIDE 50 MG/ML
25 INJECTION INTRAMUSCULAR; INTRAVENOUS; SUBCUTANEOUS ONCE AS NEEDED
Status: CANCELLED
Start: 2025-02-06

## 2025-02-03 RX ORDER — DIPHENHYDRAMINE HYDROCHLORIDE 50 MG/ML
50 INJECTION INTRAMUSCULAR; INTRAVENOUS ONCE AS NEEDED
Status: CANCELLED | OUTPATIENT
Start: 2025-02-06

## 2025-02-03 RX ORDER — HEPARIN 100 UNIT/ML
500 SYRINGE INTRAVENOUS
Status: CANCELLED | OUTPATIENT
Start: 2025-02-06

## 2025-02-03 RX ORDER — EPINEPHRINE 0.3 MG/.3ML
0.3 INJECTION SUBCUTANEOUS ONCE AS NEEDED
Status: CANCELLED | OUTPATIENT
Start: 2025-02-06

## 2025-02-03 RX ORDER — DEXAMETHASONE SODIUM PHOSPHATE 10 MG/ML
10 INJECTION INTRAMUSCULAR; INTRAVENOUS
Status: CANCELLED | OUTPATIENT
Start: 2025-02-06

## 2025-02-03 RX ORDER — SODIUM CHLORIDE 0.9 % (FLUSH) 0.9 %
10 SYRINGE (ML) INJECTION
Status: CANCELLED | OUTPATIENT
Start: 2025-02-06

## 2025-02-05 DIAGNOSIS — L03.311 CELLULITIS OF ABDOMINAL WALL: Primary | ICD-10-CM

## 2025-02-05 RX ORDER — SULFAMETHOXAZOLE AND TRIMETHOPRIM 800; 160 MG/1; MG/1
1 TABLET ORAL 2 TIMES DAILY
Qty: 20 TABLET | Refills: 2 | Status: SHIPPED | OUTPATIENT
Start: 2025-02-05 | End: 2025-02-15

## 2025-02-06 ENCOUNTER — INFUSION (OUTPATIENT)
Dept: INFUSION THERAPY | Facility: HOSPITAL | Age: 35
End: 2025-02-06
Payer: COMMERCIAL

## 2025-02-06 ENCOUNTER — LAB VISIT (OUTPATIENT)
Dept: LAB | Facility: HOSPITAL | Age: 35
End: 2025-02-06
Attending: INTERNAL MEDICINE
Payer: COMMERCIAL

## 2025-02-06 VITALS
RESPIRATION RATE: 18 BRPM | WEIGHT: 289.88 LBS | HEIGHT: 67 IN | DIASTOLIC BLOOD PRESSURE: 64 MMHG | TEMPERATURE: 98 F | HEART RATE: 80 BPM | SYSTOLIC BLOOD PRESSURE: 122 MMHG | BODY MASS INDEX: 45.5 KG/M2

## 2025-02-06 DIAGNOSIS — C50.912 INVASIVE DUCTAL CARCINOMA OF BREAST, FEMALE, LEFT: Primary | ICD-10-CM

## 2025-02-06 DIAGNOSIS — C50.912 INVASIVE DUCTAL CARCINOMA OF BREAST, FEMALE, LEFT: ICD-10-CM

## 2025-02-06 LAB
ALBUMIN SERPL BCP-MCNC: 3.7 G/DL (ref 3.5–5.2)
ALP SERPL-CCNC: 75 U/L (ref 40–150)
ALT SERPL W/O P-5'-P-CCNC: 18 U/L (ref 10–44)
ANION GAP SERPL CALC-SCNC: 9 MMOL/L (ref 8–16)
AST SERPL-CCNC: 12 U/L (ref 10–40)
BASOPHILS # BLD AUTO: 0.04 K/UL (ref 0–0.2)
BASOPHILS NFR BLD: 0.4 % (ref 0–1.9)
BILIRUB SERPL-MCNC: 0.9 MG/DL (ref 0.1–1)
BUN SERPL-MCNC: 14 MG/DL (ref 6–20)
CALCIUM SERPL-MCNC: 9.4 MG/DL (ref 8.7–10.5)
CHLORIDE SERPL-SCNC: 105 MMOL/L (ref 95–110)
CO2 SERPL-SCNC: 23 MMOL/L (ref 23–29)
CREAT SERPL-MCNC: 0.7 MG/DL (ref 0.5–1.4)
DIFFERENTIAL METHOD BLD: ABNORMAL
EOSINOPHIL # BLD AUTO: 0.2 K/UL (ref 0–0.5)
EOSINOPHIL NFR BLD: 1.5 % (ref 0–8)
ERYTHROCYTE [DISTWIDTH] IN BLOOD BY AUTOMATED COUNT: 13.1 % (ref 11.5–14.5)
EST. GFR  (NO RACE VARIABLE): >60 ML/MIN/1.73 M^2
GLUCOSE SERPL-MCNC: 105 MG/DL (ref 70–110)
HCG INTACT+B SERPL-ACNC: <2.4 MIU/ML
HCT VFR BLD AUTO: 36.9 % (ref 37–48.5)
HGB BLD-MCNC: 12.6 G/DL (ref 12–16)
IMM GRANULOCYTES # BLD AUTO: 0.08 K/UL (ref 0–0.04)
IMM GRANULOCYTES NFR BLD AUTO: 0.8 % (ref 0–0.5)
LYMPHOCYTES # BLD AUTO: 2.3 K/UL (ref 1–4.8)
LYMPHOCYTES NFR BLD: 23.2 % (ref 18–48)
MCH RBC QN AUTO: 29.2 PG (ref 27–31)
MCHC RBC AUTO-ENTMCNC: 34.1 G/DL (ref 32–36)
MCV RBC AUTO: 85 FL (ref 82–98)
MONOCYTES # BLD AUTO: 0.7 K/UL (ref 0.3–1)
MONOCYTES NFR BLD: 6.7 % (ref 4–15)
NEUTROPHILS # BLD AUTO: 6.8 K/UL (ref 1.8–7.7)
NEUTROPHILS NFR BLD: 67.4 % (ref 38–73)
NRBC BLD-RTO: 0 /100 WBC
PLATELET # BLD AUTO: 375 K/UL (ref 150–450)
PMV BLD AUTO: 9.1 FL (ref 9.2–12.9)
POTASSIUM SERPL-SCNC: 4.3 MMOL/L (ref 3.5–5.1)
PROT SERPL-MCNC: 7.7 G/DL (ref 6–8.4)
RBC # BLD AUTO: 4.32 M/UL (ref 4–5.4)
SODIUM SERPL-SCNC: 137 MMOL/L (ref 136–145)
WBC # BLD AUTO: 10.01 K/UL (ref 3.9–12.7)

## 2025-02-06 PROCEDURE — 25000003 PHARM REV CODE 250: Performed by: INTERNAL MEDICINE

## 2025-02-06 PROCEDURE — 96417 CHEMO IV INFUS EACH ADDL SEQ: CPT

## 2025-02-06 PROCEDURE — 96367 TX/PROPH/DG ADDL SEQ IV INF: CPT

## 2025-02-06 PROCEDURE — 96375 TX/PRO/DX INJ NEW DRUG ADDON: CPT

## 2025-02-06 PROCEDURE — 85025 COMPLETE CBC W/AUTO DIFF WBC: CPT | Performed by: INTERNAL MEDICINE

## 2025-02-06 PROCEDURE — 96402 CHEMO HORMON ANTINEOPL SQ/IM: CPT

## 2025-02-06 PROCEDURE — 36415 COLL VENOUS BLD VENIPUNCTURE: CPT | Performed by: INTERNAL MEDICINE

## 2025-02-06 PROCEDURE — 63600175 PHARM REV CODE 636 W HCPCS: Performed by: INTERNAL MEDICINE

## 2025-02-06 PROCEDURE — 84702 CHORIONIC GONADOTROPIN TEST: CPT | Performed by: INTERNAL MEDICINE

## 2025-02-06 PROCEDURE — 96413 CHEMO IV INFUSION 1 HR: CPT

## 2025-02-06 PROCEDURE — A4216 STERILE WATER/SALINE, 10 ML: HCPCS | Performed by: INTERNAL MEDICINE

## 2025-02-06 PROCEDURE — 80053 COMPREHEN METABOLIC PANEL: CPT | Performed by: INTERNAL MEDICINE

## 2025-02-06 RX ORDER — MEPERIDINE HYDROCHLORIDE 100 MG/ML
25 INJECTION INTRAMUSCULAR; INTRAVENOUS; SUBCUTANEOUS ONCE AS NEEDED
Status: DISCONTINUED | OUTPATIENT
Start: 2025-02-06 | End: 2025-02-06 | Stop reason: HOSPADM

## 2025-02-06 RX ORDER — DIPHENHYDRAMINE HYDROCHLORIDE 50 MG/ML
50 INJECTION INTRAMUSCULAR; INTRAVENOUS ONCE AS NEEDED
Status: DISCONTINUED | OUTPATIENT
Start: 2025-02-06 | End: 2025-02-06 | Stop reason: HOSPADM

## 2025-02-06 RX ORDER — FAMOTIDINE 10 MG/ML
20 INJECTION INTRAVENOUS
Status: COMPLETED | OUTPATIENT
Start: 2025-02-06 | End: 2025-02-06

## 2025-02-06 RX ORDER — SODIUM CHLORIDE 0.9 % (FLUSH) 0.9 %
10 SYRINGE (ML) INJECTION
Status: DISCONTINUED | OUTPATIENT
Start: 2025-02-06 | End: 2025-02-06 | Stop reason: HOSPADM

## 2025-02-06 RX ORDER — PROCHLORPERAZINE EDISYLATE 5 MG/ML
10 INJECTION INTRAMUSCULAR; INTRAVENOUS ONCE AS NEEDED
Status: DISCONTINUED | OUTPATIENT
Start: 2025-02-06 | End: 2025-02-06 | Stop reason: HOSPADM

## 2025-02-06 RX ORDER — EPINEPHRINE 0.3 MG/.3ML
0.3 INJECTION SUBCUTANEOUS ONCE AS NEEDED
Status: DISCONTINUED | OUTPATIENT
Start: 2025-02-06 | End: 2025-02-06 | Stop reason: HOSPADM

## 2025-02-06 RX ORDER — HEPARIN 100 UNIT/ML
500 SYRINGE INTRAVENOUS
Status: DISCONTINUED | OUTPATIENT
Start: 2025-02-06 | End: 2025-02-06 | Stop reason: HOSPADM

## 2025-02-06 RX ADMIN — TRASTUZUMAB-ANNS 261 MG: 420 INJECTION, POWDER, LYOPHILIZED, FOR SOLUTION INTRAVENOUS at 09:02

## 2025-02-06 RX ADMIN — DIPHENHYDRAMINE HYDROCHLORIDE 50 MG: 50 INJECTION, SOLUTION INTRAMUSCULAR; INTRAVENOUS at 09:02

## 2025-02-06 RX ADMIN — DEXAMETHASONE SODIUM PHOSPHATE 10 MG: 4 INJECTION, SOLUTION INTRA-ARTICULAR; INTRALESIONAL; INTRAMUSCULAR; INTRAVENOUS; SOFT TISSUE at 10:02

## 2025-02-06 RX ADMIN — GOSERELIN ACETATE 3.6 MG: 3.6 IMPLANT SUBCUTANEOUS at 11:02

## 2025-02-06 RX ADMIN — HEPARIN SODIUM (PORCINE) LOCK FLUSH IV SOLN 100 UNIT/ML 500 UNITS: 100 SOLUTION at 11:02

## 2025-02-06 RX ADMIN — SODIUM CHLORIDE: 0.9 INJECTION, SOLUTION INTRAVENOUS at 09:02

## 2025-02-06 RX ADMIN — SODIUM CHLORIDE, PRESERVATIVE FREE 10 ML: 5 INJECTION INTRAVENOUS at 11:02

## 2025-02-06 RX ADMIN — PACLITAXEL 198 MG: 6 INJECTION, SOLUTION INTRAVENOUS at 10:02

## 2025-02-06 RX ADMIN — FAMOTIDINE 20 MG: 10 INJECTION INTRAVENOUS at 09:02

## 2025-02-06 NOTE — PLAN OF CARE
0820 pt here for D1C8 kanjinti/taxol infusion and zoladex injection, hx, meds, allergies, labs reviewed, pt with no new complaints, pt does have small acne like sores on stomach and side Dr abraham aware pt on antibiotics , pt reclined in chair, continue to monitor

## 2025-02-06 NOTE — PLAN OF CARE
1150 pt tolerated Kanjnti/Taxol infusion and zoladex injection without issue, pt to rtc 2/13/25, no distress noted upon d/c to home

## 2025-02-13 ENCOUNTER — INFUSION (OUTPATIENT)
Dept: INFUSION THERAPY | Facility: HOSPITAL | Age: 35
End: 2025-02-13
Attending: INTERNAL MEDICINE
Payer: COMMERCIAL

## 2025-02-13 ENCOUNTER — OFFICE VISIT (OUTPATIENT)
Dept: HEMATOLOGY/ONCOLOGY | Facility: CLINIC | Age: 35
End: 2025-02-13
Payer: COMMERCIAL

## 2025-02-13 ENCOUNTER — LAB VISIT (OUTPATIENT)
Dept: LAB | Facility: HOSPITAL | Age: 35
End: 2025-02-13
Attending: INTERNAL MEDICINE
Payer: COMMERCIAL

## 2025-02-13 VITALS
SYSTOLIC BLOOD PRESSURE: 138 MMHG | OXYGEN SATURATION: 96 % | HEART RATE: 95 BPM | BODY MASS INDEX: 45.74 KG/M2 | RESPIRATION RATE: 18 BRPM | WEIGHT: 291.44 LBS | DIASTOLIC BLOOD PRESSURE: 81 MMHG | TEMPERATURE: 99 F | HEIGHT: 67 IN

## 2025-02-13 VITALS
WEIGHT: 291.44 LBS | TEMPERATURE: 98 F | DIASTOLIC BLOOD PRESSURE: 74 MMHG | SYSTOLIC BLOOD PRESSURE: 156 MMHG | OXYGEN SATURATION: 98 % | HEIGHT: 67 IN | BODY MASS INDEX: 45.74 KG/M2 | RESPIRATION RATE: 18 BRPM | HEART RATE: 100 BPM

## 2025-02-13 DIAGNOSIS — D84.821 IMMUNODEFICIENCY DUE TO DRUGS: ICD-10-CM

## 2025-02-13 DIAGNOSIS — L73.2 HIDRADENITIS SUPPURATIVA: ICD-10-CM

## 2025-02-13 DIAGNOSIS — C50.912 INVASIVE DUCTAL CARCINOMA OF BREAST, FEMALE, LEFT: ICD-10-CM

## 2025-02-13 DIAGNOSIS — C50.912 INVASIVE DUCTAL CARCINOMA OF BREAST, FEMALE, LEFT: Primary | ICD-10-CM

## 2025-02-13 DIAGNOSIS — Z79.899 IMMUNODEFICIENCY DUE TO DRUGS: ICD-10-CM

## 2025-02-13 LAB
ALBUMIN SERPL BCP-MCNC: 4 G/DL (ref 3.5–5.2)
ALP SERPL-CCNC: 70 U/L (ref 40–150)
ALT SERPL W/O P-5'-P-CCNC: 33 U/L (ref 10–44)
ANION GAP SERPL CALC-SCNC: 11 MMOL/L (ref 8–16)
AST SERPL-CCNC: 18 U/L (ref 10–40)
BASOPHILS # BLD AUTO: 0.03 K/UL (ref 0–0.2)
BASOPHILS NFR BLD: 0.6 % (ref 0–1.9)
BILIRUB SERPL-MCNC: 0.8 MG/DL (ref 0.1–1)
BUN SERPL-MCNC: 14 MG/DL (ref 6–20)
CALCIUM SERPL-MCNC: 9.4 MG/DL (ref 8.7–10.5)
CHLORIDE SERPL-SCNC: 108 MMOL/L (ref 95–110)
CO2 SERPL-SCNC: 19 MMOL/L (ref 23–29)
CREAT SERPL-MCNC: 0.7 MG/DL (ref 0.5–1.4)
DIFFERENTIAL METHOD BLD: ABNORMAL
EOSINOPHIL # BLD AUTO: 0.1 K/UL (ref 0–0.5)
EOSINOPHIL NFR BLD: 2.1 % (ref 0–8)
ERYTHROCYTE [DISTWIDTH] IN BLOOD BY AUTOMATED COUNT: 13.2 % (ref 11.5–14.5)
EST. GFR  (NO RACE VARIABLE): >60 ML/MIN/1.73 M^2
GLUCOSE SERPL-MCNC: 116 MG/DL (ref 70–110)
HCG INTACT+B SERPL-ACNC: <2.4 MIU/ML
HCT VFR BLD AUTO: 36.8 % (ref 37–48.5)
HGB BLD-MCNC: 12.6 G/DL (ref 12–16)
IMM GRANULOCYTES # BLD AUTO: 0.05 K/UL (ref 0–0.04)
IMM GRANULOCYTES NFR BLD AUTO: 1.1 % (ref 0–0.5)
LYMPHOCYTES # BLD AUTO: 1.9 K/UL (ref 1–4.8)
LYMPHOCYTES NFR BLD: 40.5 % (ref 18–48)
MCH RBC QN AUTO: 29.7 PG (ref 27–31)
MCHC RBC AUTO-ENTMCNC: 34.2 G/DL (ref 32–36)
MCV RBC AUTO: 87 FL (ref 82–98)
MONOCYTES # BLD AUTO: 0.3 K/UL (ref 0.3–1)
MONOCYTES NFR BLD: 7.1 % (ref 4–15)
NEUTROPHILS # BLD AUTO: 2.3 K/UL (ref 1.8–7.7)
NEUTROPHILS NFR BLD: 48.6 % (ref 38–73)
NRBC BLD-RTO: 0 /100 WBC
PLATELET # BLD AUTO: 363 K/UL (ref 150–450)
PMV BLD AUTO: 8.8 FL (ref 9.2–12.9)
POTASSIUM SERPL-SCNC: 4.5 MMOL/L (ref 3.5–5.1)
PROT SERPL-MCNC: 7.3 G/DL (ref 6–8.4)
RBC # BLD AUTO: 4.24 M/UL (ref 4–5.4)
SODIUM SERPL-SCNC: 138 MMOL/L (ref 136–145)
WBC # BLD AUTO: 4.76 K/UL (ref 3.9–12.7)

## 2025-02-13 PROCEDURE — 25000003 PHARM REV CODE 250: Performed by: INTERNAL MEDICINE

## 2025-02-13 PROCEDURE — 96367 TX/PROPH/DG ADDL SEQ IV INF: CPT

## 2025-02-13 PROCEDURE — 96413 CHEMO IV INFUSION 1 HR: CPT

## 2025-02-13 PROCEDURE — 96417 CHEMO IV INFUS EACH ADDL SEQ: CPT

## 2025-02-13 PROCEDURE — 63600175 PHARM REV CODE 636 W HCPCS: Performed by: INTERNAL MEDICINE

## 2025-02-13 PROCEDURE — A4216 STERILE WATER/SALINE, 10 ML: HCPCS | Performed by: INTERNAL MEDICINE

## 2025-02-13 PROCEDURE — 85025 COMPLETE CBC W/AUTO DIFF WBC: CPT | Performed by: INTERNAL MEDICINE

## 2025-02-13 PROCEDURE — 80053 COMPREHEN METABOLIC PANEL: CPT | Performed by: INTERNAL MEDICINE

## 2025-02-13 PROCEDURE — 96375 TX/PRO/DX INJ NEW DRUG ADDON: CPT

## 2025-02-13 PROCEDURE — 84702 CHORIONIC GONADOTROPIN TEST: CPT | Performed by: INTERNAL MEDICINE

## 2025-02-13 PROCEDURE — 36415 COLL VENOUS BLD VENIPUNCTURE: CPT | Performed by: INTERNAL MEDICINE

## 2025-02-13 RX ORDER — FAMOTIDINE 10 MG/ML
20 INJECTION INTRAVENOUS
Status: CANCELLED | OUTPATIENT
Start: 2025-02-13

## 2025-02-13 RX ORDER — HEPARIN 100 UNIT/ML
500 SYRINGE INTRAVENOUS
Status: DISCONTINUED | OUTPATIENT
Start: 2025-02-13 | End: 2025-02-13 | Stop reason: HOSPADM

## 2025-02-13 RX ORDER — FAMOTIDINE 10 MG/ML
20 INJECTION INTRAVENOUS
Status: COMPLETED | OUTPATIENT
Start: 2025-02-13 | End: 2025-02-13

## 2025-02-13 RX ORDER — MEPERIDINE HYDROCHLORIDE 50 MG/ML
25 INJECTION INTRAMUSCULAR; INTRAVENOUS; SUBCUTANEOUS ONCE AS NEEDED
Status: CANCELLED
Start: 2025-02-13

## 2025-02-13 RX ORDER — HEPARIN 100 UNIT/ML
500 SYRINGE INTRAVENOUS
Status: CANCELLED | OUTPATIENT
Start: 2025-02-13

## 2025-02-13 RX ORDER — EPINEPHRINE 0.3 MG/.3ML
0.3 INJECTION SUBCUTANEOUS ONCE AS NEEDED
Status: DISCONTINUED | OUTPATIENT
Start: 2025-02-13 | End: 2025-02-13 | Stop reason: HOSPADM

## 2025-02-13 RX ORDER — DIPHENHYDRAMINE HYDROCHLORIDE 50 MG/ML
50 INJECTION INTRAMUSCULAR; INTRAVENOUS ONCE AS NEEDED
Status: CANCELLED | OUTPATIENT
Start: 2025-02-13

## 2025-02-13 RX ORDER — SODIUM CHLORIDE 0.9 % (FLUSH) 0.9 %
10 SYRINGE (ML) INJECTION
Status: DISCONTINUED | OUTPATIENT
Start: 2025-02-13 | End: 2025-02-13 | Stop reason: HOSPADM

## 2025-02-13 RX ORDER — DEXAMETHASONE SODIUM PHOSPHATE 10 MG/ML
10 INJECTION INTRAMUSCULAR; INTRAVENOUS
Status: CANCELLED | OUTPATIENT
Start: 2025-02-13

## 2025-02-13 RX ORDER — MEPERIDINE HYDROCHLORIDE 100 MG/ML
25 INJECTION INTRAMUSCULAR; INTRAVENOUS; SUBCUTANEOUS ONCE AS NEEDED
Status: DISCONTINUED | OUTPATIENT
Start: 2025-02-13 | End: 2025-02-13 | Stop reason: HOSPADM

## 2025-02-13 RX ORDER — PROCHLORPERAZINE EDISYLATE 5 MG/ML
10 INJECTION INTRAMUSCULAR; INTRAVENOUS ONCE AS NEEDED
Status: DISCONTINUED | OUTPATIENT
Start: 2025-02-13 | End: 2025-02-13 | Stop reason: HOSPADM

## 2025-02-13 RX ORDER — SODIUM CHLORIDE 0.9 % (FLUSH) 0.9 %
10 SYRINGE (ML) INJECTION
Status: CANCELLED | OUTPATIENT
Start: 2025-02-13

## 2025-02-13 RX ORDER — PROCHLORPERAZINE EDISYLATE 5 MG/ML
10 INJECTION INTRAMUSCULAR; INTRAVENOUS ONCE AS NEEDED
Status: CANCELLED
Start: 2025-02-13

## 2025-02-13 RX ORDER — EPINEPHRINE 0.3 MG/.3ML
0.3 INJECTION SUBCUTANEOUS ONCE AS NEEDED
Status: CANCELLED | OUTPATIENT
Start: 2025-02-13

## 2025-02-13 RX ORDER — DIPHENHYDRAMINE HYDROCHLORIDE 50 MG/ML
50 INJECTION INTRAMUSCULAR; INTRAVENOUS ONCE AS NEEDED
Status: DISCONTINUED | OUTPATIENT
Start: 2025-02-13 | End: 2025-02-13 | Stop reason: HOSPADM

## 2025-02-13 RX ADMIN — DIPHENHYDRAMINE HYDROCHLORIDE 50 MG: 50 INJECTION INTRAMUSCULAR; INTRAVENOUS at 11:02

## 2025-02-13 RX ADMIN — DEXAMETHASONE SODIUM PHOSPHATE 10 MG: 4 INJECTION, SOLUTION INTRA-ARTICULAR; INTRALESIONAL; INTRAMUSCULAR; INTRAVENOUS; SOFT TISSUE at 10:02

## 2025-02-13 RX ADMIN — HEPARIN SODIUM (PORCINE) LOCK FLUSH IV SOLN 100 UNIT/ML 500 UNITS: 100 SOLUTION at 12:02

## 2025-02-13 RX ADMIN — PACLITAXEL 198 MG: 6 INJECTION, SOLUTION INTRAVENOUS at 11:02

## 2025-02-13 RX ADMIN — SODIUM CHLORIDE: 9 INJECTION, SOLUTION INTRAVENOUS at 09:02

## 2025-02-13 RX ADMIN — SODIUM CHLORIDE, PRESERVATIVE FREE 10 ML: 5 INJECTION INTRAVENOUS at 12:02

## 2025-02-13 RX ADMIN — FAMOTIDINE 20 MG: 10 INJECTION INTRAVENOUS at 11:02

## 2025-02-13 RX ADMIN — TRASTUZUMAB-ANNS 261 MG: 420 INJECTION, POWDER, LYOPHILIZED, FOR SOLUTION INTRAVENOUS at 10:02

## 2025-02-13 NOTE — PLAN OF CARE
1250 - Pt tolerated Kanjinti and Taxol infusions well, no complaints or complications reported. VSS throughout treatment. Positive blood return noted from port; port heparin locked and deaccessed. Pt aware to call provider with any questions or concerns, and aware of upcoming appts. Pt ambulatory from clinic independently with steady gait, no distress noted.

## 2025-02-13 NOTE — PROGRESS NOTES
[facility]/ The [facility]   at [facility] Note:      Chief Complaint:   Encounter Diagnoses   Name Primary?    Invasive ductal carcinoma of breast, female, left Yes    Immunodeficiency due to drugs     Hidradenitis suppurativa           Cancer Staging   Invasive ductal carcinoma of breast, female, left  Staging form: Breast, AJCC 8th Edition  - Clinical stage from 8/8/2024: Stage IA (cT1c, cN0, cM0, G3, ER+, WY+, HER2+) - Signed by Maureen Chiu MD on 8/27/2024      HPI:  Divya De Paz is a 34 y.o. female with PCOS and ANISH who presents today for C9 of weekly adjuvant taxol/trastuzumab for stage I TPBC. She presents today with no acute complaints or concerns other than some continued mild fatigue and mild mouth sores. She reports improved abdominal cellulitis with her bactrim course and has two days remaining. Denies any fevers, chills, or new lesions. She is excited regarding nearing the end of the chemo portion of her treatment.     Per Dr. Chiu's previous note: Oncology History  Patient self palpated a lump during self-exam, got evaluated by Gyn a few days later.   Follow-up mammogram 7/25/2024 showed an irregular high density mass in the UOQ of left breast  Ultrasound 7/25/2024: superficial irregular not parallel hypoechoic mass with indistinct and angular margins measuring 1.0 x 0.8 x 1.0 cm (1 o'clock axis, 1 CFN).     Biopsy 8/8/2024: IDC, grade 3, ER 90-95%/ WY 10-20%/ HER2 3+; Ki67 80-90%     MRI 8/15/2024 revealed a 1.5 cm x 1.3 cm x 1.0 cm irregularly shaped mass with irregular margins and heterogeneous internal enhancement seen in the left breast at 1 o'clock in the anterior depth, 4.1 cm from the nipple and 0.3 cm from the skin. No internal mammary or axillary adenopathy identified.    L breast lumpectomy 9/25/24: IDC, grade 3, 1.1cm; 0/1 LN+,  also with oncoplastic reduction  Adjuvant TH started 12/11/24 (delay due to poor wound  healing after surgery)       GYN History:  Age of menarche was 14. Age of menopause was n/a.  Last menstrual period was the week of 2024, pt reports inconsistent periods due to PCOS. Patient reports hormonal therapy use (Provera) to induce menstruation, stopped 2024 per OB/GYN reccs. Patient is . Age of first live birth was n/a.     Family History:  Paternal Grandmother Breast Cancer - multiple times;  of lung cancer (heavy smoker)  Paternal Aunt Breast Cancer - diagnosed at 45, BRCA negative  Paternal Aunt did not have cancer, also BRCA negative  Maternal grandmother - lung cancer, no smoking history  Maternal grandfather - renal cancer      Patient Active Problem List   Diagnosis    Hidradenitis suppurativa    PCOS (polycystic ovarian syndrome)    Insulin resistance    Secondary oligomenorrhea    Severe obesity    Anxiety and depression    Invasive ductal carcinoma of breast, female, left    Negative Hereditary Cancer Genetic testing    Immunodeficiency due to drugs       Current Outpatient Medications   Medication Instructions    amitriptyline (ELAVIL) 25 mg, Oral, Nightly    LIDOcaine-prilocaine (EMLA) cream Apply topically to port one hour prior to chemotherapy infusion    metFORMIN (GLUCOPHAGE) 500 mg, Oral, 2 times daily with meals    ondansetron (ZOFRAN) 8 mg, Oral, Every 8 hours PRN    prochlorperazine (COMPAZINE) 5 mg, Oral, Every 6 hours PRN, Take if zofran is not working    spironolactone (ALDACTONE) 50 mg, Oral, Daily       Review of Systems:   Review of Systems   Constitutional:  Positive for malaise/fatigue.        See above   Respiratory:  Positive for cough. Negative for shortness of breath.    Cardiovascular:  Negative for chest pain.   Gastrointestinal:  Negative for abdominal pain and diarrhea.   Genitourinary:  Negative for frequency.   Musculoskeletal:  Negative for back pain.   Skin:  Negative for rash.   Neurological:  Negative for headaches.   Psychiatric/Behavioral:  The  "patient is nervous/anxious.    All other systems reviewed and are negative.      PHYSICAL EXAM:  /81   Pulse 95   Temp 98.5 °F (36.9 °C) (Oral)   Resp 18   Ht 5' 7" (1.702 m)   Wt 132.2 kg (291 lb 7.2 oz)   SpO2 96%   BMI 45.65 kg/m²     General Appearance:    Alert, cooperative, no distress, appears stated age   Head:    Normocephalic, without obvious abnormality, atraumatic   Lungs:     Clear to auscultation bilaterally, respirations unlabored    Heart:    Regular rate and rhythm, S1 and S2 normal, no murmur, rub    or gallop   Abdomen:     Erythema surrounding 1cm lesion   Extremities:   Extremities normal, atraumatic, no cyanosis or edema   Pulses:      Skin:   Skin color, texture, turgor normal, no rashes or lesions   Neurologic:   normal gait           Pertinent Labs & Imaging:  Pathology Results  (Last 30 days)      None            No results found for this or any previous visit (from the past 24 hours).        Assessment & Plan:    1. Invasive ductal carcinoma of breast, female, left    2. Immunodeficiency due to drugs    3. Hidradenitis suppurativa      Patient with newly diagnosed stage I TPBC on adjuvant TH weekly   Cycle #9 today. Reviewed labs. Ok for treatment  echo on 10/17/24: EF 60 to 65%, GLS -17.6%  zoladex every 4 weeks  RTC weekly for labs and infusion, every 2 weeks for provider visits  Strict instructions regarding cellulitis. Educated to complete current Bactrim course and to notify us of any recurrence  Return to clinic in 2 weeks with KERVIN appointment and labs.   Message sent to Dr. Gomes for follow-up, C12 of TH scheduled on 3/6/25  Will continue OFS during adjuvant RT, then add AI         Route Chart for Scheduling    Med Onc Chart Routing      Follow up with physician . As scheduled.   Follow up with KERVIN    Infusion scheduling note    Injection scheduling note    Labs    Imaging    Pharmacy appointment    Other referrals                  Treatment Plan Information   OP " BREAST TRASTUZUMAB PACLITAXEL WEEKLY Maureen Chiu MD   Associated diagnosis: Invasive ductal carcinoma of breast, female, left Stage IA cT1c, cN0, cM0, G3, ER+, WY+, HER2+ noted on 8/12/2024   Line of treatment: Adjuvant  Treatment Goal: Curative     Upcoming Treatment Dates - OP BREAST TRASTUZUMAB PACLITAXEL WEEKLY    2/20/2025       Pre-Medications       diphenhydrAMINE (BENADRYL) 50 mg in 0.9% NaCl 50 mL IVPB       dexAMETHasone injection 10 mg       famotidine (PF) injection 20 mg       Chemotherapy       trastuzumab-anns (KANJINTI) 261 mg in 0.9% NaCl 250 mL chemo infusion       PACLitaxeL (TAXOL) 80 mg/m2 = 198 mg in 0.9% NaCl 250 mL chemo infusion  2/27/2025       Pre-Medications       diphenhydrAMINE (BENADRYL) 50 mg in 0.9% NaCl 50 mL IVPB       dexAMETHasone injection 10 mg       famotidine (PF) injection 20 mg       Chemotherapy       trastuzumab-anns (KANJINTI) 261 mg in 0.9% NaCl 250 mL chemo infusion       PACLitaxeL (TAXOL) 80 mg/m2 = 198 mg in 0.9% NaCl 250 mL chemo infusion  3/6/2025       Pre-Medications       diphenhydrAMINE (BENADRYL) 50 mg in 0.9% NaCl 50 mL IVPB       dexAMETHasone injection 10 mg       famotidine (PF) injection 20 mg       Chemotherapy       trastuzumab-anns (KANJINTI) 261 mg in 0.9% NaCl 250 mL chemo infusion       PACLitaxeL (TAXOL) 80 mg/m2 = 198 mg in 0.9% NaCl 250 mL chemo infusion  3/13/2025       Chemotherapy       trastuzumab-anns (KANJINTI) 782 mg in 0.9% NaCl 250 mL chemo infusion    Supportive Plan Information  OP BREAST GOSERELIN Q4W Maureen Chiu MD   Associated Diagnosis: Invasive ductal carcinoma of breast, female, left Stage IA cT1c, cN0, cM0, G3, ER+, WY+, HER2+ noted on 8/12/2024   Line of treatment: Supportive Care   Treatment goal: Supportive     Upcoming Treatment Dates - OP BREAST GOSERELIN Q4W    3/4/2025       Chemotherapy       goserelin (ZOLADEX) injection 3.6 mg  4/1/2025       Chemotherapy       goserelin (ZOLADEX) injection 3.6  mg  4/29/2025       Chemotherapy       goserelin (ZOLADEX) injection 3.6 mg    Patient is in agreement with the proposed treatment plan. All questions were answered to the patient's satisfaction. Pt knows to call clinic for any new or worsening symptoms and if anything is needed before the next clinic visit.    MDM includes  :    - Acute or chronic illness or injury that poses a threat to life or bodily function  - Independent review and explanation of 3+ results from unique tests  - Discussion of management and ordering 3+ unique tests  - Extensive discussion of treatment and management  - Prescription drug management  - Drug therapy requiring intensive monitoring for toxicity       Angelito Dominguez DO  02/18/2025

## 2025-02-13 NOTE — PLAN OF CARE
0935 - Labs, hx, and medications reviewed, Pt meets parameters for treatment today. Assessment completed and plan of care reviewed. Pt verbalized understanding. PAC accessed without complications. Pt voices no new complaints or concerns, will continue to monitor for safety.

## 2025-02-14 NOTE — PROGRESS NOTES
[facility]/ [facility]   at [facility] Note:      Chief Complaint:   Encounter Diagnoses   Name Primary?    Invasive ductal carcinoma of breast, female, left Yes    Severe obesity           Cancer Staging   Invasive ductal carcinoma of breast, female, left  Staging form: Breast, AJCC 8th Edition  - Clinical stage from 8/8/2024: Stage IA (cT1c, cN0, cM0, G3, ER+, IL+, HER2+) - Signed by Maureen Chiu MD on 8/27/2024      HPI:  Divya De Paz is a 34 y.o. female with PCOS and ANISH who presents today for C11 of weekly adjuvant taxol/trastuzumab for stage I TPBC.   Overall she is doing well. Denies peripheral neuropathy. She did have some diarrhea last night, thinks it was related to food, okay today.  Intermittent nausea, denies vomiting. Nausea is alleviated with Zofran. She is sleeping well.  Tingling sensation in her breast. She does have anxiety, she has seen Dr. Catherine in the past, has not seen her recently.  Cellulitis resolved. Denies fevers and chills. Denies headaches.       Per Dr. Chiu's previous note: Oncology History  Patient self palpated a lump during self-exam, got evaluated by Gyn a few days later.   Follow-up mammogram 7/25/2024 showed an irregular high density mass in the UOQ of left breast  Ultrasound 7/25/2024: superficial irregular not parallel hypoechoic mass with indistinct and angular margins measuring 1.0 x 0.8 x 1.0 cm (1 o'clock axis, 1 CFN).     Biopsy 8/8/2024: IDC, grade 3, ER 90-95%/ IL 10-20%/ HER2 3+; Ki67 80-90%     MRI 8/15/2024 revealed a 1.5 cm x 1.3 cm x 1.0 cm irregularly shaped mass with irregular margins and heterogeneous internal enhancement seen in the left breast at 1 o'clock in the anterior depth, 4.1 cm from the nipple and 0.3 cm from the skin. No internal mammary or axillary adenopathy identified.    L breast lumpectomy 9/25/24: IDC, grade 3, 1.1cm; 0/1 LN+,  also with oncoplastic reduction  Adjuvant TH  started 24 (delay due to poor wound healing after surgery)       GYN History:  Age of menarche was 14. Age of menopause was n/a.  Last menstrual period was the week of 2024, pt reports inconsistent periods due to PCOS. Patient reports hormonal therapy use (Provera) to induce menstruation, stopped 2024 per OB/GYN reccs. Patient is . Age of first live birth was n/a.     Family History:  Paternal Grandmother Breast Cancer - multiple times;  of lung cancer (heavy smoker)  Paternal Aunt Breast Cancer - diagnosed at 45, BRCA negative  Paternal Aunt did not have cancer, also BRCA negative  Maternal grandmother - lung cancer, no smoking history  Maternal grandfather - renal cancer      Patient Active Problem List   Diagnosis    Hidradenitis suppurativa    PCOS (polycystic ovarian syndrome)    Insulin resistance    Secondary oligomenorrhea    Severe obesity    Anxiety and depression    Invasive ductal carcinoma of breast, female, left    Negative Hereditary Cancer Genetic testing    Immunodeficiency due to drugs       Current Outpatient Medications   Medication Instructions    amitriptyline (ELAVIL) 25 mg, Oral, Nightly    LIDOcaine-prilocaine (EMLA) cream Apply topically to port one hour prior to chemotherapy infusion    metFORMIN (GLUCOPHAGE) 500 mg, Oral, 2 times daily with meals    ondansetron (ZOFRAN) 8 mg, Oral, Every 8 hours PRN    prochlorperazine (COMPAZINE) 5 mg, Oral, Every 6 hours PRN, Take if zofran is not working    spironolactone (ALDACTONE) 50 mg, Oral, Daily    sulfamethoxazole-trimethoprim 800-160mg (BACTRIM DS) 800-160 mg Tab 1 tablet, Oral, 2 times daily       Review of Systems:   Review of Systems   Constitutional:  Negative for malaise/fatigue.        See above   Respiratory:  Negative for cough and shortness of breath.    Cardiovascular:  Negative for chest pain.   Gastrointestinal:  Negative for abdominal pain and diarrhea.   Genitourinary:  Negative for frequency.  "  Musculoskeletal:  Negative for back pain.   Skin:  Negative for rash.   Neurological:  Negative for headaches.   Psychiatric/Behavioral:  The patient is nervous/anxious.    All other systems reviewed and are negative.      PHYSICAL EXAM:  /84 (BP Location: Right arm, Patient Position: Sitting)   Pulse 94   Temp 97.7 °F (36.5 °C) (Oral)   Resp 20   Ht 5' 7" (1.702 m)   Wt 131.8 kg (290 lb 9.1 oz)   SpO2 98%   BMI 45.51 kg/m²   Physical Exam  Vitals and nursing note reviewed.   Constitutional:       Appearance: Normal appearance. She is obese.      Comments: Alopecia noted   HENT:      Head: Normocephalic and atraumatic.      Nose: Nose normal.   Cardiovascular:      Rate and Rhythm: Normal rate and regular rhythm.      Heart sounds: Normal heart sounds.   Pulmonary:      Effort: Pulmonary effort is normal.      Breath sounds: Normal breath sounds.   Chest:          Comments: Right chest wall port in place with no redness to tenderness  Bilateral breast reduction, scars healing well  Abdominal:      General: Abdomen is flat.      Palpations: Abdomen is soft.   Lymphadenopathy:      Cervical: No cervical adenopathy.      Upper Body:      Right upper body: No supraclavicular or axillary adenopathy.      Left upper body: No supraclavicular or axillary adenopathy.   Skin:     General: Skin is warm and dry.   Neurological:      General: No focal deficit present.      Mental Status: She is alert and oriented to person, place, and time.   Psychiatric:         Mood and Affect: Mood normal.         Behavior: Behavior normal.         Thought Content: Thought content normal.         Judgment: Judgment normal.           Pertinent Labs & Imaging:  Pathology Results  (Last 30 days)      None            Recent Results (from the past 24 hours)   CBC W/ AUTO DIFFERENTIAL    Collection Time: 02/27/25  7:23 AM   Result Value Ref Range    WBC 4.74 3.90 - 12.70 K/uL    RBC 3.98 (L) 4.00 - 5.40 M/uL    Hemoglobin 12.0 12.0 - " 16.0 g/dL    Hematocrit 34.3 (L) 37.0 - 48.5 %    MCV 86 82 - 98 fL    MCH 30.2 27.0 - 31.0 pg    MCHC 35.0 32.0 - 36.0 g/dL    RDW 13.6 11.5 - 14.5 %    Platelets 307 150 - 450 K/uL    MPV 9.0 (L) 9.2 - 12.9 fL    Immature Granulocytes 1.1 (H) 0.0 - 0.5 %    Gran # (ANC) 2.4 1.8 - 7.7 K/uL    Immature Grans (Abs) 0.05 (H) 0.00 - 0.04 K/uL    Lymph # 1.8 1.0 - 4.8 K/uL    Mono # 0.4 0.3 - 1.0 K/uL    Eos # 0.1 0.0 - 0.5 K/uL    Baso # 0.03 0.00 - 0.20 K/uL    nRBC 0 0 /100 WBC    Gran % 51.2 38.0 - 73.0 %    Lymph % 37.8 18.0 - 48.0 %    Mono % 7.8 4.0 - 15.0 %    Eosinophil % 1.5 0.0 - 8.0 %    Basophil % 0.6 0.0 - 1.9 %    Differential Method Automated    CMP    Collection Time: 02/27/25  7:23 AM   Result Value Ref Range    Sodium 137 136 - 145 mmol/L    Potassium 4.3 3.5 - 5.1 mmol/L    Chloride 107 95 - 110 mmol/L    CO2 21 (L) 23 - 29 mmol/L    Glucose 112 (H) 70 - 110 mg/dL    BUN 16 6 - 20 mg/dL    Creatinine 0.6 0.5 - 1.4 mg/dL    Calcium 9.6 8.7 - 10.5 mg/dL    Total Protein 6.9 6.0 - 8.4 g/dL    Albumin 3.8 3.5 - 5.2 g/dL    Total Bilirubin 1.0 0.1 - 1.0 mg/dL    Alkaline Phosphatase 60 40 - 150 U/L    AST 21 10 - 40 U/L    ALT 33 10 - 44 U/L    eGFR >60.0 >60 mL/min/1.73 m^2    Anion Gap 9 8 - 16 mmol/L   B-HCG    Collection Time: 02/27/25  7:23 AM   Result Value Ref Range    HCG Quant <2.4 See Text mIU/mL           Assessment & Plan:    1. Invasive ductal carcinoma of breast, female, left    2. Severe obesity      Patient with newly diagnosed stage I TPBC on adjuvant TH weekly   Cycle #11 today. Reviewed labs. Ok for treatment  echo on 10/17/24: EF 60 to 65%, GLS -17.6%  zoladex every 4 weeks - due March 6  RTC weekly for labs and infusion, will transition to every 3 weeks herceptin and every 6 weeks labs and provider visit    Return to clinic in 2 weeks with KERVIN appointment and labs.   Message sent to Dr. Gomes for follow-up, C12 of TH scheduled on 3/6/25  Will continue OFS during adjuvant RT, then  add AI     Return to clinic in 2 weeks with MD appointment and labs.     Patient is in agreement with the proposed treatment plan. All questions were answered to the patient's satisfaction. Patient knows to call clinic for any new or worsening symptoms and if anything is needed before the next clinic visit.          Loraine Tarango, FNP-C  Hematology & Medical Oncology   [address] [place] [address]  ph. [phone]  Fax. [phone]    Collaborating physician, Dr. Chiu.    Approximately 15 minutes were spent face-to-face with the patient.  Approximately 20 minutes in total were spent on this encounter, which includes face-to-face time and non-face-to-face time preparing to see the patient (e.g., review of tests), obtaining and/or reviewing separately obtained history, documenting clinical information in the electronic or other health record, independently interpreting results (not separately reported) and communicating results to the patient/family/caregiver, or care coordination (not separately reported).       Route Chart for Scheduling    Med Onc Chart Routing      Follow up with physician . See Dr. Chiu 12 weeks after 3/13   Follow up with KERVIN . See me 6 weeks after 3/13   Infusion scheduling note   herceptin every 3 weeks   Injection scheduling note    Labs CBC, CMP and B HCG   Scheduling:  Preferred lab:  Lab interval:  labs every 6 weeks - starting 6 weeks after 3/31   Imaging ECHO   due in April   Pharmacy appointment    Other referrals                  Treatment Plan Information   OP BREAST TRASTUZUMAB PACLITAXEL WEEKLY Maureen Chiu MD   Associated diagnosis: Invasive ductal carcinoma of breast, female, left Stage IA cT1c, cN0, cM0, G3, ER+, [place]+, HER2+ noted on 8/12/2024   Line of treatment: Adjuvant  Treatment Goal: Curative     Upcoming Treatment Dates - OP BREAST TRASTUZUMAB PACLITAXEL WEEKLY    2/20/2025       Pre-Medications       diphenhydrAMINE (BENADRYL) 50 mg in 0.9%  NaCl 50 mL IVPB       dexAMETHasone injection 10 mg       famotidine (PF) injection 20 mg       Chemotherapy       trastuzumab-anns (KANJINTI) 261 mg in 0.9% NaCl 250 mL chemo infusion       PACLitaxeL (TAXOL) 80 mg/m2 = 198 mg in 0.9% NaCl 250 mL chemo infusion  2/27/2025       Pre-Medications       diphenhydrAMINE (BENADRYL) 50 mg in 0.9% NaCl 50 mL IVPB       dexAMETHasone injection 10 mg       famotidine (PF) injection 20 mg       Chemotherapy       trastuzumab-anns (KANJINTI) 261 mg in 0.9% NaCl 250 mL chemo infusion       PACLitaxeL (TAXOL) 80 mg/m2 = 198 mg in 0.9% NaCl 250 mL chemo infusion  3/6/2025       Pre-Medications       diphenhydrAMINE (BENADRYL) 50 mg in 0.9% NaCl 50 mL IVPB       dexAMETHasone injection 10 mg       famotidine (PF) injection 20 mg       Chemotherapy       trastuzumab-anns (KANJINTI) 261 mg in 0.9% NaCl 250 mL chemo infusion       PACLitaxeL (TAXOL) 80 mg/m2 = 198 mg in 0.9% NaCl 250 mL chemo infusion  3/13/2025       Chemotherapy       trastuzumab-anns (KANJINTI) 782 mg in 0.9% NaCl 250 mL chemo infusion    Supportive Plan Information  OP BREAST GOSERELIN Q4W Maureen Chiu MD   Associated Diagnosis: Invasive ductal carcinoma of breast, female, left Stage IA cT1c, cN0, cM0, G3, ER+, [place]+, HER2+ noted on 8/12/2024   Line of treatment: Supportive Care   Treatment goal: Supportive     Upcoming Treatment Dates - OP BREAST GOSERELIN Q4W    3/4/2025       Chemotherapy       goserelin (ZOLADEX) injection 3.6 mg  4/1/2025       Chemotherapy       goserelin (ZOLADEX) injection 3.6 mg  4/29/2025       Chemotherapy       goserelin (ZOLADEX) injection 3.6 mg    Patient is in agreement with the proposed treatment plan. All questions were answered to the patient's satisfaction. Pt knows to call clinic for any new or worsening symptoms and if anything is needed before the next clinic visit.    MDM includes  :    - Acute or chronic illness or injury that poses a threat to life or bodily  function  - Independent review and explanation of 3+ results from unique tests  - Discussion of management and ordering 3+ unique tests  - Extensive discussion of treatment and management  - Prescription drug management  - Drug therapy requiring intensive monitoring for toxicity       Loraine Tarango, DANIELLE  02/14/2025

## 2025-02-19 RX ORDER — EPINEPHRINE 0.3 MG/.3ML
0.3 INJECTION SUBCUTANEOUS ONCE AS NEEDED
Status: CANCELLED | OUTPATIENT
Start: 2025-02-20

## 2025-02-19 RX ORDER — HEPARIN 100 UNIT/ML
500 SYRINGE INTRAVENOUS
Status: CANCELLED | OUTPATIENT
Start: 2025-02-20

## 2025-02-19 RX ORDER — DIPHENHYDRAMINE HYDROCHLORIDE 50 MG/ML
50 INJECTION INTRAMUSCULAR; INTRAVENOUS ONCE AS NEEDED
Status: CANCELLED | OUTPATIENT
Start: 2025-02-20

## 2025-02-19 RX ORDER — FAMOTIDINE 10 MG/ML
20 INJECTION INTRAVENOUS
Status: CANCELLED | OUTPATIENT
Start: 2025-02-20

## 2025-02-19 RX ORDER — DEXAMETHASONE SODIUM PHOSPHATE 10 MG/ML
10 INJECTION INTRAMUSCULAR; INTRAVENOUS
Status: CANCELLED | OUTPATIENT
Start: 2025-02-20

## 2025-02-19 RX ORDER — MEPERIDINE HYDROCHLORIDE 25 MG/ML
25 INJECTION INTRAMUSCULAR; INTRAVENOUS; SUBCUTANEOUS ONCE AS NEEDED
Status: CANCELLED
Start: 2025-02-20

## 2025-02-19 RX ORDER — PROCHLORPERAZINE EDISYLATE 5 MG/ML
10 INJECTION INTRAMUSCULAR; INTRAVENOUS ONCE AS NEEDED
Status: CANCELLED
Start: 2025-02-20

## 2025-02-19 RX ORDER — SODIUM CHLORIDE 0.9 % (FLUSH) 0.9 %
10 SYRINGE (ML) INJECTION
Status: CANCELLED | OUTPATIENT
Start: 2025-02-20

## 2025-02-20 ENCOUNTER — INFUSION (OUTPATIENT)
Dept: INFUSION THERAPY | Facility: HOSPITAL | Age: 35
End: 2025-02-20
Payer: COMMERCIAL

## 2025-02-20 ENCOUNTER — LAB VISIT (OUTPATIENT)
Dept: LAB | Facility: HOSPITAL | Age: 35
End: 2025-02-20
Attending: INTERNAL MEDICINE
Payer: COMMERCIAL

## 2025-02-20 VITALS
TEMPERATURE: 98 F | SYSTOLIC BLOOD PRESSURE: 132 MMHG | BODY MASS INDEX: 45.85 KG/M2 | DIASTOLIC BLOOD PRESSURE: 66 MMHG | WEIGHT: 292.13 LBS | HEIGHT: 67 IN | HEART RATE: 88 BPM | RESPIRATION RATE: 18 BRPM

## 2025-02-20 DIAGNOSIS — C50.912 INVASIVE DUCTAL CARCINOMA OF BREAST, FEMALE, LEFT: ICD-10-CM

## 2025-02-20 DIAGNOSIS — C50.912 INVASIVE DUCTAL CARCINOMA OF BREAST, FEMALE, LEFT: Primary | ICD-10-CM

## 2025-02-20 LAB
ALBUMIN SERPL BCP-MCNC: 3.9 G/DL (ref 3.5–5.2)
ALP SERPL-CCNC: 65 U/L (ref 40–150)
ALT SERPL W/O P-5'-P-CCNC: 40 U/L (ref 10–44)
ANION GAP SERPL CALC-SCNC: 9 MMOL/L (ref 8–16)
AST SERPL-CCNC: 20 U/L (ref 10–40)
BASOPHILS # BLD AUTO: 0.03 K/UL (ref 0–0.2)
BASOPHILS NFR BLD: 0.6 % (ref 0–1.9)
BILIRUB SERPL-MCNC: 1 MG/DL (ref 0.1–1)
BUN SERPL-MCNC: 16 MG/DL (ref 6–20)
CALCIUM SERPL-MCNC: 9.4 MG/DL (ref 8.7–10.5)
CHLORIDE SERPL-SCNC: 107 MMOL/L (ref 95–110)
CO2 SERPL-SCNC: 23 MMOL/L (ref 23–29)
CREAT SERPL-MCNC: 0.7 MG/DL (ref 0.5–1.4)
DIFFERENTIAL METHOD BLD: ABNORMAL
EOSINOPHIL # BLD AUTO: 0.1 K/UL (ref 0–0.5)
EOSINOPHIL NFR BLD: 1.7 % (ref 0–8)
ERYTHROCYTE [DISTWIDTH] IN BLOOD BY AUTOMATED COUNT: 13.3 % (ref 11.5–14.5)
EST. GFR  (NO RACE VARIABLE): >60 ML/MIN/1.73 M^2
GLUCOSE SERPL-MCNC: 111 MG/DL (ref 70–110)
HCG INTACT+B SERPL-ACNC: <2.4 MIU/ML
HCT VFR BLD AUTO: 36.6 % (ref 37–48.5)
HGB BLD-MCNC: 12.3 G/DL (ref 12–16)
IMM GRANULOCYTES # BLD AUTO: 0.05 K/UL (ref 0–0.04)
IMM GRANULOCYTES NFR BLD AUTO: 1 % (ref 0–0.5)
LYMPHOCYTES # BLD AUTO: 1.9 K/UL (ref 1–4.8)
LYMPHOCYTES NFR BLD: 39.8 % (ref 18–48)
MCH RBC QN AUTO: 29.6 PG (ref 27–31)
MCHC RBC AUTO-ENTMCNC: 33.6 G/DL (ref 32–36)
MCV RBC AUTO: 88 FL (ref 82–98)
MONOCYTES # BLD AUTO: 0.3 K/UL (ref 0.3–1)
MONOCYTES NFR BLD: 6.6 % (ref 4–15)
NEUTROPHILS # BLD AUTO: 2.4 K/UL (ref 1.8–7.7)
NEUTROPHILS NFR BLD: 50.3 % (ref 38–73)
NRBC BLD-RTO: 0 /100 WBC
PLATELET # BLD AUTO: 339 K/UL (ref 150–450)
PMV BLD AUTO: 8.8 FL (ref 9.2–12.9)
POTASSIUM SERPL-SCNC: 4.2 MMOL/L (ref 3.5–5.1)
PROT SERPL-MCNC: 7 G/DL (ref 6–8.4)
RBC # BLD AUTO: 4.16 M/UL (ref 4–5.4)
SODIUM SERPL-SCNC: 139 MMOL/L (ref 136–145)
WBC # BLD AUTO: 4.82 K/UL (ref 3.9–12.7)

## 2025-02-20 PROCEDURE — 25000003 PHARM REV CODE 250: Performed by: INTERNAL MEDICINE

## 2025-02-20 PROCEDURE — 63600175 PHARM REV CODE 636 W HCPCS: Performed by: INTERNAL MEDICINE

## 2025-02-20 PROCEDURE — A4216 STERILE WATER/SALINE, 10 ML: HCPCS | Performed by: INTERNAL MEDICINE

## 2025-02-20 PROCEDURE — 96375 TX/PRO/DX INJ NEW DRUG ADDON: CPT

## 2025-02-20 PROCEDURE — 84702 CHORIONIC GONADOTROPIN TEST: CPT | Performed by: INTERNAL MEDICINE

## 2025-02-20 PROCEDURE — 36415 COLL VENOUS BLD VENIPUNCTURE: CPT | Performed by: INTERNAL MEDICINE

## 2025-02-20 PROCEDURE — 80053 COMPREHEN METABOLIC PANEL: CPT | Performed by: INTERNAL MEDICINE

## 2025-02-20 PROCEDURE — 85025 COMPLETE CBC W/AUTO DIFF WBC: CPT | Performed by: INTERNAL MEDICINE

## 2025-02-20 PROCEDURE — 96367 TX/PROPH/DG ADDL SEQ IV INF: CPT

## 2025-02-20 PROCEDURE — 96417 CHEMO IV INFUS EACH ADDL SEQ: CPT

## 2025-02-20 PROCEDURE — 96413 CHEMO IV INFUSION 1 HR: CPT

## 2025-02-20 RX ORDER — DIPHENHYDRAMINE HYDROCHLORIDE 50 MG/ML
50 INJECTION INTRAMUSCULAR; INTRAVENOUS ONCE AS NEEDED
Status: DISCONTINUED | OUTPATIENT
Start: 2025-02-20 | End: 2025-02-20 | Stop reason: HOSPADM

## 2025-02-20 RX ORDER — HEPARIN 100 UNIT/ML
500 SYRINGE INTRAVENOUS
Status: DISCONTINUED | OUTPATIENT
Start: 2025-02-20 | End: 2025-02-20 | Stop reason: HOSPADM

## 2025-02-20 RX ORDER — MEPERIDINE HYDROCHLORIDE 100 MG/ML
25 INJECTION INTRAMUSCULAR; INTRAVENOUS; SUBCUTANEOUS ONCE AS NEEDED
Status: DISCONTINUED | OUTPATIENT
Start: 2025-02-20 | End: 2025-02-20 | Stop reason: HOSPADM

## 2025-02-20 RX ORDER — PROCHLORPERAZINE EDISYLATE 5 MG/ML
10 INJECTION INTRAMUSCULAR; INTRAVENOUS ONCE AS NEEDED
Status: DISCONTINUED | OUTPATIENT
Start: 2025-02-20 | End: 2025-02-20 | Stop reason: HOSPADM

## 2025-02-20 RX ORDER — SODIUM CHLORIDE 0.9 % (FLUSH) 0.9 %
10 SYRINGE (ML) INJECTION
Status: DISCONTINUED | OUTPATIENT
Start: 2025-02-20 | End: 2025-02-20 | Stop reason: HOSPADM

## 2025-02-20 RX ORDER — EPINEPHRINE 0.3 MG/.3ML
0.3 INJECTION SUBCUTANEOUS ONCE AS NEEDED
Status: DISCONTINUED | OUTPATIENT
Start: 2025-02-20 | End: 2025-02-20 | Stop reason: HOSPADM

## 2025-02-20 RX ORDER — FAMOTIDINE 10 MG/ML
20 INJECTION INTRAVENOUS
Status: COMPLETED | OUTPATIENT
Start: 2025-02-20 | End: 2025-02-20

## 2025-02-20 RX ADMIN — DEXAMETHASONE SODIUM PHOSPHATE 10 MG: 4 INJECTION, SOLUTION INTRA-ARTICULAR; INTRALESIONAL; INTRAMUSCULAR; INTRAVENOUS; SOFT TISSUE at 10:02

## 2025-02-20 RX ADMIN — DIPHENHYDRAMINE HYDROCHLORIDE 50 MG: 50 INJECTION INTRAMUSCULAR; INTRAVENOUS at 10:02

## 2025-02-20 RX ADMIN — TRASTUZUMAB-ANNS 261 MG: 420 INJECTION, POWDER, LYOPHILIZED, FOR SOLUTION INTRAVENOUS at 09:02

## 2025-02-20 RX ADMIN — PACLITAXEL 198 MG: 6 INJECTION, SOLUTION INTRAVENOUS at 10:02

## 2025-02-20 RX ADMIN — Medication 10 ML: at 12:02

## 2025-02-20 RX ADMIN — HEPARIN SODIUM (PORCINE) LOCK FLUSH IV SOLN 100 UNIT/ML 500 UNITS: 100 SOLUTION at 12:02

## 2025-02-20 RX ADMIN — FAMOTIDINE 20 MG: 10 INJECTION INTRAVENOUS at 10:02

## 2025-02-20 NOTE — PLAN OF CARE
0815 pt here for C11 Kanjinti/Taxol infusion, labs, hx, meds, allergies reviewed, pt with no new complaints at this time, reclined in chair, continue to monitor

## 2025-02-20 NOTE — PLAN OF CARE
1215 pt tolerated kanjinti/taxol infusion without issue, pt to rtc 2/27/25, no distress noted upon d/c to home

## 2025-02-27 ENCOUNTER — LAB VISIT (OUTPATIENT)
Dept: LAB | Facility: HOSPITAL | Age: 35
End: 2025-02-27
Attending: INTERNAL MEDICINE
Payer: COMMERCIAL

## 2025-02-27 ENCOUNTER — TELEPHONE (OUTPATIENT)
Dept: RADIATION ONCOLOGY | Facility: CLINIC | Age: 35
End: 2025-02-27
Payer: COMMERCIAL

## 2025-02-27 ENCOUNTER — OFFICE VISIT (OUTPATIENT)
Dept: HEMATOLOGY/ONCOLOGY | Facility: CLINIC | Age: 35
End: 2025-02-27
Payer: COMMERCIAL

## 2025-02-27 ENCOUNTER — TELEPHONE (OUTPATIENT)
Dept: PSYCHIATRY | Facility: CLINIC | Age: 35
End: 2025-02-27
Payer: COMMERCIAL

## 2025-02-27 ENCOUNTER — INFUSION (OUTPATIENT)
Dept: INFUSION THERAPY | Facility: HOSPITAL | Age: 35
End: 2025-02-27
Attending: INTERNAL MEDICINE
Payer: COMMERCIAL

## 2025-02-27 VITALS
RESPIRATION RATE: 20 BRPM | BODY MASS INDEX: 45.61 KG/M2 | DIASTOLIC BLOOD PRESSURE: 84 MMHG | OXYGEN SATURATION: 98 % | HEART RATE: 94 BPM | TEMPERATURE: 98 F | SYSTOLIC BLOOD PRESSURE: 135 MMHG | HEIGHT: 67 IN | WEIGHT: 290.56 LBS

## 2025-02-27 VITALS — HEART RATE: 93 BPM | SYSTOLIC BLOOD PRESSURE: 164 MMHG | DIASTOLIC BLOOD PRESSURE: 88 MMHG

## 2025-02-27 DIAGNOSIS — C50.912 INVASIVE DUCTAL CARCINOMA OF BREAST, FEMALE, LEFT: ICD-10-CM

## 2025-02-27 DIAGNOSIS — C50.912 INVASIVE DUCTAL CARCINOMA OF BREAST, FEMALE, LEFT: Primary | ICD-10-CM

## 2025-02-27 DIAGNOSIS — E66.01 SEVERE OBESITY: ICD-10-CM

## 2025-02-27 LAB
ALBUMIN SERPL BCP-MCNC: 3.8 G/DL (ref 3.5–5.2)
ALP SERPL-CCNC: 60 U/L (ref 40–150)
ALT SERPL W/O P-5'-P-CCNC: 33 U/L (ref 10–44)
ANION GAP SERPL CALC-SCNC: 9 MMOL/L (ref 8–16)
AST SERPL-CCNC: 21 U/L (ref 10–40)
BASOPHILS # BLD AUTO: 0.03 K/UL (ref 0–0.2)
BASOPHILS NFR BLD: 0.6 % (ref 0–1.9)
BILIRUB SERPL-MCNC: 1 MG/DL (ref 0.1–1)
BUN SERPL-MCNC: 16 MG/DL (ref 6–20)
CALCIUM SERPL-MCNC: 9.6 MG/DL (ref 8.7–10.5)
CHLORIDE SERPL-SCNC: 107 MMOL/L (ref 95–110)
CO2 SERPL-SCNC: 21 MMOL/L (ref 23–29)
CREAT SERPL-MCNC: 0.6 MG/DL (ref 0.5–1.4)
DIFFERENTIAL METHOD BLD: ABNORMAL
EOSINOPHIL # BLD AUTO: 0.1 K/UL (ref 0–0.5)
EOSINOPHIL NFR BLD: 1.5 % (ref 0–8)
ERYTHROCYTE [DISTWIDTH] IN BLOOD BY AUTOMATED COUNT: 13.6 % (ref 11.5–14.5)
EST. GFR  (NO RACE VARIABLE): >60 ML/MIN/1.73 M^2
GLUCOSE SERPL-MCNC: 112 MG/DL (ref 70–110)
HCG INTACT+B SERPL-ACNC: <2.4 MIU/ML
HCT VFR BLD AUTO: 34.3 % (ref 37–48.5)
HGB BLD-MCNC: 12 G/DL (ref 12–16)
IMM GRANULOCYTES # BLD AUTO: 0.05 K/UL (ref 0–0.04)
IMM GRANULOCYTES NFR BLD AUTO: 1.1 % (ref 0–0.5)
LYMPHOCYTES # BLD AUTO: 1.8 K/UL (ref 1–4.8)
LYMPHOCYTES NFR BLD: 37.8 % (ref 18–48)
MCH RBC QN AUTO: 30.2 PG (ref 27–31)
MCHC RBC AUTO-ENTMCNC: 35 G/DL (ref 32–36)
MCV RBC AUTO: 86 FL (ref 82–98)
MONOCYTES # BLD AUTO: 0.4 K/UL (ref 0.3–1)
MONOCYTES NFR BLD: 7.8 % (ref 4–15)
NEUTROPHILS # BLD AUTO: 2.4 K/UL (ref 1.8–7.7)
NEUTROPHILS NFR BLD: 51.2 % (ref 38–73)
NRBC BLD-RTO: 0 /100 WBC
PLATELET # BLD AUTO: 307 K/UL (ref 150–450)
PMV BLD AUTO: 9 FL (ref 9.2–12.9)
POTASSIUM SERPL-SCNC: 4.3 MMOL/L (ref 3.5–5.1)
PROT SERPL-MCNC: 6.9 G/DL (ref 6–8.4)
RBC # BLD AUTO: 3.98 M/UL (ref 4–5.4)
SODIUM SERPL-SCNC: 137 MMOL/L (ref 136–145)
WBC # BLD AUTO: 4.74 K/UL (ref 3.9–12.7)

## 2025-02-27 PROCEDURE — 80053 COMPREHEN METABOLIC PANEL: CPT | Performed by: INTERNAL MEDICINE

## 2025-02-27 PROCEDURE — 3075F SYST BP GE 130 - 139MM HG: CPT | Mod: CPTII,S$GLB,, | Performed by: NURSE PRACTITIONER

## 2025-02-27 PROCEDURE — 96375 TX/PRO/DX INJ NEW DRUG ADDON: CPT

## 2025-02-27 PROCEDURE — 96417 CHEMO IV INFUS EACH ADDL SEQ: CPT

## 2025-02-27 PROCEDURE — 85025 COMPLETE CBC W/AUTO DIFF WBC: CPT | Performed by: INTERNAL MEDICINE

## 2025-02-27 PROCEDURE — 1160F RVW MEDS BY RX/DR IN RCRD: CPT | Mod: CPTII,S$GLB,, | Performed by: NURSE PRACTITIONER

## 2025-02-27 PROCEDURE — 96413 CHEMO IV INFUSION 1 HR: CPT

## 2025-02-27 PROCEDURE — 3079F DIAST BP 80-89 MM HG: CPT | Mod: CPTII,S$GLB,, | Performed by: NURSE PRACTITIONER

## 2025-02-27 PROCEDURE — G2211 COMPLEX E/M VISIT ADD ON: HCPCS | Mod: S$GLB,,, | Performed by: NURSE PRACTITIONER

## 2025-02-27 PROCEDURE — A4216 STERILE WATER/SALINE, 10 ML: HCPCS | Performed by: NURSE PRACTITIONER

## 2025-02-27 PROCEDURE — 63600175 PHARM REV CODE 636 W HCPCS: Performed by: NURSE PRACTITIONER

## 2025-02-27 PROCEDURE — 84702 CHORIONIC GONADOTROPIN TEST: CPT | Performed by: INTERNAL MEDICINE

## 2025-02-27 PROCEDURE — 3008F BODY MASS INDEX DOCD: CPT | Mod: CPTII,S$GLB,, | Performed by: NURSE PRACTITIONER

## 2025-02-27 PROCEDURE — 99215 OFFICE O/P EST HI 40 MIN: CPT | Mod: S$GLB,,, | Performed by: NURSE PRACTITIONER

## 2025-02-27 PROCEDURE — 96367 TX/PROPH/DG ADDL SEQ IV INF: CPT

## 2025-02-27 PROCEDURE — 99999 PR PBB SHADOW E&M-EST. PATIENT-LVL IV: CPT | Mod: PBBFAC,,, | Performed by: NURSE PRACTITIONER

## 2025-02-27 PROCEDURE — 1159F MED LIST DOCD IN RCRD: CPT | Mod: CPTII,S$GLB,, | Performed by: NURSE PRACTITIONER

## 2025-02-27 PROCEDURE — 36415 COLL VENOUS BLD VENIPUNCTURE: CPT | Performed by: INTERNAL MEDICINE

## 2025-02-27 PROCEDURE — 25000003 PHARM REV CODE 250: Performed by: NURSE PRACTITIONER

## 2025-02-27 RX ORDER — FAMOTIDINE 10 MG/ML
20 INJECTION INTRAVENOUS
Status: COMPLETED | OUTPATIENT
Start: 2025-02-27 | End: 2025-02-27

## 2025-02-27 RX ORDER — HEPARIN 100 UNIT/ML
500 SYRINGE INTRAVENOUS
Status: CANCELLED | OUTPATIENT
Start: 2025-02-27

## 2025-02-27 RX ORDER — MEPERIDINE HYDROCHLORIDE 100 MG/ML
25 INJECTION INTRAMUSCULAR; INTRAVENOUS; SUBCUTANEOUS ONCE AS NEEDED
Status: DISCONTINUED | OUTPATIENT
Start: 2025-02-27 | End: 2025-02-27 | Stop reason: HOSPADM

## 2025-02-27 RX ORDER — HEPARIN 100 UNIT/ML
500 SYRINGE INTRAVENOUS
Status: DISCONTINUED | OUTPATIENT
Start: 2025-02-27 | End: 2025-02-27 | Stop reason: HOSPADM

## 2025-02-27 RX ORDER — FAMOTIDINE 10 MG/ML
20 INJECTION INTRAVENOUS
Status: CANCELLED | OUTPATIENT
Start: 2025-02-27

## 2025-02-27 RX ORDER — PROCHLORPERAZINE EDISYLATE 5 MG/ML
10 INJECTION INTRAMUSCULAR; INTRAVENOUS ONCE AS NEEDED
Status: CANCELLED
Start: 2025-02-27

## 2025-02-27 RX ORDER — DEXAMETHASONE SODIUM PHOSPHATE 10 MG/ML
10 INJECTION INTRAMUSCULAR; INTRAVENOUS
Status: CANCELLED | OUTPATIENT
Start: 2025-02-27

## 2025-02-27 RX ORDER — PROCHLORPERAZINE EDISYLATE 5 MG/ML
10 INJECTION INTRAMUSCULAR; INTRAVENOUS ONCE AS NEEDED
Status: DISCONTINUED | OUTPATIENT
Start: 2025-02-27 | End: 2025-02-27 | Stop reason: HOSPADM

## 2025-02-27 RX ORDER — SODIUM CHLORIDE 0.9 % (FLUSH) 0.9 %
10 SYRINGE (ML) INJECTION
Status: DISCONTINUED | OUTPATIENT
Start: 2025-02-27 | End: 2025-02-27 | Stop reason: HOSPADM

## 2025-02-27 RX ORDER — EPINEPHRINE 0.3 MG/.3ML
0.3 INJECTION SUBCUTANEOUS ONCE AS NEEDED
Status: DISCONTINUED | OUTPATIENT
Start: 2025-02-27 | End: 2025-02-27 | Stop reason: HOSPADM

## 2025-02-27 RX ORDER — MEPERIDINE HYDROCHLORIDE 50 MG/ML
25 INJECTION INTRAMUSCULAR; INTRAVENOUS; SUBCUTANEOUS ONCE AS NEEDED
Status: CANCELLED
Start: 2025-02-27

## 2025-02-27 RX ORDER — EPINEPHRINE 0.3 MG/.3ML
0.3 INJECTION SUBCUTANEOUS ONCE AS NEEDED
Status: CANCELLED | OUTPATIENT
Start: 2025-02-27

## 2025-02-27 RX ORDER — DIPHENHYDRAMINE HYDROCHLORIDE 50 MG/ML
50 INJECTION INTRAMUSCULAR; INTRAVENOUS ONCE AS NEEDED
Status: CANCELLED | OUTPATIENT
Start: 2025-02-27

## 2025-02-27 RX ORDER — SODIUM CHLORIDE 0.9 % (FLUSH) 0.9 %
10 SYRINGE (ML) INJECTION
Status: CANCELLED | OUTPATIENT
Start: 2025-02-27

## 2025-02-27 RX ORDER — DIPHENHYDRAMINE HYDROCHLORIDE 50 MG/ML
50 INJECTION INTRAMUSCULAR; INTRAVENOUS ONCE AS NEEDED
Status: DISCONTINUED | OUTPATIENT
Start: 2025-02-27 | End: 2025-02-27 | Stop reason: HOSPADM

## 2025-02-27 RX ADMIN — SODIUM CHLORIDE: 9 INJECTION, SOLUTION INTRAVENOUS at 09:02

## 2025-02-27 RX ADMIN — FAMOTIDINE 20 MG: 10 INJECTION INTRAVENOUS at 11:02

## 2025-02-27 RX ADMIN — DIPHENHYDRAMINE HYDROCHLORIDE 50 MG: 50 INJECTION INTRAMUSCULAR; INTRAVENOUS at 10:02

## 2025-02-27 RX ADMIN — HEPARIN SODIUM (PORCINE) LOCK FLUSH IV SOLN 100 UNIT/ML 500 UNITS: 100 SOLUTION at 12:02

## 2025-02-27 RX ADMIN — PACLITAXEL 198 MG: 6 INJECTION, SOLUTION INTRAVENOUS at 11:02

## 2025-02-27 RX ADMIN — TRASTUZUMAB-ANNS 261 MG: 420 INJECTION, POWDER, LYOPHILIZED, FOR SOLUTION INTRAVENOUS at 10:02

## 2025-02-27 RX ADMIN — DEXAMETHASONE SODIUM PHOSPHATE 10 MG: 4 INJECTION, SOLUTION INTRA-ARTICULAR; INTRALESIONAL; INTRAMUSCULAR; INTRAVENOUS; SOFT TISSUE at 11:02

## 2025-02-27 RX ADMIN — Medication 10 ML: at 12:02

## 2025-02-27 NOTE — PLAN OF CARE
Patient completed Kanjinti & taxol infusions, tolerated well.  Port de accessed, flushed, blood return noted, heparin locked.  RTC 3/6/25  Patient ambulated off floor independently, NAD.

## 2025-02-27 NOTE — TELEPHONE ENCOUNTER
----- Message from Loraine Tarango NP sent at 2/27/2025  8:30 AM CST -----  Good morning,Patient completes chemotherapy March 6 and will need radiation. Can we get her in to see Dr. Gomes?Thanks

## 2025-02-27 NOTE — TELEPHONE ENCOUNTER
Call to pt after receiving a message from Loraine Tarango that pt will finish chemo 3/6/2025 and needs f/u with Dr Gomes for radiation. Appt scheduled with Dr Gomes and CT simulation on 3/19/2025 at 8:30 and and 9:00 AM.Pt verbalized understanding of date time and location of appt.

## 2025-02-27 NOTE — PLAN OF CARE
Patient seated in chair, VSS, assessment done.  Port accessed, flushed, blood return noted, started NS @ 25 cc/hr KVO while waiting for Kanjinti & Taxol from pharmacy.  TM for safety

## 2025-02-28 ENCOUNTER — PATIENT MESSAGE (OUTPATIENT)
Dept: HEMATOLOGY/ONCOLOGY | Facility: CLINIC | Age: 35
End: 2025-02-28
Payer: COMMERCIAL

## 2025-03-03 RX ORDER — DIPHENHYDRAMINE HYDROCHLORIDE 50 MG/ML
50 INJECTION INTRAMUSCULAR; INTRAVENOUS ONCE AS NEEDED
Status: CANCELLED | OUTPATIENT
Start: 2025-03-06

## 2025-03-03 RX ORDER — MEPERIDINE HYDROCHLORIDE 50 MG/ML
25 INJECTION INTRAMUSCULAR; INTRAVENOUS; SUBCUTANEOUS ONCE AS NEEDED
Status: CANCELLED
Start: 2025-03-06

## 2025-03-03 RX ORDER — PROCHLORPERAZINE EDISYLATE 5 MG/ML
10 INJECTION INTRAMUSCULAR; INTRAVENOUS ONCE AS NEEDED
Status: CANCELLED
Start: 2025-03-06

## 2025-03-03 RX ORDER — SODIUM CHLORIDE 0.9 % (FLUSH) 0.9 %
10 SYRINGE (ML) INJECTION
Status: CANCELLED | OUTPATIENT
Start: 2025-03-06

## 2025-03-03 RX ORDER — EPINEPHRINE 0.3 MG/.3ML
0.3 INJECTION SUBCUTANEOUS ONCE AS NEEDED
Status: CANCELLED | OUTPATIENT
Start: 2025-03-06

## 2025-03-03 RX ORDER — DEXAMETHASONE SODIUM PHOSPHATE 10 MG/ML
10 INJECTION INTRAMUSCULAR; INTRAVENOUS
Status: CANCELLED | OUTPATIENT
Start: 2025-03-06

## 2025-03-03 RX ORDER — HEPARIN 100 UNIT/ML
500 SYRINGE INTRAVENOUS
Status: CANCELLED | OUTPATIENT
Start: 2025-03-06

## 2025-03-03 RX ORDER — FAMOTIDINE 10 MG/ML
20 INJECTION INTRAVENOUS
Status: CANCELLED | OUTPATIENT
Start: 2025-03-06

## 2025-03-06 ENCOUNTER — LAB VISIT (OUTPATIENT)
Dept: LAB | Facility: HOSPITAL | Age: 35
End: 2025-03-06
Payer: COMMERCIAL

## 2025-03-06 ENCOUNTER — INFUSION (OUTPATIENT)
Dept: INFUSION THERAPY | Facility: HOSPITAL | Age: 35
End: 2025-03-06
Payer: COMMERCIAL

## 2025-03-06 VITALS
RESPIRATION RATE: 18 BRPM | TEMPERATURE: 98 F | SYSTOLIC BLOOD PRESSURE: 140 MMHG | HEART RATE: 88 BPM | WEIGHT: 293 LBS | DIASTOLIC BLOOD PRESSURE: 66 MMHG | HEIGHT: 67 IN | BODY MASS INDEX: 45.99 KG/M2

## 2025-03-06 DIAGNOSIS — D84.821 IMMUNODEFICIENCY DUE TO DRUGS: ICD-10-CM

## 2025-03-06 DIAGNOSIS — C50.912 INVASIVE DUCTAL CARCINOMA OF BREAST, FEMALE, LEFT: Primary | ICD-10-CM

## 2025-03-06 DIAGNOSIS — C50.912 INFILTRATING DUCTAL CARCINOMA OF LEFT BREAST: ICD-10-CM

## 2025-03-06 DIAGNOSIS — C50.912 INVASIVE DUCTAL CARCINOMA OF BREAST, FEMALE, LEFT: ICD-10-CM

## 2025-03-06 DIAGNOSIS — Z79.899 IMMUNODEFICIENCY DUE TO DRUGS: ICD-10-CM

## 2025-03-06 LAB
ALBUMIN SERPL BCP-MCNC: 4 G/DL (ref 3.5–5.2)
ALP SERPL-CCNC: 70 U/L (ref 40–150)
ALT SERPL W/O P-5'-P-CCNC: 30 U/L (ref 10–44)
ANION GAP SERPL CALC-SCNC: 9 MMOL/L (ref 8–16)
AST SERPL-CCNC: 19 U/L (ref 10–40)
BASOPHILS # BLD AUTO: 0.05 K/UL (ref 0–0.2)
BASOPHILS NFR BLD: 0.8 % (ref 0–1.9)
BILIRUB SERPL-MCNC: 0.9 MG/DL (ref 0.1–1)
BUN SERPL-MCNC: 16 MG/DL (ref 6–20)
CALCIUM SERPL-MCNC: 9.4 MG/DL (ref 8.7–10.5)
CHLORIDE SERPL-SCNC: 107 MMOL/L (ref 95–110)
CO2 SERPL-SCNC: 22 MMOL/L (ref 23–29)
CREAT SERPL-MCNC: 0.7 MG/DL (ref 0.5–1.4)
DIFFERENTIAL METHOD BLD: ABNORMAL
EOSINOPHIL # BLD AUTO: 0.1 K/UL (ref 0–0.5)
EOSINOPHIL NFR BLD: 1.2 % (ref 0–8)
ERYTHROCYTE [DISTWIDTH] IN BLOOD BY AUTOMATED COUNT: 13.6 % (ref 11.5–14.5)
EST. GFR  (NO RACE VARIABLE): >60 ML/MIN/1.73 M^2
GLUCOSE SERPL-MCNC: 110 MG/DL (ref 70–110)
HCG INTACT+B SERPL-ACNC: <2.4 MIU/ML
HCT VFR BLD AUTO: 36.6 % (ref 37–48.5)
HGB BLD-MCNC: 12.8 G/DL (ref 12–16)
IMM GRANULOCYTES # BLD AUTO: 0.08 K/UL (ref 0–0.04)
IMM GRANULOCYTES NFR BLD AUTO: 1.2 % (ref 0–0.5)
LYMPHOCYTES # BLD AUTO: 2 K/UL (ref 1–4.8)
LYMPHOCYTES NFR BLD: 30.6 % (ref 18–48)
MCH RBC QN AUTO: 30.8 PG (ref 27–31)
MCHC RBC AUTO-ENTMCNC: 35 G/DL (ref 32–36)
MCV RBC AUTO: 88 FL (ref 82–98)
MONOCYTES # BLD AUTO: 0.5 K/UL (ref 0.3–1)
MONOCYTES NFR BLD: 6.9 % (ref 4–15)
NEUTROPHILS # BLD AUTO: 3.9 K/UL (ref 1.8–7.7)
NEUTROPHILS NFR BLD: 59.3 % (ref 38–73)
NRBC BLD-RTO: 0 /100 WBC
PLATELET # BLD AUTO: 305 K/UL (ref 150–450)
PMV BLD AUTO: 9.2 FL (ref 9.2–12.9)
POTASSIUM SERPL-SCNC: 4.2 MMOL/L (ref 3.5–5.1)
PROT SERPL-MCNC: 7.2 G/DL (ref 6–8.4)
RBC # BLD AUTO: 4.16 M/UL (ref 4–5.4)
SODIUM SERPL-SCNC: 138 MMOL/L (ref 136–145)
WBC # BLD AUTO: 6.5 K/UL (ref 3.9–12.7)

## 2025-03-06 PROCEDURE — 96375 TX/PRO/DX INJ NEW DRUG ADDON: CPT

## 2025-03-06 PROCEDURE — A4216 STERILE WATER/SALINE, 10 ML: HCPCS | Performed by: INTERNAL MEDICINE

## 2025-03-06 PROCEDURE — 80053 COMPREHEN METABOLIC PANEL: CPT | Performed by: INTERNAL MEDICINE

## 2025-03-06 PROCEDURE — 96413 CHEMO IV INFUSION 1 HR: CPT

## 2025-03-06 PROCEDURE — 25000003 PHARM REV CODE 250: Performed by: INTERNAL MEDICINE

## 2025-03-06 PROCEDURE — 96367 TX/PROPH/DG ADDL SEQ IV INF: CPT

## 2025-03-06 PROCEDURE — 84702 CHORIONIC GONADOTROPIN TEST: CPT | Performed by: INTERNAL MEDICINE

## 2025-03-06 PROCEDURE — 96402 CHEMO HORMON ANTINEOPL SQ/IM: CPT

## 2025-03-06 PROCEDURE — 85025 COMPLETE CBC W/AUTO DIFF WBC: CPT | Performed by: INTERNAL MEDICINE

## 2025-03-06 PROCEDURE — 96417 CHEMO IV INFUS EACH ADDL SEQ: CPT

## 2025-03-06 PROCEDURE — 36415 COLL VENOUS BLD VENIPUNCTURE: CPT | Performed by: INTERNAL MEDICINE

## 2025-03-06 PROCEDURE — 63600175 PHARM REV CODE 636 W HCPCS: Performed by: INTERNAL MEDICINE

## 2025-03-06 RX ORDER — DIPHENHYDRAMINE HYDROCHLORIDE 50 MG/ML
50 INJECTION INTRAMUSCULAR; INTRAVENOUS ONCE AS NEEDED
Status: DISCONTINUED | OUTPATIENT
Start: 2025-03-06 | End: 2025-03-06 | Stop reason: HOSPADM

## 2025-03-06 RX ORDER — PROCHLORPERAZINE EDISYLATE 5 MG/ML
10 INJECTION INTRAMUSCULAR; INTRAVENOUS ONCE AS NEEDED
Status: DISCONTINUED | OUTPATIENT
Start: 2025-03-06 | End: 2025-03-06 | Stop reason: HOSPADM

## 2025-03-06 RX ORDER — SODIUM CHLORIDE 0.9 % (FLUSH) 0.9 %
10 SYRINGE (ML) INJECTION
Status: DISCONTINUED | OUTPATIENT
Start: 2025-03-06 | End: 2025-03-06 | Stop reason: HOSPADM

## 2025-03-06 RX ORDER — MEPERIDINE HYDROCHLORIDE 100 MG/ML
25 INJECTION INTRAMUSCULAR; INTRAVENOUS; SUBCUTANEOUS ONCE AS NEEDED
Status: DISCONTINUED | OUTPATIENT
Start: 2025-03-06 | End: 2025-03-06

## 2025-03-06 RX ORDER — MEPERIDINE HYDROCHLORIDE 50 MG/ML
25 INJECTION INTRAMUSCULAR; INTRAVENOUS; SUBCUTANEOUS ONCE AS NEEDED
Refills: 0 | Status: DISCONTINUED | OUTPATIENT
Start: 2025-03-06 | End: 2025-03-06 | Stop reason: HOSPADM

## 2025-03-06 RX ORDER — EPINEPHRINE 0.3 MG/.3ML
0.3 INJECTION SUBCUTANEOUS ONCE AS NEEDED
Status: DISCONTINUED | OUTPATIENT
Start: 2025-03-06 | End: 2025-03-06 | Stop reason: HOSPADM

## 2025-03-06 RX ORDER — FAMOTIDINE 10 MG/ML
20 INJECTION INTRAVENOUS
Status: COMPLETED | OUTPATIENT
Start: 2025-03-06 | End: 2025-03-06

## 2025-03-06 RX ORDER — HEPARIN 100 UNIT/ML
500 SYRINGE INTRAVENOUS
Status: DISCONTINUED | OUTPATIENT
Start: 2025-03-06 | End: 2025-03-06 | Stop reason: HOSPADM

## 2025-03-06 RX ADMIN — TRASTUZUMAB-ANNS 261 MG: 420 INJECTION, POWDER, LYOPHILIZED, FOR SOLUTION INTRAVENOUS at 09:03

## 2025-03-06 RX ADMIN — Medication 10 ML: at 11:03

## 2025-03-06 RX ADMIN — FAMOTIDINE 20 MG: 10 INJECTION INTRAVENOUS at 09:03

## 2025-03-06 RX ADMIN — PACLITAXEL 198 MG: 6 INJECTION, SOLUTION INTRAVENOUS at 10:03

## 2025-03-06 RX ADMIN — GOSERELIN ACETATE 3.6 MG: 3.6 IMPLANT SUBCUTANEOUS at 11:03

## 2025-03-06 RX ADMIN — SODIUM CHLORIDE: 9 INJECTION, SOLUTION INTRAVENOUS at 09:03

## 2025-03-06 RX ADMIN — HEPARIN SODIUM (PORCINE) LOCK FLUSH IV SOLN 100 UNIT/ML 500 UNITS: 100 SOLUTION at 11:03

## 2025-03-06 RX ADMIN — DEXAMETHASONE SODIUM PHOSPHATE 10 MG: 4 INJECTION, SOLUTION INTRA-ARTICULAR; INTRALESIONAL; INTRAMUSCULAR; INTRAVENOUS; SOFT TISSUE at 10:03

## 2025-03-06 RX ADMIN — DIPHENHYDRAMINE HYDROCHLORIDE 50 MG: 50 INJECTION INTRAMUSCULAR; INTRAVENOUS at 09:03

## 2025-03-06 NOTE — PLAN OF CARE
0820 pt here for C12 Kanjinti/Taxol infusion with Zoladex injection, labs, hx, meds, allergies reviewed, pt with no new complaints at this time, reclined in chair, continue to monitor

## 2025-03-06 NOTE — PLAN OF CARE
1145 pt tolerated Kajinti/Taxol infusion and zoladex injection without issue, pt to rtc 3/13/25, no distress noted upon d/c to home

## 2025-03-13 ENCOUNTER — INFUSION (OUTPATIENT)
Dept: INFUSION THERAPY | Facility: HOSPITAL | Age: 35
End: 2025-03-13
Payer: COMMERCIAL

## 2025-03-13 ENCOUNTER — OFFICE VISIT (OUTPATIENT)
Dept: HEMATOLOGY/ONCOLOGY | Facility: CLINIC | Age: 35
End: 2025-03-13
Payer: COMMERCIAL

## 2025-03-13 ENCOUNTER — LAB VISIT (OUTPATIENT)
Dept: LAB | Facility: HOSPITAL | Age: 35
End: 2025-03-13
Attending: INTERNAL MEDICINE
Payer: COMMERCIAL

## 2025-03-13 VITALS
SYSTOLIC BLOOD PRESSURE: 151 MMHG | BODY MASS INDEX: 45.99 KG/M2 | HEART RATE: 92 BPM | HEIGHT: 67 IN | RESPIRATION RATE: 17 BRPM | OXYGEN SATURATION: 97 % | DIASTOLIC BLOOD PRESSURE: 72 MMHG | WEIGHT: 293 LBS | TEMPERATURE: 97 F

## 2025-03-13 DIAGNOSIS — C50.912 INVASIVE DUCTAL CARCINOMA OF BREAST, FEMALE, LEFT: Primary | ICD-10-CM

## 2025-03-13 DIAGNOSIS — C50.912 INVASIVE DUCTAL CARCINOMA OF BREAST, FEMALE, LEFT: ICD-10-CM

## 2025-03-13 LAB
ALBUMIN SERPL BCP-MCNC: 3.9 G/DL (ref 3.5–5.2)
ALP SERPL-CCNC: 67 U/L (ref 40–150)
ALT SERPL W/O P-5'-P-CCNC: 30 U/L (ref 10–44)
ANION GAP SERPL CALC-SCNC: 8 MMOL/L (ref 8–16)
AST SERPL-CCNC: 18 U/L (ref 10–40)
BASOPHILS # BLD AUTO: 0.05 K/UL (ref 0–0.2)
BASOPHILS NFR BLD: 0.7 % (ref 0–1.9)
BILIRUB SERPL-MCNC: 0.9 MG/DL (ref 0.1–1)
BUN SERPL-MCNC: 16 MG/DL (ref 6–20)
CALCIUM SERPL-MCNC: 9.3 MG/DL (ref 8.7–10.5)
CHLORIDE SERPL-SCNC: 109 MMOL/L (ref 95–110)
CO2 SERPL-SCNC: 22 MMOL/L (ref 23–29)
CREAT SERPL-MCNC: 0.7 MG/DL (ref 0.5–1.4)
DIFFERENTIAL METHOD BLD: ABNORMAL
EOSINOPHIL # BLD AUTO: 0.1 K/UL (ref 0–0.5)
EOSINOPHIL NFR BLD: 1 % (ref 0–8)
ERYTHROCYTE [DISTWIDTH] IN BLOOD BY AUTOMATED COUNT: 13.5 % (ref 11.5–14.5)
EST. GFR  (NO RACE VARIABLE): >60 ML/MIN/1.73 M^2
GLUCOSE SERPL-MCNC: 114 MG/DL (ref 70–110)
HCG INTACT+B SERPL-ACNC: <2.4 MIU/ML
HCT VFR BLD AUTO: 37.1 % (ref 37–48.5)
HGB BLD-MCNC: 12.6 G/DL (ref 12–16)
IMM GRANULOCYTES # BLD AUTO: 0.07 K/UL (ref 0–0.04)
IMM GRANULOCYTES NFR BLD AUTO: 1 % (ref 0–0.5)
LYMPHOCYTES # BLD AUTO: 2.3 K/UL (ref 1–4.8)
LYMPHOCYTES NFR BLD: 32.6 % (ref 18–48)
MCH RBC QN AUTO: 30.1 PG (ref 27–31)
MCHC RBC AUTO-ENTMCNC: 34 G/DL (ref 32–36)
MCV RBC AUTO: 89 FL (ref 82–98)
MONOCYTES # BLD AUTO: 0.5 K/UL (ref 0.3–1)
MONOCYTES NFR BLD: 7.5 % (ref 4–15)
NEUTROPHILS # BLD AUTO: 4 K/UL (ref 1.8–7.7)
NEUTROPHILS NFR BLD: 57.2 % (ref 38–73)
NRBC BLD-RTO: 0 /100 WBC
PLATELET # BLD AUTO: 332 K/UL (ref 150–450)
PMV BLD AUTO: 9 FL (ref 9.2–12.9)
POTASSIUM SERPL-SCNC: 4.3 MMOL/L (ref 3.5–5.1)
PROT SERPL-MCNC: 7.2 G/DL (ref 6–8.4)
RBC # BLD AUTO: 4.19 M/UL (ref 4–5.4)
SODIUM SERPL-SCNC: 139 MMOL/L (ref 136–145)
WBC # BLD AUTO: 6.91 K/UL (ref 3.9–12.7)

## 2025-03-13 PROCEDURE — 36415 COLL VENOUS BLD VENIPUNCTURE: CPT | Performed by: INTERNAL MEDICINE

## 2025-03-13 PROCEDURE — 3078F DIAST BP <80 MM HG: CPT | Mod: CPTII,S$GLB,, | Performed by: INTERNAL MEDICINE

## 2025-03-13 PROCEDURE — 96413 CHEMO IV INFUSION 1 HR: CPT

## 2025-03-13 PROCEDURE — 63600175 PHARM REV CODE 636 W HCPCS: Performed by: INTERNAL MEDICINE

## 2025-03-13 PROCEDURE — 99215 OFFICE O/P EST HI 40 MIN: CPT | Mod: S$GLB,,, | Performed by: INTERNAL MEDICINE

## 2025-03-13 PROCEDURE — 80053 COMPREHEN METABOLIC PANEL: CPT | Performed by: INTERNAL MEDICINE

## 2025-03-13 PROCEDURE — 3077F SYST BP >= 140 MM HG: CPT | Mod: CPTII,S$GLB,, | Performed by: INTERNAL MEDICINE

## 2025-03-13 PROCEDURE — 85025 COMPLETE CBC W/AUTO DIFF WBC: CPT | Performed by: INTERNAL MEDICINE

## 2025-03-13 PROCEDURE — 84702 CHORIONIC GONADOTROPIN TEST: CPT | Performed by: INTERNAL MEDICINE

## 2025-03-13 PROCEDURE — 3008F BODY MASS INDEX DOCD: CPT | Mod: CPTII,S$GLB,, | Performed by: INTERNAL MEDICINE

## 2025-03-13 PROCEDURE — A4216 STERILE WATER/SALINE, 10 ML: HCPCS | Performed by: INTERNAL MEDICINE

## 2025-03-13 PROCEDURE — 99999 PR PBB SHADOW E&M-EST. PATIENT-LVL III: CPT | Mod: PBBFAC,,, | Performed by: INTERNAL MEDICINE

## 2025-03-13 PROCEDURE — G2211 COMPLEX E/M VISIT ADD ON: HCPCS | Mod: S$GLB,,, | Performed by: INTERNAL MEDICINE

## 2025-03-13 PROCEDURE — 25000003 PHARM REV CODE 250: Performed by: INTERNAL MEDICINE

## 2025-03-13 RX ORDER — HEPARIN 100 UNIT/ML
500 SYRINGE INTRAVENOUS
Status: DISCONTINUED | OUTPATIENT
Start: 2025-03-13 | End: 2025-03-13 | Stop reason: HOSPADM

## 2025-03-13 RX ORDER — MEPERIDINE HYDROCHLORIDE 50 MG/ML
25 INJECTION INTRAMUSCULAR; INTRAVENOUS; SUBCUTANEOUS ONCE AS NEEDED
Status: CANCELLED
Start: 2025-03-13

## 2025-03-13 RX ORDER — DIPHENHYDRAMINE HYDROCHLORIDE 50 MG/ML
50 INJECTION, SOLUTION INTRAMUSCULAR; INTRAVENOUS ONCE AS NEEDED
Status: DISCONTINUED | OUTPATIENT
Start: 2025-03-13 | End: 2025-03-13 | Stop reason: HOSPADM

## 2025-03-13 RX ORDER — SODIUM CHLORIDE 0.9 % (FLUSH) 0.9 %
10 SYRINGE (ML) INJECTION
Status: CANCELLED | OUTPATIENT
Start: 2025-03-13

## 2025-03-13 RX ORDER — MEPERIDINE HYDROCHLORIDE 100 MG/ML
25 INJECTION INTRAMUSCULAR; INTRAVENOUS; SUBCUTANEOUS ONCE AS NEEDED
Status: DISCONTINUED | OUTPATIENT
Start: 2025-03-13 | End: 2025-03-13 | Stop reason: HOSPADM

## 2025-03-13 RX ORDER — PROCHLORPERAZINE EDISYLATE 5 MG/ML
10 INJECTION INTRAMUSCULAR; INTRAVENOUS ONCE AS NEEDED
Status: CANCELLED
Start: 2025-03-13

## 2025-03-13 RX ORDER — EPINEPHRINE 0.3 MG/.3ML
0.3 INJECTION SUBCUTANEOUS ONCE AS NEEDED
Status: DISCONTINUED | OUTPATIENT
Start: 2025-03-13 | End: 2025-03-13 | Stop reason: HOSPADM

## 2025-03-13 RX ORDER — SODIUM CHLORIDE 0.9 % (FLUSH) 0.9 %
10 SYRINGE (ML) INJECTION
Status: DISCONTINUED | OUTPATIENT
Start: 2025-03-13 | End: 2025-03-13 | Stop reason: HOSPADM

## 2025-03-13 RX ORDER — EPINEPHRINE 0.3 MG/.3ML
0.3 INJECTION SUBCUTANEOUS ONCE AS NEEDED
Status: CANCELLED | OUTPATIENT
Start: 2025-03-13

## 2025-03-13 RX ORDER — HEPARIN 100 UNIT/ML
500 SYRINGE INTRAVENOUS
Status: CANCELLED | OUTPATIENT
Start: 2025-03-13

## 2025-03-13 RX ORDER — PROCHLORPERAZINE EDISYLATE 5 MG/ML
10 INJECTION INTRAMUSCULAR; INTRAVENOUS ONCE AS NEEDED
Status: DISCONTINUED | OUTPATIENT
Start: 2025-03-13 | End: 2025-03-13 | Stop reason: HOSPADM

## 2025-03-13 RX ORDER — DIPHENHYDRAMINE HYDROCHLORIDE 50 MG/ML
50 INJECTION, SOLUTION INTRAMUSCULAR; INTRAVENOUS ONCE AS NEEDED
Status: CANCELLED | OUTPATIENT
Start: 2025-03-13

## 2025-03-13 RX ADMIN — HEPARIN SODIUM (PORCINE) LOCK FLUSH IV SOLN 100 UNIT/ML 500 UNITS: 100 SOLUTION at 09:03

## 2025-03-13 RX ADMIN — Medication 10 ML: at 09:03

## 2025-03-13 RX ADMIN — SODIUM CHLORIDE 750 MG: 9 INJECTION, SOLUTION INTRAVENOUS at 09:03

## 2025-03-13 NOTE — PROGRESS NOTES
[facility]/ [facility]   at [facility] Note:      Chief Complaint:   Encounter Diagnosis   Name Primary?    Invasive ductal carcinoma of breast, female, left Yes          Cancer Staging   Invasive ductal carcinoma of breast, female, left  Staging form: Breast, AJCC 8th Edition  - Clinical stage from 2024: Stage IA (cT1c, cN0, cM0, G3, ER+, AL+, HER2+) - Signed by Maureen Chiu MD on 2024      HPI:  Divya De Paz is a 34 y.o. female with PCOS and ANISH who presents today for first cycle of q3wk adjuvant trastuzumab. She tolerated weekly chemo well with 11 of 12 planned weeks.   Overall doing well. Minimal side effects from taxol      Oncology History  Patient self palpated a lump during self-exam, got evaluated by Gyn a few days later.   Follow-up mammogram 2024 showed an irregular high density mass in the UOQ of left breast  Ultrasound 2024: superficial irregular not parallel hypoechoic mass with indistinct and angular margins measuring 1.0 x 0.8 x 1.0 cm (1 o'clock axis, 1 CFN).     Biopsy 2024: IDC, grade 3, ER 90-95%/ AL 10-20%/ HER2 3+; Ki67 80-90%     MRI 8/15/2024 revealed a 1.5 cm x 1.3 cm x 1.0 cm irregularly shaped mass with irregular margins and heterogeneous internal enhancement seen in the left breast at 1 o'clock in the anterior depth, 4.1 cm from the nipple and 0.3 cm from the skin. No internal mammary or axillary adenopathy identified.    L breast lumpectomy 24: IDC, grade 3, 1.1cm; 0/1 LN+,  also with oncoplastic reduction  Adjuvant TH started 24 (delay due to poor wound healing after surgery)       GYN History:  Age of menarche was 14. Age of menopause was n/a.  Last menstrual period was the week of 2024, pt reports inconsistent periods due to PCOS. Patient reports hormonal therapy use (Provera) to induce menstruation, stopped 2024 per OB/GYN reccs. Patient is . Age of first live  "birth was n/a.     Family History:  Paternal Grandmother Breast Cancer - multiple times;  of lung cancer (heavy smoker)  Paternal Aunt Breast Cancer - diagnosed at 45, BRCA negative  Paternal Aunt did not have cancer, also BRCA negative  Maternal grandmother - lung cancer, no smoking history  Maternal grandfather - renal cancer      Patient Active Problem List   Diagnosis    Hidradenitis suppurativa    PCOS (polycystic ovarian syndrome)    Insulin resistance    Secondary oligomenorrhea    Severe obesity    Anxiety and depression    Invasive ductal carcinoma of breast, female, left    Negative Hereditary Cancer Genetic testing    Immunodeficiency due to drugs       Current Outpatient Medications   Medication Instructions    amitriptyline (ELAVIL) 25 mg, Oral, Nightly    LIDOcaine-prilocaine (EMLA) cream Apply topically to port one hour prior to chemotherapy infusion    metFORMIN (GLUCOPHAGE) 500 mg, Oral, 2 times daily with meals    ondansetron (ZOFRAN) 8 mg, Oral, Every 8 hours PRN    prochlorperazine (COMPAZINE) 5 mg, Oral, Every 6 hours PRN, Take if zofran is not working    spironolactone (ALDACTONE) 50 mg, Oral, Daily       Review of Systems:   Review of Systems   Constitutional:  Negative for malaise/fatigue.        See above   Respiratory:  Negative for cough and shortness of breath.    Cardiovascular:  Negative for chest pain.   Gastrointestinal:  Negative for abdominal pain and diarrhea.   Genitourinary:  Negative for frequency.   Musculoskeletal:  Negative for back pain.   Skin:  Negative for rash.   Neurological:  Negative for headaches.   Psychiatric/Behavioral:  The patient is nervous/anxious.    All other systems reviewed and are negative.      PHYSICAL EXAM:  BP (!) 151/72   Pulse 92   Temp 97 °F (36.1 °C) (Oral)   Resp 17   Ht 5' 7" (1.702 m)   Wt 132.9 kg (292 lb 15.9 oz)   LMP 2024   SpO2 97%   BMI 45.89 kg/m²   Physical Exam  Vitals and nursing note reviewed.   Constitutional:      "  Appearance: Normal appearance. She is obese.      Comments: Alopecia noted   HENT:      Head: Normocephalic and atraumatic.      Nose: Nose normal.   Cardiovascular:      Rate and Rhythm: Normal rate and regular rhythm.      Heart sounds: Normal heart sounds.   Pulmonary:      Effort: Pulmonary effort is normal.      Breath sounds: Normal breath sounds.   Chest:          Comments: Right chest wall port in place with no redness to tenderness  Bilateral breast reduction, scars healing well  Abdominal:      General: Abdomen is flat.      Palpations: Abdomen is soft.   Lymphadenopathy:      Cervical: No cervical adenopathy.      Upper Body:      Right upper body: No supraclavicular or axillary adenopathy.      Left upper body: No supraclavicular or axillary adenopathy.   Skin:     General: Skin is warm and dry.   Neurological:      General: No focal deficit present.      Mental Status: She is alert and oriented to person, place, and time.   Psychiatric:         Mood and Affect: Mood normal.         Behavior: Behavior normal.         Thought Content: Thought content normal.         Judgment: Judgment normal.           Pertinent Labs & Imaging:  Pathology Results  (Last 30 days)      None            Recent Results (from the past 24 hours)   CBC W/ AUTO DIFFERENTIAL    Collection Time: 03/13/25  7:21 AM   Result Value Ref Range    WBC 6.91 3.90 - 12.70 K/uL    RBC 4.19 4.00 - 5.40 M/uL    Hemoglobin 12.6 12.0 - 16.0 g/dL    Hematocrit 37.1 37.0 - 48.5 %    MCV 89 82 - 98 fL    MCH 30.1 27.0 - 31.0 pg    MCHC 34.0 32.0 - 36.0 g/dL    RDW 13.5 11.5 - 14.5 %    Platelets 332 150 - 450 K/uL    MPV 9.0 (L) 9.2 - 12.9 fL    Immature Granulocytes 1.0 (H) 0.0 - 0.5 %    Gran # (ANC) 4.0 1.8 - 7.7 K/uL    Immature Grans (Abs) 0.07 (H) 0.00 - 0.04 K/uL    Lymph # 2.3 1.0 - 4.8 K/uL    Mono # 0.5 0.3 - 1.0 K/uL    Eos # 0.1 0.0 - 0.5 K/uL    Baso # 0.05 0.00 - 0.20 K/uL    nRBC 0 0 /100 WBC    Gran % 57.2 38.0 - 73.0 %    Lymph %  32.6 18.0 - 48.0 %    Mono % 7.5 4.0 - 15.0 %    Eosinophil % 1.0 0.0 - 8.0 %    Basophil % 0.7 0.0 - 1.9 %    Differential Method Automated    CMP    Collection Time: 03/13/25  7:21 AM   Result Value Ref Range    Sodium 139 136 - 145 mmol/L    Potassium 4.3 3.5 - 5.1 mmol/L    Chloride 109 95 - 110 mmol/L    CO2 22 (L) 23 - 29 mmol/L    Glucose 114 (H) 70 - 110 mg/dL    BUN 16 6 - 20 mg/dL    Creatinine 0.7 0.5 - 1.4 mg/dL    Calcium 9.3 8.7 - 10.5 mg/dL    Total Protein 7.2 6.0 - 8.4 g/dL    Albumin 3.9 3.5 - 5.2 g/dL    Total Bilirubin 0.9 0.1 - 1.0 mg/dL    Alkaline Phosphatase 67 40 - 150 U/L    AST 18 10 - 40 U/L    ALT 30 10 - 44 U/L    eGFR >60.0 >60 mL/min/1.73 m^2    Anion Gap 8 8 - 16 mmol/L   B-HCG    Collection Time: 03/13/25  7:21 AM   Result Value Ref Range    HCG Quant <2.4 See Text mIU/mL           Assessment & Plan:    1. Invasive ductal carcinoma of breast, female, left      Patient with stage I TPBC s/p adjuvant TH weekly now on q3wk trstuzumab  She is overall doing well  Repeat echo due in April   Repeat labs due in July  zoladex every 4 weeks - due March 6    Will follow up with Dr Gomes for radiation end of the month  Start AI after completion of XRT       Route Chart for Scheduling    Med Onc Chart Routing      Follow up with physician . 6 weeks   Follow up with KERVIN . 12 weeks   Infusion scheduling note    Injection scheduling note    Labs    Imaging    Pharmacy appointment    Other referrals                  Treatment Plan Information   OP BREAST TRASTUZUMAB PACLITAXEL WEEKLY Maureen Chiu MD   Associated diagnosis: Invasive ductal carcinoma of breast, female, left Stage IA cT1c, cN0, cM0, G3, ER+, [place]+, HER2+ noted on 8/12/2024   Line of treatment: Adjuvant  Treatment Goal: Curative     Upcoming Treatment Dates - OP BREAST TRASTUZUMAB PACLITAXEL WEEKLY    4/3/2025       Chemotherapy       trastuzumab-anns (KANJINTI) 782 mg in 0.9% NaCl 250 mL chemo infusion  4/24/2025        Chemotherapy       trastuzumab-anns (KANJINTI) 782 mg in 0.9% NaCl 250 mL chemo infusion  5/15/2025       Chemotherapy       trastuzumab-anns (KANJINTI) 782 mg in 0.9% NaCl 250 mL chemo infusion  6/5/2025       Chemotherapy       trastuzumab-anns (KANJINTI) 782 mg in 0.9% NaCl 250 mL chemo infusion    Supportive Plan Information  OP BREAST GOSERELIN Q4W Maureen Chiu MD   Associated Diagnosis: Invasive ductal carcinoma of breast, female, left Stage IA cT1c, cN0, cM0, G3, ER+, [place]+, HER2+ noted on 8/12/2024   Line of treatment: Supportive Care   Treatment goal: Supportive     Upcoming Treatment Dates - OP BREAST GOSERELIN Q4W    4/1/2025       Chemotherapy       goserelin (ZOLADEX) injection 3.6 mg  4/29/2025       Chemotherapy       goserelin (ZOLADEX) injection 3.6 mg    Patient is in agreement with the proposed treatment plan. All questions were answered to the patient's satisfaction. Pt knows to call clinic for any new or worsening symptoms and if anything is needed before the next clinic visit.    MDM includes  :    - Acute or chronic illness or injury that poses a threat to life or bodily function  - Independent review and explanation of 3+ results from unique tests  - Discussion of management and ordering 3+ unique tests  - Extensive discussion of treatment and management  - Prescription drug management  - Drug therapy requiring intensive monitoring for toxicity       Maureen Chiu MD  03/13/2025

## 2025-03-13 NOTE — PLAN OF CARE
1000 Pt tolerated Kanjinti infusion well today, no complaints or complications,. VSS. Pt aware to call provider with any questions or concerns and is aware of upcoming appts. Pt ambulatory from clinic with steady gait, no distress noted.

## 2025-03-14 ENCOUNTER — TELEPHONE (OUTPATIENT)
Dept: PSYCHIATRY | Facility: CLINIC | Age: 35
End: 2025-03-14
Payer: COMMERCIAL

## 2025-03-19 ENCOUNTER — HOSPITAL ENCOUNTER (OUTPATIENT)
Dept: RADIATION THERAPY | Facility: HOSPITAL | Age: 35
Discharge: HOME | End: 2025-03-19
Payer: COMMERCIAL

## 2025-03-19 ENCOUNTER — OFFICE VISIT (OUTPATIENT)
Dept: RADIATION ONCOLOGY | Facility: CLINIC | Age: 35
End: 2025-03-19
Payer: COMMERCIAL

## 2025-03-19 VITALS
SYSTOLIC BLOOD PRESSURE: 141 MMHG | HEART RATE: 95 BPM | WEIGHT: 293 LBS | HEIGHT: 67 IN | DIASTOLIC BLOOD PRESSURE: 83 MMHG | OXYGEN SATURATION: 95 % | TEMPERATURE: 99 F | BODY MASS INDEX: 45.99 KG/M2

## 2025-03-19 DIAGNOSIS — C50.912 INVASIVE DUCTAL CARCINOMA OF BREAST, FEMALE, LEFT: Primary | ICD-10-CM

## 2025-03-19 LAB
B-HCG UR QL: NEGATIVE
CTP QC/QA: YES

## 2025-03-19 PROCEDURE — 77290 THER RAD SIMULAJ FIELD CPLX: CPT | Mod: 26,,, | Performed by: RADIOLOGY

## 2025-03-19 PROCEDURE — 3079F DIAST BP 80-89 MM HG: CPT | Mod: CPTII,S$GLB,, | Performed by: RADIOLOGY

## 2025-03-19 PROCEDURE — 77290 THER RAD SIMULAJ FIELD CPLX: CPT | Mod: TC | Performed by: RADIOLOGY

## 2025-03-19 PROCEDURE — 99999 PR PBB SHADOW E&M-EST. PATIENT-LVL III: CPT | Mod: PBBFAC,,, | Performed by: RADIOLOGY

## 2025-03-19 PROCEDURE — 3008F BODY MASS INDEX DOCD: CPT | Mod: CPTII,S$GLB,, | Performed by: RADIOLOGY

## 2025-03-19 PROCEDURE — 77334 RADIATION TREATMENT AID(S): CPT | Mod: 26,,, | Performed by: RADIOLOGY

## 2025-03-19 PROCEDURE — 77014 HC CT GUIDANCE RADIATION THERAPY FLDS PLACEMENT: CPT | Mod: TC | Performed by: RADIOLOGY

## 2025-03-19 PROCEDURE — 77334 RADIATION TREATMENT AID(S): CPT | Mod: TC | Performed by: RADIOLOGY

## 2025-03-19 PROCEDURE — 77263 THER RADIOLOGY TX PLNG CPLX: CPT | Mod: ,,, | Performed by: RADIOLOGY

## 2025-03-19 PROCEDURE — 1159F MED LIST DOCD IN RCRD: CPT | Mod: CPTII,S$GLB,, | Performed by: RADIOLOGY

## 2025-03-19 PROCEDURE — 99215 OFFICE O/P EST HI 40 MIN: CPT | Mod: 25,S$GLB,, | Performed by: RADIOLOGY

## 2025-03-19 PROCEDURE — 3077F SYST BP >= 140 MM HG: CPT | Mod: CPTII,S$GLB,, | Performed by: RADIOLOGY

## 2025-03-19 PROCEDURE — 81025 URINE PREGNANCY TEST: CPT | Performed by: RADIOLOGY

## 2025-03-19 NOTE — PROGRESS NOTES
PATIENT IDENTIFICATION:  Patient Name: Divya De Paz  MRN: [id number]  : 1990    DIAGNOSIS:  Cancer Staging   Invasive ductal carcinoma of breast, female, left  Staging form: Breast, AJCC 8th Edition  - Clinical stage from 2024: Stage IA (cT1c, cN0, cM0, G3, ER+, CO+, HER2+) - Signed by Maureen Chiu MD on 2024      HISTORY OF PRESENT ILLNESS:   The patient is a 34 year old woman with a Stage IA triple positive left breast cancer.  She is status post breast conserving surgery and awaiting adjuvant chemoimmunotherapy.  She has been referred for consideration of adjuvant radiation to the left breast.    The patient noted a subareolar mass in the left breast while showering.    24 Breast, left, 1 o'clock, 1 cm from nipple, ultrasound-guided core needle biopsies of mass:   - Invasive ductal carcinoma       - 6 mm in greatest contiguous dimension       - Nano-Villanueva modified SBR score 3 + 3 + 3 = 9 of 9       - Histologic grade 3 of 3       24 SYNOPTIC REPORT  SPECIMEN, LATERALITY, PROCEDURE: Breast, breast, left, excision  HISTOLOGIC TYPE:  Invasive ductal carcinoma (best seen in blocks 1G and 1N)  HISTOLOGIC GRADE: Grade 3       Tubules 3       Nuclei 3       Mitosis 3  TUMOR FOCALITY:  Single focus  TUMOR SIZE:  11 mm  DUCTAL CARCINOMA IN SITU:  Present in 5 of 20 slides examined, comprising 10% of the total tumor burden (EIC negative)       Nuclear grade:  3 (high nuclear grade)       Necrosis:  Present, central       Architecture:  Solid and comedo types  TUMOR EXTENSION:         Skin:  Uninvolved by tumor       Nipple:  Not present for evaluation       Skeletal muscle:  Not present for evaluation  MARGINS:       Invasive carcinoma:  Invasive carcinoma is greater than 10 mm away from all margins of resection       Ductal carcinoma in situ:  In-situ carcinoma is greater than 10 mm away from all margins of resection  LYMPH NODES:       Total number of nodes examined: 1       Total  number of sentinel nodes examined: 1       Total number of lymph nodes involved:  0     TUMOR MARKERS:  Tumor markers were performed on the previous biopsy specimen (UBS ) and per report the tumor cells are positive for estrogen receptor (2 to 3+, 90-95%) and progesterone receptor (1 to 2+, 10-20%) and are positive for HER2   overexpression.     The patient completed adjuvant chemoimmunotherapy on 3/6/25.  She tolerated treatment well.  Oncology History   Invasive ductal carcinoma of breast, female, left   8/8/2024 Cancer Staged    Staging form: Breast, AJCC 8th Edition  - Clinical stage from 8/8/2024: Stage IA (cT1c, cN0, cM0, G3, ER+, [place]+, HER2+)     8/12/2024 Initial Diagnosis    Invasive ductal carcinoma of breast, female, left     12/12/2024 -  Chemotherapy    Treatment Summary   Plan Name: OP BREAST TRASTUZUMAB PACLITAXEL WEEKLY  Treatment Goal: Curative  Status: Active  Start Date: 12/12/2024  End Date: 11/20/2025 (Planned)  Provider: Maureen Chiu MD  Chemotherapy: PACLitaxeL (TAXOL) 80 mg/m2 = 198 mg in 0.9% NaCl 250 mL chemo infusion, 80 mg/m2 = 198 mg, Intravenous, Clinic/HOD 1 time, 12 of 12 cycles  Administration: 198 mg (12/12/2024), 198 mg (12/19/2024), 198 mg (12/26/2024), 198 mg (1/3/2025), 198 mg (1/9/2025), 198 mg (1/16/2025), 198 mg (1/30/2025), 198 mg (2/6/2025), 198 mg (2/13/2025), 198 mg (2/20/2025), 198 mg (2/27/2025), 198 mg (3/6/2025)  trastuzumab-anns (KANJINTI) 521 mg in 0.9% NaCl 309.8 mL chemo infusion, 4 mg/kg = 521 mg, Intravenous, Clinic/HOD 1 time, 13 of 25 cycles  Administration: 521 mg (12/12/2024), 261 mg (12/19/2024), 750 mg (3/13/2025), 261 mg (12/26/2024), 261 mg (1/3/2025), 261 mg (1/9/2025), 261 mg (1/16/2025), 261 mg (1/30/2025), 261 mg (2/6/2025), 261 mg (2/13/2025), 261 mg (2/20/2025), 261 mg (2/27/2025), 261 mg (3/6/2025)          REVIEW OF SYSTEMS:   Review of Systems   Constitutional:  Negative for fever, malaise/fatigue and weight loss.   HENT:  Negative  for ear pain, hearing loss, sinus pain and sore throat.    Eyes:  Negative for blurred vision, double vision and pain.   Respiratory:  Positive for cough. Negative for hemoptysis, shortness of breath and wheezing.    Cardiovascular:  Negative for chest pain, palpitations and leg swelling.   Gastrointestinal:  Negative for abdominal pain, blood in stool, constipation, diarrhea, heartburn, nausea and vomiting.   Genitourinary:  Negative for dysuria, frequency, hematuria and urgency.   Musculoskeletal:  Negative for back pain and joint pain.   Skin:  Negative for itching and rash.   Neurological:  Negative for tingling, focal weakness, seizures and headaches.   Psychiatric/Behavioral:  Negative for depression. The patient is nervous/anxious.        PAST MEDICAL HISTORY:  Past Medical History:   Diagnosis Date    Anxiety     Depression     Hidradenitis suppurativa     Invasive ductal carcinoma of breast, female, left     PCOS (polycystic ovarian syndrome)        PAST SURGICAL HISTORY:  Past Surgical History:   Procedure Laterality Date    INJECTION FOR SENTINEL NODE IDENTIFICATION Left 9/25/2024    Procedure: INJECTION, FOR SENTINEL NODE IDENTIFICATION;  Surgeon: Candie Rowell MD;  Location: [facility];  Service: General;  Laterality: Left;    LUMPECTOMY, BREAST Left 9/25/2024    Procedure: LUMPECTOMY, BREAST WITH RADIOLOGICAL MARKER;  Surgeon: Candie Rowell MD;  Location: [facility] OR;  Service: General;  Laterality: Left;    REDUCTION OF BOTH BREASTS Bilateral 9/25/2024    Procedure: MAMMOPLASTY, REDUCTION;  Surgeon: Ridge Murcia DO;  Location: [facility] OR;  Service: General;  Laterality: Bilateral;  , BILATERAL/ONCOLOGIC REDUCTION BILATERAL    SENTINEL LYMPH NODE BIOPSY Left 9/25/2024    Procedure: BIOPSY, LYMPH NODE, SENTINEL;  Surgeon: Candie Rowell MD;  Location: [facility];  Service: General;  Laterality: Left;       ALLERGIES:   Review of patient's allergies indicates:   Allergen Reactions    Cefzil [cefprozil]  Hives       MEDICATIONS:  Current Outpatient Medications   Medication Sig    amitriptyline (ELAVIL) 25 MG tablet Take 1 tablet (25 mg total) by mouth every evening.    LIDOcaine-prilocaine (EMLA) cream Apply topically to port one hour prior to chemotherapy infusion    metFORMIN (GLUCOPHAGE) 500 MG tablet Take 1 tablet (500 mg total) by mouth 2 (two) times daily with meals.    ondansetron (ZOFRAN) 8 MG tablet Take 1 tablet (8 mg total) by mouth every 8 (eight) hours as needed for Nausea.    prochlorperazine (COMPAZINE) 5 MG tablet Take 1 tablet (5 mg total) by mouth every 6 (six) hours as needed for Nausea. Take if zofran is not working    spironolactone (ALDACTONE) 50 MG tablet Take 1 tablet (50 mg total) by mouth once daily.     No current facility-administered medications for this visit.       SOCIAL HISTORY:  Social History     Socioeconomic History    Marital status: Single    Number of children: 0   Occupational History    Occupation: [employer]  and [employer]     Employer: [employer]   Tobacco Use    Smoking status: Never     Passive exposure: Yes    Smokeless tobacco: Never    Tobacco comments:     Pt states she dont smoke    Substance and Sexual Activity    Alcohol use: Yes     Alcohol/week: 0.0 standard drinks of alcohol     Comment: occasional    Drug use: No    Sexual activity: Not Currently     Partners: Male     Birth control/protection: OCP   Other Topics Concern    Are you pregnant or think you may be? No    Breast-feeding No   Social History Narrative    From [place]    Moved to [facility] 2008    Exercising      Social Drivers of Health     Financial Resource Strain: Low Risk  (4/2/2024)    Overall Financial Resource Strain (CARDIA)     Difficulty of Paying Living Expenses: Not very hard   Food Insecurity: No Food Insecurity (4/2/2024)    Hunger Vital Sign     Worried About Running Out of Food in the Last Year: Never true     Ran Out of Food in the Last Year: Never true   Transportation Needs: No  Transportation Needs (2024)    PRAPARE - Transportation     Lack of Transportation (Medical): No     Lack of Transportation (Non-Medical): No   Physical Activity: Sufficiently Active (2024)    Exercise Vital Sign     Days of Exercise per Week: 7 days     Minutes of Exercise per Session: 60 min   Stress: Stress Concern Present (2024)    [employer] - Occupational Stress Questionnaire     Feeling of Stress : Very much   Housing Stability: Low Risk  (2024)    Housing Stability Vital Sign     Unable to Pay for Housing in the Last Year: No     Number of Places Lived in the Last Year: 1     Unstable Housing in the Last Year: No       FAMILY HISTORY:  Family History   Problem Relation Name Age of Onset    Diabetes Mother      Depression Mother      Hypertension Father      Depression Father      Arthritis Father      Breast cancer Paternal Aunt  38    Lung cancer Maternal Grandmother  62        nonsmoker    Breast cancer Paternal Grandmother  50 - 59    Acne Brother      Kidney cancer Maternal Grandfather  74    Breast cancer Other  30 - 39         PHYSICAL EXAMINATION:  Vitals:    25 0842   BP: (!) 141/83   Pulse: 95   Temp: 98.6 °F (37 °C)     Body mass index is 46.99 kg/m².    ECO  Physical Exam  Constitutional:       Appearance: Normal appearance.   HENT:      Head: Normocephalic and atraumatic.      Nose: Nose normal.      Mouth/Throat:      Mouth: Mucous membranes are moist.   Cardiovascular:      Rate and Rhythm: Normal rate.   Pulmonary:      Effort: Pulmonary effort is normal.   Chest:       Abdominal:      General: Abdomen is flat.      Palpations: Abdomen is soft.   Musculoskeletal:         General: Normal range of motion.      Cervical back: Normal range of motion.   Skin:     General: Skin is warm and dry.   Neurological:      General: No focal deficit present.      Mental Status: She is alert. Mental status is at baseline.            ASSESSMENT/PLAN:  Divya was seen today for follow-up.    Diagnoses and all orders for this visit:    Invasive ductal carcinoma of breast, female, left    The patient is recommended adjuvant radiation to the left breast to reduce risk of recurrence.  She will receive 3 weeks of daily EBRT.  She will be treated using DIBH to minimize radiation dose delivered to the heart and lungs.      The risks and benefits of treatment have been discussed with the patient and she expressed full understanding. she understands the treatment plan and willing to proceed accordingly.    I spent approximately 60 minutes reviewing the available records and evaluating the patient, out of which over 50% of the time was spent face to face with the patient in counseling and coordinating this patient's care.

## 2025-03-24 ENCOUNTER — OFFICE VISIT (OUTPATIENT)
Dept: PSYCHIATRY | Facility: CLINIC | Age: 35
End: 2025-03-24
Payer: COMMERCIAL

## 2025-03-24 ENCOUNTER — PATIENT MESSAGE (OUTPATIENT)
Dept: PSYCHIATRY | Facility: CLINIC | Age: 35
End: 2025-03-24
Payer: COMMERCIAL

## 2025-03-24 DIAGNOSIS — C50.912 INFILTRATING DUCTAL CARCINOMA OF LEFT BREAST: ICD-10-CM

## 2025-03-24 DIAGNOSIS — F41.1 GENERALIZED ANXIETY DISORDER: Primary | ICD-10-CM

## 2025-03-24 PROCEDURE — 90834 PSYTX W PT 45 MINUTES: CPT | Mod: 95,,, | Performed by: PSYCHOLOGIST

## 2025-03-24 NOTE — PROGRESS NOTES
Telemedicine PSYCHO-ONCOLOGY NOTE/ Individual Psychotherapy     Consultation started: 3/24/2025 at 4:38 PM   The chief complaint leading to consultation is: adjustment, anxiety  The patient location is: [place],   Patient job  Virtual visit with synchronous audio and video     Patient alone at the time of consultation      Crisis Disclaimer: Patient was informed that due to the virtual nature of the visit, that if a crisis develops, protocols will be implemented to ensure patient safety, including but not limited to: 1) Initiating a welfare check with local Law Enforcement, 2) Calling 1/National Crisis Hotline, and/or 3) Initiating PEC/CEC procedures.      Each patient provided medical services by telemedicine is:  (1) informed of the relationship between the physician and patient and the respective role of any other health care provider with respect to management of the patient; and (2) notified that he or she may decline to receive medical services by telemedicine and may withdraw from such care at any time.    Date: 3/24/2025   Site of therapist:  [facility]         Therapeutic Intervention: Met with patient.  Outpatient - Insight oriented psychotherapy 45 min - CPT code 26214    Referring provider:    Chief complaint/reason for encounter: anxiety   Met with patient to evaluate psychosocial adaptation to survivorship of bc      Patient was last seen by me on 12/20/2024    Problem list  Patient Active Problem List   Diagnosis    Hidradenitis suppurativa    PCOS (polycystic ovarian syndrome)    Insulin resistance    Secondary oligomenorrhea    Severe obesity    Anxiety and depression    Invasive ductal carcinoma of breast, female, left    Negative Hereditary Cancer Genetic testing    Immunodeficiency due to drugs       Chief complaint/reason for encounter: anxiety   Met with patient to evaluate psychosocial adaptation to diagnosis/treatment/survivorship of BC    Current Medications  Current Outpatient  Medications   Medication    amitriptyline (ELAVIL) 25 MG tablet    LIDOcaine-prilocaine (EMLA) cream    metFORMIN (GLUCOPHAGE) 500 MG tablet    ondansetron (ZOFRAN) 8 MG tablet    prochlorperazine (COMPAZINE) 5 MG tablet    spironolactone (ALDACTONE) 50 MG tablet     No current facility-administered medications for this visit.       Objective:  Divya De Paz arrived promptly for the session.     The patient was fully cooperative throughout the session.  Appearance: age appropriate, appropriately  dressed, adequately  groomed  Behavior/Cooperation: friendly and cooperative  Speech: normal in rate, volume, and tone and appropriate quality, quantity and organization of sentences  Mood: anxious  Affect: mood congruent  Thought Process: goal-directed, logical  Thought Content: normal,  No delusions or paranoia; did not appear to be responding to internal stimuli during the session  Orientation: grossly intact  Attention Span/Concentration: Attends to session without distraction; reports no difficulty  Insight: patient has awareness of illness; good insight into own behavior and behavior of others  Judgment: the patient's behavior is adequate to circumstances    Interval history and content of current session:  Discussed diagnosis, treatment, prognosis, current adaptation to disease and treatment status, and family's adaptation to disease and treatment status. Reports to be coping with moderate difficulty. Evaluated cognitive response, paying particular attention to negative intrusive thoughts of a persistent and detrimental nature. Thoughts of this type are in evidence with moderate distress. Provided cognitive behavioral therapy to address negative cognitions. Identified and evaluated psychosocial and environmental stressors secondary to diagnosis and treatment.  Examined proactive behaviors that may be implemented to minimize or ameliorate psychosocial stressors secondary to diagnosis and treatment.     Risk  parameters:   Patient reports no suicidal ideation  Patient reports no homicidal ideation  Patient reports no self-injurious behavior  Patient reports no violent behavior   Safety needs:  None at this time      Verbal deficits: None     Patient's response to intervention:The patient's response to intervention is accepting.     Progress toward goals and other mental status changes:  The patient's progress toward goals is good.      Progress to date:Progress as Expected      Goals from last visit: Met     Patient reported outcomes:    Distress Thermometer:   Distress Score    Distress Score: 6        Practical Problems Physical Problems                                                   Family Problems                                         Emotional Problems                                                         Spiritual/Religions Concerns               Other Problems               PHQ ANSWERS    Q1. Little interest or pleasure in doing things: (Patient-Rptd) (P) Several days (03/24/25 0902)  Q2. Feeling down, depressed, or hopeless: (Patient-Rptd) (P) Several days (03/24/25 0902)  Q3. Trouble falling or staying asleep, or sleeping too much: (Patient-Rptd) (P) More than half the days (03/24/25 0902)  Q4. Feeling tired or having little energy: (Patient-Rptd) (P) More than half the days (03/24/25 0902)  Q5. Poor appetite or overeating: (Patient-Rptd) (P) Several days (03/24/25 0902)  Q6. Feeling bad about yourself - or that you are a failure or have let yourself or your family down: (Patient-Rptd) (P) More than half the days (03/24/25 0902)  Q7. Trouble concentrating on things, such as reading the newspaper or watching television: (Patient-Rptd) (P) More than half the days (03/24/25 0902)  Q8. Moving or speaking so slowly that other people could have noticed. Or the opposite - being so fidgety or restless that you have been moving around a lot more than usual: (Patient-Rptd) (P) Not at all (03/24/25 0902)  Q9.       PHQ8 Score : (Patient-Rptd) (P) 11 (03/24/25 0902)  PHQ-9 Total Score: (Patient-Rptd) (P) 11 (03/24/25 0902)     ANISH-7 Answers        3/24/2025     9:00 AM   GAD7   1. Feeling nervous, anxious, or on edge? 2   2. Not being able to stop or control worrying? 2   3. Worrying too much about different things? 2   4. Trouble relaxing? 1   5. Being so restless that it is hard to sit still? 1   6. Becoming easily annoyed or irritable? 1   7. Feeling afraid as if something awful might happen? 2   8. If you checked off any problems, how difficult have these problems made it for you to do your work, take care of things at home, or get along with other people? 1   ANISH-7 Score 11        Patient-reported     ANISH-7 Score: (Patient-Rptd) (P) 11  Interpretation: (Patient-Rptd) (P) Moderate Anxiety     Client Strengths: verbal, intelligent, successful, good social support, good insight, commitment to wellness, strong licha, strong cultural traditions     Diagnosis:     ICD-10-CM ICD-9-CM   1. Generalized anxiety disorder  F41.1 300.02   2. Infiltrating ductal carcinoma of left breast  C50.912 174.9       Treatment Plan:individual psychotherapy and medication management by physician  Target symptoms: anxiety   Why chosen therapy is appropriate versus another modality: relevant to diagnosis, patient responds to this modality, evidence based practice  Outcome monitoring methods: self-report, observation, checklist/rating scale  Therapeutic intervention type: insight oriented psychotherapy, behavior modifying psychotherapy  Prognosis: Good      Behavioral goals:    Exercise:   Stress management:   Social engagement:   Nutrition:   Smoking Cessation:   Therapy:  Increase daily self-care and attention to health management  Pleasant events scheduling and increased social interaction  Monitor stressors in writing and bring to next visit    Return to clinic: as scheduled    Next Session:      Length of Service (minutes direct face-to-face  contact): 45    Racheal Catherine, PhD  Clinical Psychologist  [place] License #[id number]  [place] License #[id number]

## 2025-03-26 ENCOUNTER — DOCUMENTATION ONLY (OUTPATIENT)
Dept: RADIATION ONCOLOGY | Facility: CLINIC | Age: 35
End: 2025-03-26
Payer: COMMERCIAL

## 2025-03-26 NOTE — PLAN OF CARE
Nursing assessment completed for left breast radiation to begin 3/27/2025. Skin care discussed, Miaderm cream given with instruction for use. VS as follows: /82, P 85, T 97.5 (oral), O2 99%. Denies pain, falls.

## 2025-03-31 RX ORDER — MEPERIDINE HYDROCHLORIDE 50 MG/ML
25 INJECTION INTRAMUSCULAR; INTRAVENOUS; SUBCUTANEOUS ONCE AS NEEDED
Status: CANCELLED
Start: 2025-04-03

## 2025-03-31 RX ORDER — DIPHENHYDRAMINE HYDROCHLORIDE 50 MG/ML
50 INJECTION, SOLUTION INTRAMUSCULAR; INTRAVENOUS ONCE AS NEEDED
Status: CANCELLED | OUTPATIENT
Start: 2025-04-03

## 2025-03-31 RX ORDER — EPINEPHRINE 0.3 MG/.3ML
0.3 INJECTION SUBCUTANEOUS ONCE AS NEEDED
Status: CANCELLED | OUTPATIENT
Start: 2025-04-03

## 2025-03-31 RX ORDER — PROCHLORPERAZINE EDISYLATE 5 MG/ML
10 INJECTION INTRAMUSCULAR; INTRAVENOUS ONCE AS NEEDED
Status: CANCELLED
Start: 2025-04-03

## 2025-03-31 RX ORDER — SODIUM CHLORIDE 0.9 % (FLUSH) 0.9 %
10 SYRINGE (ML) INJECTION
Status: CANCELLED | OUTPATIENT
Start: 2025-04-03

## 2025-03-31 RX ORDER — HEPARIN 100 UNIT/ML
500 SYRINGE INTRAVENOUS
Status: CANCELLED | OUTPATIENT
Start: 2025-04-03

## 2025-04-01 ENCOUNTER — HOSPITAL ENCOUNTER (OUTPATIENT)
Dept: RADIATION THERAPY | Facility: HOSPITAL | Age: 35
Discharge: HOME | End: 2025-04-01
Attending: RADIOLOGY
Payer: COMMERCIAL

## 2025-04-02 ENCOUNTER — DOCUMENTATION ONLY (OUTPATIENT)
Dept: RADIATION ONCOLOGY | Facility: CLINIC | Age: 35
End: 2025-04-02
Payer: COMMERCIAL

## 2025-04-02 NOTE — PLAN OF CARE
Day 5 of outpatient radiation to the left breast, KRISTANH. Tolerating treatment well. No skin issues seen or reported. Has mild fatigue.Using Miaderm cream BID.

## 2025-04-03 ENCOUNTER — INFUSION (OUTPATIENT)
Dept: INFUSION THERAPY | Facility: HOSPITAL | Age: 35
End: 2025-04-03
Payer: COMMERCIAL

## 2025-04-03 VITALS
SYSTOLIC BLOOD PRESSURE: 144 MMHG | BODY MASS INDEX: 45.99 KG/M2 | TEMPERATURE: 98 F | HEART RATE: 87 BPM | DIASTOLIC BLOOD PRESSURE: 67 MMHG | HEIGHT: 67 IN | RESPIRATION RATE: 18 BRPM | WEIGHT: 293 LBS

## 2025-04-03 DIAGNOSIS — C50.912 INVASIVE DUCTAL CARCINOMA OF BREAST, FEMALE, LEFT: Primary | ICD-10-CM

## 2025-04-03 PROCEDURE — 25000003 PHARM REV CODE 250: Performed by: INTERNAL MEDICINE

## 2025-04-03 PROCEDURE — A4216 STERILE WATER/SALINE, 10 ML: HCPCS | Performed by: INTERNAL MEDICINE

## 2025-04-03 PROCEDURE — 63600175 PHARM REV CODE 636 W HCPCS: Mod: TB | Performed by: INTERNAL MEDICINE

## 2025-04-03 PROCEDURE — 96413 CHEMO IV INFUSION 1 HR: CPT

## 2025-04-03 RX ORDER — HEPARIN 100 UNIT/ML
500 SYRINGE INTRAVENOUS
Status: DISCONTINUED | OUTPATIENT
Start: 2025-04-03 | End: 2025-04-03 | Stop reason: HOSPADM

## 2025-04-03 RX ORDER — MEPERIDINE HYDROCHLORIDE 50 MG/ML
25 INJECTION INTRAMUSCULAR; INTRAVENOUS; SUBCUTANEOUS ONCE AS NEEDED
Refills: 0 | Status: DISCONTINUED | OUTPATIENT
Start: 2025-04-03 | End: 2025-04-03 | Stop reason: HOSPADM

## 2025-04-03 RX ORDER — PROCHLORPERAZINE EDISYLATE 5 MG/ML
10 INJECTION INTRAMUSCULAR; INTRAVENOUS ONCE AS NEEDED
Status: DISCONTINUED | OUTPATIENT
Start: 2025-04-03 | End: 2025-04-03 | Stop reason: HOSPADM

## 2025-04-03 RX ORDER — SODIUM CHLORIDE 0.9 % (FLUSH) 0.9 %
10 SYRINGE (ML) INJECTION
Status: DISCONTINUED | OUTPATIENT
Start: 2025-04-03 | End: 2025-04-03 | Stop reason: HOSPADM

## 2025-04-03 RX ORDER — EPINEPHRINE 0.3 MG/.3ML
0.3 INJECTION SUBCUTANEOUS ONCE AS NEEDED
Status: DISCONTINUED | OUTPATIENT
Start: 2025-04-03 | End: 2025-04-03 | Stop reason: HOSPADM

## 2025-04-03 RX ORDER — MEPERIDINE HYDROCHLORIDE 100 MG/ML
25 INJECTION INTRAMUSCULAR; INTRAVENOUS; SUBCUTANEOUS ONCE AS NEEDED
Status: DISCONTINUED | OUTPATIENT
Start: 2025-04-03 | End: 2025-04-03

## 2025-04-03 RX ORDER — DIPHENHYDRAMINE HYDROCHLORIDE 50 MG/ML
50 INJECTION, SOLUTION INTRAMUSCULAR; INTRAVENOUS ONCE AS NEEDED
Status: DISCONTINUED | OUTPATIENT
Start: 2025-04-03 | End: 2025-04-03 | Stop reason: HOSPADM

## 2025-04-03 RX ADMIN — SODIUM CHLORIDE, PRESERVATIVE FREE 10 ML: 5 INJECTION INTRAVENOUS at 09:04

## 2025-04-03 RX ADMIN — HEPARIN 500 UNITS: 100 SYRINGE at 09:04

## 2025-04-03 RX ADMIN — SODIUM CHLORIDE: 9 INJECTION, SOLUTION INTRAVENOUS at 08:04

## 2025-04-03 RX ADMIN — TRASTUZUMAB-ANNS 750 MG: 420 INJECTION, POWDER, LYOPHILIZED, FOR SOLUTION INTRAVENOUS at 08:04

## 2025-04-03 NOTE — PLAN OF CARE
0916-Pt tolerated Kanjinti well today, no complaints or complications. VSS.  Pt aware to call provider with any questions or concerns and is aware of upcoming appts. Pt ambulatory from clinic with steady gait, no distress noted.

## 2025-04-07 ENCOUNTER — HOSPITAL ENCOUNTER (OUTPATIENT)
Dept: CARDIOLOGY | Facility: HOSPITAL | Age: 35
Discharge: HOME | End: 2025-04-07
Attending: NURSE PRACTITIONER
Payer: COMMERCIAL

## 2025-04-07 VITALS
WEIGHT: 293 LBS | BODY MASS INDEX: 45.99 KG/M2 | DIASTOLIC BLOOD PRESSURE: 67 MMHG | HEIGHT: 67 IN | SYSTOLIC BLOOD PRESSURE: 144 MMHG | HEART RATE: 90 BPM

## 2025-04-07 DIAGNOSIS — C50.912 INVASIVE DUCTAL CARCINOMA OF BREAST, FEMALE, LEFT: ICD-10-CM

## 2025-04-07 LAB
ASCENDING AORTA: 2.98 CM
AV AREA BY CONTINUOUS VTI: 3 CM2
AV INDEX (PROSTH): 0.84
AV LVOT MEAN GRADIENT: 5 MMHG
AV LVOT PEAK GRADIENT: 7 MMHG
AV MEAN GRADIENT: 6 MMHG
AV PEAK GRADIENT: 12 MMHG
AV VALVE AREA BY VELOCITY RATIO: 2.6 CM²
AV VALVE AREA: 2.9 CM2
AV VELOCITY RATIO: 0.76
BSA FOR ECHO PROCEDURE: 2.51 M2
CV ECHO LV RWT: 0.42 CM
DOP CALC AO PEAK VEL: 1.7 M/S
DOP CALC AO VTI: 33.7 CM
DOP CALC LVOT AREA: 3.5 CM2
DOP CALC LVOT DIAMETER: 2.1 CM
DOP CALC LVOT PEAK VEL: 1.3 M/S
DOP CALC LVOT STROKE VOLUME: 97.6 CM3
DOP CALCLVOT PEAK VEL VTI: 28.2 CM
E WAVE DECELERATION TIME: 229 MS
E/A RATIO: 1.62
E/E' RATIO: 9 M/S
ECHO EF ESTIMATED: 65 %
ECHO LV POSTERIOR WALL: 0.8 CM (ref 0.6–1.1)
FRACTIONAL SHORTENING: 34.2 % (ref 28–44)
GLOBAL LONGITUIDAL STRAIN: 18.2 %
INTERVENTRICULAR SEPTUM: 1 CM (ref 0.6–1.1)
IVRT: 54 MS
LA MAJOR: 5.6 CM
LA MINOR: 5.6 CM
LA WIDTH: 3.8 CM
LEFT ATRIUM SIZE: 3.5 CM
LEFT ATRIUM VOLUME INDEX MOD: 32 ML/M2
LEFT ATRIUM VOLUME INDEX: 26 ML/M2
LEFT ATRIUM VOLUME MOD: 76 ML
LEFT ATRIUM VOLUME: 63 CM3
LEFT INTERNAL DIMENSION IN SYSTOLE: 2.5 CM (ref 2.1–4)
LEFT VENTRICLE DIASTOLIC VOLUME INDEX: 25.52 ML/M2
LEFT VENTRICLE DIASTOLIC VOLUME: 61 ML
LEFT VENTRICLE MASS INDEX: 42.3 G/M2
LEFT VENTRICLE SYSTOLIC VOLUME INDEX: 9.2 ML/M2
LEFT VENTRICLE SYSTOLIC VOLUME: 22 ML
LEFT VENTRICULAR INTERNAL DIMENSION IN DIASTOLE: 3.8 CM (ref 3.5–6)
LEFT VENTRICULAR MASS: 101.1 G
LV LATERAL E/E' RATIO: 8.5
LV SEPTAL E/E' RATIO: 10.2
MV A" WAVE DURATION": 134.16 MS
MV PEAK A VEL: 0.63 M/S
MV PEAK E VEL: 1.02 M/S
OHS CV RV/LV RATIO: 0.95 CM
OHS LV EJECTION FRACTION SIMPSONS BIPLANE MOD: 60 %
PISA TR MAX VEL: 2.3 M/S
PULM VEIN A" WAVE DURATION": 134.16 MS
PULM VEIN S/D RATIO: 1.49
PULMONIC VEIN PEAK A VELOCITY: 0.2 M/S
PV PEAK D VEL: 0.37 M/S
PV PEAK S VEL: 0.55 M/S
RA MAJOR: 4.29 CM
RA PRESSURE ESTIMATED: 0 MMHG
RA WIDTH: 3.78 CM
RIGHT ATRIAL AREA: 13.7 CM2
RIGHT VENTRICLE DIASTOLIC BASEL DIMENSION: 3.6 CM
RV TB RVSP: 2 MMHG
RV TISSUE DOPPLER FREE WALL SYSTOLIC VELOCITY 1 (APICAL 4 CHAMBER VIEW): 12.72 CM/S
SINUS: 3.6 CM
STJ: 3.02 CM
TDI LATERAL: 0.12 M/S
TDI SEPTAL: 0.1 M/S
TDI: 0.11 M/S
TRICUSPID ANNULAR PLANE SYSTOLIC EXCURSION: 2.53 CM
TV PEAK GRADIENT: 20 MMHG
TV REST PULMONARY ARTERY PRESSURE: 21 MMHG
Z-SCORE OF LEFT VENTRICULAR DIMENSION IN END DIASTOLE: -10.35
Z-SCORE OF LEFT VENTRICULAR DIMENSION IN END SYSTOLE: -7.44

## 2025-04-07 PROCEDURE — 93306 TTE W/DOPPLER COMPLETE: CPT

## 2025-04-07 PROCEDURE — 93356 MYOCRD STRAIN IMG SPCKL TRCK: CPT | Mod: ,,, | Performed by: INTERNAL MEDICINE

## 2025-04-07 PROCEDURE — 93306 TTE W/DOPPLER COMPLETE: CPT | Mod: 26,,, | Performed by: INTERNAL MEDICINE

## 2025-04-09 ENCOUNTER — DOCUMENTATION ONLY (OUTPATIENT)
Dept: RADIATION ONCOLOGY | Facility: CLINIC | Age: 35
End: 2025-04-09
Payer: COMMERCIAL

## 2025-04-09 NOTE — PLAN OF CARE
Day 10 of outpatient radiation to the left breast, DIBH. Reports some burning sensation left axilla. Skin intact. Gel sheets provided with instruction for use per Dr Gomes.

## 2025-04-14 ENCOUNTER — TELEPHONE (OUTPATIENT)
Dept: PSYCHIATRY | Facility: CLINIC | Age: 35
End: 2025-04-14
Payer: COMMERCIAL

## 2025-04-17 ENCOUNTER — DOCUMENTATION ONLY (OUTPATIENT)
Dept: RADIATION ONCOLOGY | Facility: CLINIC | Age: 35
End: 2025-04-17
Payer: COMMERCIAL

## 2025-04-24 ENCOUNTER — OFFICE VISIT (OUTPATIENT)
Dept: HEMATOLOGY/ONCOLOGY | Facility: CLINIC | Age: 35
End: 2025-04-24
Payer: COMMERCIAL

## 2025-04-24 ENCOUNTER — INFUSION (OUTPATIENT)
Dept: INFUSION THERAPY | Facility: HOSPITAL | Age: 35
End: 2025-04-24
Payer: COMMERCIAL

## 2025-04-24 VITALS — HEART RATE: 86 BPM | SYSTOLIC BLOOD PRESSURE: 132 MMHG | RESPIRATION RATE: 16 BRPM | DIASTOLIC BLOOD PRESSURE: 76 MMHG

## 2025-04-24 VITALS
HEART RATE: 87 BPM | WEIGHT: 293 LBS | HEIGHT: 67 IN | SYSTOLIC BLOOD PRESSURE: 145 MMHG | BODY MASS INDEX: 45.99 KG/M2 | DIASTOLIC BLOOD PRESSURE: 90 MMHG | RESPIRATION RATE: 18 BRPM | TEMPERATURE: 99 F | OXYGEN SATURATION: 95 %

## 2025-04-24 DIAGNOSIS — C50.912 INVASIVE DUCTAL CARCINOMA OF BREAST, FEMALE, LEFT: Primary | ICD-10-CM

## 2025-04-24 PROCEDURE — 25000003 PHARM REV CODE 250: Performed by: NURSE PRACTITIONER

## 2025-04-24 PROCEDURE — 96413 CHEMO IV INFUSION 1 HR: CPT

## 2025-04-24 PROCEDURE — 99999 PR PBB SHADOW E&M-EST. PATIENT-LVL III: CPT | Mod: PBBFAC,,, | Performed by: NURSE PRACTITIONER

## 2025-04-24 PROCEDURE — 63600175 PHARM REV CODE 636 W HCPCS: Performed by: NURSE PRACTITIONER

## 2025-04-24 PROCEDURE — A4216 STERILE WATER/SALINE, 10 ML: HCPCS | Performed by: NURSE PRACTITIONER

## 2025-04-24 RX ORDER — SODIUM CHLORIDE 0.9 % (FLUSH) 0.9 %
10 SYRINGE (ML) INJECTION
Status: CANCELLED | OUTPATIENT
Start: 2025-04-24

## 2025-04-24 RX ORDER — DIPHENHYDRAMINE HYDROCHLORIDE 50 MG/ML
50 INJECTION, SOLUTION INTRAMUSCULAR; INTRAVENOUS ONCE AS NEEDED
Status: DISCONTINUED | OUTPATIENT
Start: 2025-04-24 | End: 2025-04-24 | Stop reason: HOSPADM

## 2025-04-24 RX ORDER — EPINEPHRINE 0.3 MG/.3ML
0.3 INJECTION SUBCUTANEOUS ONCE AS NEEDED
Status: CANCELLED | OUTPATIENT
Start: 2025-04-24

## 2025-04-24 RX ORDER — ANASTROZOLE 1 MG/1
1 TABLET ORAL DAILY
Qty: 30 TABLET | Refills: 11 | Status: SHIPPED | OUTPATIENT
Start: 2025-04-24 | End: 2026-04-24

## 2025-04-24 RX ORDER — SODIUM CHLORIDE 0.9 % (FLUSH) 0.9 %
10 SYRINGE (ML) INJECTION
Status: DISCONTINUED | OUTPATIENT
Start: 2025-04-24 | End: 2025-04-24 | Stop reason: HOSPADM

## 2025-04-24 RX ORDER — MEPERIDINE HYDROCHLORIDE 100 MG/ML
25 INJECTION INTRAMUSCULAR; INTRAVENOUS; SUBCUTANEOUS ONCE AS NEEDED
Status: DISCONTINUED | OUTPATIENT
Start: 2025-04-24 | End: 2025-04-24 | Stop reason: HOSPADM

## 2025-04-24 RX ORDER — PROCHLORPERAZINE EDISYLATE 5 MG/ML
10 INJECTION INTRAMUSCULAR; INTRAVENOUS ONCE AS NEEDED
Status: CANCELLED
Start: 2025-04-24

## 2025-04-24 RX ORDER — HEPARIN 100 UNIT/ML
500 SYRINGE INTRAVENOUS
Status: DISCONTINUED | OUTPATIENT
Start: 2025-04-24 | End: 2025-04-24 | Stop reason: HOSPADM

## 2025-04-24 RX ORDER — DIPHENHYDRAMINE HYDROCHLORIDE 50 MG/ML
50 INJECTION, SOLUTION INTRAMUSCULAR; INTRAVENOUS ONCE AS NEEDED
Status: CANCELLED | OUTPATIENT
Start: 2025-04-24

## 2025-04-24 RX ORDER — MEPERIDINE HYDROCHLORIDE 50 MG/ML
25 INJECTION INTRAMUSCULAR; INTRAVENOUS; SUBCUTANEOUS ONCE AS NEEDED
Status: CANCELLED
Start: 2025-04-24

## 2025-04-24 RX ORDER — HEPARIN 100 UNIT/ML
500 SYRINGE INTRAVENOUS
Status: CANCELLED | OUTPATIENT
Start: 2025-04-24

## 2025-04-24 RX ORDER — PROCHLORPERAZINE EDISYLATE 5 MG/ML
10 INJECTION INTRAMUSCULAR; INTRAVENOUS ONCE AS NEEDED
Status: DISCONTINUED | OUTPATIENT
Start: 2025-04-24 | End: 2025-04-24 | Stop reason: HOSPADM

## 2025-04-24 RX ORDER — EPINEPHRINE 0.3 MG/.3ML
0.3 INJECTION SUBCUTANEOUS ONCE AS NEEDED
Status: DISCONTINUED | OUTPATIENT
Start: 2025-04-24 | End: 2025-04-24 | Stop reason: HOSPADM

## 2025-04-24 RX ADMIN — TRASTUZUMAB-ANNS 750 MG: 420 INJECTION, POWDER, LYOPHILIZED, FOR SOLUTION INTRAVENOUS at 11:04

## 2025-04-24 RX ADMIN — SODIUM CHLORIDE: 9 INJECTION, SOLUTION INTRAVENOUS at 10:04

## 2025-04-24 RX ADMIN — HEPARIN 500 UNITS: 100 SYRINGE at 12:04

## 2025-04-24 RX ADMIN — Medication 10 ML: at 12:04

## 2025-04-24 NOTE — PROGRESS NOTES
[facility]/ [facility]   at [facility] Note:      Chief Complaint:   Encounter Diagnosis   Name Primary?    Invasive ductal carcinoma of breast, female, left Yes        Cancer Staging   Invasive ductal carcinoma of breast, female, left  Staging form: Breast, AJCC 8th Edition  - Clinical stage from 8/8/2024: Stage IA (cT1c, cN0, cM0, G3, ER+, MI+, HER2+) - Signed by Maureen Chiu MD on 8/27/2024      HPI:  Divya De Paz is a 34 y.o. female with PCOS and ANISH who presents today for  a follow up and for her q3wk adjuvant trastuzumab. She tolerated weekly chemo well with 11 of 12 planned weeks.  Tolerating every 3 week trastuzumab well also.  She completed radiation last Thursday.  Noted some fatigue and skin changes.  No major peeling or burning.  Notes occasional palpitations when at night when she takes her elavil. Will discuss this with her pcp.  Planning a trip to [facility] for June.    Per Dr. Chiu's note:   Oncology History  Patient self palpated a lump during self-exam, got evaluated by Gyn a few days later.   Follow-up mammogram 7/25/2024 showed an irregular high density mass in the UOQ of left breast  Ultrasound 7/25/2024: superficial irregular not parallel hypoechoic mass with indistinct and angular margins measuring 1.0 x 0.8 x 1.0 cm (1 o'clock axis, 1 CFN).     Biopsy 8/8/2024: IDC, grade 3, ER 90-95%/ MI 10-20%/ HER2 3+; Ki67 80-90%     MRI 8/15/2024 revealed a 1.5 cm x 1.3 cm x 1.0 cm irregularly shaped mass with irregular margins and heterogeneous internal enhancement seen in the left breast at 1 o'clock in the anterior depth, 4.1 cm from the nipple and 0.3 cm from the skin. No internal mammary or axillary adenopathy identified.    L breast lumpectomy 9/25/24: IDC, grade 3, 1.1cm; 0/1 LN+,  also with oncoplastic reduction  Adjuvant TH started 12/11/24 (delay due to poor wound healing after surgery)   Radiation completed: April 17,      GYN History:  Age of menarche was 14. Age of menopause was n/a.  Last menstrual period was the week of 2024, pt reports inconsistent periods due to PCOS. Patient reports hormonal therapy use (Provera) to induce menstruation, stopped 2024 per OB/GYN reccs. Patient is . Age of first live birth was n/a.     Family History:  Paternal Grandmother Breast Cancer - multiple times;  of lung cancer (heavy smoker)  Paternal Aunt Breast Cancer - diagnosed at 45, BRCA negative  Paternal Aunt did not have cancer, also BRCA negative  Maternal grandmother - lung cancer, no smoking history  Maternal grandfather - renal cancer      Patient Active Problem List   Diagnosis    Hidradenitis suppurativa    PCOS (polycystic ovarian syndrome)    Insulin resistance    Secondary oligomenorrhea    Severe obesity    Anxiety and depression    Invasive ductal carcinoma of breast, female, left    Negative Hereditary Cancer Genetic testing    Immunodeficiency due to drugs       Current Outpatient Medications   Medication Instructions    amitriptyline (ELAVIL) 25 mg, Oral, Nightly    anastrozole (ARIMIDEX) 1 mg, Oral, Daily    LIDOcaine-prilocaine (EMLA) cream Apply topically to port one hour prior to chemotherapy infusion    metFORMIN (GLUCOPHAGE) 500 mg, Oral, 2 times daily with meals    ondansetron (ZOFRAN) 8 mg, Oral, Every 8 hours PRN    prochlorperazine (COMPAZINE) 5 mg, Oral, Every 6 hours PRN, Take if zofran is not working    spironolactone (ALDACTONE) 50 mg, Oral, Daily       Review of Systems:   Review of Systems   Constitutional:  Negative for malaise/fatigue.        See above   Respiratory:  Negative for cough and shortness of breath.    Cardiovascular:  Negative for chest pain.   Gastrointestinal:  Negative for abdominal pain and diarrhea.   Genitourinary:  Negative for frequency.   Musculoskeletal:  Negative for back pain.   Skin:  Negative for rash.   Neurological:  Negative for headaches.  "  Psychiatric/Behavioral:  The patient is not nervous/anxious.    All other systems reviewed and are negative.    PHYSICAL EXAM:  BP (!) 145/90 (BP Location: Left arm, Patient Position: Sitting)   Pulse 87   Temp 98.5 °F (36.9 °C) (Oral)   Resp 18   Ht 5' 7" (1.702 m)   Wt 133.7 kg (294 lb 12.1 oz)   SpO2 95%   BMI 46.17 kg/m²     Physical Exam  Vitals and nursing note reviewed.   Constitutional:       Appearance: Normal appearance. She is obese.      Comments: Alopecia noted   HENT:      Head: Normocephalic and atraumatic.      Nose: Nose normal.   Cardiovascular:      Rate and Rhythm: Normal rate and regular rhythm.      Heart sounds: Normal heart sounds.   Pulmonary:      Effort: Pulmonary effort is normal.      Breath sounds: Normal breath sounds.   Chest:          Comments: Right chest wall port in place with no redness to tenderness  Bilateral breast reduction, scars healing well, hyperpigmentation and skin thickening noted to left breast after radiation  Abdominal:      General: Abdomen is flat.      Palpations: Abdomen is soft.   Lymphadenopathy:      Cervical: No cervical adenopathy.      Upper Body:      Right upper body: No supraclavicular or axillary adenopathy.      Left upper body: No supraclavicular or axillary adenopathy.   Skin:     General: Skin is warm and dry.   Neurological:      General: No focal deficit present.      Mental Status: She is alert and oriented to person, place, and time.   Psychiatric:         Mood and Affect: Mood normal.         Behavior: Behavior normal.         Thought Content: Thought content normal.         Judgment: Judgment normal.       Pertinent Labs & Imaging:  Pathology Results  (Last 30 days)      None            No results found for this or any previous visit (from the past 24 hours).      Assessment & Plan:    1. Invasive ductal carcinoma of breast, female, left  - anastrozole (ARIMIDEX) 1 mg Tab; Take 1 tablet (1 mg total) by mouth once daily.  Dispense: 30 " tablet; Refill: 11    Patient with stage I TPBC s/p adjuvant TH weekly now on q3wk trstuzumab  She is overall doing well  Repeat echo from April 2025 reviewed  Repeat labs due in July  zoladex every 4 weeks (due currently for this, needs re-authorization, pre service team has been contacted about this)  Has completed radiation,  anastrozole rx sent in and side effects reviewed.  Ca and vit D recommended.  Pt will hold off on starting arimidex until next zoladex injection given.  She has not had a menstrual cycle since August 2025.    Rtc in 3 weeks for visit with Dr. Chiu, then can be seen every 6 weeks, labs every 3 months      Route Chart for Scheduling    Med Onc Chart Routing  Urgent    Follow up with physician 3 weeks.   Follow up with KERVIN . 9 weeks   Infusion scheduling note   every 3 weeks (please schedule out)   Injection scheduling note zoladex every 4 weeks (please get scheduled once re-authorized)   Labs CBC, CMP and B HCG   Scheduling:  Preferred lab:  Lab interval:  every 3 months, urine pregnancy test in 3 weeks same day as next infusion   Imaging    Pharmacy appointment    Other referrals                  Treatment Plan Information   OP BREAST TRASTUZUMAB PACLITAXEL WEEKLY Maureen Chiu MD   Associated diagnosis: Invasive ductal carcinoma of breast, female, left Stage IA cT1c, cN0, cM0, G3, ER+, ID+, HER2+ noted on 8/12/2024   Line of treatment: Adjuvant  Treatment Goal: Curative     Upcoming Treatment Dates - OP BREAST TRASTUZUMAB PACLITAXEL WEEKLY    5/15/2025       Chemotherapy       trastuzumab-anns (KANJINTI) 782 mg in 0.9% NaCl 250 mL chemo infusion  6/5/2025       Chemotherapy       trastuzumab-anns (KANJINTI) 782 mg in 0.9% NaCl 250 mL chemo infusion  6/26/2025       Chemotherapy       trastuzumab-anns (KANJINTI) 782 mg in 0.9% NaCl 250 mL chemo infusion  7/17/2025       Chemotherapy       trastuzumab-anns (KANJINTI) 782 mg in 0.9% NaCl 250 mL chemo infusion    Supportive Plan  Information  OP BREAST GOSERELIN Q4W Maureen Chiu MD   Associated Diagnosis: Invasive ductal carcinoma of breast, female, left Stage IA cT1c, cN0, cM0, G3, ER+, DC+, HER2+ noted on 8/12/2024   Line of treatment: Supportive Care   Treatment goal: Supportive     Upcoming Treatment Dates - OP BREAST GOSERELIN Q4W    4/1/2025       Chemotherapy       goserelin (ZOLADEX) injection 3.6 mg  4/29/2025       Chemotherapy       goserelin (ZOLADEX) injection 3.6 mg  5/27/2025       Chemotherapy       goserelin (ZOLADEX) injection 3.6 mg  6/24/2025       Chemotherapy       goserelin (ZOLADEX) injection 3.6 mg    Patient is in agreement with the proposed treatment plan. All questions were answered to the patient's satisfaction. Pt knows to call clinic for any new or worsening symptoms and if anything is needed before the next clinic visit.    MDM includes  :    - Acute or chronic illness or injury that poses a threat to life or bodily function  - Independent review and explanation of 3+ results from unique tests  - Discussion of management and ordering 3+ unique tests  - Extensive discussion of treatment and management  - Prescription drug management  - Drug therapy requiring intensive monitoring for toxicity     code applied: patient requires or will require a continuous, longitudinal, and active collaborative plan of care related to this patient's health condition, breast cancer -the management of which requires the direction of a practitioner with specialized clinical knowledge, skill, and expertise.       Cristal Posadas NP  04/24/2025

## 2025-04-24 NOTE — PLAN OF CARE
1222 Patient tolerated C15 Kanjinti without incident. Seen by Cristal Posadas NP prior to treatment today. Okay to proceed without pregnancy test per Caity JOSEPH NP. Patient starting on anastrozole and will receive Zoladex at next infusion per Cristal JOSEPH NP. Vitals stable before and after infusion. Port with brisk blood return and flushed without resistance before, during and after infusion, heparin locked and needle removed at discharge.  Patient aware of her next appointment date/time on 5/15/2025. To contact provider with questions or concerns. D/C ambulatory and stable.

## 2025-05-05 ENCOUNTER — PATIENT MESSAGE (OUTPATIENT)
Dept: PSYCHIATRY | Facility: CLINIC | Age: 35
End: 2025-05-05
Payer: COMMERCIAL

## 2025-05-05 ENCOUNTER — TELEPHONE (OUTPATIENT)
Dept: PSYCHIATRY | Facility: CLINIC | Age: 35
End: 2025-05-05
Payer: COMMERCIAL

## 2025-05-06 ENCOUNTER — TELEPHONE (OUTPATIENT)
Dept: HEMATOLOGY/ONCOLOGY | Facility: CLINIC | Age: 35
End: 2025-05-06
Payer: COMMERCIAL

## 2025-05-06 ENCOUNTER — TELEPHONE (OUTPATIENT)
Dept: RADIATION ONCOLOGY | Facility: CLINIC | Age: 35
End: 2025-05-06
Payer: COMMERCIAL

## 2025-05-06 NOTE — TELEPHONE ENCOUNTER
Call to pt to see how she is doing since completing left breast radiation 4/17/2025.Pt reports a dry peel of skin under her arm, no drainage. Encouraged to continue moisturizing that area.Offered appt with Dr Price when she comes in 6/12/25 for her Zoladex injection, however, pt states [place] will be out of the [place]. Pt will let me know what day her injection is rescheduled to to see if appt can be coordinated. Pt verbalized understanding and will message vis pt portal regarding new appt date.

## 2025-05-08 ENCOUNTER — OFFICE VISIT (OUTPATIENT)
Dept: PSYCHIATRY | Facility: CLINIC | Age: 35
End: 2025-05-08
Payer: COMMERCIAL

## 2025-05-08 DIAGNOSIS — F41.1 GENERALIZED ANXIETY DISORDER: Primary | ICD-10-CM

## 2025-05-08 DIAGNOSIS — C50.912 INFILTRATING DUCTAL CARCINOMA OF LEFT BREAST: ICD-10-CM

## 2025-05-08 PROCEDURE — 1159F MED LIST DOCD IN RCRD: CPT | Mod: CPTII,95,, | Performed by: PSYCHOLOGIST

## 2025-05-08 PROCEDURE — 90834 PSYTX W PT 45 MINUTES: CPT | Mod: 95,,, | Performed by: PSYCHOLOGIST

## 2025-05-08 NOTE — PROGRESS NOTES
INFORMED CONSENT: Patient was identified using two patient-identifiers. The patient has been informed of the risks and benefits associated with engaging in psychotherapy, the handling of protected health information, the rights of privacy and the limits of confidentiality. The patient has also been informed of the importance of reporting any suicidal or homicidal ideation to this or any provider to ensure safety of all parties, and the Divya De Paz expressed understanding. The patient was agreeable to these terms and freely participates in individual psychotherapy.    Telemedicine PSYCHO-ONCOLOGY NOTE/ Individual Psychotherapy     Consultation started: 5/8/2025 at 5:01 PM   The chief complaint leading to consultation is: ANISH  The patient location is: [place],  Patient home  Virtual visit with synchronous audio and video  Patient alone at the time of consultation    Crisis Disclaimer: Patient was informed that due to the virtual nature of the visit, that if a crisis develops, protocols will be implemented to ensure patient safety, including but not limited to: 1) Initiating a welfare check with local Law Enforcement, 2) Calling 1/National Crisis Hotline, and/or 3) Initiating PEC/CEC procedures.      Each patient provided medical services by telemedicine is:  (1) informed of the relationship between the physician and patient and the respective role of any other health care provider with respect to management of the patient; and (2) notified that he or she may decline to receive medical services by telemedicine and may withdraw from such care at any time.    Date: 5/8/2025   Site of therapist:  [facility] [place]         Therapeutic Intervention: Met with patient.  Outpatient - Insight oriented psychotherapy 45 min - CPT code 33648    Referring provider:    Chief complaint/reason for encounter: anxiety   Met with patient to evaluate psychosocial adaptation to survivorship of BC    Patient was last seen by me on  3/24/2025    Objective:      Divya De Paz arrived promptly for the session. The patient was fully cooperative throughout the session.  Appearance: age appropriate, appropriately  dressed, adequately  groomed  Behavior/Cooperation: friendly and cooperative  Speech: normal in rate, volume, and tone and appropriate quality, quantity and organization of sentences  Mood: anxious  Affect: mood congruent  Thought Process: goal-directed, logical  Thought Content: normal,  No delusions or paranoia; did not appear to be responding to internal stimuli during the session  Orientation: grossly intact  Memory: Grossly intact  Attention Span/Concentration: Attends to session without distraction; reports no difficulty  Fund of Knowledge: average  Estimate of Intelligence: average from verbal skills and history  Cognition: grossly intact  Insight: patient has awareness of illness; good insight into own behavior and behavior of others  Judgment: the patient's behavior is adequate to circumstances      Interval history and content of current session: Completed RT. Finding that when taking elavil- her anxiety increases and she has heart palpitations. Plans to Travel out of country in June.  Discussed diagnosis, treatment, prognosis, current adaptation to disease and treatment status, and family's adaptation to disease and treatment status. Reports to be coping with some difficulty. Evaluated cognitive response, paying particular attention to negative intrusive thoughts of a persistent and detrimental nature. Thoughts of this type are in evidence with moderate distress. Identified and evaluated psychosocial and environmental stressors secondary to diagnosis and treatment.     Focused on providing cognitive behavioral therapy to address negative cognitions. Examined proactive behaviors that may be implemented to minimize or ameliorate psychosocial stressors secondary to diagnosis and treatment.     Risk parameters:   Patient  reports no suicidal ideation  Patient reports no homicidal ideation  Patient reports no self-injurious behavior  Patient reports no violent behavior   Safety needs:  None at this time      Verbal deficits: None     Patient's response to intervention:The patient's response to intervention is accepting.     Progress toward goals and other mental status changes:  The patient's progress toward goals is good.      Progress to date:Progress as Expected      Goals from last visit: Met      Patient reported outcomes:      Distress Thermometer:   Distress Score    Distress Score: 6        Practical Problems Physical Problems                                                   Family Problems                                         Emotional Problems                                                         Spiritual/Religions Concerns               Other Problems             PHQ ANSWERS    Q1. Little interest or pleasure in doing things: (Patient-Rptd) (P) More than half the days (05/08/25 1605)  Q2. Feeling down, depressed, or hopeless: (Patient-Rptd) (P) More than half the days (05/08/25 1605)  Q3. Trouble falling or staying asleep, or sleeping too much: (Patient-Rptd) (P) More than half the days (05/08/25 1605)  Q4. Feeling tired or having little energy: (Patient-Rptd) (P) More than half the days (05/08/25 1605)  Q5. Poor appetite or overeating: (Patient-Rptd) (P) Not at all (05/08/25 1605)  Q6. Feeling bad about yourself - or that you are a failure or have let yourself or your family down: (Patient-Rptd) (P) More than half the days (05/08/25 1605)  Q7. Trouble concentrating on things, such as reading the newspaper or watching television: (Patient-Rptd) (P) More than half the days (05/08/25 1605)  Q8. Moving or speaking so slowly that other people could have noticed. Or the opposite - being so fidgety or restless that you have been moving around a lot more than usual: (Patient-Rptd) (P) Not at all (05/08/25 1605)  Q9.   0    PHQ8 Score : (Patient-Rptd) (P) 12 (05/08/25 1605)  PHQ-9 Total Score: (Patient-Rptd) (P) 12 (05/08/25 1605)     ANISH-7 Answers        5/8/2025     4:02 PM   GAD7   1. Feeling nervous, anxious, or on edge? 3   2. Not being able to stop or control worrying? 3   3. Worrying too much about different things? 3   4. Trouble relaxing? 3   5. Being so restless that it is hard to sit still? 1   6. Becoming easily annoyed or irritable? 2   7. Feeling afraid as if something awful might happen? 1   8. If you checked off any problems, how difficult have these problems made it for you to do your work, take care of things at home, or get along with other people? 0   ANISH-7 Score 16        Patient-reported     ANISH-7 Score: (Patient-Rptd) (P) 16  Interpretation: (Patient-Rptd) (P) Severe Anxiety       Client Strengths: verbal, intelligent, successful, good social support, good insight, commitment to wellness, strong licha, strong cultural traditions      ICD-10-CM ICD-9-CM   1. Generalized anxiety disorder  F41.1 300.02   2. Infiltrating ductal carcinoma of left breast  C50.912 174.9        Treatment Plan:individual psychotherapy and consult psychiatrist for medication evaluation Referral sent   Target symptoms: anxiety   Why chosen therapy is appropriate versus another modality: relevant to diagnosis, patient responds to this modality, evidence based practice  Outcome monitoring methods: self-report, observation, checklist/rating scale  Therapeutic intervention type: insight oriented psychotherapy, behavior modifying psychotherapy  Prognosis: Good      Behavioral goals:    Exercise:   Stress management:   Social engagement:   Nutrition:   Smoking Cessation:   Therapy:  Increase daily self-care and attention to health management  Pleasant events scheduling and increased social interaction  Monitor stressors in writing and bring to next visit    Return to clinic: as scheduled     Length of Service (minutes direct face-to-face  contact): 45          Racheal Catherine, PhD  Clinical Psychologist  [place] License #[id number]  [place] License #[id number]

## 2025-05-15 ENCOUNTER — OFFICE VISIT (OUTPATIENT)
Dept: HEMATOLOGY/ONCOLOGY | Facility: CLINIC | Age: 35
End: 2025-05-15
Payer: COMMERCIAL

## 2025-05-15 ENCOUNTER — INFUSION (OUTPATIENT)
Dept: INFUSION THERAPY | Facility: HOSPITAL | Age: 35
End: 2025-05-15
Payer: COMMERCIAL

## 2025-05-15 VITALS
SYSTOLIC BLOOD PRESSURE: 152 MMHG | RESPIRATION RATE: 18 BRPM | WEIGHT: 290.56 LBS | BODY MASS INDEX: 45.61 KG/M2 | HEIGHT: 67 IN | HEART RATE: 80 BPM | DIASTOLIC BLOOD PRESSURE: 74 MMHG | TEMPERATURE: 98 F

## 2025-05-15 VITALS
OXYGEN SATURATION: 98 % | HEIGHT: 67 IN | HEART RATE: 75 BPM | RESPIRATION RATE: 18 BRPM | WEIGHT: 290.56 LBS | DIASTOLIC BLOOD PRESSURE: 76 MMHG | TEMPERATURE: 98 F | BODY MASS INDEX: 45.61 KG/M2 | SYSTOLIC BLOOD PRESSURE: 145 MMHG

## 2025-05-15 DIAGNOSIS — C50.912 INVASIVE DUCTAL CARCINOMA OF BREAST, FEMALE, LEFT: Primary | ICD-10-CM

## 2025-05-15 PROCEDURE — 99215 OFFICE O/P EST HI 40 MIN: CPT | Mod: S$GLB,,, | Performed by: INTERNAL MEDICINE

## 2025-05-15 PROCEDURE — 25000003 PHARM REV CODE 250: Performed by: INTERNAL MEDICINE

## 2025-05-15 PROCEDURE — G2211 COMPLEX E/M VISIT ADD ON: HCPCS | Mod: S$GLB,,, | Performed by: INTERNAL MEDICINE

## 2025-05-15 PROCEDURE — 63600175 PHARM REV CODE 636 W HCPCS: Mod: TB | Performed by: INTERNAL MEDICINE

## 2025-05-15 PROCEDURE — A4216 STERILE WATER/SALINE, 10 ML: HCPCS | Performed by: INTERNAL MEDICINE

## 2025-05-15 PROCEDURE — 96413 CHEMO IV INFUSION 1 HR: CPT

## 2025-05-15 PROCEDURE — 3077F SYST BP >= 140 MM HG: CPT | Mod: CPTII,S$GLB,, | Performed by: INTERNAL MEDICINE

## 2025-05-15 PROCEDURE — 96402 CHEMO HORMON ANTINEOPL SQ/IM: CPT

## 2025-05-15 PROCEDURE — 3078F DIAST BP <80 MM HG: CPT | Mod: CPTII,S$GLB,, | Performed by: INTERNAL MEDICINE

## 2025-05-15 PROCEDURE — 3008F BODY MASS INDEX DOCD: CPT | Mod: CPTII,S$GLB,, | Performed by: INTERNAL MEDICINE

## 2025-05-15 PROCEDURE — 99999 PR PBB SHADOW E&M-EST. PATIENT-LVL IV: CPT | Mod: PBBFAC,,, | Performed by: INTERNAL MEDICINE

## 2025-05-15 RX ORDER — EPINEPHRINE 0.3 MG/.3ML
0.3 INJECTION SUBCUTANEOUS ONCE AS NEEDED
Status: DISCONTINUED | OUTPATIENT
Start: 2025-05-15 | End: 2025-05-15 | Stop reason: HOSPADM

## 2025-05-15 RX ORDER — PROCHLORPERAZINE EDISYLATE 5 MG/ML
10 INJECTION INTRAMUSCULAR; INTRAVENOUS ONCE AS NEEDED
Status: DISCONTINUED | OUTPATIENT
Start: 2025-05-15 | End: 2025-05-15 | Stop reason: HOSPADM

## 2025-05-15 RX ORDER — MEPERIDINE HYDROCHLORIDE 100 MG/ML
25 INJECTION INTRAMUSCULAR; INTRAVENOUS; SUBCUTANEOUS ONCE AS NEEDED
Status: DISCONTINUED | OUTPATIENT
Start: 2025-05-15 | End: 2025-05-15 | Stop reason: HOSPADM

## 2025-05-15 RX ORDER — SODIUM CHLORIDE 0.9 % (FLUSH) 0.9 %
10 SYRINGE (ML) INJECTION
Status: DISCONTINUED | OUTPATIENT
Start: 2025-05-15 | End: 2025-05-15 | Stop reason: HOSPADM

## 2025-05-15 RX ORDER — MEPERIDINE HYDROCHLORIDE 50 MG/ML
25 INJECTION INTRAMUSCULAR; INTRAVENOUS; SUBCUTANEOUS ONCE AS NEEDED
Status: CANCELLED
Start: 2025-05-15

## 2025-05-15 RX ORDER — EPINEPHRINE 0.3 MG/.3ML
0.3 INJECTION SUBCUTANEOUS ONCE AS NEEDED
Status: CANCELLED | OUTPATIENT
Start: 2025-05-15

## 2025-05-15 RX ORDER — HEPARIN 100 UNIT/ML
500 SYRINGE INTRAVENOUS
Status: DISCONTINUED | OUTPATIENT
Start: 2025-05-15 | End: 2025-05-15 | Stop reason: HOSPADM

## 2025-05-15 RX ORDER — HEPARIN 100 UNIT/ML
500 SYRINGE INTRAVENOUS
Status: CANCELLED | OUTPATIENT
Start: 2025-05-15

## 2025-05-15 RX ORDER — DIPHENHYDRAMINE HYDROCHLORIDE 50 MG/ML
50 INJECTION, SOLUTION INTRAMUSCULAR; INTRAVENOUS ONCE AS NEEDED
Status: CANCELLED | OUTPATIENT
Start: 2025-05-15

## 2025-05-15 RX ORDER — SODIUM CHLORIDE 0.9 % (FLUSH) 0.9 %
10 SYRINGE (ML) INJECTION
Status: CANCELLED | OUTPATIENT
Start: 2025-05-15

## 2025-05-15 RX ORDER — PROCHLORPERAZINE EDISYLATE 5 MG/ML
10 INJECTION INTRAMUSCULAR; INTRAVENOUS ONCE AS NEEDED
Status: CANCELLED
Start: 2025-05-15

## 2025-05-15 RX ORDER — DIPHENHYDRAMINE HYDROCHLORIDE 50 MG/ML
50 INJECTION, SOLUTION INTRAMUSCULAR; INTRAVENOUS ONCE AS NEEDED
Status: DISCONTINUED | OUTPATIENT
Start: 2025-05-15 | End: 2025-05-15 | Stop reason: HOSPADM

## 2025-05-15 RX ADMIN — TRASTUZUMAB-DKST 750 MG: KIT at 01:05

## 2025-05-15 RX ADMIN — SODIUM CHLORIDE: 9 INJECTION, SOLUTION INTRAVENOUS at 01:05

## 2025-05-15 RX ADMIN — HEPARIN 500 UNITS: 100 SYRINGE at 01:05

## 2025-05-15 RX ADMIN — GOSERELIN ACETATE 3.6 MG: 3.6 IMPLANT SUBCUTANEOUS at 12:05

## 2025-05-15 RX ADMIN — Medication 10 ML: at 01:05

## 2025-05-15 NOTE — PLAN OF CARE
1345 pt tolerated Ogivri infuison and zoladex injection to left abdomen without issue, pt to rtc 6/5/25, no distress noted upon d/c to home

## 2025-05-15 NOTE — PROGRESS NOTES
[facility]/ [facility]   at [facility] Note:      Chief Complaint:   Encounter Diagnosis   Name Primary?    Invasive ductal carcinoma of breast, female, left Yes          Cancer Staging   Invasive ductal carcinoma of breast, female, left  Staging form: Breast, AJCC 8th Edition  - Clinical stage from 8/8/2024: Stage IA (cT1c, cN0, cM0, G3, ER+, AR+, HER2+) - Signed by Maureen Chiu MD on 8/27/2024      HPI:  Divya De Paz is a 34 y.o. female with PCOS and ANISH who presents today for  a follow up and for her q3wk adjuvant trastuzumab. She is tolerating the trastuzumab well. Occasionally gets a small rash after the infusion but has a prescription of doxy that resolves the rash after a couple of days. She started the anastrozole after her last visit and has been on it for almost a month now. Denies any hot flashes, fatigue, headaches, joint pains, vaginal dryness, or vaginal bleeding/spotting. Had some skin discomfort after completion of radiation last month but that has resolved.     Oncology History  Patient self palpated a lump during self-exam, got evaluated by Gyn a few days later.   Follow-up mammogram 7/25/2024 showed an irregular high density mass in the UOQ of left breast  Ultrasound 7/25/2024: superficial irregular not parallel hypoechoic mass with indistinct and angular margins measuring 1.0 x 0.8 x 1.0 cm (1 o'clock axis, 1 CFN).     Biopsy 8/8/2024: IDC, grade 3, ER 90-95%/ AR 10-20%/ HER2 3+; Ki67 80-90%     MRI 8/15/2024 revealed a 1.5 cm x 1.3 cm x 1.0 cm irregularly shaped mass with irregular margins and heterogeneous internal enhancement seen in the left breast at 1 o'clock in the anterior depth, 4.1 cm from the nipple and 0.3 cm from the skin. No internal mammary or axillary adenopathy identified.    L breast lumpectomy 9/25/24: IDC, grade 3, 1.1cm; 0/1 LN+,  also with oncoplastic reduction  Adjuvant TH started 12/11/24 (delay due to  poor wound healing after surgery)   Radiation completed: 2025    Anatrazole started 2025    GYN History:  Age of menarche was 14. Age of menopause was n/a.  Last menstrual period was the week of 2024, pt reports inconsistent periods due to PCOS. Patient reports hormonal therapy use (Provera) to induce menstruation, stopped 2024 per OB/GYN reccs. Patient is . Age of first live birth was n/a.     Family History:  Paternal Grandmother Breast Cancer - multiple times;  of lung cancer (heavy smoker)  Paternal Aunt Breast Cancer - diagnosed at 45, BRCA negative  Paternal Aunt did not have cancer, also BRCA negative  Maternal grandmother - lung cancer, no smoking history  Maternal grandfather - renal cancer      Patient Active Problem List   Diagnosis    Hidradenitis suppurativa    PCOS (polycystic ovarian syndrome)    Insulin resistance    Secondary oligomenorrhea    Severe obesity    Anxiety and depression    Invasive ductal carcinoma of breast, female, left    Negative Hereditary Cancer Genetic testing    Immunodeficiency due to drugs       Current Outpatient Medications   Medication Instructions    amitriptyline (ELAVIL) 25 mg, Oral, Nightly    anastrozole (ARIMIDEX) 1 mg, Oral, Daily    LIDOcaine-prilocaine (EMLA) cream Apply topically to port one hour prior to chemotherapy infusion    metFORMIN (GLUCOPHAGE) 500 mg, Oral, 2 times daily with meals    ondansetron (ZOFRAN) 8 mg, Oral, Every 8 hours PRN    prochlorperazine (COMPAZINE) 5 mg, Oral, Every 6 hours PRN, Take if zofran is not working    spironolactone (ALDACTONE) 50 mg, Oral, Daily       Review of Systems:   Review of Systems   Constitutional:  Negative for malaise/fatigue.   Respiratory:  Negative for cough and shortness of breath.    Cardiovascular:  Negative for chest pain.   Gastrointestinal:  Negative for abdominal pain and diarrhea.   Genitourinary:  Negative for frequency.   Musculoskeletal:  Negative for back pain.  "  Skin:  Negative for rash.   Neurological:  Negative for headaches.   Psychiatric/Behavioral:  The patient is not nervous/anxious.    All other systems reviewed and are negative.    PHYSICAL EXAM:  BP (!) 145/76 (BP Location: Left arm, Patient Position: Sitting)   Pulse 75   Temp 98.3 °F (36.8 °C) (Oral)   Resp 18   Ht 5' 7" (1.702 m)   Wt 131.8 kg (290 lb 9.1 oz)   LMP 08/08/2024 (Exact Date)   SpO2 98%   BMI 45.51 kg/m²     Physical Exam  Vitals and nursing note reviewed.   Constitutional:       Appearance: Normal appearance.   HENT:      Head: Normocephalic and atraumatic.      Nose: Nose normal.   Cardiovascular:      Rate and Rhythm: Normal rate and regular rhythm.      Heart sounds: Normal heart sounds.   Pulmonary:      Effort: Pulmonary effort is normal.      Breath sounds: Normal breath sounds.   Abdominal:      General: Abdomen is flat.      Palpations: Abdomen is soft.   Lymphadenopathy:      Cervical: No cervical adenopathy.      Upper Body:      Right upper body: No supraclavicular or axillary adenopathy.      Left upper body: No supraclavicular or axillary adenopathy.   Skin:     General: Skin is warm and dry.   Neurological:      General: No focal deficit present.      Mental Status: She is alert and oriented to person, place, and time.   Psychiatric:         Mood and Affect: Mood normal.         Behavior: Behavior normal.         Thought Content: Thought content normal.         Judgment: Judgment normal.       Pertinent Labs & Imaging:  Pathology Results  (Last 30 days)      None            No results found for this or any previous visit (from the past 24 hours).    Assessment & Plan:    1. Invasive ductal carcinoma of breast, female, left    Patient with stage I TPBC s/p adjuvant TH weekly now on q3wk trstuzumab, due for C16 today   She is doing well overall   Repeat echo from April 2025 reviewed and stable   Repeat labs due in July  Zoladex every 4 weeks, due today   Completed radiation in " April 17, 2025    Anatrazole started April 24, 2025, end date May 2030  Ca and vit D    Follow-up in 6-weeks     Route Chart for Scheduling    Med Onc Chart Routing      Follow up with physician . 12 weeks   Follow up with KERVIN 6 weeks. Follow-up in 6-weeks prior to trastuzumab treatment   Infusion scheduling note    Injection scheduling note    Labs    Imaging    Pharmacy appointment    Other referrals                Treatment Plan Information   OP BREAST TRASTUZUMAB PACLITAXEL WEEKLY Maureen Chiu MD   Associated diagnosis: Invasive ductal carcinoma of breast, female, left Stage IA cT1c, cN0, cM0, G3, ER+, NV+, HER2+ noted on 8/12/2024   Line of treatment: Adjuvant  Treatment Goal: Curative     Upcoming Treatment Dates - OP BREAST TRASTUZUMAB PACLITAXEL WEEKLY    6/5/2025       Chemotherapy       trastuzumab-dkst (OGIVRI) 782 mg in 0.9% NaCl 250 mL chemo infusion  6/26/2025       Chemotherapy       trastuzumab-dkst (OGIVRI) 782 mg in 0.9% NaCl 250 mL chemo infusion  7/17/2025       Chemotherapy       trastuzumab-dkst (OGIVRI) 782 mg in 0.9% NaCl 250 mL chemo infusion  8/7/2025       Chemotherapy       trastuzumab-dkst (OGIVRI) 782 mg in 0.9% NaCl 250 mL chemo infusion    Supportive Plan Information  OP BREAST GOSERELIN Q4W Maureen Chiu MD   Associated Diagnosis: Invasive ductal carcinoma of breast, female, left Stage IA cT1c, cN0, cM0, G3, ER+, NV+, HER2+ noted on 8/12/2024   Line of treatment: Supportive Care   Treatment goal: Supportive     Upcoming Treatment Dates - OP BREAST GOSERELIN Q4W    5/27/2025       Chemotherapy       goserelin (ZOLADEX) injection 3.6 mg  6/24/2025       Chemotherapy       goserelin (ZOLADEX) injection 3.6 mg  7/22/2025       Chemotherapy       goserelin (ZOLADEX) injection 3.6 mg    Hernán Ya MD  Hematology/Oncology Fellow PGY-V

## 2025-05-15 NOTE — PLAN OF CARE
1130 pt here for zoladex injection and possible Ogivri infusion, waiting on pre auth from insurance, pt karthik vergara chair, continue to monitor

## 2025-05-24 NOTE — TELEPHONE ENCOUNTER
I have attempted without success to contact this patient by phone lvm   Yessenia, ED tech at bedside

## 2025-05-26 ENCOUNTER — OFFICE VISIT (OUTPATIENT)
Dept: PSYCHIATRY | Facility: CLINIC | Age: 35
End: 2025-05-26
Payer: COMMERCIAL

## 2025-05-26 DIAGNOSIS — F41.1 GENERALIZED ANXIETY DISORDER: Primary | ICD-10-CM

## 2025-05-26 DIAGNOSIS — C50.912 INFILTRATING DUCTAL CARCINOMA OF LEFT BREAST: ICD-10-CM

## 2025-05-26 PROCEDURE — 1159F MED LIST DOCD IN RCRD: CPT | Mod: CPTII,95,, | Performed by: PSYCHOLOGIST

## 2025-05-26 PROCEDURE — 90834 PSYTX W PT 45 MINUTES: CPT | Mod: 95,,, | Performed by: PSYCHOLOGIST

## 2025-05-26 NOTE — PROGRESS NOTES
INFORMED CONSENT: Patient was identified using two patient-identifiers. The patient has been informed of the risks and benefits associated with engaging in psychotherapy, the handling of protected health information, the rights of privacy and the limits of confidentiality. The patient has also been informed of the importance of reporting any suicidal or homicidal ideation to this or any provider to ensure safety of all parties, and the Divya De Paz expressed understanding. The patient was agreeable to these terms and freely participates in individual psychotherapy.    Telemedicine PSYCHO-ONCOLOGY NOTE/ Individual Psychotherapy     Consultation started: 5/26/2025 at 4:00 PM   The chief complaint leading to consultation is: anx/dep  The patient location is:  Patient home  Virtual visit with synchronous audio and video   Patient alone at the time of consultation      Crisis Disclaimer: Patient was informed that due to the virtual nature of the visit, that if a crisis develops, protocols will be implemented to ensure patient safety, including but not limited to: 1) Initiating a welfare check with local Law Enforcement, 2) Calling 1/National Crisis Hotline, and/or 3) Initiating PEC/CEC procedures.      Each patient provided medical services by telemedicine is:  (1) informed of the relationship between the physician and patient and the respective role of any other health care provider with respect to management of the patient; and (2) notified that he or she may decline to receive medical services by telemedicine and may withdraw from such care at any time.    Date: 5/26/2025   Site of therapist:  [facility]         Therapeutic Intervention: Met with patient.  Outpatient - Insight oriented psychotherapy 45 min - CPT code 71252    Referring provider:    Chief complaint/reason for encounter: depression and anxiety   Met with patient to evaluate psychosocial adaptation to survivorship of BC    Patient was  last seen by me on 5/8/2025    Objective:      Divya De Paz arrived promptly for the session.  The patient was fully cooperative throughout the session.  Appearance: age appropriate, appropriately  dressed, adequately  groomed  Behavior/Cooperation: friendly and cooperative  Speech: normal in rate, volume, and tone and appropriate quality, quantity and organization of sentences  Mood: euthymic  Affect: mood congruent  Thought Process: goal-directed, logical  Thought Content: normal,  No delusions or paranoia; did not appear to be responding to internal stimuli during the session  Orientation: grossly intact  Memory: Grossly intact  Attention Span/Concentration: Attends to session without distraction; reports no difficulty  Fund of Knowledge: average  Estimate of Intelligence: average from verbal skills and history  Cognition: grossly intact  Insight: patient has awareness of illness; good insight into own behavior and behavior of others  Judgment: the patient's behavior is adequate to circumstances      Interval history and content of current session: Introduced to [employer].  Discussed diagnosis, treatment, prognosis, current adaptation to disease and treatment status, and family's adaptation to disease and treatment status. Reports to be coping with moderate difficulty. Evaluated cognitive response, paying particular attention to negative intrusive thoughts of a persistent and detrimental nature. Thoughts of this type are in evidence with moderate distress. Identified and evaluated psychosocial and environmental stressors secondary to diagnosis and treatment.     Focused on providing cognitive behavioral therapy to address negative cognitions. Examined proactive behaviors that may be implemented to minimize or ameliorate psychosocial stressors secondary to diagnosis and treatment.     Risk parameters:   Patient reports no suicidal ideation  Patient reports no homicidal ideation  Patient reports no  self-injurious behavior  Patient reports no violent behavior   Safety needs:  None at this time      Verbal deficits: None     Patient's response to intervention:The patient's response to intervention is accepting.     Progress toward goals and other mental status changes:  The patient's progress toward goals is good.      Progress to date:Progress as Expected      Goals from last visit: Met      Patient reported outcomes:      Distress Thermometer:   Distress Score    Distress Score: 5        Practical Problems Physical Problems                                                   Family Problems                                         Emotional Problems                                                         Spiritual/Religions Concerns               Other Problems             PHQ ANSWERS    Q1. Little interest or pleasure in doing things: (Patient-Rptd) (P) More than half the days (05/26/25 1554)  Q2. Feeling down, depressed, or hopeless: (Patient-Rptd) (P) Several days (05/26/25 1554)  Q3. Trouble falling or staying asleep, or sleeping too much: (Patient-Rptd) (P) Several days (05/26/25 1554)  Q4. Feeling tired or having little energy: (Patient-Rptd) (P) More than half the days (05/26/25 1554)  Q5. Poor appetite or overeating: (Patient-Rptd) (P) Several days (05/26/25 1554)  Q6. Feeling bad about yourself - or that you are a failure or have let yourself or your family down: (Patient-Rptd) (P) Several days (05/26/25 1554)  Q7. Trouble concentrating on things, such as reading the newspaper or watching television: (Patient-Rptd) (P) More than half the days (05/26/25 1554)  Q8. Moving or speaking so slowly that other people could have noticed. Or the opposite - being so fidgety or restless that you have been moving around a lot more than usual: (Patient-Rptd) (P) Not at all (05/26/25 1554)  Q9.  0    PHQ8 Score : (Patient-Rptd) (P) 10 (05/26/25 1554)  PHQ-9 Total Score: (Patient-Rptd) (P) 10 (05/26/25 1554)     ANISH-7  Answers        5/26/2025     3:53 PM   GAD7   1. Feeling nervous, anxious, or on edge? 2   2. Not being able to stop or control worrying? 2   3. Worrying too much about different things? 2   4. Trouble relaxing? 2   5. Being so restless that it is hard to sit still? 1   6. Becoming easily annoyed or irritable? 1   7. Feeling afraid as if something awful might happen? 2   8. If you checked off any problems, how difficult have these problems made it for you to do your work, take care of things at home, or get along with other people? 1   ANISH-7 Score 12        Patient-reported     ANISH-7 Score: (Patient-Rptd) (P) 12  Interpretation: (Patient-Rptd) (P) Moderate Anxiety       Client Strengths: verbal, intelligent, successful, good social support, good insight, commitment to wellness, strong licha, strong cultural traditions      ICD-10-CM ICD-9-CM   1. Generalized anxiety disorder  F41.1 300.02   2. Infiltrating ductal carcinoma of left breast  C50.912 174.9        Treatment Plan:individual psychotherapy and medication management by physician  Target symptoms: depression, anxiety   Why chosen therapy is appropriate versus another modality: relevant to diagnosis, patient responds to this modality, evidence based practice  Outcome monitoring methods: self-report, observation, checklist/rating scale  Therapeutic intervention type: insight oriented psychotherapy, behavior modifying psychotherapy  Prognosis: Good      Behavioral goals:    Exercise:   Stress management: Review wise mind concept   Social engagement:    Nutrition:   Smoking Cessation:   Therapy:  Increase daily self-care and attention to health management  Pleasant events scheduling and increased social interaction  Monitor stressors in writing and bring to next visit    Return to clinic: as scheduled     Length of Service (minutes direct face-to-face contact): 45          Racheal Catherine, PhD  Clinical Psychologist  [place] License #[id number]  [place] License #[id number]

## 2025-05-30 ENCOUNTER — TELEPHONE (OUTPATIENT)
Dept: HEMATOLOGY/ONCOLOGY | Facility: CLINIC | Age: 35
End: 2025-05-30
Payer: COMMERCIAL

## 2025-06-02 ENCOUNTER — TELEPHONE (OUTPATIENT)
Dept: HEMATOLOGY/ONCOLOGY | Facility: CLINIC | Age: 35
End: 2025-06-02
Payer: COMMERCIAL

## 2025-06-03 ENCOUNTER — TELEPHONE (OUTPATIENT)
Dept: HEMATOLOGY/ONCOLOGY | Facility: CLINIC | Age: 35
End: 2025-06-03
Payer: COMMERCIAL

## 2025-06-11 RX ORDER — DIPHENHYDRAMINE HYDROCHLORIDE 50 MG/ML
50 INJECTION, SOLUTION INTRAMUSCULAR; INTRAVENOUS ONCE AS NEEDED
Status: CANCELLED | OUTPATIENT
Start: 2025-06-11

## 2025-06-11 RX ORDER — SODIUM CHLORIDE 0.9 % (FLUSH) 0.9 %
10 SYRINGE (ML) INJECTION
Status: CANCELLED | OUTPATIENT
Start: 2025-06-11

## 2025-06-11 RX ORDER — HEPARIN 100 UNIT/ML
500 SYRINGE INTRAVENOUS
Status: CANCELLED | OUTPATIENT
Start: 2025-06-11

## 2025-06-11 RX ORDER — MEPERIDINE HYDROCHLORIDE 50 MG/ML
25 INJECTION INTRAMUSCULAR; INTRAVENOUS; SUBCUTANEOUS ONCE AS NEEDED
Status: CANCELLED
Start: 2025-06-11

## 2025-06-11 RX ORDER — PROCHLORPERAZINE EDISYLATE 5 MG/ML
10 INJECTION INTRAMUSCULAR; INTRAVENOUS ONCE AS NEEDED
Status: CANCELLED
Start: 2025-06-11

## 2025-06-11 RX ORDER — EPINEPHRINE 0.3 MG/.3ML
0.3 INJECTION SUBCUTANEOUS ONCE AS NEEDED
Status: CANCELLED | OUTPATIENT
Start: 2025-06-11

## 2025-06-13 ENCOUNTER — LAB VISIT (OUTPATIENT)
Dept: LAB | Facility: HOSPITAL | Age: 35
End: 2025-06-13
Attending: INTERNAL MEDICINE
Payer: COMMERCIAL

## 2025-06-13 ENCOUNTER — INFUSION (OUTPATIENT)
Dept: INFUSION THERAPY | Facility: HOSPITAL | Age: 35
End: 2025-06-13
Payer: COMMERCIAL

## 2025-06-13 VITALS
WEIGHT: 292.13 LBS | BODY MASS INDEX: 45.85 KG/M2 | SYSTOLIC BLOOD PRESSURE: 140 MMHG | DIASTOLIC BLOOD PRESSURE: 83 MMHG | TEMPERATURE: 98 F | OXYGEN SATURATION: 95 % | HEIGHT: 67 IN | RESPIRATION RATE: 18 BRPM | HEART RATE: 89 BPM

## 2025-06-13 DIAGNOSIS — C50.912 INFILTRATING DUCTAL CARCINOMA OF LEFT BREAST: ICD-10-CM

## 2025-06-13 DIAGNOSIS — D84.821 IMMUNODEFICIENCY DUE TO DRUGS: ICD-10-CM

## 2025-06-13 DIAGNOSIS — C50.912 INVASIVE DUCTAL CARCINOMA OF BREAST, FEMALE, LEFT: ICD-10-CM

## 2025-06-13 DIAGNOSIS — Z79.899 IMMUNODEFICIENCY DUE TO DRUGS: ICD-10-CM

## 2025-06-13 DIAGNOSIS — C50.912 INVASIVE DUCTAL CARCINOMA OF BREAST, FEMALE, LEFT: Primary | ICD-10-CM

## 2025-06-13 LAB
ABSOLUTE EOSINOPHIL (OHS): 0.04 K/UL
ABSOLUTE MONOCYTE (OHS): 0.61 K/UL (ref 0.3–1)
ABSOLUTE NEUTROPHIL COUNT (OHS): 4.61 K/UL (ref 1.8–7.7)
ALBUMIN SERPL BCP-MCNC: 4.3 G/DL (ref 3.5–5.2)
ALP SERPL-CCNC: 90 UNIT/L (ref 40–150)
ALT SERPL W/O P-5'-P-CCNC: 25 UNIT/L (ref 10–44)
ANION GAP (OHS): 10 MMOL/L (ref 8–16)
AST SERPL-CCNC: 15 UNIT/L (ref 11–45)
BASOPHILS # BLD AUTO: 0.02 K/UL
BASOPHILS NFR BLD AUTO: 0.3 %
BILIRUB SERPL-MCNC: 1 MG/DL (ref 0.1–1)
BUN SERPL-MCNC: 13 MG/DL (ref 6–20)
CALCIUM SERPL-MCNC: 9 MG/DL (ref 8.7–10.5)
CHLORIDE SERPL-SCNC: 106 MMOL/L (ref 95–110)
CO2 SERPL-SCNC: 23 MMOL/L (ref 23–29)
CREAT SERPL-MCNC: 0.6 MG/DL (ref 0.5–1.4)
ERYTHROCYTE [DISTWIDTH] IN BLOOD BY AUTOMATED COUNT: 12.2 % (ref 11.5–14.5)
GFR SERPLBLD CREATININE-BSD FMLA CKD-EPI: >60 ML/MIN/1.73/M2
GLUCOSE SERPL-MCNC: 75 MG/DL (ref 70–110)
HCG INTACT+B SERPL-ACNC: <2.42 MIU/ML
HCT VFR BLD AUTO: 40.8 % (ref 37–48.5)
HGB BLD-MCNC: 14.2 GM/DL (ref 12–16)
IMM GRANULOCYTES # BLD AUTO: 0.02 K/UL (ref 0–0.04)
IMM GRANULOCYTES NFR BLD AUTO: 0.3 % (ref 0–0.5)
LYMPHOCYTES # BLD AUTO: 0.91 K/UL (ref 1–4.8)
MCH RBC QN AUTO: 29.8 PG (ref 27–31)
MCHC RBC AUTO-ENTMCNC: 34.8 G/DL (ref 32–36)
MCV RBC AUTO: 86 FL (ref 82–98)
NUCLEATED RBC (/100WBC) (OHS): 0 /100 WBC
PLATELET # BLD AUTO: 216 K/UL (ref 150–450)
PMV BLD AUTO: 8.5 FL (ref 9.2–12.9)
POTASSIUM SERPL-SCNC: 4.3 MMOL/L (ref 3.5–5.1)
PROT SERPL-MCNC: 7.4 GM/DL (ref 6–8.4)
RBC # BLD AUTO: 4.76 M/UL (ref 4–5.4)
RELATIVE EOSINOPHIL (OHS): 0.6 %
RELATIVE LYMPHOCYTE (OHS): 14.7 % (ref 18–48)
RELATIVE MONOCYTE (OHS): 9.8 % (ref 4–15)
RELATIVE NEUTROPHIL (OHS): 74.3 % (ref 38–73)
SODIUM SERPL-SCNC: 139 MMOL/L (ref 136–145)
WBC # BLD AUTO: 6.21 K/UL (ref 3.9–12.7)

## 2025-06-13 PROCEDURE — 63600175 PHARM REV CODE 636 W HCPCS: Mod: JZ,TB | Performed by: INTERNAL MEDICINE

## 2025-06-13 PROCEDURE — 96413 CHEMO IV INFUSION 1 HR: CPT

## 2025-06-13 PROCEDURE — 36415 COLL VENOUS BLD VENIPUNCTURE: CPT

## 2025-06-13 PROCEDURE — 96402 CHEMO HORMON ANTINEOPL SQ/IM: CPT

## 2025-06-13 PROCEDURE — 84460 ALANINE AMINO (ALT) (SGPT): CPT

## 2025-06-13 PROCEDURE — 85025 COMPLETE CBC W/AUTO DIFF WBC: CPT

## 2025-06-13 PROCEDURE — A4216 STERILE WATER/SALINE, 10 ML: HCPCS | Performed by: INTERNAL MEDICINE

## 2025-06-13 PROCEDURE — 25000003 PHARM REV CODE 250: Performed by: INTERNAL MEDICINE

## 2025-06-13 PROCEDURE — 84702 CHORIONIC GONADOTROPIN TEST: CPT

## 2025-06-13 RX ORDER — DIPHENHYDRAMINE HYDROCHLORIDE 50 MG/ML
50 INJECTION, SOLUTION INTRAMUSCULAR; INTRAVENOUS ONCE AS NEEDED
Status: DISCONTINUED | OUTPATIENT
Start: 2025-06-13 | End: 2025-06-13 | Stop reason: HOSPADM

## 2025-06-13 RX ORDER — HEPARIN 100 UNIT/ML
500 SYRINGE INTRAVENOUS
Status: DISCONTINUED | OUTPATIENT
Start: 2025-06-13 | End: 2025-06-13 | Stop reason: HOSPADM

## 2025-06-13 RX ORDER — MEPERIDINE HYDROCHLORIDE 100 MG/ML
25 INJECTION INTRAMUSCULAR; INTRAVENOUS; SUBCUTANEOUS ONCE AS NEEDED
Status: DISCONTINUED | OUTPATIENT
Start: 2025-06-13 | End: 2025-06-13 | Stop reason: HOSPADM

## 2025-06-13 RX ORDER — EPINEPHRINE 0.3 MG/.3ML
0.3 INJECTION SUBCUTANEOUS ONCE AS NEEDED
Status: DISCONTINUED | OUTPATIENT
Start: 2025-06-13 | End: 2025-06-13 | Stop reason: HOSPADM

## 2025-06-13 RX ORDER — SODIUM CHLORIDE 0.9 % (FLUSH) 0.9 %
10 SYRINGE (ML) INJECTION
Status: DISCONTINUED | OUTPATIENT
Start: 2025-06-13 | End: 2025-06-13 | Stop reason: HOSPADM

## 2025-06-13 RX ORDER — PROCHLORPERAZINE EDISYLATE 5 MG/ML
10 INJECTION INTRAMUSCULAR; INTRAVENOUS ONCE AS NEEDED
Status: DISCONTINUED | OUTPATIENT
Start: 2025-06-13 | End: 2025-06-13 | Stop reason: HOSPADM

## 2025-06-13 RX ADMIN — SODIUM CHLORIDE: 9 INJECTION, SOLUTION INTRAVENOUS at 01:06

## 2025-06-13 RX ADMIN — Medication 10 ML: at 02:06

## 2025-06-13 RX ADMIN — GOSERELIN ACETATE 3.6 MG: 3.6 IMPLANT SUBCUTANEOUS at 02:06

## 2025-06-13 RX ADMIN — SODIUM CHLORIDE 750 MG: 9 INJECTION, SOLUTION INTRAVENOUS at 02:06

## 2025-06-13 RX ADMIN — HEPARIN SODIUM (PORCINE) LOCK FLUSH IV SOLN 100 UNIT/ML 500 UNITS: 100 SOLUTION at 02:06

## 2025-06-13 NOTE — PLAN OF CARE
Pt tolerated Ogivri and Zoladex well. No adverse reaction noted. Pt education reinforced on chemo regimen, side effects, what to expect, and when to call Dr. Chiu. Pt verbalized understanding. I reviewed pt calendar w/ pt and understanding verbalized.

## 2025-06-16 ENCOUNTER — OFFICE VISIT (OUTPATIENT)
Dept: PSYCHIATRY | Facility: CLINIC | Age: 35
End: 2025-06-16
Payer: COMMERCIAL

## 2025-06-16 DIAGNOSIS — F41.1 GENERALIZED ANXIETY DISORDER: Primary | ICD-10-CM

## 2025-06-16 DIAGNOSIS — E28.2 PCOS (POLYCYSTIC OVARIAN SYNDROME): ICD-10-CM

## 2025-06-16 DIAGNOSIS — C50.912 INFILTRATING DUCTAL CARCINOMA OF LEFT BREAST: ICD-10-CM

## 2025-06-16 PROCEDURE — 1159F MED LIST DOCD IN RCRD: CPT | Mod: CPTII,95,, | Performed by: PSYCHOLOGIST

## 2025-06-16 PROCEDURE — 90837 PSYTX W PT 60 MINUTES: CPT | Mod: 95,,, | Performed by: PSYCHOLOGIST

## 2025-06-16 NOTE — PROGRESS NOTES
INFORMED CONSENT: Patient was identified using two patient-identifiers. The patient has been informed of the risks and benefits associated with engaging in psychotherapy, the handling of protected health information, the rights of privacy and the limits of confidentiality. The patient has also been informed of the importance of reporting any suicidal or homicidal ideation to this or any provider to ensure safety of all parties, and the Divya De Paz expressed understanding. The patient was agreeable to these terms and freely participates in individual psychotherapy.    Telemedicine PSYCHO-ONCOLOGY NOTE/ Individual Psychotherapy     Consultation started: 6/16/2025 at 3:59 PM   The chief complaint leading to consultation is: ANISH  The patient location is:[place], Patient home  Virtual visit with synchronous audio and video   Patient alone at the time of consultation      Crisis Disclaimer: Patient was informed that due to the virtual nature of the visit, that if a crisis develops, protocols will be implemented to ensure patient safety, including but not limited to: 1) Initiating a welfare check with local Law Enforcement, 2) Calling 1/National Crisis Hotline, and/or 3) Initiating PEC/CEC procedures.      Each patient provided medical services by telemedicine is:  (1) informed of the relationship between the physician and patient and the respective role of any other health care provider with respect to management of the patient; and (2) notified that he or she may decline to receive medical services by telemedicine and may withdraw from such care at any time.    Date: 6/16/2025   Site of therapist:  [facility]         Therapeutic Intervention: Met with patient.  Outpatient - Insight oriented psychotherapy 60 min - CPT code 62831    Referring provider:    Chief complaint/reason for encounter: anxiety   Met with patient to evaluate psychosocial adaptation to survivorship of BC    Patient was last seen by me on  5/26/2025    Objective:      Divya De Paz arrived promptly for the session. The patient was fully cooperative throughout the session.  Appearance: age appropriate, appropriately  dressed, adequately  groomed  Behavior/Cooperation: friendly and cooperative  Speech: normal in rate, volume, and tone and appropriate quality, quantity and organization of sentences  Mood: steady  Affect: mood congruent  Thought Process: goal-directed, logical  Thought Content: normal,  No delusions or paranoia; did not appear to be responding to internal stimuli during the session  Orientation: grossly intact  Memory: Grossly intact  Attention Span/Concentration: Attends to session without distraction; reports no difficulty  Fund of Knowledge: average  Estimate of Intelligence: average from verbal skills and history  Cognition: grossly intact  Insight: patient has awareness of illness; good insight into own behavior and behavior of others  Judgment: the patient's behavior is adequate to circumstances      Interval history and content of current session: Patient doing well. Trip was rewarding.  Discussed diagnosis, treatment, prognosis, current adaptation to disease and treatment status, and family's adaptation to disease and treatment status. Reports to be coping in an adequate manner. Evaluated cognitive response, paying particular attention to negative intrusive thoughts of a persistent and detrimental nature. Thoughts of this type are in evidence with mild distress. Identified and evaluated psychosocial and environmental stressors secondary to diagnosis and treatment.     Focused on providing cognitive behavioral therapy to address negative cognitions. Examined proactive behaviors that may be implemented to minimize or ameliorate psychosocial stressors secondary to diagnosis and treatment.     Risk parameters:   Patient reports no suicidal ideation  Patient reports no homicidal ideation  Patient reports no self-injurious  behavior  Patient reports no violent behavior   Safety needs:  None at this time      Verbal deficits: None     Patient's response to intervention:The patient's response to intervention is accepting.     Progress toward goals and other mental status changes:  The patient's progress toward goals is good.      Progress to date:Progress as Expected      Goals from last visit: Met      Patient reported outcomes:      Distress Thermometer:   Distress Score    Distress Score: 5        Practical Problems Physical Problems                                                   Family Problems                                         Emotional Problems                                                         Spiritual/Religions Concerns               Other Problems             PHQ ANSWERS    Q1. Little interest or pleasure in doing things: (Patient-Rptd) (P) Several days (06/15/25 1537)  Q2. Feeling down, depressed, or hopeless: (Patient-Rptd) (P) Several days (06/15/25 1537)  Q3. Trouble falling or staying asleep, or sleeping too much: (Patient-Rptd) (P) Several days (06/15/25 1537)  Q4. Feeling tired or having little energy: (Patient-Rptd) (P) Several days (06/15/25 1537)  Q5. Poor appetite or overeating: (Patient-Rptd) (P) Several days (06/15/25 1537)  Q6. Feeling bad about yourself - or that you are a failure or have let yourself or your family down: (Patient-Rptd) (P) Several days (06/15/25 1537)  Q7. Trouble concentrating on things, such as reading the newspaper or watching television: (Patient-Rptd) (P) Several days (06/15/25 1537)  Q8. Moving or speaking so slowly that other people could have noticed. Or the opposite - being so fidgety or restless that you have been moving around a lot more than usual: (Patient-Rptd) (P) Not at all (06/15/25 1537)  Q9.      PHQ8 Score : (Patient-Rptd) (P) 7 (06/15/25 1537)  PHQ-9 Total Score: (Patient-Rptd) (P) 7 (06/15/25 1537)     ANISH-7 Answers        6/15/2025     3:36 PM   GAD7   1.  Feeling nervous, anxious, or on edge? 2   2. Not being able to stop or control worrying? 2   3. Worrying too much about different things? 2   4. Trouble relaxing? 1   5. Being so restless that it is hard to sit still? 1   6. Becoming easily annoyed or irritable? 1   7. Feeling afraid as if something awful might happen? 1   8. If you checked off any problems, how difficult have these problems made it for you to do your work, take care of things at home, or get along with other people? 1   ANISH-7 Score 10        Patient-reported     ANISH-7 Score: (Patient-Rptd) (P) 10  Interpretation: (Patient-Rptd) (P) Moderate Anxiety       Client Strengths: verbal, intelligent, successful, good social support, good insight, commitment to wellness, strong licha, strong cultural traditions      ICD-10-CM ICD-9-CM   1. Generalized anxiety disorder  F41.1 300.02   2. Infiltrating ductal carcinoma of left breast  C50.912 174.9        Treatment Plan:individual psychotherapy and medication management by physician  Target symptoms: anxiety   Why chosen therapy is appropriate versus another modality: relevant to diagnosis, patient responds to this modality, evidence based practice  Outcome monitoring methods: self-report, observation, checklist/rating scale  Therapeutic intervention type: insight oriented psychotherapy, behavior modifying psychotherapy  Prognosis: Good      Behavioral goals:    Exercise:   Stress management:   Social engagement:   Nutrition:   Smoking Cessation:   Therapy:  Increase daily self-care and attention to health management  Pleasant events scheduling and increased social interaction  Monitor stressors in writing and bring to next visit    Return to clinic: as needed     Length of Service (minutes direct face-to-face contact): 60          Racheal Catherine, PhD  Clinical Psychologist  [place] License #[id number]  [place] License #[id number]

## 2025-06-18 ENCOUNTER — OFFICE VISIT (OUTPATIENT)
Dept: RADIATION ONCOLOGY | Facility: CLINIC | Age: 35
End: 2025-06-18
Payer: COMMERCIAL

## 2025-06-18 VITALS
SYSTOLIC BLOOD PRESSURE: 136 MMHG | WEIGHT: 293 LBS | HEIGHT: 66 IN | DIASTOLIC BLOOD PRESSURE: 83 MMHG | HEART RATE: 82 BPM | OXYGEN SATURATION: 97 % | BODY MASS INDEX: 47.09 KG/M2

## 2025-06-18 DIAGNOSIS — C50.912 INVASIVE DUCTAL CARCINOMA OF BREAST, FEMALE, LEFT: Primary | ICD-10-CM

## 2025-06-18 PROCEDURE — 99999 PR PBB SHADOW E&M-EST. PATIENT-LVL III: CPT | Mod: PBBFAC,,, | Performed by: RADIOLOGY

## 2025-06-18 NOTE — PROGRESS NOTES
DEPARTMENT OF RADIATION ONCOLOGY  FOLLOW-UP NOTE        Patient Name: Divya De Paz  MRN: [id number]  : 1990    DIAGNOSIS:  Cancer Staging   Invasive ductal carcinoma of breast, female, left  Staging form: Breast, AJCC 8th Edition  - Clinical stage from 2024: Stage IA (cT1c, cN0, cM0, G3, ER+, KS+, HER2+) - Signed by Maureen Chiu MD on 2024      PATIENT IDENTIFICATION:  The patient is a 34 year old woman with a Stage IA triple positive left breast cancer.  She is status post breast conserving surgery and awaiting adjuvant chemoimmunotherapy.  She has been referred for consideration of adjuvant radiation to the left breast.     The patient noted a subareolar mass in the left breast while showering.     24 Breast, left, 1 o'clock, 1 cm from nipple, ultrasound-guided core needle biopsies of mass:   - Invasive ductal carcinoma       - 6 mm in greatest contiguous dimension       - Nano-Villanueva modified SBR score 3 + 3 + 3 = 9 of 9       - Histologic grade 3 of 3         24 SYNOPTIC REPORT  SPECIMEN, LATERALITY, PROCEDURE: Breast, breast, left, excision  HISTOLOGIC TYPE:  Invasive ductal carcinoma (best seen in blocks 1G and 1N)  HISTOLOGIC GRADE: Grade 3       Tubules 3       Nuclei 3       Mitosis 3  TUMOR FOCALITY:  Single focus  TUMOR SIZE:  11 mm  DUCTAL CARCINOMA IN SITU:  Present in 5 of 20 slides examined, comprising 10% of the total tumor burden (EIC negative)       Nuclear grade:  3 (high nuclear grade)       Necrosis:  Present, central       Architecture:  Solid and comedo types  TUMOR EXTENSION:         Skin:  Uninvolved by tumor       Nipple:  Not present for evaluation       Skeletal muscle:  Not present for evaluation  MARGINS:       Invasive carcinoma:  Invasive carcinoma is greater than 10 mm away from all margins of resection       Ductal carcinoma in situ:  In-situ carcinoma is greater than 10 mm away from all margins of resection  LYMPH NODES:       Total number of  nodes examined: 1       Total number of sentinel nodes examined: 1       Total number of lymph nodes involved:  0      TUMOR MARKERS:  Tumor markers were performed on the previous biopsy specimen (UBS ) and per report the tumor cells are positive for estrogen receptor (2 to 3+, 90-95%) and progesterone receptor (1 to 2+, 10-20%) and are positive for HER2   overexpression.      The patient completed adjuvant chemoimmunotherapy on 3/6/25.  She tolerated treatment well.  Oncology History   Invasive ductal carcinoma of breast, female, left   8/8/2024 Cancer Staged    Staging form: Breast, AJCC 8th Edition  - Clinical stage from 8/8/2024: Stage IA (cT1c, cN0, cM0, G3, ER+, NV+, HER2+)     8/12/2024 Initial Diagnosis    Invasive ductal carcinoma of breast, female, left     12/12/2024 -  Chemotherapy    Treatment Summary   Plan Name: OP BREAST TRASTUZUMAB PACLITAXEL WEEKLY  Treatment Goal: Curative  Status: Active  Start Date: 12/12/2024  End Date: 11/20/2025 (Planned)  Provider: Maureen Chiu MD  Chemotherapy: PACLitaxeL (TAXOL) 80 mg/m2 = 198 mg in 0.9% NaCl 250 mL chemo infusion, 80 mg/m2 = 198 mg, Intravenous, Clinic/HOD 1 time, 12 of 12 cycles  Administration: 198 mg (12/12/2024), 198 mg (12/19/2024), 198 mg (12/26/2024), 198 mg (1/3/2025), 198 mg (1/9/2025), 198 mg (1/16/2025), 198 mg (1/30/2025), 198 mg (2/6/2025), 198 mg (2/13/2025), 198 mg (2/20/2025), 198 mg (2/27/2025), 198 mg (3/6/2025)  trastuzumab-anns (KANJINTI) 521 mg in 0.9% NaCl 309.8 mL chemo infusion, 4 mg/kg = 521 mg, Intravenous, Clinic/HOD 1 time, 15 of 15 cycles  Administration: 521 mg (12/12/2024), 261 mg (12/19/2024), 750 mg (3/13/2025), 261 mg (12/26/2024), 261 mg (1/3/2025), 261 mg (1/9/2025), 261 mg (1/16/2025), 261 mg (1/30/2025), 261 mg (2/6/2025), 261 mg (2/13/2025), 261 mg (2/20/2025), 261 mg (2/27/2025), 261 mg (3/6/2025), 750 mg (4/3/2025), 750 mg (4/24/2025)  trastuzumab-dkst (OGIVRI) 750 mg in 0.9% NaCl 320.7 mL chemo  infusion, 782 mg, Intravenous, Clinic/HOD 1 time, 2 of 10 cycles  Administration: 750 mg (5/15/2025), 750 mg (6/13/2025)     3/27/2025 - 4/17/2025 Radiation Therapy    Treatment Summary  Course: C1 Breast 2025    Treatment Site Ref. ID Energy Dose/Fx (Gy) #Fx Dose Correction (Gy) Total Dose (Gy) Start Date End Date Elapsed Days   3D Breast_L Breast_L 18X/6X 2.65 16 / 16 0 42.4 3/27/2025 4/17/2025 21              RADIATION:      HISTORY OF PRESENT ILLNESS: Ms. De Paz returns to clinic today for routine post-radiation follow-up.  The patient reports doing well post radiation treatment.  Fatigue improved.  On immunotherapy.    REVIEW OF SYSTEMS:   Review of Systems   Constitutional:  Negative for fever, malaise/fatigue and weight loss.   HENT:  Negative for ear pain, hearing loss, sinus pain and sore throat.    Eyes:  Negative for blurred vision, double vision and pain.   Respiratory:  Negative for cough, hemoptysis, shortness of breath and wheezing.    Cardiovascular:  Negative for chest pain, palpitations and leg swelling.   Gastrointestinal:  Negative for abdominal pain, blood in stool, constipation, diarrhea, heartburn, nausea and vomiting.   Genitourinary:  Negative for dysuria, frequency, hematuria and urgency.   Musculoskeletal:  Negative for back pain and joint pain.   Skin:  Negative for itching and rash.   Neurological:  Negative for tingling, focal weakness, seizures and headaches.   Psychiatric/Behavioral:  Negative for depression. The patient is nervous/anxious.          ALLERGIES:   Review of patient's allergies indicates:   Allergen Reactions    Cefzil [cefprozil] Hives       MEDICATIONS:  Current Outpatient Medications   Medication Sig    anastrozole (ARIMIDEX) 1 mg Tab Take 1 tablet (1 mg total) by mouth once daily.    LIDOcaine-prilocaine (EMLA) cream Apply topically to port one hour prior to chemotherapy infusion    metFORMIN (GLUCOPHAGE) 500 MG tablet Take 1 tablet (500 mg total) by mouth 2 (two)  times daily with meals.    spironolactone (ALDACTONE) 50 MG tablet Take 1 tablet (50 mg total) by mouth once daily.     No current facility-administered medications for this visit.           PHYSICAL EXAMINATION:  Vitals:    25 1330   BP: 136/83   Pulse: 82     ECO  Body mass index is 47.65 kg/m².   Physical Exam  Constitutional:       Appearance: She is well-developed.   HENT:      Head: Normocephalic and atraumatic.   Eyes:      Conjunctiva/sclera: Conjunctivae normal.   Cardiovascular:      Rate and Rhythm: Normal rate.   Pulmonary:      Effort: Pulmonary effort is normal.   Chest:      Comments: Mild hyperpigmentation at the left breast  Abdominal:      Palpations: Abdomen is soft.   Musculoskeletal:         General: Normal range of motion.      Cervical back: Normal range of motion and neck supple.   Skin:     General: Skin is warm and dry.   Neurological:      Mental Status: She is alert and oriented to person, place, and time.   Psychiatric:         Behavior: Behavior normal.         Thought Content: Thought content normal.          ASSESSMENT/PLAN:  Divya was seen today for breast cancer.    Diagnoses and all orders for this visit:    Invasive ductal carcinoma of breast, female, left    The patient is recovering well from the acute effects of radiation treatment.  She was advised on use of a mild moisturizer for skin care.  MMG per routine.  Continue immunotherapy under the care of Dr. Chiu.  RTC as needed.

## 2025-06-19 ENCOUNTER — OFFICE VISIT (OUTPATIENT)
Dept: PSYCHIATRY | Facility: CLINIC | Age: 35
End: 2025-06-19
Payer: COMMERCIAL

## 2025-06-19 VITALS
DIASTOLIC BLOOD PRESSURE: 86 MMHG | HEART RATE: 77 BPM | WEIGHT: 290.25 LBS | HEIGHT: 66 IN | SYSTOLIC BLOOD PRESSURE: 149 MMHG | BODY MASS INDEX: 46.65 KG/M2

## 2025-06-19 DIAGNOSIS — F41.1 GENERALIZED ANXIETY DISORDER: ICD-10-CM

## 2025-06-19 PROCEDURE — 3008F BODY MASS INDEX DOCD: CPT | Mod: CPTII,S$GLB,, | Performed by: NURSE PRACTITIONER

## 2025-06-19 PROCEDURE — 99999 PR PBB SHADOW E&M-EST. PATIENT-LVL III: CPT | Mod: PBBFAC,,, | Performed by: NURSE PRACTITIONER

## 2025-06-19 PROCEDURE — 3077F SYST BP >= 140 MM HG: CPT | Mod: CPTII,S$GLB,, | Performed by: NURSE PRACTITIONER

## 2025-06-19 PROCEDURE — 3079F DIAST BP 80-89 MM HG: CPT | Mod: CPTII,S$GLB,, | Performed by: NURSE PRACTITIONER

## 2025-06-19 PROCEDURE — 99215 OFFICE O/P EST HI 40 MIN: CPT | Mod: S$GLB,,, | Performed by: NURSE PRACTITIONER

## 2025-06-19 PROCEDURE — G2211 COMPLEX E/M VISIT ADD ON: HCPCS | Mod: S$GLB,,, | Performed by: NURSE PRACTITIONER

## 2025-06-19 RX ORDER — FLUVOXAMINE MALEATE 50 MG/1
100 TABLET, FILM COATED ORAL NIGHTLY
Qty: 60 TABLET | Refills: 1 | Status: SHIPPED | OUTPATIENT
Start: 2025-06-19 | End: 2025-08-18

## 2025-06-19 NOTE — PROGRESS NOTES
Outpatient Psychiatry Initial Visit (PMP-BC)    6/19/2025    Divya De Paz, a 35 y.o. female, presenting for initial evaluation visit. Met with patient.    Reason for Encounter: Referral from Racheal Catherine. Patient complains of: Anxiety    Current Psychiatric Medications:   Mealontin 10 mg PRN for sleep       History of Present Illness    HPI:  Patient reports experiencing generalized anxiety and obsessive-compulsive tendencies for approximately 10 years, with a significant increase in symptoms over the past two years. She describes having intrusive thoughts, magical thinking, and compulsive behaviors such as repeatedly checking alarms and household appliances. These symptoms occur daily and significantly impact her daily life and ability to relax.    Patient reports sleeping an average of 7 hours per night, which is an improvement from previous months. She experiences difficulty falling asleep about 3 days a week due to racing thoughts and anxiety. She also wakes up almost nightly around 3 or 4 AM to use the restroom and sometimes has trouble falling back asleep, taking about 30 minutes to do so.    Patient describes feeling a lack of ebony or excitement in activities that previously brought her happiness. This change in emotional response has been ongoing for about five years. She also reports increased irritability and difficulty handling stressful situations, particularly at work.    Patient was diagnosed with breast cancer in August or September of last year. She has completed chemotherapy and radiation treatments and is currently on immunotherapy. This diagnosis and treatment process have been a significant source of stress and have impacted her overall mental health.    Patient has a history of using psychiatric medications, including Prozac for about five years and more recently, amitriptyline. She discontinued amitriptyline due to side effects such as palpitations and increased anxiety. She has also  engaged in therapy on and off over the years, currently seeing Dr. Gooden on an as-needed basis, averaging once a month.    Patient reports a significant family history of mental health issues, including depression and anxiety in her parents and brother, as well as substance abuse and suicide attempts in extended family members.    Patient reports minimal alcohol use, especially since her cancer diagnosis. She has a history of smoking cigarettes from age 18 to 25 but has since quit. She also reports past marijuana use but stopped due to experiencing anxiety during use. Patient denies current suicidal ideation, self-harm practices, hallucinations, violent behavior when upset, and recent significant changes in appetite or weight. She denies any history of psychiatric hospitalizations, rehab facility admissions, seizures, concussions, or traumatic brain injuries.    ROS:  General: -fever, -chills, -fatigue, -weight gain, -weight loss, +increased energy levels, +increased activities  Eyes: -vision changes, -redness, -discharge, +spots, specks or flashing lights, +blind spots  ENT: -ear pain, -nasal congestion, -sore throat  Cardiovascular: -chest pain, +palpitations, -lower extremity edema, +feeling of flutter in chest, +feelings of fast heart rate  Respiratory: -cough, -shortness of breath  Gastrointestinal: -abdominal pain, -nausea, -vomiting, -diarrhea, -constipation, -blood in stool  Genitourinary: -dysuria, -hematuria, -frequency, +nocturia  Musculoskeletal: -joint pain, -muscle pain  Skin: -rash, -lesion  Neurological: -headache, -dizziness, -numbness, -tingling  Psychiatric: +anxiety, -depression, +sleep difficulty, +difficulty staying asleep, +irritability, +obsessive thoughts, +obsessive behavior         Denies SI/HI/AH/VH paranoia or delusions. Patient verbalized motivation for compliance with medications and all other elements of treatment plan.       Standardized Screenings tools:   PHQ9: 13  ANISH- 7:  17  Mood Disorder Questionnaire:   Adult ADHD Self-Report Scale:   Part A:     Part B:     Psychosocial Stressors:  -  Coping mechanisms: -    History:     Past Psychiatric History:   Previous Diagnoses:  yes - Generalized Anxiety disorder in 2015, panic attacks  Previous Therapy: yes Currently in Treatment With: yes - on and off for 10 years, Sees Dr. Gooden every month or so.   Previous Psychiatric treatment and medication trials: yes -   Amitriptyline- for insomnia and anxiety, palpitations which cause anxiety.  Prozac- 30 mg  on and off for 5 years, felt numb, apathetic  Previous Psychiatric hospitalizations: no  Previous Suicide Attempts: no  History of Violence: no  History of Abuse: no  History of Trauma: yes age 18 witnessed death of grandmother, Diagnosed with breast Cancer August 2024  Suicidal Ideation: no  Self Harm:  no  Auditory Hallucination: no  Visual Hallucination: no    Family Psychiatric History  Sudden cardiac death before 51yo: none  Suicide Attempts: Maternal Uncle attempted, Maternal 1st cousin attemted  Substance Abuse: Maternal Uncle Opioids, Paternal Uncle and Aunt Opioids, Paternal cousin AUD,   Mental Health Diagnoses: Father Depression, Mother Anxiety, Brother Anxiety, Paternal Aunts Anxiety and Depression,     Substance Abuse History:  Caffeine: yes - Coffee 1 cup/day,  Alcohol: yes - prior to diagnosis 1X/week, now 1 drink/month  Tobacco: no - on and off since at 18 and stopped 10 years ago  Rehab: no  Recreational drugs: no    Social History:  Born: [place]  Childhood: Good, raised by both parents, had 1 younger brother,  Relationships: single   Children: none   Living situation:in Apartment alone  Education History: Bachelors degree in arts and history    Work History:  at [employer]   Legal History: none   Firearms Access: none  : none     Neuro History  Seizure: no  Head trauma/TBI: no      Review Of Systems:     Medical Review Of Systems:  Pertinent  "positives noted in HPI    Psychiatric Review Of Systems:  Sleep Disturbance: yes, 7 hours/night , about 3 X/week has difficulty falling asleep, wake once. Wakes 1X/ night for 30 min.   Appetite changes: no  Weight changes: no  Energy Changes: yes, increased after finishing radiation  Anhedonia yes, beginning 5 years ago,  Somatic symptoms: no  Anxiety/panic: yes  Guilty/hopeless: no  Self-injurious behavior/risky behavior: no  Any drugs: no  Alcohol: no       Current Evaluation:       Mental Status Evaluation:  Physical Exam    Appearance: Appears stated age. Well-groomed. Well-nourished.  Speech: Normal rate. Normal volume. Spontaneous and fluid.  Affect: Appropriate.  Thought Content: No evidence of aggression. No evidence of homicidal ideation. No evidence of homicidal plan. No evidence of homicidal intent. No evidence of suicidal ideation. No evidence of suicidal plan. No evidence of suicidal intent. No evidence of delusions.  Memory: Recent memory intact. Remote memory intact.  Behavior: Cooperative. Good eye contact. Engaged. Pleasant.  Mood: Euthymic.  Thought Form: Linear thinking. Goal oriented and directed.  Perception: No perceptual abnormalities noted.  Judgement: Intact as evidenced by decision making in the recent past.  Insight: Good insight into symptoms. Good insight into treatment options.  Cognition: A&Ox3. Normal attention span. Average fund of knowledge.  Motor: No gross motor abnormalities.         Physical/Somatic Complaints   The patient lists: no physical complaints.    Constitutional  Vitals:  Most recent vital signs, dated less than 90 days prior to this appointment, were reviewed.   Vitals:    06/19/25 1343   BP: (!) 149/86   Pulse: 77   Weight: 131.7 kg (290 lb 3.8 oz)   Height: 5' 6" (1.676 m)        General:  unremarkable, age appropriate       Laboratory Data  Lab Visit on 06/13/2025   Component Date Value Ref Range Status    Sodium 06/13/2025 139  136 - 145 mmol/L Final    Potassium " 06/13/2025 4.3  3.5 - 5.1 mmol/L Final    Chloride 06/13/2025 106  95 - 110 mmol/L Final    CO2 06/13/2025 23  23 - 29 mmol/L Final    Glucose 06/13/2025 75  70 - 110 mg/dL Final    BUN 06/13/2025 13  6 - 20 mg/dL Final    Creatinine 06/13/2025 0.6  0.5 - 1.4 mg/dL Final    Calcium 06/13/2025 9.0  8.7 - 10.5 mg/dL Final    Protein Total 06/13/2025 7.4  6.0 - 8.4 gm/dL Final    Albumin 06/13/2025 4.3  3.5 - 5.2 g/dL Final    Bilirubin Total 06/13/2025 1.0  0.1 - 1.0 mg/dL Final    ALP 06/13/2025 90  40 - 150 unit/L Final    AST 06/13/2025 15  11 - 45 unit/L Final    ALT 06/13/2025 25  10 - 44 unit/L Final    Anion Gap 06/13/2025 10  8 - 16 mmol/L Final    eGFR 06/13/2025 >60  >60 mL/min/1.73/m2 Final    Beta HCG Quant 06/13/2025 <2.42  See Text mIU/mL Final    WBC 06/13/2025 6.21  3.90 - 12.70 K/uL Final    RBC 06/13/2025 4.76  4.00 - 5.40 M/uL Final    HGB 06/13/2025 14.2  12.0 - 16.0 gm/dL Final    HCT 06/13/2025 40.8  37.0 - 48.5 % Final    MCV 06/13/2025 86  82 - 98 fL Final    MCH 06/13/2025 29.8  27.0 - 31.0 pg Final    MCHC 06/13/2025 34.8  32.0 - 36.0 g/dL Final    RDW 06/13/2025 12.2  11.5 - 14.5 % Final    Platelet Count 06/13/2025 216  150 - 450 K/uL Final    MPV 06/13/2025 8.5 (L)  9.2 - 12.9 fL Final    Nucleated RBC 06/13/2025 0  <=0 /100 WBC Final    Neut % 06/13/2025 74.3 (H)  38 - 73 % Final    Lymph % 06/13/2025 14.7 (L)  18 - 48 % Final    Mono % 06/13/2025 9.8  4 - 15 % Final    Eos % 06/13/2025 0.6  <=8 % Final    Basophil % 06/13/2025 0.3  <=1.9 % Final    Imm Grans % 06/13/2025 0.3  0.0 - 0.5 % Final    Neut # 06/13/2025 4.61  1.8 - 7.7 K/uL Final    Lymph # 06/13/2025 0.91 (L)  1 - 4.8 K/uL Final    Mono # 06/13/2025 0.61  0.3 - 1 K/uL Final    Eos # 06/13/2025 0.04  <=0.5 K/uL Final    Baso # 06/13/2025 0.02  <=0.2 K/uL Final    Imm Grans # 06/13/2025 0.02  0.00 - 0.04 K/uL Final         Medications  Encounter Medications[1]      Assessment - Diagnosis - Goals:     Impression: Divya Baca  Baldev, a 35 y.o. female, presenting for initial evaluation visit generalized anxiety disorder r/o OCD  Presents 6/19/2025 - Luvox 100 mg Daily       ICD-10-CM ICD-9-CM    1. Generalized anxiety disorder  F41.1 300.02 Ambulatory referral/consult to Psychiatry      fluvoxaMINE (LUVOX) 50 MG Tab tablet      2. Infiltrating ductal carcinoma of left breast  C50.912 174.9 Ambulatory referral/consult to Psychiatry           Strengths and Liabilities: Strength: Patient accepts guidance/feedback, Strength: Patient is expressive/articulate., Strength: Patient is intelligent., Strength: Patient is motivated for change., Strength: Patient is physically healthy.    Treatment Goals:    Anxiety: acquiring relapse prevention skills, reducing negative automatic thoughts, reducing physical symptoms of anxiety, and reducing time spent worrying (<30 minutes/day)    Treatment Plan/Recommendations:   Assessment & Plan    IMPRESSION:  Presents with symptoms consistent with generalized anxiety disorder and obsessive-compulsive tendencies.  Previous trials of Prozac and amitriptyline were unsuccessful due to side effects.  Started Luvox (fluvoxamine) 50 mg daily for 1 week, then increase to 100 mg daily for its efficacy in treating both anxiety and OCD symptoms.  Opted to defer sleep medication at this time, as anxiety treatment may improve sleep quality.    ANXIETY AND OBSESSIVE-COMPULSIVE DISORDER (OCD):  - Started Luvox (fluvoxamine) 50 mg daily for 1 week, then increase to 100 mg daily for its efficacy in treating both anxiety and OCD symptoms. Explained that antidepressants may take up to 3 weeks to show noticeable effects. Explained potential side effects of Luvox, including emotional flattening, sexual dysfunction, and increased appetite. Discussed the rare risk of worsening suicidal thoughts, particularly in adolescents.    SLEEP DISORDERS:  - Continue OTC melatonin 10 mg as needed for sleep, not to exceed 10 mg per  dose.    FOLLOW-UP CARE:  - Follow up in 5 weeks to reassess Luvox efficacy and side effects. Patient can schedule follow-up visit at  or online. Option for virtual follow-up visit available.       Discussed diagnosis, risks and benefits of proposed treatment above vs alternative treatments vs no treatment, and potential side effects of these treatments, and the inherent unpredictability of individual response to treatment.The patient expresses understanding and gives informed consent to pursue treatment at this time believing that the potential benefits outweigh the potential risks. Patient has no other questions. Risks/adverse effects discussed at this time including but not limited to: GI side effects, sexual dysfunction, activation vs sedation, triggering of suicidal thoughts, and serotonin syndrome.  Patient was cautioned on drinking alcohol, operating machinery or driving while on sedating medications.  Patient voices understanding and agreement with this plan  Provided crisis numbers  Encouraged patient to keep future appointments.  Instructed patient to call or message with questions  In the event of an emergency, including suicidal ideation, patient was advised to go to the emergency room  This note was generated with the assistance of ambient listening technology. Verbal consent was obtained by the patient and accompanying visitor(s) for the recording of patient appointment to facilitate this note. I attest to having reviewed and edited the generated note for accuracy, though some syntax or spelling errors may persist. Please contact the author of this note for any clarification.        Return to Clinic: 1 month    Total time: 75 minutes    This includes face to face time and non-face to face time preparing to see the patient (eg, review of tests), obtaining and/or reviewing separately obtained history, documenting clinical information in the electronic or other health record, independently  interpreting results and communicating results to the patient/family/caregiver, or care coordinator.        Winter Dickson DNP, PMHNP, FNP         [1]   Outpatient Encounter Medications as of 6/19/2025   Medication Sig Dispense Refill    anastrozole (ARIMIDEX) 1 mg Tab Take 1 tablet (1 mg total) by mouth once daily. 30 tablet 11    fluvoxaMINE (LUVOX) 50 MG Tab tablet Take 50 mg (1 tablet) by mouth for 7 days, then increase to 100 mg ( 2 tablet) Daily 60 tablet 1    LIDOcaine-prilocaine (EMLA) cream Apply topically to port one hour prior to chemotherapy infusion 30 g 1    metFORMIN (GLUCOPHAGE) 500 MG tablet Take 1 tablet (500 mg total) by mouth 2 (two) times daily with meals. 180 tablet 3    spironolactone (ALDACTONE) 50 MG tablet Take 1 tablet (50 mg total) by mouth once daily. 90 tablet 3    [DISCONTINUED] amitriptyline (ELAVIL) 25 MG tablet Take 1 tablet (25 mg total) by mouth every evening. 90 tablet 1    [DISCONTINUED] ondansetron (ZOFRAN) 8 MG tablet Take 1 tablet (8 mg total) by mouth every 8 (eight) hours as needed for Nausea. 30 tablet 1    [DISCONTINUED] prochlorperazine (COMPAZINE) 5 MG tablet Take 1 tablet (5 mg total) by mouth every 6 (six) hours as needed for Nausea. Take if zofran is not working 30 tablet 1     No facility-administered encounter medications on file as of 6/19/2025.

## 2025-06-23 ENCOUNTER — TELEPHONE (OUTPATIENT)
Dept: PLASTIC SURGERY | Facility: CLINIC | Age: 35
End: 2025-06-23
Payer: COMMERCIAL

## 2025-06-24 ENCOUNTER — TELEPHONE (OUTPATIENT)
Dept: OBSTETRICS AND GYNECOLOGY | Facility: CLINIC | Age: 35
End: 2025-06-24
Payer: COMMERCIAL

## 2025-06-24 ENCOUNTER — OFFICE VISIT (OUTPATIENT)
Dept: PLASTIC SURGERY | Facility: CLINIC | Age: 35
End: 2025-06-24
Payer: COMMERCIAL

## 2025-06-24 VITALS
HEIGHT: 66 IN | DIASTOLIC BLOOD PRESSURE: 86 MMHG | HEART RATE: 76 BPM | WEIGHT: 288.13 LBS | SYSTOLIC BLOOD PRESSURE: 139 MMHG | BODY MASS INDEX: 46.3 KG/M2

## 2025-06-24 DIAGNOSIS — C50.912 INVASIVE DUCTAL CARCINOMA OF BREAST, FEMALE, LEFT: Primary | ICD-10-CM

## 2025-06-24 PROCEDURE — 3079F DIAST BP 80-89 MM HG: CPT | Mod: CPTII,S$GLB,, | Performed by: SURGERY

## 2025-06-24 PROCEDURE — 99212 OFFICE O/P EST SF 10 MIN: CPT | Mod: S$GLB,,, | Performed by: SURGERY

## 2025-06-24 PROCEDURE — 99999 PR PBB SHADOW E&M-EST. PATIENT-LVL III: CPT | Mod: PBBFAC,,, | Performed by: SURGERY

## 2025-06-24 PROCEDURE — 3008F BODY MASS INDEX DOCD: CPT | Mod: CPTII,S$GLB,, | Performed by: SURGERY

## 2025-06-24 PROCEDURE — 3075F SYST BP GE 130 - 139MM HG: CPT | Mod: CPTII,S$GLB,, | Performed by: SURGERY

## 2025-06-24 PROCEDURE — 1159F MED LIST DOCD IN RCRD: CPT | Mod: CPTII,S$GLB,, | Performed by: SURGERY

## 2025-06-24 NOTE — TELEPHONE ENCOUNTER
----- Message from Kelly Lance MD sent at 6/17/2025 12:54 PM CDT -----  Regarding: FW: Ref  Need  to get her scheduled to discuss contraception with breast cancer  ----- Message -----  From: Racheal Catherine, PhD  Sent: 6/16/2025   4:19 PM CDT  To: Kelly Lance MD; Virgie Holland RN  Subject: Ref                                              Patient with Triple POS BC. Has PCOS would like to discuss birth control options giver her BC status.

## 2025-06-29 NOTE — PROGRESS NOTES
Divya De Paz [facility] for a follow up visit status post oncoplastic breast reduction on 09/25/2024.  She did have some delayed wound healing along her IMF on each side.  She had a breakdown along her incisions.  She also has a history of hidradenitis under the breasts which we feel like contributed to her wounds..  She completed adjuvant chemotherapy and radiation.  She is currently doing immunotherapy and it had on a hormone blocker.  She does not report any major side effects from this.  She is having her hair regrowth after.   female denies fever, chills, nausea, vomiting or other systemic signs of infection.       ROS - negative, other than stated above    PHYSICAL EXAMINATION  Vitals:    06/24/25 1027   BP: 139/86   Pulse: 76       Gen: NAD, appears stated age  Neuro: normal without focal findings, mental status and speech normal  HEENT: NCAT, neck supple, PEERL  CV: RRR  Pulm: Breathing non-labored, chest wall movement equal bilaterally   Breast: Healed wise pattern incisions.  Slightly widened incisions at the T point along the IMF incision.  These are not visible when looking straight on with the breasts. She has a equal nipple position and good symmetry  Abdomen: soft, nontender, no guarding  Extremity:normal strength, no cyanosis or edema  Skin: Skin color, texture, turgor normal. No rashes or lesions  Psych: oriented to time, place and person, mood and affect are within normal limits      ASSESSMENT/PLAN  35 y.o. female s/p oncoplastic breast reduction    Discussed how the radiated left breasts may continue to change a little bit with time.  Often times that there is more recurrent ptosis of the non radiated breasts which can lead to some long-term asymmetry.  Discussed the role for revision procedures.  Discussed scar care and that has scars we will continue to change up to 12-18 months postop.  Follow up as needed  Juan Antonio Murcia, DO  Plastic and Reconstructive  Surgery      I spent a total of 10 minutes on the day of the visit.  This includes face to face time and non-face to face time preparing to see the patient (eg, review of tests), obtaining and/or reviewing separately obtained history, documenting clinical information in the electronic or other health record, independently interpreting results and communicating results to the patient/family/caregiver, or care coordinator.

## 2025-07-01 ENCOUNTER — TELEPHONE (OUTPATIENT)
Dept: HEMATOLOGY/ONCOLOGY | Facility: CLINIC | Age: 35
End: 2025-07-01
Payer: COMMERCIAL

## 2025-07-01 NOTE — PROGRESS NOTES
The patient location is: [place]  The chief complaint leading to consultation is: breast cancer    Visit type: audiovisual    Face to Face time with patient: 15 min  20 minutes of total time spent on the encounter, which includes face to face time and non-face to face time preparing to see the patient (eg, review of tests), Obtaining and/or reviewing separately obtained history, Documenting clinical information in the electronic or other health record, Independently interpreting results (not separately reported) and communicating results to the patient/family/caregiver, or Care coordination (not separately reported).     Each patient to whom he or she provides medical services by telemedicine is:  (1) informed of the relationship between the physician and patient and the respective role of any other health care provider with respect to management of the patient; and (2) notified that he or she may decline to receive medical services by telemedicine and may withdraw from such care at any time.    Notes:     [facility]/ [facility]   at [facility] Note:      Chief Complaint:   Encounter Diagnoses   Name Primary?    Invasive ductal carcinoma of breast, female, left Yes    Anxiety about health     PCOS (polycystic ovarian syndrome)             Cancer Staging   Invasive ductal carcinoma of breast, female, left  Staging form: Breast, AJCC 8th Edition  - Clinical stage from 8/8/2024: Stage IA (cT1c, cN0, cM0, G3, ER+, OR+, HER2+) - Signed by Maureen Chiu MD on 8/27/2024      HPI:  Divya De Paz is a 35 y.o. female with PCOS and ANISH who presents today for  a follow up and for her q3wk adjuvant trastuzumab. She is tolerating the trastuzumab well. Occasionally gets a small rash after the infusion but has a prescription of doxy that resolves the rash after a couple of days. She is tolerating anastrozole well, with an occasional tolerable hot flashes. She is  seeing Dr. Catherine and now psychiatry for her anxiety.  She was changed form amitriptyline to luvox for this recently and is tolerating well.  Appetite is good.  No n/v/d/c.  No major joint aching.    Per Dr. Chiu's note:  Oncology History  Patient self palpated a lump during self-exam, got evaluated by Gyn a few days later.   Follow-up mammogram 2024 showed an irregular high density mass in the UOQ of left breast  Ultrasound 2024: superficial irregular not parallel hypoechoic mass with indistinct and angular margins measuring 1.0 x 0.8 x 1.0 cm (1 o'clock axis, 1 CFN).     Biopsy 2024: IDC, grade 3, ER 90-95%/ AR 10-20%/ HER2 3+; Ki67 80-90%     MRI 8/15/2024 revealed a 1.5 cm x 1.3 cm x 1.0 cm irregularly shaped mass with irregular margins and heterogeneous internal enhancement seen in the left breast at 1 o'clock in the anterior depth, 4.1 cm from the nipple and 0.3 cm from the skin. No internal mammary or axillary adenopathy identified.    L breast lumpectomy 24: IDC, grade 3, 1.1cm; 0/1 LN+,  also with oncoplastic reduction  Adjuvant TH started 24 (delay due to poor wound healing after surgery)   Radiation completed: 2025    Anatrazole started 2025    GYN History:  Age of menarche was 14. Age of menopause was n/a.  Last menstrual period was the week of 2024, pt reports inconsistent periods due to PCOS. Patient reports hormonal therapy use (Provera) to induce menstruation, stopped 2024 per OB/GYN reccs. Patient is . Age of first live birth was n/a.     Family History:  Paternal Grandmother Breast Cancer - multiple times;  of lung cancer (heavy smoker)  Paternal Aunt Breast Cancer - diagnosed at 45, BRCA negative  Paternal Aunt did not have cancer, also BRCA negative  Maternal grandmother - lung cancer, no smoking history  Maternal grandfather - renal cancer      Patient Active Problem List   Diagnosis    Hidradenitis suppurativa    PCOS (polycystic  ovarian syndrome)    Insulin resistance    Secondary oligomenorrhea    Severe obesity    Anxiety and depression    Invasive ductal carcinoma of breast, female, left    Negative Hereditary Cancer Genetic testing    Immunodeficiency due to drugs       Current Outpatient Medications   Medication Instructions    anastrozole (ARIMIDEX) 1 mg, Oral, Daily    fluvoxaMINE (LUVOX) 50 MG Tab tablet Take 50 mg (1 tablet) by mouth for 7 days, then increase to 100 mg ( 2 tablet) Daily    LIDOcaine-prilocaine (EMLA) cream Apply topically to port one hour prior to chemotherapy infusion    metFORMIN (GLUCOPHAGE) 500 mg, Oral, 2 times daily with meals    spironolactone (ALDACTONE) 50 mg, Oral, Daily       Review of Systems:   Review of Systems   Constitutional:  Negative for malaise/fatigue.   Respiratory:  Negative for cough and shortness of breath.    Cardiovascular:  Negative for chest pain.   Gastrointestinal:  Negative for abdominal pain and diarrhea.   Genitourinary:  Negative for frequency.   Musculoskeletal:  Negative for back pain.   Skin:  Negative for rash.   Neurological:  Negative for headaches.   Psychiatric/Behavioral:  The patient is not nervous/anxious.    All other systems reviewed and are negative.    PHYSICAL EXAM:  LMP 05/28/2025     Limited pe d/t virtual visit    Physical Exam  Constitutional:       General: She is not in acute distress.     Appearance: Normal appearance. She is not ill-appearing.   HENT:      Head: Normocephalic and atraumatic.   Pulmonary:      Effort: Pulmonary effort is normal. No respiratory distress.   Musculoskeletal:      Cervical back: Normal range of motion.   Neurological:      Mental Status: She is alert and oriented to person, place, and time.       Pertinent Labs & Imaging:  Pathology Results  (Last 30 days)      None            No results found for this or any previous visit (from the past 24 hours).    Assessment & Plan:    1. Invasive ductal carcinoma of breast, female,  left    2. Anxiety about health    3. PCOS (polycystic ovarian syndrome)    Patient with stage I TPBC s/p adjuvant TH weekly now on q3wk trstuzumab, due for C16 today   She is doing well overall   Repeat echo from April 2025 reviewed and stable, needs repeat for July  Getting repeat labs tomorrow, if stable she is ok for her kanjinti tomorrow  Zoladex every 4 weeks  Completed radiation in April 17, 2025    Anatrazole started April 24, 2025, end date May 2030  Ca and vit D , will order dexa  Follow-up in 6-weeks     MDM includes  :    - Acute or chronic illness or injury that poses a threat to life or bodily function  - Independent review and explanation of 3+ results from unique tests  - Discussion of management and ordering 3+ unique tests  - Extensive discussion of treatment and management  - Prescription drug management  - Drug therapy requiring intensive monitoring for toxicity     code applied: patient requires or will require a continuous, longitudinal, and active collaborative plan of care related to this patient's health condition, breast cancer -the management of which requires the direction of a practitioner with specialized clinical knowledge, skill, and expertise.        Cristal Posadas NP    Route Chart for Scheduling    Med Onc Chart Routing  Urgent    Follow up with physician 6 weeks.   Follow up with KERVIN . 12 weeks   Infusion scheduling note   every 3 weeks kanjinti   Injection scheduling note zoladex every 4 weeks   Labs CBC and CMP   Scheduling:  Preferred lab:  Lab interval:  every 3 months   Imaging DXA scan and ECHO   echo within the next two weeks, dexa sometime prior to next visit   Pharmacy appointment    Other referrals                Treatment Plan Information   OP BREAST TRASTUZUMAB PACLITAXEL WEEKLY Maureen Chiu MD   Associated diagnosis: Invasive ductal carcinoma of breast, female, left Stage IA cT1c, cN0, cM0, G3, ER+, MO+, HER2+ noted on 8/12/2024   Line of treatment:  Adjuvant  Treatment Goal: Curative     Upcoming Treatment Dates - OP BREAST TRASTUZUMAB PACLITAXEL WEEKLY    6/26/2025       Chemotherapy       trastuzumab-dkst (OGIVRI) 782 mg in 0.9% NaCl 250 mL chemo infusion  7/17/2025       Chemotherapy       trastuzumab-dkst (OGIVRI) 782 mg in 0.9% NaCl 250 mL chemo infusion  8/7/2025       Chemotherapy       trastuzumab-dkst (OGIVRI) 782 mg in 0.9% NaCl 250 mL chemo infusion  8/28/2025       Chemotherapy       trastuzumab-dkst (OGIVRI) 782 mg in 0.9% NaCl 250 mL chemo infusion    Supportive Plan Information  OP BREAST GOSERELIN Q4W Maureen Chiu MD   Associated Diagnosis: Invasive ductal carcinoma of breast, female, left Stage IA cT1c, cN0, cM0, G3, ER+, OH+, HER2+ noted on 8/12/2024   Line of treatment: Supportive Care   Treatment goal: Supportive     Upcoming Treatment Dates - OP BREAST GOSERELIN Q4W    6/24/2025       Chemotherapy       goserelin (ZOLADEX) injection 3.6 mg  7/22/2025       Chemotherapy       goserelin (ZOLADEX) injection 3.6 mg  8/19/2025       Chemotherapy       goserelin (ZOLADEX) injection 3.6 mg

## 2025-07-02 ENCOUNTER — PATIENT MESSAGE (OUTPATIENT)
Dept: HEMATOLOGY/ONCOLOGY | Facility: CLINIC | Age: 35
End: 2025-07-02

## 2025-07-02 ENCOUNTER — OFFICE VISIT (OUTPATIENT)
Dept: HEMATOLOGY/ONCOLOGY | Facility: CLINIC | Age: 35
End: 2025-07-02
Payer: COMMERCIAL

## 2025-07-02 DIAGNOSIS — Z79.811 AROMATASE INHIBITOR USE: ICD-10-CM

## 2025-07-02 DIAGNOSIS — E28.2 PCOS (POLYCYSTIC OVARIAN SYNDROME): ICD-10-CM

## 2025-07-02 DIAGNOSIS — R45.89 ANXIETY ABOUT HEALTH: ICD-10-CM

## 2025-07-02 DIAGNOSIS — C50.912 INVASIVE DUCTAL CARCINOMA OF BREAST, FEMALE, LEFT: Primary | ICD-10-CM

## 2025-07-02 PROCEDURE — 98006 SYNCH AUDIO-VIDEO EST MOD 30: CPT | Mod: 95,,, | Performed by: NURSE PRACTITIONER

## 2025-07-02 PROCEDURE — G2211 COMPLEX E/M VISIT ADD ON: HCPCS | Mod: 95,,, | Performed by: NURSE PRACTITIONER

## 2025-07-02 PROCEDURE — 1159F MED LIST DOCD IN RCRD: CPT | Mod: CPTII,95,, | Performed by: NURSE PRACTITIONER

## 2025-07-03 ENCOUNTER — INFUSION (OUTPATIENT)
Dept: INFUSION THERAPY | Facility: HOSPITAL | Age: 35
End: 2025-07-03
Payer: COMMERCIAL

## 2025-07-03 ENCOUNTER — LAB VISIT (OUTPATIENT)
Dept: LAB | Facility: HOSPITAL | Age: 35
End: 2025-07-03
Attending: INTERNAL MEDICINE
Payer: COMMERCIAL

## 2025-07-03 VITALS
WEIGHT: 286.63 LBS | DIASTOLIC BLOOD PRESSURE: 81 MMHG | TEMPERATURE: 98 F | HEART RATE: 75 BPM | BODY MASS INDEX: 46.06 KG/M2 | OXYGEN SATURATION: 100 % | SYSTOLIC BLOOD PRESSURE: 146 MMHG | RESPIRATION RATE: 18 BRPM | HEIGHT: 66 IN

## 2025-07-03 DIAGNOSIS — Z79.899 IMMUNODEFICIENCY DUE TO DRUGS: ICD-10-CM

## 2025-07-03 DIAGNOSIS — C50.912 INFILTRATING DUCTAL CARCINOMA OF LEFT BREAST: ICD-10-CM

## 2025-07-03 DIAGNOSIS — D84.821 IMMUNODEFICIENCY DUE TO DRUGS: ICD-10-CM

## 2025-07-03 DIAGNOSIS — C50.912 INVASIVE DUCTAL CARCINOMA OF BREAST, FEMALE, LEFT: Primary | ICD-10-CM

## 2025-07-03 DIAGNOSIS — C50.912 INVASIVE DUCTAL CARCINOMA OF BREAST, FEMALE, LEFT: ICD-10-CM

## 2025-07-03 LAB
ABSOLUTE EOSINOPHIL (OHS): 0.06 K/UL
ABSOLUTE MONOCYTE (OHS): 0.41 K/UL (ref 0.3–1)
ABSOLUTE NEUTROPHIL COUNT (OHS): 2.28 K/UL (ref 1.8–7.7)
ALBUMIN SERPL BCP-MCNC: 4.3 G/DL (ref 3.5–5.2)
ALP SERPL-CCNC: 82 UNIT/L (ref 40–150)
ALT SERPL W/O P-5'-P-CCNC: 29 UNIT/L (ref 10–44)
ANION GAP (OHS): 11 MMOL/L (ref 8–16)
AST SERPL-CCNC: 16 UNIT/L (ref 11–45)
BASOPHILS # BLD AUTO: 0.02 K/UL
BASOPHILS NFR BLD AUTO: 0.5 %
BILIRUB SERPL-MCNC: 0.9 MG/DL (ref 0.1–1)
BUN SERPL-MCNC: 14 MG/DL (ref 6–20)
CALCIUM SERPL-MCNC: 9.6 MG/DL (ref 8.7–10.5)
CHLORIDE SERPL-SCNC: 106 MMOL/L (ref 95–110)
CO2 SERPL-SCNC: 23 MMOL/L (ref 23–29)
CREAT SERPL-MCNC: 0.7 MG/DL (ref 0.5–1.4)
ERYTHROCYTE [DISTWIDTH] IN BLOOD BY AUTOMATED COUNT: 12.1 % (ref 11.5–14.5)
GFR SERPLBLD CREATININE-BSD FMLA CKD-EPI: >60 ML/MIN/1.73/M2
GLUCOSE SERPL-MCNC: 103 MG/DL (ref 70–110)
HCG INTACT+B SERPL-ACNC: <2.4 MIU/ML
HCT VFR BLD AUTO: 41.4 % (ref 37–48.5)
HGB BLD-MCNC: 14.1 GM/DL (ref 12–16)
IMM GRANULOCYTES # BLD AUTO: 0.01 K/UL (ref 0–0.04)
IMM GRANULOCYTES NFR BLD AUTO: 0.2 % (ref 0–0.5)
LYMPHOCYTES # BLD AUTO: 1.28 K/UL (ref 1–4.8)
MCH RBC QN AUTO: 29 PG (ref 27–31)
MCHC RBC AUTO-ENTMCNC: 34.1 G/DL (ref 32–36)
MCV RBC AUTO: 85 FL (ref 82–98)
NUCLEATED RBC (/100WBC) (OHS): 0 /100 WBC
PLATELET # BLD AUTO: 277 K/UL (ref 150–450)
PMV BLD AUTO: 9.1 FL (ref 9.2–12.9)
POTASSIUM SERPL-SCNC: 4.2 MMOL/L (ref 3.5–5.1)
PROT SERPL-MCNC: 7.4 GM/DL (ref 6–8.4)
RBC # BLD AUTO: 4.87 M/UL (ref 4–5.4)
RELATIVE EOSINOPHIL (OHS): 1.5 %
RELATIVE LYMPHOCYTE (OHS): 31.5 % (ref 18–48)
RELATIVE MONOCYTE (OHS): 10.1 % (ref 4–15)
RELATIVE NEUTROPHIL (OHS): 56.2 % (ref 38–73)
SODIUM SERPL-SCNC: 140 MMOL/L (ref 136–145)
WBC # BLD AUTO: 4.06 K/UL (ref 3.9–12.7)

## 2025-07-03 PROCEDURE — 84702 CHORIONIC GONADOTROPIN TEST: CPT

## 2025-07-03 PROCEDURE — 63600175 PHARM REV CODE 636 W HCPCS: Performed by: NURSE PRACTITIONER

## 2025-07-03 PROCEDURE — A4216 STERILE WATER/SALINE, 10 ML: HCPCS | Performed by: NURSE PRACTITIONER

## 2025-07-03 PROCEDURE — 96413 CHEMO IV INFUSION 1 HR: CPT

## 2025-07-03 PROCEDURE — 82040 ASSAY OF SERUM ALBUMIN: CPT

## 2025-07-03 PROCEDURE — 36415 COLL VENOUS BLD VENIPUNCTURE: CPT

## 2025-07-03 PROCEDURE — 85025 COMPLETE CBC W/AUTO DIFF WBC: CPT

## 2025-07-03 PROCEDURE — 25000003 PHARM REV CODE 250: Performed by: NURSE PRACTITIONER

## 2025-07-03 RX ORDER — HEPARIN 100 UNIT/ML
500 SYRINGE INTRAVENOUS
Status: CANCELLED | OUTPATIENT
Start: 2025-07-03

## 2025-07-03 RX ORDER — HEPARIN 100 UNIT/ML
500 SYRINGE INTRAVENOUS
Status: DISCONTINUED | OUTPATIENT
Start: 2025-07-03 | End: 2025-07-03 | Stop reason: HOSPADM

## 2025-07-03 RX ORDER — SODIUM CHLORIDE 0.9 % (FLUSH) 0.9 %
10 SYRINGE (ML) INJECTION
Status: DISCONTINUED | OUTPATIENT
Start: 2025-07-03 | End: 2025-07-03 | Stop reason: HOSPADM

## 2025-07-03 RX ORDER — DIPHENHYDRAMINE HYDROCHLORIDE 50 MG/ML
50 INJECTION, SOLUTION INTRAMUSCULAR; INTRAVENOUS ONCE AS NEEDED
Status: DISCONTINUED | OUTPATIENT
Start: 2025-07-03 | End: 2025-07-03 | Stop reason: HOSPADM

## 2025-07-03 RX ORDER — PROCHLORPERAZINE EDISYLATE 5 MG/ML
10 INJECTION INTRAMUSCULAR; INTRAVENOUS ONCE AS NEEDED
Status: CANCELLED
Start: 2025-07-03

## 2025-07-03 RX ORDER — MEPERIDINE HYDROCHLORIDE 50 MG/ML
25 INJECTION INTRAMUSCULAR; INTRAVENOUS; SUBCUTANEOUS ONCE AS NEEDED
Status: CANCELLED
Start: 2025-07-03

## 2025-07-03 RX ORDER — DIPHENHYDRAMINE HYDROCHLORIDE 50 MG/ML
50 INJECTION, SOLUTION INTRAMUSCULAR; INTRAVENOUS ONCE AS NEEDED
Status: CANCELLED | OUTPATIENT
Start: 2025-07-03

## 2025-07-03 RX ORDER — EPINEPHRINE 0.3 MG/.3ML
0.3 INJECTION SUBCUTANEOUS ONCE AS NEEDED
Status: DISCONTINUED | OUTPATIENT
Start: 2025-07-03 | End: 2025-07-03 | Stop reason: HOSPADM

## 2025-07-03 RX ORDER — SODIUM CHLORIDE 0.9 % (FLUSH) 0.9 %
10 SYRINGE (ML) INJECTION
Status: CANCELLED | OUTPATIENT
Start: 2025-07-03

## 2025-07-03 RX ORDER — EPINEPHRINE 0.3 MG/.3ML
0.3 INJECTION SUBCUTANEOUS ONCE AS NEEDED
Status: CANCELLED | OUTPATIENT
Start: 2025-07-03

## 2025-07-03 RX ORDER — MEPERIDINE HYDROCHLORIDE 100 MG/ML
25 INJECTION INTRAMUSCULAR; INTRAVENOUS; SUBCUTANEOUS ONCE AS NEEDED
Status: DISCONTINUED | OUTPATIENT
Start: 2025-07-03 | End: 2025-07-03 | Stop reason: HOSPADM

## 2025-07-03 RX ORDER — PROCHLORPERAZINE EDISYLATE 5 MG/ML
10 INJECTION INTRAMUSCULAR; INTRAVENOUS ONCE AS NEEDED
Status: DISCONTINUED | OUTPATIENT
Start: 2025-07-03 | End: 2025-07-03 | Stop reason: HOSPADM

## 2025-07-03 RX ADMIN — SODIUM CHLORIDE: 9 INJECTION, SOLUTION INTRAVENOUS at 08:07

## 2025-07-03 RX ADMIN — HEPARIN 500 UNITS: 100 SYRINGE at 10:07

## 2025-07-03 RX ADMIN — SODIUM CHLORIDE 750 MG: 9 INJECTION, SOLUTION INTRAVENOUS at 10:07

## 2025-07-03 RX ADMIN — Medication 10 ML: at 10:07

## 2025-07-03 NOTE — PLAN OF CARE
Pt arrived. Weight and VS obtained. Assessment done. Port assessed, flushed, blood return noted. Infusing NS@25ml/hr. Will monitor throughout treatment.

## 2025-07-03 NOTE — PLAN OF CARE
Treatment completed. Pt tolerated well. VS taken Port deassessed, flushed, heparin locked. Ambulated off unit independently.

## 2025-07-08 ENCOUNTER — TELEPHONE (OUTPATIENT)
Dept: HEMATOLOGY/ONCOLOGY | Facility: CLINIC | Age: 35
End: 2025-07-08
Payer: COMMERCIAL

## 2025-07-08 NOTE — NURSING
LUIS ARMANDO Swan confirmed appt with Dr. Lance virtually for 8:15am on Friday, 7/11/25, LUIS ARMANDO De Leon

## 2025-07-10 ENCOUNTER — INFUSION (OUTPATIENT)
Dept: INFUSION THERAPY | Facility: HOSPITAL | Age: 35
End: 2025-07-10
Payer: COMMERCIAL

## 2025-07-10 DIAGNOSIS — C50.912 INVASIVE DUCTAL CARCINOMA OF BREAST, FEMALE, LEFT: Primary | ICD-10-CM

## 2025-07-10 PROCEDURE — 63600175 PHARM REV CODE 636 W HCPCS: Mod: JZ,TB | Performed by: INTERNAL MEDICINE

## 2025-07-10 PROCEDURE — 96402 CHEMO HORMON ANTINEOPL SQ/IM: CPT

## 2025-07-10 RX ADMIN — GOSERELIN ACETATE 3.6 MG: 3.6 IMPLANT SUBCUTANEOUS at 11:07

## 2025-07-10 NOTE — NURSING
Potassium of 3.2 on arrival. Will replete oral and check Mg  Resolved   Pt tolerated zoladex injection to ABD tissue. D/C in stable condition.

## 2025-07-11 ENCOUNTER — TELEPHONE (OUTPATIENT)
Dept: OBSTETRICS AND GYNECOLOGY | Facility: CLINIC | Age: 35
End: 2025-07-11
Payer: COMMERCIAL

## 2025-07-11 ENCOUNTER — DOCUMENTATION ONLY (OUTPATIENT)
Dept: HEMATOLOGY/ONCOLOGY | Facility: CLINIC | Age: 35
End: 2025-07-11
Payer: COMMERCIAL

## 2025-07-11 ENCOUNTER — OFFICE VISIT (OUTPATIENT)
Dept: HEMATOLOGY/ONCOLOGY | Facility: CLINIC | Age: 35
End: 2025-07-11
Payer: COMMERCIAL

## 2025-07-11 ENCOUNTER — PATIENT MESSAGE (OUTPATIENT)
Dept: HEMATOLOGY/ONCOLOGY | Facility: CLINIC | Age: 35
End: 2025-07-11

## 2025-07-11 DIAGNOSIS — C50.912 INVASIVE DUCTAL CARCINOMA OF BREAST, FEMALE, LEFT: Primary | ICD-10-CM

## 2025-07-11 DIAGNOSIS — C50.912 INFILTRATING DUCTAL CARCINOMA OF LEFT BREAST: ICD-10-CM

## 2025-07-11 DIAGNOSIS — E88.819 INSULIN RESISTANCE: ICD-10-CM

## 2025-07-11 DIAGNOSIS — N91.1 AMENORRHEA, SECONDARY: ICD-10-CM

## 2025-07-11 DIAGNOSIS — E28.2 PCOS (POLYCYSTIC OVARIAN SYNDROME): ICD-10-CM

## 2025-07-11 PROCEDURE — 98007 SYNCH AUDIO-VIDEO EST HI 40: CPT | Mod: 95,,, | Performed by: OBSTETRICS & GYNECOLOGY

## 2025-07-11 NOTE — PROGRESS NOTES
RN Beny conducted chart review for clinic preparations including intake, barriers to care and opportunities for greater evaluation/recommendations by provider, Haley Cadena RN.

## 2025-07-11 NOTE — PROGRESS NOTES
The patient location is: [place]  The chief complaint leading to consultation is: Contraception    Visit type: audiovisual    Face to Face time with patient: 40   minutes of total time spent on the encounter, which includes face to face time and non-face to face time preparing to see the patient (eg, review of tests), Obtaining and/or reviewing separately obtained history, Documenting clinical information in the electronic or other health record, Independently interpreting results (not separately reported) and communicating results to the patient/family/caregiver, or Care coordination (not separately reported).         Each patient to whom he or she provides medical services by telemedicine is:  (1) informed of the relationship between the physician and patient and the respective role of any other health care provider with respect to management of the patient; and (2) notified that he or she may decline to receive medical services by telemedicine and may withdraw from such care at any time.    Notes:   SUBJECTIVE:   35 y.o. female   presents today for virtual visit to discuss contraception and management of PCOS and irregular cycles with ER+TN+ breast cancer.  Patient's last menstrual period was 2025..  .    She reports taking OCPs in the past to regulate her periods. She was on and off OCPs for about 5 years and stopped last July. She took Provera in July . She stopped everything with her diagnosis. She had a period in 2024 with Provera and in April on her own. She takes Metformin and Spironolactone. She does not need contraception at this time but will in the future. She reports struggling with weight loss. She is interested in losing weight. She has lost about 9-10 pounds but desires to continue to lose. . She has PCOS     Past Medical History:   Diagnosis Date    Anxiety     Depression     Hidradenitis suppurativa     Invasive ductal carcinoma of breast, female, left     PCOS (polycystic  ovarian syndrome)      Past Surgical History:   Procedure Laterality Date    INJECTION FOR SENTINEL NODE IDENTIFICATION Left 2024    Procedure: INJECTION, FOR SENTINEL NODE IDENTIFICATION;  Surgeon: Candie Rowell MD;  Location: [facility];  Service: General;  Laterality: Left;    LUMPECTOMY, BREAST Left 2024    Procedure: LUMPECTOMY, BREAST WITH RADIOLOGICAL MARKER;  Surgeon: Candie Rowell MD;  Location: [facility] OR;  Service: General;  Laterality: Left;    REDUCTION OF BOTH BREASTS Bilateral 2024    Procedure: MAMMOPLASTY, REDUCTION;  Surgeon: Ridge Murcia DO;  Location: [facility];  Service: General;  Laterality: Bilateral;  , BILATERAL/ONCOLOGIC REDUCTION BILATERAL    SENTINEL LYMPH NODE BIOPSY Left 2024    Procedure: BIOPSY, LYMPH NODE, SENTINEL;  Surgeon: Candie Rowell MD;  Location: [facility];  Service: General;  Laterality: Left;     Social History[1]  Family History   Problem Relation Name Age of Onset    Diabetes Mother      Depression Mother      Hypertension Father      Depression Father      Arthritis Father      Breast cancer Paternal Aunt  38    Lung cancer Maternal Grandmother  62        nonsmoker    Breast cancer Paternal Grandmother  50 - 59    Acne Brother      Kidney cancer Maternal Grandfather  74    Breast cancer Other  30 - 39     OB History    Para Term  AB Living   0 0 0 0 0 0   SAB IAB Ectopic Multiple Live Births   0 0 0 0            Current Medications[2]  Allergies: Cefzil [cefprozil]     The ASCVD Risk score (Forrest WEIR, et al., 2019) failed to calculate for the following reasons:    The 2019 ASCVD risk score is only valid for ages 40 to 79      ROS:  Constitutional: +weight loss, +weight gain, fever, fatigue  Eyes:  No vision changes, glasses/contacts  ENT/Mouth: No ulcers, sinus problems, ears ringing, headache  Cardiovascular: No inability to lie flat, chest pain, exercise intolerance, swelling, heart palpitations  Respiratory: No wheezing,  coughing blood, shortness of breath, or cough  Gastrointestinal: No diarrhea, bloody stool, nausea/vomiting, constipation, gas, hemorrhoids  Genitourinary: No blood in urine, painful urination, urgency of urination, frequency of urination, incomplete emptying, incontinence, abnormal bleeding, painful periods, heavy periods, vaginal discharge, vaginal odor, painful intercourse, sexual problems, bleeding after intercourse.  Musculoskeletal: No muscle weakness  Skin/Breast: No painful breasts, nipple discharge, masses, rash, ulcers  Neurological: No passing out, seizures, numbness, headache  Endocrine: No diabetes, hypothyroid, hyperthyroid, hot flashes, hair loss, abnormal hair growth, acne  Psychiatric: No depression, crying  Hematologic: No bruises, bleeding, swollen lymph nodes, anemia.      Physical Exam    deferred  ASSESSMENT:   1. Invasive ductal carcinoma of breast, female, left        2. Infiltrating ductal carcinoma of left breast  Ambulatory referral/consult to [facility]      3. PCOS (polycystic ovarian syndrome)  Ambulatory referral/consult to [facility]    US Pelvis Comp with Transvag NON-OB (xpd      4. Amenorrhea, secondary  US Pelvis Comp with Transvag NON-OB (xpd      5. Insulin resistance        6. Body mass index (BMI) of 45.0 to 49.9 in adult  Ambulatory referral/consult to Weight Management Program            PLAN:   Counseled her on importance of at least 4 withdrawal bleeds per year. This will decrease incidence of uterine cancer. She is MT+ so she has questions about using Provera for withdrawal. She is also interested in contraception.  Will refer [facility] for [place] loss. This is important to decrease her endometrial cancer risk and is important for her overall health.   Will discuss With GYN ONC and Oncology  She is not interested in having children. She has questions about whether zoladex is to preserve her ovaries during  treatment            [facility] and [facility] Follow up Visit    Ms. Divya De Paz returns for follow-up virtually to discuss contraception. She has a history of PCOS and used Provera in the past to induce menstruation.     Ms. De Paz has a history of an ER+ ID+ HER2+ IDC of the Left Breast diagnosed 8/8/24. She is s/p left lumpectomy with bilateral breast reduction, SNBx(-) on 9/25/24. She received adjuvant TH beginning 12/11/24, completed radiation therapy 8/17/25 and started Anastrazole 4/24/25. She maintains Q3W Herceptin and monthly Zoladex.                       PCP: Carlos Villalobos MD  WWE: 2/3/23  PAP: 2/3/23  Echo: 4/7/25, next scheduled 7/17/25  Mammo: scheduled 8/5/25  Labs: 7/3/25    Cancer/Stage/TNM:   Cancer Staging   Invasive ductal carcinoma of breast, female, left  Staging form: Breast, AJCC 8th Edition  - Clinical stage from 8/8/2024: Stage IA (cT1c, cN0, cM0, G3, ER+, ID+, HER2+) - Signed by Maureen Chiu MD on 8/27/2024       Oncology History   Invasive ductal carcinoma of breast, female, left   8/8/2024 Cancer Staged    Staging form: Breast, AJCC 8th Edition  - Clinical stage from 8/8/2024: Stage IA (cT1c, cN0, cM0, G3, ER+, ID+, HER2+)     8/12/2024 Initial Diagnosis    Invasive ductal carcinoma of breast, female, left     12/12/2024 -  Chemotherapy    Treatment Summary   Plan Name: OP BREAST TRASTUZUMAB PACLITAXEL WEEKLY  Treatment Goal: Curative  Status: Active  Start Date: 12/12/2024  End Date: 11/20/2025 (Planned)  Provider: Maureen Chiu MD  Chemotherapy: PACLitaxeL (TAXOL) 80 mg/m2 = 198 mg in 0.9% NaCl 250 mL chemo infusion, 80 mg/m2 = 198 mg, Intravenous, Clinic[facility] 1 time, 12 of 12 cycles  Administration: 198 mg (12/12/2024), 198 mg (12/19/2024), 198 mg (12/26/2024), 198 mg (1/3/2025), 198 mg (1/9/2025), 198 mg (1/16/2025), 198 mg (1/30/2025), 198 mg (2/6/2025), 198 mg (2/13/2025), 198 mg (2/20/2025), 198 mg (2/27/2025), 198 mg (3/6/2025)  trastuzumab-anns  (KANJINTI) 521 mg in 0.9% NaCl 309.8 mL chemo infusion, 4 mg/kg = 521 mg, Intravenous, Clinic/[facility] 1 time, 15 of 15 cycles  Administration: 521 mg (12/12/2024), 261 mg (12/19/2024), 750 mg (3/13/2025), 261 mg (12/26/2024), 261 mg (1/3/2025), 261 mg (1/9/2025), 261 mg (1/16/2025), 261 mg (1/30/2025), 261 mg (2/6/2025), 261 mg (2/13/2025), 261 mg (2/20/2025), 261 mg (2/27/2025), 261 mg (3/6/2025), 750 mg (4/3/2025), 750 mg (4/24/2025)  trastuzumab-dkst (OGIVRI) 750 mg in 0.9% NaCl 320.7 mL chemo infusion, 782 mg, Intravenous, Clinic/[facility] 1 time, 3 of 10 cycles  Administration: 750 mg (5/15/2025), 750 mg (6/13/2025), 750 mg (7/3/2025)     3/27/2025 - 4/17/2025 Radiation Therapy    Treatment Summary  Course: C1 Breast 2025    Treatment Site Ref. ID Energy Dose/Fx (Gy) #Fx Dose Correction (Gy) Total Dose (Gy) Start Date End Date Elapsed Days   3D Breast_L Breast_L 18X/6X 2.65 16 / 16 0 42.4 3/27/2025 4/17/2025 21                Past Medical History:   Diagnosis Date    Anxiety     Depression     Hidradenitis suppurativa     Invasive ductal carcinoma of breast, female, left     PCOS (polycystic ovarian syndrome)         Current Outpatient Medications   Medication Instructions    anastrozole (ARIMIDEX) 1 mg, Oral, Daily    fluvoxaMINE (LUVOX) 50 MG Tab tablet Take 50 mg (1 tablet) by mouth for 7 days, then increase to 100 mg ( 2 tablet) Daily    LIDOcaine-prilocaine (EMLA) cream Apply topically to port one hour prior to chemotherapy infusion    metFORMIN (GLUCOPHAGE) 500 mg, Oral, 2 times daily with meals    spironolactone (ALDACTONE) 50 mg, Oral, Daily        Past Surgical History:   Procedure Laterality Date    INJECTION FOR SENTINEL NODE IDENTIFICATION Left 9/25/2024    Procedure: INJECTION, FOR SENTINEL NODE IDENTIFICATION;  Surgeon: Candie Rowell MD;  Location: [facility];  Service: General;  Laterality: Left;    LUMPECTOMY, BREAST Left 9/25/2024    Procedure: LUMPECTOMY, BREAST WITH RADIOLOGICAL MARKER;  Surgeon:  Candie Rowell MD;  Location: [facility];  Service: General;  Laterality: Left;    REDUCTION OF BOTH BREASTS Bilateral 9/25/2024    Procedure: MAMMOPLASTY, REDUCTION;  Surgeon: Ridge Murcia DO;  Location: [facility];  Service: General;  Laterality: Bilateral;  , BILATERAL/ONCOLOGIC REDUCTION BILATERAL    SENTINEL LYMPH NODE BIOPSY Left 9/25/2024    Procedure: BIOPSY, LYMPH NODE, SENTINEL;  Surgeon: Candie Rowell MD;  Location: [facility];  Service: General;  Laterality: Left;               [1]   Social History  Socioeconomic History    Marital status: Single    Number of children: 0   Occupational History    Occupation: claims  and proc     Employer: [email or web address]   Tobacco Use    Smoking status: Never     Passive exposure: Yes    Smokeless tobacco: Never    Tobacco comments:     Pt states she dont smoke    Substance and Sexual Activity    Alcohol use: Yes     Alcohol/week: 0.0 standard drinks of alcohol     Comment: occasional    Drug use: No    Sexual activity: Not Currently     Partners: Male     Birth control/protection: OCP   Other Topics Concern    Are you pregnant or think you may be? No    Breast-feeding No   Social History Narrative    From [place] [facility] 2008    Exercising      Social Drivers of Health     Financial Resource Strain: Low Risk  (6/15/2025)    Overall Financial Resource Strain (CARDIA)     Difficulty of Paying Living Expenses: Not hard at all   Food Insecurity: No Food Insecurity (6/15/2025)    Hunger Vital Sign     Worried About Running Out of Food in the Last Year: Never true     Ran Out of Food in the Last Year: Never true   Transportation Needs: No Transportation Needs (6/15/2025)    PRAPARE - Transportation     Lack of Transportation (Medical): No     Lack of Transportation (Non-Medical): No   Physical Activity: Sufficiently Active (6/15/2025)    Exercise Vital Sign     Days of Exercise per Week: 5 days     Minutes of Exercise per Session: 60 min   Stress: Stress  Concern Present (6/15/2025)    [place] [employer] - Occupational Stress Questionnaire     Feeling of Stress : Rather much   Housing Stability: Low Risk  (6/15/2025)    Housing Stability Vital Sign     Unable to Pay for Housing in the Last Year: No     Number of Times Moved in the Last Year: 0     Homeless in the Last Year: No   [2]   Current Outpatient Medications   Medication Sig Dispense Refill    anastrozole (ARIMIDEX) 1 mg Tab Take 1 tablet (1 mg total) by mouth once daily. 30 tablet 11    fluvoxaMINE (LUVOX) 50 MG Tab tablet Take 50 mg (1 tablet) by mouth for 7 days, then increase to 100 mg ( 2 tablet) Daily 60 tablet 1    LIDOcaine-prilocaine (EMLA) cream Apply topically to port one hour prior to chemotherapy infusion 30 g 1    metFORMIN (GLUCOPHAGE) 500 MG tablet Take 1 tablet (500 mg total) by mouth 2 (two) times daily with meals. 180 tablet 3    spironolactone (ALDACTONE) 50 MG tablet Take 1 tablet (50 mg total) by mouth once daily. 90 tablet 3     No current facility-administered medications for this visit.

## 2025-07-11 NOTE — TELEPHONE ENCOUNTER
----- Message from Kelly Lance MD sent at 7/11/2025  8:58 AM CDT -----  Hi! I had no MA until about 5 minutes ago here. Can you please call and schedule this patient for a pelvic ultrasound?  Thank you!

## 2025-07-15 ENCOUNTER — TELEPHONE (OUTPATIENT)
Dept: GYNECOLOGIC ONCOLOGY | Facility: CLINIC | Age: 35
End: 2025-07-15
Payer: COMMERCIAL

## 2025-07-15 NOTE — TELEPHONE ENCOUNTER
----- Message from Kelly Lance MD sent at 7/14/2025  5:05 PM CDT -----  ER+OK+ breast cancer with PCOS and secondary amenorrhea and BMI of 46  Patient desires hyst. I spoke to her and she is aware of consult.   Thank you !

## 2025-07-17 ENCOUNTER — HOSPITAL ENCOUNTER (OUTPATIENT)
Dept: RADIOLOGY | Facility: OTHER | Age: 35
Discharge: HOME | End: 2025-07-17
Attending: OBSTETRICS & GYNECOLOGY
Payer: COMMERCIAL

## 2025-07-17 ENCOUNTER — HOSPITAL ENCOUNTER (OUTPATIENT)
Dept: CARDIOLOGY | Facility: HOSPITAL | Age: 35
Discharge: HOME | End: 2025-07-17
Attending: NURSE PRACTITIONER
Payer: COMMERCIAL

## 2025-07-17 VITALS
WEIGHT: 286 LBS | DIASTOLIC BLOOD PRESSURE: 88 MMHG | HEART RATE: 88 BPM | SYSTOLIC BLOOD PRESSURE: 128 MMHG | BODY MASS INDEX: 45.96 KG/M2 | HEIGHT: 66 IN

## 2025-07-17 DIAGNOSIS — E28.2 PCOS (POLYCYSTIC OVARIAN SYNDROME): ICD-10-CM

## 2025-07-17 DIAGNOSIS — N91.1 AMENORRHEA, SECONDARY: ICD-10-CM

## 2025-07-17 DIAGNOSIS — C50.912 INVASIVE DUCTAL CARCINOMA OF BREAST, FEMALE, LEFT: ICD-10-CM

## 2025-07-17 LAB
AORTIC SIZE INDEX (SOV): 1.3 CM/M2
AORTIC SIZE INDEX: 1.2 CM/M2
ASCENDING AORTA: 2.9 CM
AV AREA BY CONTINUOUS VTI: 3.3 CM2
AV INDEX (PROSTH): 0.88
AV LVOT MEAN GRADIENT: 3 MMHG
AV LVOT PEAK GRADIENT: 4 MMHG
AV MEAN GRADIENT: 4 MMHG
AV PEAK GRADIENT: 6 MMHG
AV VALVE AREA BY VELOCITY RATIO: 3.5 CM²
AV VALVE AREA: 3.3 CM2
AV VELOCITY RATIO: 0.92
BSA FOR ECHO PROCEDURE: 2.46 M2
CV ECHO LV RWT: 0.56 CM
DOP CALC AO PEAK VEL: 1.2 M/S
DOP CALC AO VTI: 24.8 CM
DOP CALC LVOT AREA: 3.8 CM2
DOP CALC LVOT DIAMETER: 2.2 CM
DOP CALC LVOT PEAK VEL: 1.1 M/S
DOP CALC LVOT STROKE VOLUME: 82.8 CM3
DOP CALCLVOT PEAK VEL VTI: 21.8 CM
E WAVE DECELERATION TIME: 241 MS
E/A RATIO: 1.48
E/E' RATIO: 7 M/S
ECHO EF ESTIMATED: 63 %
ECHO LV POSTERIOR WALL: 1 CM (ref 0.6–1.1)
FRACTIONAL SHORTENING: 33.3 % (ref 28–44)
GLOBAL LONGITUIDAL STRAIN: 18 %
INTERVENTRICULAR SEPTUM: 1.1 CM (ref 0.6–1.1)
IVRT: 95 MS
LA MAJOR: 4.8 CM
LA MINOR: 5 CM
LA WIDTH: 3.6 CM
LEFT ATRIUM SIZE: 3.6 CM
LEFT ATRIUM VOLUME INDEX MOD: 33 ML/M2
LEFT ATRIUM VOLUME INDEX: 23 ML/M2
LEFT ATRIUM VOLUME MOD: 76 ML
LEFT ATRIUM VOLUME: 54 CM3
LEFT INTERNAL DIMENSION IN SYSTOLE: 2.4 CM (ref 2.1–4)
LEFT VENTRICLE DIASTOLIC VOLUME INDEX: 23.61 ML/M2
LEFT VENTRICLE DIASTOLIC VOLUME: 55 ML
LEFT VENTRICLE MASS INDEX: 49.7 G/M2
LEFT VENTRICLE SYSTOLIC VOLUME INDEX: 8.6 ML/M2
LEFT VENTRICLE SYSTOLIC VOLUME: 20 ML
LEFT VENTRICULAR INTERNAL DIMENSION IN DIASTOLE: 3.6 CM (ref 3.5–6)
LEFT VENTRICULAR MASS: 115.9 G
LV LATERAL E/E' RATIO: 6.6
LV SEPTAL E/E' RATIO: 7.8
Lab: 1.7 CM/M
Lab: 1.8 CM/M
MV A" WAVE DURATION": 62.8 MS
MV PEAK A VEL: 0.63 M/S
MV PEAK E VEL: 0.93 M/S
OHS CV CPX PATIENT HEIGHT IN: 66
OHS CV RV/LV RATIO: 0.92 CM
PULM VEIN A" WAVE DURATION": 62.8 MS
PULM VEIN S/D RATIO: 1.07
PULMONIC VEIN PEAK A VELOCITY: 0.2 M/S
PV PEAK D VEL: 0.41 M/S
PV PEAK S VEL: 0.44 M/S
RA MAJOR: 4.16 CM
RA PRESSURE ESTIMATED: 3 MMHG
RA WIDTH: 3.6 CM
RIGHT ATRIAL AREA: 14.3 CM2
RIGHT VENTRICLE DIASTOLIC BASEL DIMENSION: 3.3 CM
SINUS: 3 CM
STJ: 2.9 CM
TDI LATERAL: 0.14 M/S
TDI SEPTAL: 0.12 M/S
TDI: 0.13 M/S
TRICUSPID ANNULAR PLANE SYSTOLIC EXCURSION: 2 CM
Z-SCORE OF LEFT VENTRICULAR DIMENSION IN END DIASTOLE: -9.64
Z-SCORE OF LEFT VENTRICULAR DIMENSION IN END SYSTOLE: -6.82

## 2025-07-17 PROCEDURE — 76830 TRANSVAGINAL US NON-OB: CPT | Mod: TC

## 2025-07-17 PROCEDURE — 93356 MYOCRD STRAIN IMG SPCKL TRCK: CPT | Mod: ,,, | Performed by: INTERNAL MEDICINE

## 2025-07-17 PROCEDURE — 93306 TTE W/DOPPLER COMPLETE: CPT

## 2025-07-17 PROCEDURE — 93306 TTE W/DOPPLER COMPLETE: CPT | Mod: 26,,, | Performed by: INTERNAL MEDICINE

## 2025-07-22 NOTE — PROGRESS NOTES
Subjective:      Patient ID: Divya De Paz is a 35 y.o. female.    Chief Complaint: Advice Only      HPI    35 yr old para 0 referred from Dr Lance for consideration of hysterectomy. Personal history of IDC of left breast. Has irregular cycles currently. On and off of OCPs for last 5 years. Breast cancer was AZ+ so she is reluctant to take progesterone.    She does not desire future fertility    9/2024 left breast lumpectomy, bilateral mammoplasty, SLN  Stage IA (cT1c, cN0, cM0, G3, ER+, AZ+, HER2+)     Adjuvant TH started 12/11/24 (delay due to poor wound healing after surgery)   Radiation completed: April 17, 2025  Anatrazole started April 24, 2025 7/17/2025 pelvic US  Uterus 5.3 x 4.7 x 2.9 cm, ES 3 mm  R ov 1.6 cm  L ov 2.7 cm  No FF       LMP 5/28/2025    Last pap smear 2/2023 NILM, neg HR HPV    History for HS, PCOS (Metformin and Spironolactone) BMI 46    No abdominal/pelvic surgeries    Family history for breast cancer -- pat aunt, PGM. MGM - lung cancer (non smoker). No gyn/colon cancer.      Review of Systems   Constitutional:  Negative for chills, diaphoresis, fatigue and fever.   Respiratory:  Negative for chest tightness, shortness of breath and wheezing.    Cardiovascular:  Negative for chest pain, palpitations and leg swelling.   Gastrointestinal:  Negative for abdominal pain, constipation, diarrhea, nausea and vomiting.   Genitourinary:  Positive for menstrual problem. Negative for difficulty urinating, dysuria, flank pain, frequency, genital sores, hematuria, pelvic pain, urgency, vaginal bleeding, vaginal discharge and vaginal pain.   Skin:  Negative for color change.   Neurological:  Negative for dizziness, light-headedness and headaches.   Psychiatric/Behavioral:  Negative for agitation.        Past Medical History:   Diagnosis Date    Anxiety     Depression     Hidradenitis suppurativa     Invasive ductal carcinoma of breast, female, left     PCOS (polycystic ovarian syndrome)       Past Surgical History:   Procedure Laterality Date    INJECTION FOR SENTINEL NODE IDENTIFICATION Left 9/25/2024    Procedure: INJECTION, FOR SENTINEL NODE IDENTIFICATION;  Surgeon: Candie Rowell MD;  Location: [facility];  Service: General;  Laterality: Left;    LUMPECTOMY, BREAST Left 9/25/2024    Procedure: LUMPECTOMY, BREAST WITH RADIOLOGICAL MARKER;  Surgeon: Candie Rowell MD;  Location: [facility] OR;  Service: General;  Laterality: Left;    REDUCTION OF BOTH BREASTS Bilateral 9/25/2024    Procedure: MAMMOPLASTY, REDUCTION;  Surgeon: Ridge Murcia DO;  Location: [facility];  Service: General;  Laterality: Bilateral;  , BILATERAL/ONCOLOGIC REDUCTION BILATERAL    SENTINEL LYMPH NODE BIOPSY Left 9/25/2024    Procedure: BIOPSY, LYMPH NODE, SENTINEL;  Surgeon: Candie Rowell MD;  Location: [facility];  Service: General;  Laterality: Left;     Family History   Problem Relation Name Age of Onset    Diabetes Mother      Depression Mother      Hypertension Father      Depression Father      Arthritis Father      Breast cancer Paternal Aunt  38    Lung cancer Maternal Grandmother  62        nonsmoker    Breast cancer Paternal Grandmother  50 - 59    Acne Brother      Kidney cancer Maternal Grandfather  74    Breast cancer Other  30 - 39     Social History[1]  Current Outpatient Medications   Medication Sig    anastrozole (ARIMIDEX) 1 mg Tab Take 1 tablet (1 mg total) by mouth once daily.    fluvoxaMINE (LUVOX) 50 MG Tab tablet Take 50 mg (1 tablet) by mouth for 7 days, then increase to 100 mg ( 2 tablet) Daily    LIDOcaine-prilocaine (EMLA) cream Apply topically to port one hour prior to chemotherapy infusion    metFORMIN (GLUCOPHAGE) 500 MG tablet Take 1 tablet (500 mg total) by mouth 2 (two) times daily with meals.    spironolactone (ALDACTONE) 50 MG tablet Take 1 tablet (50 mg total) by mouth once daily.     No current facility-administered medications for this visit.     Review of patient's allergies indicates:    Allergen Reactions    Cefzil [cefprozil] Hives       Objective:   Physical Exam:   Constitutional: She is oriented to person, place, and time. She appears well-developed and well-nourished. No distress.    HENT:   Head: Normocephalic and atraumatic.    Eyes: No scleral icterus.     Cardiovascular:       Exam reveals no cyanosis and no edema.        Pulmonary/Chest: Effort normal. No respiratory distress.        Abdominal: Soft. She exhibits no distension and no fluid wave. There is no abdominal tenderness. There is no rigidity.     Genitourinary: Cervix is normal. Right adnexum displays no mass, no tenderness and no fullness. Left adnexum displays no mass, no tenderness and no fullness. No bleeding in the vagina. Uterus is not enlarged, not fixed, not tender and not hosting fibroids.    Genitourinary Comments:                   Musculoskeletal: Normal range of motion and moves all extremeties. No edema.       Neurological: She is alert and oriented to person, place, and time.    Skin: Skin is warm. No rash noted. No cyanosis or erythema.    Psychiatric: She has a normal mood and affect. Thought content normal.       Assessment:     1. PCOS (polycystic ovarian syndrome)    2. Invasive ductal carcinoma of left breast in female    3. Secondary oligomenorrhea        Plan:       We discussed the rationale for consideration of hysterectomy with BSO today given her hormone receptor positive breast cancer as well as anovulatory bleeding and underlying risk factors for endometrial hyperplasia and endometrial cancer. Induction of menses to prevent endometrial lining overgrowth would require repeated rounds of progestin therapy.        We discussed minimally invasive surgical approach with the use of the robotic platform and anticipated recover. She will take some time to think about the things we have discussed and will review her schedule for surgical scheduling.     She was allowed to ask questions and all were answered  to her satisfaction. I appreciate the opportunity to be involved with ehr care.     I spent approximately 45 minutes reviewing the available records and evaluating the patient, out of which over 50% of the time was spent face to face with the patient in counseling and coordinating this patient's care.           [1]   Social History  Socioeconomic History    Marital status: Single    Number of children: 0   Occupational History    Occupation: claims  and proc     Employer: [employer]   Tobacco Use    Smoking status: Never     Passive exposure: Yes    Smokeless tobacco: Never    Tobacco comments:     Pt states she dont smoke    Substance and Sexual Activity    Alcohol use: Yes     Alcohol/week: 0.0 standard drinks of alcohol     Comment: occasional    Drug use: No    Sexual activity: Not Currently     Partners: Male     Birth control/protection: OCP   Other Topics Concern    Are you pregnant or think you may be? No    Breast-feeding No   Social History Narrative    From [place] to [facility] 2008    Exercising      Social Drivers of Health     Financial Resource Strain: Low Risk  (6/15/2025)    Overall Financial Resource Strain (CARDIA)     Difficulty of Paying Living Expenses: Not hard at all   Food Insecurity: No Food Insecurity (6/15/2025)    Hunger Vital Sign     Worried About Running Out of Food in the Last Year: Never true     Ran Out of Food in the Last Year: Never true   Transportation Needs: No Transportation Needs (6/15/2025)    PRAPARE - Transportation     Lack of Transportation (Medical): No     Lack of Transportation (Non-Medical): No   Physical Activity: Sufficiently Active (6/15/2025)    Exercise Vital Sign     Days of Exercise per Week: 5 days     Minutes of Exercise per Session: 60 min   Stress: Stress Concern Present (6/15/2025)    [place] [employer] - Occupational Stress Questionnaire     Feeling of Stress : Rather much   Housing Stability: Low Risk  (6/15/2025)     Housing Stability Vital Sign     Unable to Pay for Housing in the Last Year: No     Number of Times Moved in the Last Year: 0     Homeless in the Last Year: No      07-Apr-2022 00:10

## 2025-07-23 ENCOUNTER — OFFICE VISIT (OUTPATIENT)
Dept: GYNECOLOGIC ONCOLOGY | Facility: CLINIC | Age: 35
End: 2025-07-23
Attending: OBSTETRICS & GYNECOLOGY
Payer: COMMERCIAL

## 2025-07-23 VITALS
DIASTOLIC BLOOD PRESSURE: 86 MMHG | BODY MASS INDEX: 46.48 KG/M2 | WEIGHT: 288 LBS | SYSTOLIC BLOOD PRESSURE: 140 MMHG | HEART RATE: 75 BPM

## 2025-07-23 DIAGNOSIS — C50.912 INVASIVE DUCTAL CARCINOMA OF LEFT BREAST IN FEMALE: ICD-10-CM

## 2025-07-23 DIAGNOSIS — N91.4 SECONDARY OLIGOMENORRHEA: ICD-10-CM

## 2025-07-23 DIAGNOSIS — E28.2 PCOS (POLYCYSTIC OVARIAN SYNDROME): Primary | ICD-10-CM

## 2025-07-23 PROCEDURE — 99999 PR PBB SHADOW E&M-EST. PATIENT-LVL III: CPT | Mod: PBBFAC,,, | Performed by: OBSTETRICS & GYNECOLOGY

## 2025-07-23 NOTE — Clinical Note
Robert Reddy-- just FYGORDON, we are considering hyst/bso. I know she will want to discuss with you as well. Thank you both for involving me with her care.

## 2025-07-24 ENCOUNTER — INFUSION (OUTPATIENT)
Dept: INFUSION THERAPY | Facility: HOSPITAL | Age: 35
End: 2025-07-24
Payer: COMMERCIAL

## 2025-07-24 VITALS
WEIGHT: 288.06 LBS | SYSTOLIC BLOOD PRESSURE: 140 MMHG | HEART RATE: 75 BPM | RESPIRATION RATE: 16 BRPM | TEMPERATURE: 98 F | HEIGHT: 67 IN | DIASTOLIC BLOOD PRESSURE: 86 MMHG | BODY MASS INDEX: 45.21 KG/M2

## 2025-07-24 DIAGNOSIS — C50.912 INVASIVE DUCTAL CARCINOMA OF BREAST, FEMALE, LEFT: Primary | ICD-10-CM

## 2025-07-24 PROCEDURE — A4216 STERILE WATER/SALINE, 10 ML: HCPCS | Performed by: INTERNAL MEDICINE

## 2025-07-24 PROCEDURE — 63600175 PHARM REV CODE 636 W HCPCS: Mod: TB | Performed by: INTERNAL MEDICINE

## 2025-07-24 PROCEDURE — 25000003 PHARM REV CODE 250: Performed by: INTERNAL MEDICINE

## 2025-07-24 PROCEDURE — 96413 CHEMO IV INFUSION 1 HR: CPT

## 2025-07-24 RX ORDER — SODIUM CHLORIDE 0.9 % (FLUSH) 0.9 %
10 SYRINGE (ML) INJECTION
Status: DISCONTINUED | OUTPATIENT
Start: 2025-07-24 | End: 2025-07-24 | Stop reason: HOSPADM

## 2025-07-24 RX ORDER — DIPHENHYDRAMINE HYDROCHLORIDE 50 MG/ML
50 INJECTION, SOLUTION INTRAMUSCULAR; INTRAVENOUS ONCE AS NEEDED
Status: DISCONTINUED | OUTPATIENT
Start: 2025-07-24 | End: 2025-07-24 | Stop reason: HOSPADM

## 2025-07-24 RX ORDER — HEPARIN 100 UNIT/ML
500 SYRINGE INTRAVENOUS
Status: CANCELLED | OUTPATIENT
Start: 2025-07-24

## 2025-07-24 RX ORDER — MEPERIDINE HYDROCHLORIDE 100 MG/ML
25 INJECTION INTRAMUSCULAR; INTRAVENOUS; SUBCUTANEOUS ONCE AS NEEDED
Status: DISCONTINUED | OUTPATIENT
Start: 2025-07-24 | End: 2025-07-24 | Stop reason: HOSPADM

## 2025-07-24 RX ORDER — DIPHENHYDRAMINE HYDROCHLORIDE 50 MG/ML
50 INJECTION, SOLUTION INTRAMUSCULAR; INTRAVENOUS ONCE AS NEEDED
Status: CANCELLED | OUTPATIENT
Start: 2025-07-24

## 2025-07-24 RX ORDER — EPINEPHRINE 0.3 MG/.3ML
0.3 INJECTION SUBCUTANEOUS ONCE AS NEEDED
Status: DISCONTINUED | OUTPATIENT
Start: 2025-07-24 | End: 2025-07-24 | Stop reason: HOSPADM

## 2025-07-24 RX ORDER — MEPERIDINE HYDROCHLORIDE 50 MG/ML
25 INJECTION INTRAMUSCULAR; INTRAVENOUS; SUBCUTANEOUS ONCE AS NEEDED
Status: CANCELLED
Start: 2025-07-24

## 2025-07-24 RX ORDER — PROCHLORPERAZINE EDISYLATE 5 MG/ML
10 INJECTION INTRAMUSCULAR; INTRAVENOUS ONCE AS NEEDED
Status: CANCELLED
Start: 2025-07-24

## 2025-07-24 RX ORDER — EPINEPHRINE 0.3 MG/.3ML
0.3 INJECTION SUBCUTANEOUS ONCE AS NEEDED
Status: CANCELLED | OUTPATIENT
Start: 2025-07-24

## 2025-07-24 RX ORDER — SODIUM CHLORIDE 0.9 % (FLUSH) 0.9 %
10 SYRINGE (ML) INJECTION
Status: CANCELLED | OUTPATIENT
Start: 2025-07-24

## 2025-07-24 RX ORDER — HEPARIN 100 UNIT/ML
500 SYRINGE INTRAVENOUS
Status: DISCONTINUED | OUTPATIENT
Start: 2025-07-24 | End: 2025-07-24 | Stop reason: HOSPADM

## 2025-07-24 RX ORDER — PROCHLORPERAZINE EDISYLATE 5 MG/ML
10 INJECTION INTRAMUSCULAR; INTRAVENOUS ONCE AS NEEDED
Status: DISCONTINUED | OUTPATIENT
Start: 2025-07-24 | End: 2025-07-24 | Stop reason: HOSPADM

## 2025-07-24 RX ADMIN — Medication 10 ML: at 10:07

## 2025-07-24 RX ADMIN — TRASTUZUMAB-DKST 750 MG: KIT at 10:07

## 2025-07-24 RX ADMIN — HEPARIN 500 UNITS: 100 SYRINGE at 10:07

## 2025-07-24 NOTE — PLAN OF CARE
0900-Labs , hx, and medications reviewed. Assessment completed. Discussed plan of care with patient. Patient in agreement. Chair reclined and warm blanket and snack offered.

## 2025-07-24 NOTE — PLAN OF CARE
1100-Patient tolerated treatment well. Port was flushed and heparin locked, then de-accessed. Discharged without complaints or S/S of adverse event.  Instructed to call provider for any questions or concerns.

## 2025-07-25 RX ORDER — SPIRONOLACTONE 50 MG/1
50 TABLET, FILM COATED ORAL DAILY
Qty: 90 TABLET | Refills: 3 | OUTPATIENT
Start: 2025-07-25 | End: 2026-07-25

## 2025-08-05 ENCOUNTER — OFFICE VISIT (OUTPATIENT)
Dept: SURGERY | Facility: CLINIC | Age: 35
End: 2025-08-05
Payer: COMMERCIAL

## 2025-08-05 ENCOUNTER — HOSPITAL ENCOUNTER (OUTPATIENT)
Dept: RADIOLOGY | Facility: HOSPITAL | Age: 35
Discharge: HOME | End: 2025-08-05
Attending: SURGERY
Payer: COMMERCIAL

## 2025-08-05 VITALS
HEIGHT: 67 IN | WEIGHT: 288 LBS | HEART RATE: 88 BPM | BODY MASS INDEX: 45.2 KG/M2 | SYSTOLIC BLOOD PRESSURE: 121 MMHG | DIASTOLIC BLOOD PRESSURE: 78 MMHG

## 2025-08-05 DIAGNOSIS — C50.912 INVASIVE DUCTAL CARCINOMA OF BREAST, FEMALE, LEFT: ICD-10-CM

## 2025-08-05 DIAGNOSIS — Z08 ENCOUNTER FOR FOLLOW-UP EXAMINATION AFTER COMPLETED TREATMENT FOR MALIGNANT NEOPLASM: Primary | ICD-10-CM

## 2025-08-05 DIAGNOSIS — Z12.31 SCREENING MAMMOGRAM, ENCOUNTER FOR: ICD-10-CM

## 2025-08-05 DIAGNOSIS — Z91.89 AT HIGH RISK FOR BREAST CANCER: ICD-10-CM

## 2025-08-05 PROCEDURE — 77067 SCR MAMMO BI INCL CAD: CPT | Mod: 26,,, | Performed by: RADIOLOGY

## 2025-08-05 PROCEDURE — 99999 PR PBB SHADOW E&M-EST. PATIENT-LVL III: CPT | Mod: PBBFAC,,, | Performed by: SURGERY

## 2025-08-05 PROCEDURE — 3074F SYST BP LT 130 MM HG: CPT | Mod: CPTII,S$GLB,, | Performed by: SURGERY

## 2025-08-05 PROCEDURE — 77063 BREAST TOMOSYNTHESIS BI: CPT | Mod: 26,,, | Performed by: RADIOLOGY

## 2025-08-05 PROCEDURE — 3078F DIAST BP <80 MM HG: CPT | Mod: CPTII,S$GLB,, | Performed by: SURGERY

## 2025-08-05 PROCEDURE — 77067 SCR MAMMO BI INCL CAD: CPT | Mod: TC

## 2025-08-05 PROCEDURE — 3008F BODY MASS INDEX DOCD: CPT | Mod: CPTII,S$GLB,, | Performed by: SURGERY

## 2025-08-05 PROCEDURE — 99213 OFFICE O/P EST LOW 20 MIN: CPT | Mod: S$GLB,,, | Performed by: SURGERY

## 2025-08-07 ENCOUNTER — INFUSION (OUTPATIENT)
Dept: INFUSION THERAPY | Facility: HOSPITAL | Age: 35
End: 2025-08-07
Payer: COMMERCIAL

## 2025-08-07 ENCOUNTER — HOSPITAL ENCOUNTER (OUTPATIENT)
Dept: RADIOLOGY | Facility: CLINIC | Age: 35
Discharge: HOME | End: 2025-08-07
Attending: NURSE PRACTITIONER
Payer: COMMERCIAL

## 2025-08-07 DIAGNOSIS — C50.912 INVASIVE DUCTAL CARCINOMA OF BREAST, FEMALE, LEFT: ICD-10-CM

## 2025-08-07 DIAGNOSIS — Z79.811 AROMATASE INHIBITOR USE: ICD-10-CM

## 2025-08-07 DIAGNOSIS — C50.912 INVASIVE DUCTAL CARCINOMA OF BREAST, FEMALE, LEFT: Primary | ICD-10-CM

## 2025-08-07 PROCEDURE — 63600175 PHARM REV CODE 636 W HCPCS: Mod: JZ,TB | Performed by: INTERNAL MEDICINE

## 2025-08-07 PROCEDURE — 96402 CHEMO HORMON ANTINEOPL SQ/IM: CPT

## 2025-08-07 PROCEDURE — 77080 DXA BONE DENSITY AXIAL: CPT | Mod: TC

## 2025-08-07 RX ADMIN — GOSERELIN ACETATE 3.6 MG: 3.6 IMPLANT SUBCUTANEOUS at 10:08

## 2025-08-08 ENCOUNTER — OFFICE VISIT (OUTPATIENT)
Dept: PSYCHIATRY | Facility: CLINIC | Age: 35
End: 2025-08-08
Payer: COMMERCIAL

## 2025-08-08 ENCOUNTER — PATIENT MESSAGE (OUTPATIENT)
Dept: INTERNAL MEDICINE | Facility: CLINIC | Age: 35
End: 2025-08-08

## 2025-08-08 ENCOUNTER — OFFICE VISIT (OUTPATIENT)
Dept: INTERNAL MEDICINE | Facility: CLINIC | Age: 35
End: 2025-08-08
Payer: COMMERCIAL

## 2025-08-08 DIAGNOSIS — E66.813 OBESITY, CLASS III, BMI 40-49.9 (MORBID OBESITY): Primary | ICD-10-CM

## 2025-08-08 DIAGNOSIS — F41.1 GENERALIZED ANXIETY DISORDER: Primary | ICD-10-CM

## 2025-08-08 DIAGNOSIS — F33.1 MDD (MAJOR DEPRESSIVE DISORDER), RECURRENT EPISODE, MODERATE: ICD-10-CM

## 2025-08-08 PROCEDURE — G2211 COMPLEX E/M VISIT ADD ON: HCPCS | Mod: 95,,, | Performed by: NURSE PRACTITIONER

## 2025-08-08 PROCEDURE — 98006 SYNCH AUDIO-VIDEO EST MOD 30: CPT | Mod: 95,,, | Performed by: NURSE PRACTITIONER

## 2025-08-08 PROCEDURE — 90833 PSYTX W PT W E/M 30 MIN: CPT | Mod: 95,,, | Performed by: NURSE PRACTITIONER

## 2025-08-08 RX ORDER — TIRZEPATIDE 2.5 MG/.5ML
2.5 INJECTION, SOLUTION SUBCUTANEOUS
Qty: 2 ML | Refills: 0 | Status: ACTIVE | OUTPATIENT
Start: 2025-08-08

## 2025-08-08 RX ORDER — FLUVOXAMINE MALEATE 150 MG/1
150 CAPSULE, EXTENDED RELEASE ORAL NIGHTLY
Qty: 90 CAPSULE | Refills: 0 | Status: SHIPPED | OUTPATIENT
Start: 2025-08-08 | End: 2025-11-09

## 2025-08-08 RX ORDER — TIRZEPATIDE 5 MG/.5ML
5 INJECTION, SOLUTION SUBCUTANEOUS
Qty: 2 ML | Refills: 1 | Status: ACTIVE | OUTPATIENT
Start: 2025-08-08

## 2025-08-08 NOTE — PROGRESS NOTES
Patient ID: Divya De Paz is a 35 y.o. White female    Subjective  Chief Complaint: patient presents for medical weight loss management.    Contraindications to GLP-1 receptor agonist therapy:   Denies personal or family history of MTC and personal history of MEN2     Pregnancy Status:   - Pt denies current pregnancy, breastfeeding, or plans to become pregnant.  - Pt denies current use of oral hormonal contraception.     Co-morbidities: PCOS    History of weight loss therapy:  Pt denies previous weight management medication use.     Weight loss history:  Starting weight:    8/6/2025   Recent Readings    Weight (lbs) 288 lb    BMI 45.1 BMI      Objective  Lab Results   Component Value Date     07/03/2025     06/13/2025     03/13/2025     Lab Results   Component Value Date    K 4.2 07/03/2025    K 4.3 06/13/2025    K 4.3 03/13/2025     Lab Results   Component Value Date     07/03/2025     06/13/2025     03/13/2025     Lab Results   Component Value Date    CO2 23 07/03/2025    CO2 23 06/13/2025    CO2 22 (L) 03/13/2025     Lab Results   Component Value Date    BUN 14 07/03/2025    BUN 13 06/13/2025    BUN 16 03/13/2025     Lab Results   Component Value Date     07/03/2025    GLU 75 06/13/2025     (H) 03/13/2025     Lab Results   Component Value Date    CALCIUM 9.6 07/03/2025    CALCIUM 9.0 06/13/2025    CALCIUM 9.3 03/13/2025     Lab Results   Component Value Date    PROT 7.4 07/03/2025    PROT 7.4 06/13/2025    PROT 7.2 03/13/2025     Lab Results   Component Value Date    ALBUMIN 4.3 07/03/2025    ALBUMIN 4.3 06/13/2025    ALBUMIN 3.9 03/13/2025     Lab Results   Component Value Date    BILITOT 0.9 07/03/2025    BILITOT 1.0 06/13/2025    BILITOT 0.9 03/13/2025     Lab Results   Component Value Date    AST 16 07/03/2025    AST 15 06/13/2025    AST 18 03/13/2025     Lab Results   Component Value Date    ALT 29 07/03/2025    ALT 25 06/13/2025    ALT 30 03/13/2025      Lab Results   Component Value Date    ANIONGAP 11 07/03/2025    ANIONGAP 10 06/13/2025    ANIONGAP 8 03/13/2025     Lab Results   Component Value Date    CREATININE 0.7 07/03/2025    CREATININE 0.6 06/13/2025    CREATININE 0.7 03/13/2025     Lab Results   Component Value Date    EGFRNORACEVR >60 07/03/2025    EGFRNORACEVR >60 06/13/2025    EGFRNORACEVR >60.0 03/13/2025     Assessment/Plan  -Pt qualifies for GLP-1 RA therapy based on BMI greater than or equal to 30 kg/m2  - Initiate Zepbound 2.5 mg SQ weekly x 4 weeks  - Then increase to Zepbound 5 mg SQ weekly  - RTC in 3 months for follow-up evaluation    Patient consented to pharmacist management via collaborative practice.

## 2025-08-14 ENCOUNTER — OFFICE VISIT (OUTPATIENT)
Dept: HEMATOLOGY/ONCOLOGY | Facility: CLINIC | Age: 35
End: 2025-08-14
Payer: COMMERCIAL

## 2025-08-14 ENCOUNTER — INFUSION (OUTPATIENT)
Dept: INFUSION THERAPY | Facility: HOSPITAL | Age: 35
End: 2025-08-14
Payer: COMMERCIAL

## 2025-08-14 VITALS
HEART RATE: 80 BPM | BODY MASS INDEX: 45.38 KG/M2 | WEIGHT: 289.13 LBS | RESPIRATION RATE: 16 BRPM | SYSTOLIC BLOOD PRESSURE: 142 MMHG | HEIGHT: 67 IN | DIASTOLIC BLOOD PRESSURE: 83 MMHG | TEMPERATURE: 98 F | OXYGEN SATURATION: 97 %

## 2025-08-14 VITALS
HEIGHT: 67 IN | OXYGEN SATURATION: 100 % | RESPIRATION RATE: 18 BRPM | WEIGHT: 289 LBS | BODY MASS INDEX: 45.36 KG/M2 | SYSTOLIC BLOOD PRESSURE: 142 MMHG | DIASTOLIC BLOOD PRESSURE: 83 MMHG | HEART RATE: 80 BPM

## 2025-08-14 DIAGNOSIS — E28.2 PCOS (POLYCYSTIC OVARIAN SYNDROME): ICD-10-CM

## 2025-08-14 DIAGNOSIS — C50.912 INVASIVE DUCTAL CARCINOMA OF BREAST, FEMALE, LEFT: Primary | ICD-10-CM

## 2025-08-14 PROCEDURE — 3077F SYST BP >= 140 MM HG: CPT | Mod: CPTII,S$GLB,, | Performed by: INTERNAL MEDICINE

## 2025-08-14 PROCEDURE — 96413 CHEMO IV INFUSION 1 HR: CPT

## 2025-08-14 PROCEDURE — G2211 COMPLEX E/M VISIT ADD ON: HCPCS | Mod: S$GLB,,, | Performed by: INTERNAL MEDICINE

## 2025-08-14 PROCEDURE — 3079F DIAST BP 80-89 MM HG: CPT | Mod: CPTII,S$GLB,, | Performed by: INTERNAL MEDICINE

## 2025-08-14 PROCEDURE — 63600175 PHARM REV CODE 636 W HCPCS: Mod: TB | Performed by: INTERNAL MEDICINE

## 2025-08-14 PROCEDURE — 99215 OFFICE O/P EST HI 40 MIN: CPT | Mod: S$GLB,,, | Performed by: INTERNAL MEDICINE

## 2025-08-14 PROCEDURE — 1159F MED LIST DOCD IN RCRD: CPT | Mod: CPTII,S$GLB,, | Performed by: INTERNAL MEDICINE

## 2025-08-14 PROCEDURE — 3008F BODY MASS INDEX DOCD: CPT | Mod: CPTII,S$GLB,, | Performed by: INTERNAL MEDICINE

## 2025-08-14 PROCEDURE — 99999 PR PBB SHADOW E&M-EST. PATIENT-LVL III: CPT | Mod: PBBFAC,,, | Performed by: INTERNAL MEDICINE

## 2025-08-14 PROCEDURE — 25000003 PHARM REV CODE 250: Performed by: INTERNAL MEDICINE

## 2025-08-14 RX ORDER — MEPERIDINE HYDROCHLORIDE 50 MG/ML
25 INJECTION INTRAMUSCULAR; INTRAVENOUS; SUBCUTANEOUS ONCE AS NEEDED
Start: 2025-09-25

## 2025-08-14 RX ORDER — EPINEPHRINE 0.3 MG/.3ML
0.3 INJECTION SUBCUTANEOUS ONCE AS NEEDED
Status: CANCELLED | OUTPATIENT
Start: 2025-08-14

## 2025-08-14 RX ORDER — DIPHENHYDRAMINE HYDROCHLORIDE 50 MG/ML
50 INJECTION, SOLUTION INTRAMUSCULAR; INTRAVENOUS ONCE AS NEEDED
OUTPATIENT
Start: 2025-09-04

## 2025-08-14 RX ORDER — MEPERIDINE HYDROCHLORIDE 50 MG/ML
25 INJECTION INTRAMUSCULAR; INTRAVENOUS; SUBCUTANEOUS ONCE AS NEEDED
Start: 2025-11-06

## 2025-08-14 RX ORDER — SODIUM CHLORIDE 0.9 % (FLUSH) 0.9 %
10 SYRINGE (ML) INJECTION
OUTPATIENT
Start: 2025-10-16

## 2025-08-14 RX ORDER — SODIUM CHLORIDE 0.9 % (FLUSH) 0.9 %
10 SYRINGE (ML) INJECTION
OUTPATIENT
Start: 2025-11-06

## 2025-08-14 RX ORDER — MEPERIDINE HYDROCHLORIDE 50 MG/ML
25 INJECTION INTRAMUSCULAR; INTRAVENOUS; SUBCUTANEOUS ONCE AS NEEDED
Start: 2025-09-04

## 2025-08-14 RX ORDER — EPINEPHRINE 0.3 MG/.3ML
0.3 INJECTION SUBCUTANEOUS ONCE AS NEEDED
Status: DISCONTINUED | OUTPATIENT
Start: 2025-08-14 | End: 2025-08-14 | Stop reason: HOSPADM

## 2025-08-14 RX ORDER — SODIUM CHLORIDE 0.9 % (FLUSH) 0.9 %
10 SYRINGE (ML) INJECTION
Status: CANCELLED | OUTPATIENT
Start: 2025-08-14

## 2025-08-14 RX ORDER — MEPERIDINE HYDROCHLORIDE 100 MG/ML
25 INJECTION INTRAMUSCULAR; INTRAVENOUS; SUBCUTANEOUS ONCE AS NEEDED
Status: DISCONTINUED | OUTPATIENT
Start: 2025-08-14 | End: 2025-08-14 | Stop reason: HOSPADM

## 2025-08-14 RX ORDER — HEPARIN 100 UNIT/ML
500 SYRINGE INTRAVENOUS
OUTPATIENT
Start: 2025-09-04

## 2025-08-14 RX ORDER — SODIUM CHLORIDE 0.9 % (FLUSH) 0.9 %
10 SYRINGE (ML) INJECTION
OUTPATIENT
Start: 2025-09-04

## 2025-08-14 RX ORDER — PROCHLORPERAZINE EDISYLATE 5 MG/ML
10 INJECTION INTRAMUSCULAR; INTRAVENOUS ONCE AS NEEDED
Start: 2025-10-16

## 2025-08-14 RX ORDER — HEPARIN 100 UNIT/ML
500 SYRINGE INTRAVENOUS
Status: CANCELLED | OUTPATIENT
Start: 2025-08-14

## 2025-08-14 RX ORDER — PROCHLORPERAZINE EDISYLATE 5 MG/ML
10 INJECTION INTRAMUSCULAR; INTRAVENOUS ONCE AS NEEDED
Status: CANCELLED
Start: 2025-08-14

## 2025-08-14 RX ORDER — DIPHENHYDRAMINE HYDROCHLORIDE 50 MG/ML
50 INJECTION, SOLUTION INTRAMUSCULAR; INTRAVENOUS ONCE AS NEEDED
OUTPATIENT
Start: 2025-09-25

## 2025-08-14 RX ORDER — PROCHLORPERAZINE EDISYLATE 5 MG/ML
10 INJECTION INTRAMUSCULAR; INTRAVENOUS ONCE AS NEEDED
Status: DISCONTINUED | OUTPATIENT
Start: 2025-08-14 | End: 2025-08-14 | Stop reason: HOSPADM

## 2025-08-14 RX ORDER — SODIUM CHLORIDE 0.9 % (FLUSH) 0.9 %
10 SYRINGE (ML) INJECTION
Status: DISCONTINUED | OUTPATIENT
Start: 2025-08-14 | End: 2025-08-14 | Stop reason: HOSPADM

## 2025-08-14 RX ORDER — EPINEPHRINE 0.3 MG/.3ML
0.3 INJECTION SUBCUTANEOUS ONCE AS NEEDED
OUTPATIENT
Start: 2025-11-06

## 2025-08-14 RX ORDER — PROCHLORPERAZINE EDISYLATE 5 MG/ML
10 INJECTION INTRAMUSCULAR; INTRAVENOUS ONCE AS NEEDED
Start: 2025-09-25

## 2025-08-14 RX ORDER — EPINEPHRINE 0.3 MG/.3ML
0.3 INJECTION SUBCUTANEOUS ONCE AS NEEDED
OUTPATIENT
Start: 2025-09-04

## 2025-08-14 RX ORDER — DIPHENHYDRAMINE HYDROCHLORIDE 50 MG/ML
50 INJECTION, SOLUTION INTRAMUSCULAR; INTRAVENOUS ONCE AS NEEDED
OUTPATIENT
Start: 2025-10-16

## 2025-08-14 RX ORDER — DIPHENHYDRAMINE HYDROCHLORIDE 50 MG/ML
50 INJECTION, SOLUTION INTRAMUSCULAR; INTRAVENOUS ONCE AS NEEDED
Status: DISCONTINUED | OUTPATIENT
Start: 2025-08-14 | End: 2025-08-14 | Stop reason: HOSPADM

## 2025-08-14 RX ORDER — DIPHENHYDRAMINE HYDROCHLORIDE 50 MG/ML
50 INJECTION, SOLUTION INTRAMUSCULAR; INTRAVENOUS ONCE AS NEEDED
Status: CANCELLED | OUTPATIENT
Start: 2025-08-14

## 2025-08-14 RX ORDER — MEPERIDINE HYDROCHLORIDE 50 MG/ML
25 INJECTION INTRAMUSCULAR; INTRAVENOUS; SUBCUTANEOUS ONCE AS NEEDED
Status: CANCELLED
Start: 2025-08-14

## 2025-08-14 RX ORDER — HEPARIN 100 UNIT/ML
500 SYRINGE INTRAVENOUS
OUTPATIENT
Start: 2025-09-25

## 2025-08-14 RX ORDER — SODIUM CHLORIDE 0.9 % (FLUSH) 0.9 %
10 SYRINGE (ML) INJECTION
OUTPATIENT
Start: 2025-09-25

## 2025-08-14 RX ORDER — PROCHLORPERAZINE EDISYLATE 5 MG/ML
10 INJECTION INTRAMUSCULAR; INTRAVENOUS ONCE AS NEEDED
Start: 2025-09-04

## 2025-08-14 RX ORDER — DIPHENHYDRAMINE HYDROCHLORIDE 50 MG/ML
50 INJECTION, SOLUTION INTRAMUSCULAR; INTRAVENOUS ONCE AS NEEDED
OUTPATIENT
Start: 2025-11-06

## 2025-08-14 RX ORDER — EPINEPHRINE 0.3 MG/.3ML
0.3 INJECTION SUBCUTANEOUS ONCE AS NEEDED
OUTPATIENT
Start: 2025-09-25

## 2025-08-14 RX ORDER — HEPARIN 100 UNIT/ML
500 SYRINGE INTRAVENOUS
OUTPATIENT
Start: 2025-10-16

## 2025-08-14 RX ORDER — HEPARIN 100 UNIT/ML
500 SYRINGE INTRAVENOUS
OUTPATIENT
Start: 2025-11-06

## 2025-08-14 RX ORDER — HEPARIN 100 UNIT/ML
500 SYRINGE INTRAVENOUS
Status: DISCONTINUED | OUTPATIENT
Start: 2025-08-14 | End: 2025-08-14 | Stop reason: HOSPADM

## 2025-08-14 RX ORDER — MEPERIDINE HYDROCHLORIDE 50 MG/ML
25 INJECTION INTRAMUSCULAR; INTRAVENOUS; SUBCUTANEOUS ONCE AS NEEDED
Start: 2025-10-16

## 2025-08-14 RX ORDER — PROCHLORPERAZINE EDISYLATE 5 MG/ML
10 INJECTION INTRAMUSCULAR; INTRAVENOUS ONCE AS NEEDED
Start: 2025-11-06

## 2025-08-14 RX ORDER — EPINEPHRINE 0.3 MG/.3ML
0.3 INJECTION SUBCUTANEOUS ONCE AS NEEDED
OUTPATIENT
Start: 2025-10-16

## 2025-08-14 RX ADMIN — HEPARIN 500 UNITS: 100 SYRINGE at 10:08

## 2025-08-14 RX ADMIN — TRASTUZUMAB-DKST 750 MG: KIT at 10:08

## 2025-08-14 RX ADMIN — SODIUM CHLORIDE: 9 INJECTION, SOLUTION INTRAVENOUS at 09:08

## 2025-09-04 ENCOUNTER — INFUSION (OUTPATIENT)
Dept: INFUSION THERAPY | Facility: HOSPITAL | Age: 35
End: 2025-09-04
Payer: COMMERCIAL

## 2025-09-04 VITALS
TEMPERATURE: 98 F | HEIGHT: 67 IN | HEART RATE: 86 BPM | RESPIRATION RATE: 18 BRPM | DIASTOLIC BLOOD PRESSURE: 80 MMHG | SYSTOLIC BLOOD PRESSURE: 143 MMHG | BODY MASS INDEX: 44.84 KG/M2 | WEIGHT: 285.69 LBS

## 2025-09-04 DIAGNOSIS — C50.912 INVASIVE DUCTAL CARCINOMA OF BREAST, FEMALE, LEFT: Primary | ICD-10-CM

## 2025-09-04 PROCEDURE — 25000003 PHARM REV CODE 250: Performed by: INTERNAL MEDICINE

## 2025-09-04 PROCEDURE — 63600175 PHARM REV CODE 636 W HCPCS: Mod: JZ,TB | Performed by: INTERNAL MEDICINE

## 2025-09-04 PROCEDURE — A4216 STERILE WATER/SALINE, 10 ML: HCPCS | Performed by: INTERNAL MEDICINE

## 2025-09-04 PROCEDURE — 96402 CHEMO HORMON ANTINEOPL SQ/IM: CPT

## 2025-09-04 PROCEDURE — 96413 CHEMO IV INFUSION 1 HR: CPT

## 2025-09-04 RX ORDER — HEPARIN 100 UNIT/ML
500 SYRINGE INTRAVENOUS
Status: DISCONTINUED | OUTPATIENT
Start: 2025-09-04 | End: 2025-09-04 | Stop reason: HOSPADM

## 2025-09-04 RX ORDER — PROCHLORPERAZINE EDISYLATE 5 MG/ML
10 INJECTION INTRAMUSCULAR; INTRAVENOUS ONCE AS NEEDED
Status: DISCONTINUED | OUTPATIENT
Start: 2025-09-04 | End: 2025-09-04 | Stop reason: HOSPADM

## 2025-09-04 RX ORDER — DIPHENHYDRAMINE HYDROCHLORIDE 50 MG/ML
50 INJECTION, SOLUTION INTRAMUSCULAR; INTRAVENOUS ONCE AS NEEDED
Status: DISCONTINUED | OUTPATIENT
Start: 2025-09-04 | End: 2025-09-04 | Stop reason: HOSPADM

## 2025-09-04 RX ORDER — MEPERIDINE HYDROCHLORIDE 100 MG/ML
25 INJECTION INTRAMUSCULAR; INTRAVENOUS; SUBCUTANEOUS ONCE AS NEEDED
Status: DISCONTINUED | OUTPATIENT
Start: 2025-09-04 | End: 2025-09-04 | Stop reason: HOSPADM

## 2025-09-04 RX ORDER — SODIUM CHLORIDE 0.9 % (FLUSH) 0.9 %
10 SYRINGE (ML) INJECTION
Status: DISCONTINUED | OUTPATIENT
Start: 2025-09-04 | End: 2025-09-04 | Stop reason: HOSPADM

## 2025-09-04 RX ORDER — EPINEPHRINE 0.3 MG/.3ML
0.3 INJECTION SUBCUTANEOUS ONCE AS NEEDED
Status: DISCONTINUED | OUTPATIENT
Start: 2025-09-04 | End: 2025-09-04 | Stop reason: HOSPADM

## 2025-09-04 RX ADMIN — TRASTUZUMAB-DKST 750 MG: KIT at 09:09

## 2025-09-04 RX ADMIN — SODIUM CHLORIDE: 9 INJECTION, SOLUTION INTRAVENOUS at 09:09

## 2025-09-04 RX ADMIN — HEPARIN 500 UNITS: 100 SYRINGE at 10:09

## 2025-09-04 RX ADMIN — Medication 10 ML: at 10:09

## 2025-09-04 RX ADMIN — GOSERELIN ACETATE 3.6 MG: 3.6 IMPLANT SUBCUTANEOUS at 09:09

## (undated) DEVICE — SUT STRATAFIX PGAPCL 3 FS-1

## (undated) DEVICE — HOLDER DRAIN POUCH PINK

## (undated) DEVICE — SUT PROLENE 4-0 MONO 18IN

## (undated) DEVICE — GOWN NONREINF SET-IN SLV XL

## (undated) DEVICE — DRAPE THREE-QTR REINF 53X77IN

## (undated) DEVICE — SUT MCRYL PLUS 3-0 PS2 27IN

## (undated) DEVICE — POSITIONER HEEL FOAM CONVOLTD

## (undated) DEVICE — SYS CLSR DERMABOND PRINEO 22CM

## (undated) DEVICE — UNDERGLOVES BIOGEL PI SZ 7 LF

## (undated) DEVICE — BANDAGE ROLL COTTN 4.5INX4.1YD

## (undated) DEVICE — NDL HYPO REG 25G X 1 1/2

## (undated) DEVICE — SYR SALINE FLSH PRFL STRL 10ML

## (undated) DEVICE — GLOVE BIOGEL SKINSENSE PI 7.0

## (undated) DEVICE — NDL ECLIPSE SAFETY 23G 1.5IN

## (undated) DEVICE — SYS CLSR DERMABOND PRINEO 42CM

## (undated) DEVICE — SPONGE LAP 18X18 PREWASHED

## (undated) DEVICE — TOWEL OR DISP STRL BLUE 4/PK

## (undated) DEVICE — Device

## (undated) DEVICE — SUT VICRYL PLUS 4-0 PS2 27

## (undated) DEVICE — BANDAGE BULKEE II 2.25INX3YD

## (undated) DEVICE — SOL NORMAL USPCA 0.9%

## (undated) DEVICE — SUT VICRYL PLUS 2-0 CT1 18

## (undated) DEVICE — SYR 10CC LUER LOCK

## (undated) DEVICE — SUT MCRYL PLUS 4-0 PS2 27IN

## (undated) DEVICE — SYR ONLY LUER LOCK 20CC

## (undated) DEVICE — SUT 2/0 30IN SILK BLK BRAI

## (undated) DEVICE — DRAPE STERI INSTRUMENT 1018

## (undated) DEVICE — POSITIONER IV ARMBOARD FOAM

## (undated) DEVICE — NDL SPINAL 20GX3.5 HUB

## (undated) DEVICE — BAG DRAIN ANTI REFLUX 2000ML

## (undated) DEVICE — APPLICATOR CHLORAPREP ORN 26ML

## (undated) DEVICE — GOWN ECLIPSE REINF LVL4 TWL LG

## (undated) DEVICE — ADHESIVE DERMABOND ADVANCED

## (undated) DEVICE — SUT VICRYL 3-0 27 SH

## (undated) DEVICE — CLOSURE SKIN STERI STRIP 1/2X4

## (undated) DEVICE — ELECTRODE REM PLYHSV RETURN 9

## (undated) DEVICE — CONTAINER SPEC OR STRL 4.5OZ

## (undated) DEVICE — ELECTRODE BLADE INSULATED 1 IN

## (undated) DEVICE — ELECTRODE BLD EXT INSUL 1

## (undated) DEVICE — STAPLER SKIN ROTATING HEAD

## (undated) DEVICE — GLOVE BIOGEL SKINSENSE PI 6.5

## (undated) DEVICE — UNDERGLOVE BIOGEL PI SZ 6.5 LF

## (undated) DEVICE — EVACUATOR PENCIL SMOKE NEPTUNE

## (undated) DEVICE — GLOVE BIOGEL SKINSENSE PI 6.0

## (undated) DEVICE — APPLIER CLIP LIAGCLIP 9.375IN

## (undated) DEVICE — GOWN ORBIS LVL 4 XXL 49IN